# Patient Record
Sex: MALE | Race: WHITE | NOT HISPANIC OR LATINO | Employment: OTHER | URBAN - METROPOLITAN AREA
[De-identification: names, ages, dates, MRNs, and addresses within clinical notes are randomized per-mention and may not be internally consistent; named-entity substitution may affect disease eponyms.]

---

## 2017-01-05 ENCOUNTER — IMMUNIZATION (OUTPATIENT)
Dept: INTERNAL MEDICINE | Facility: CLINIC | Age: 57
End: 2017-01-05
Payer: MEDICARE

## 2017-01-05 ENCOUNTER — OFFICE VISIT (OUTPATIENT)
Dept: INTERNAL MEDICINE | Facility: CLINIC | Age: 57
End: 2017-01-05
Payer: MEDICARE

## 2017-01-05 VITALS
HEIGHT: 74 IN | BODY MASS INDEX: 32.48 KG/M2 | HEART RATE: 62 BPM | SYSTOLIC BLOOD PRESSURE: 122 MMHG | WEIGHT: 253.06 LBS | DIASTOLIC BLOOD PRESSURE: 72 MMHG

## 2017-01-05 DIAGNOSIS — F31.70 BIPOLAR DISORDER IN FULL REMISSION, MOST RECENT EPISODE UNSPECIFIED TYPE: Chronic | ICD-10-CM

## 2017-01-05 DIAGNOSIS — I10 ESSENTIAL HYPERTENSION: Primary | ICD-10-CM

## 2017-01-05 DIAGNOSIS — E78.5 HYPERLIPIDEMIA, UNSPECIFIED HYPERLIPIDEMIA TYPE: ICD-10-CM

## 2017-01-05 DIAGNOSIS — E55.9 VITAMIN D INSUFFICIENCY: ICD-10-CM

## 2017-01-05 DIAGNOSIS — Z12.5 PROSTATE CANCER SCREENING: ICD-10-CM

## 2017-01-05 DIAGNOSIS — R00.8 GALLOP RHYTHM: ICD-10-CM

## 2017-01-05 DIAGNOSIS — E66.9 OBESITY (BMI 30.0-34.9): ICD-10-CM

## 2017-01-05 PROCEDURE — 99213 OFFICE O/P EST LOW 20 MIN: CPT | Mod: PBBFAC,25 | Performed by: INTERNAL MEDICINE

## 2017-01-05 PROCEDURE — 99999 PR PBB SHADOW E&M-EST. PATIENT-LVL III: CPT | Mod: PBBFAC,,, | Performed by: INTERNAL MEDICINE

## 2017-01-05 PROCEDURE — 99214 OFFICE O/P EST MOD 30 MIN: CPT | Mod: S$PBB,,, | Performed by: INTERNAL MEDICINE

## 2017-01-05 PROCEDURE — G0008 ADMIN INFLUENZA VIRUS VAC: HCPCS | Mod: PBBFAC | Performed by: INTERNAL MEDICINE

## 2017-01-05 RX ORDER — QUETIAPINE FUMARATE 100 MG/1
TABLET, FILM COATED ORAL
Refills: 5 | COMMUNITY
Start: 2016-12-08 | End: 2018-10-07

## 2017-01-05 RX ORDER — LITHIUM CARBONATE 300 MG/1
CAPSULE ORAL
Refills: 3 | COMMUNITY
Start: 2016-11-08 | End: 2018-12-14

## 2017-01-05 RX ORDER — AMOXICILLIN 500 MG/1
CAPSULE ORAL
Refills: 0 | COMMUNITY
Start: 2016-11-29 | End: 2017-01-05

## 2017-01-05 RX ORDER — TRIAMCINOLONE ACETONIDE 1 MG/G
PASTE DENTAL
Refills: 0 | COMMUNITY
Start: 2016-11-29 | End: 2017-05-25

## 2017-01-05 NOTE — PROGRESS NOTES
Subjective:       Patient ID: Abel Dc III is a 56 y.o. male.    Chief Complaint: Follow-up    HPI    Last visit with me 03/2016. Since then seen by Internal Medicine and Orthopedics.     Reports health overall going well. Has some sinus congestion but not severe. Planning to go to dentist for tooth removal. otherwise feeling pretty good.     Now using Lithium 1 tab qAM and 2 tabs QHS. Was in [place] back in Dec of 2015, then from June 8 to 14 for worsening symptoms. Also developed dehydration. Reports appetite is mildly decreased due to changes in taste.    Not doing regular exercise. Only walks dogs in his yard.     Review of Systems   All other systems reviewed and are negative.      Objective:      Physical Exam   Constitutional: He is oriented to person, place, and time. No distress.    man whose Body mass index is 32.49 kg/(m^2).    HENT:   Head: Atraumatic.   Mouth/Throat: Oropharynx is clear and moist. No oropharyngeal exudate.   tympanic membrane obscured by cerumen bilaterally    Eyes: Pupils are equal, round, and reactive to light. Right eye exhibits no discharge. Left eye exhibits no discharge.   Neck: No thyromegaly present.   Cardiovascular: Normal rate and regular rhythm.  Exam reveals gallop and S4.    No murmur heard.  Pulmonary/Chest: Effort normal and breath sounds normal. He has no wheezes. He has no rales.   Abdominal: Soft. He exhibits no distension and no mass. There is no hepatosplenomegaly. There is no tenderness. There is no rigidity, no guarding and negative Christine's sign.   Musculoskeletal: He exhibits no edema or tenderness.   Lymphadenopathy:     He has no cervical adenopathy.   Neurological: He is alert and oriented to person, place, and time.   Skin: Skin is warm and dry. No rash noted.   Psychiatric: He has a normal mood and affect. His behavior is normal.   Nursing note and vitals reviewed.      Vitals:    01/05/17 0834   BP: 122/72   BP Location: Right arm  "  Patient Position: Sitting   BP Method: Manual   Pulse: 62   Weight: 114.8 kg (253 lb 1.4 oz)   Height: 6' 2" (1.88 m)     Body mass index is 32.49 kg/(m^2).    RESULTS: Reviewed labs from last 12 months. Reviewed stress test 2013.    Assessment:       1. Essential hypertension    2. Hyperlipidemia, unspecified hyperlipidemia type    3. Bipolar disorder in full remission, most recent episode unspecified type    4. Vitamin D insufficiency    5. Gallop rhythm    6. Prostate cancer screening    7. Obesity (BMI 30.0-34.9)        Plan:   Abel was seen today for follow-up.    Diagnoses and all orders for this visit:    Essential hypertension:  Prior diagnosis, well controlled on current management.   -     Comprehensive metabolic panel; Future  -     CBC Without Differential; Future    Hyperlipidemia, unspecified hyperlipidemia type:  Prior diagnosis, well controlled on current management.   -     Lipid panel; Future    Bipolar disorder in full remission, most recent episode unspecified type:  Stable, continue follow up with Psychiatry.    Vitamin D insufficiency:  Low levels, recheck, if decreased further start supplement.  -     Vitamin D; Future    Gallop rhythm:  Asymptomatic, known LVH, continue blood pressure control.    Prostate cancer screening  -     PSA, Screening; Future    Obesity (BMI 30.0-34.9):  Pt instructed to increase walking to help decrease weight:  "Please start walking more frequently, at least 30 minutes on most days out of the week."    Return in about 3 months (around 4/5/2017).     Jonah Harrison MD  Internal Medicine    Portions of this note were completed using [employer] dictation software. Please excuse typographical or syntax errors.   "

## 2017-01-07 RX ORDER — SUCRALFATE 1 G/1
TABLET ORAL
Qty: 360 TABLET | Refills: 0 | Status: SHIPPED | OUTPATIENT
Start: 2017-01-07 | End: 2017-03-31 | Stop reason: SDUPTHER

## 2017-01-09 ENCOUNTER — DOCUMENTATION ONLY (OUTPATIENT)
Dept: REHABILITATION | Facility: HOSPITAL | Age: 57
End: 2017-01-09

## 2017-01-09 NOTE — PROGRESS NOTES
PHYSICAL THERAPY DISCHARGE SUMMARY     Name: Abel Dc III  Clinic Number: [id number]    Diagnosis:bilateral knee pain, difficulty walking  Physician: [facility], John MD  Treatment Orders: Discharge Summary  Past Medical History   Diagnosis Date    Bipolar disorder      manic depressive x 1992    BPH (benign prostatic hypertrophy)     Chronic low back pain     Depression     GERD (gastroesophageal reflux disease)     Gout, arthritis     Hyperlipidemia     Hypertension     Lumbar disc disease     Obesity     Panic disorder        Initial visit: 12/4/15  Date of Last visit: 12/7/15  Date of Discharge Note:  1/9/17  Total Visits Received: 2  Missed Visits: 0  ASSESSMENT   Status Towards Goals Met:      PHYSICAL THERAPY DISCHARGE SUMMARY     Name: Aebl Dc III  Clinic Number: [id number]    Diagnosis: bilateral knee pain, difficulty walking  Physician: [facility], John MD  Treatment Orders: Discharge Summary  Past Medical History   Diagnosis Date    Bipolar disorder      manic depressive x 1992    BPH (benign prostatic hypertrophy)     Chronic low back pain     Depression     GERD (gastroesophageal reflux disease)     Gout, arthritis     Hyperlipidemia     Hypertension     Lumbar disc disease     Obesity     Panic disorder        Initial visit: 12/4/15  Date of Last visit: 12/7/15  Date of Discharge Note:  1/9/17  Total Visits Received: 2  Missed Visits: 0  ASSESSMENT   Status Towards Goals Met:  Patient did not return to physical therapy.  Pt is discharged prior to achieving the goals established in the initial evaluation due to discontinuing physical therapy.     Goals Not achieved and why: see above    Discharge reason : Pt has not re-scheduled further follow-up sessions    G-CODE: Discharge g-code not filed due to discharge note being documentation only. Upon eval Pt was at CL at least 60% < 80% impaired, limited or restricted on the LEFS with g-code goal set at CK at least 40% < 60% impaired,  limited or restricted.    PLAN   This patient is discharged from Physical Therapy Services.     GOALS:   Short Term Goals: 4 weeks  1. Pt. to report decreased knee pain </= 6/10 at worst to increase tolerance for prolonged standing/ambulation  2. Pt. to demonstrate proper gait mechanics requiring min. to no verbal cues from PT  3. Pt. to demonstrate increased MMT for bilateral gluteus medius to 4-/5 to increase tolerance for community ambulation  5. Pt. to demonstrate increased MMT for quadriceps to 4/5 to increase tolerance to sit to standing transfers  6. Pt. to demonstrate increased MMT for hip flexor to 3/5 to increase ease with toe clearance and stair negotiation  7. Pt to report decreased pain with sit to stand transfers  8. Pt to tolerate HEP to improve ROM and independence with ADL's     Long Term Goals: 8 weeks  1. Pt. to report decreased knee pain </= 4/10 at worst to increase tolerance for community ambulation  2. Pt. to demonstrate proper gait mechanics requiring no verbal cues from PT  3. Pt. to demonstrate increased MMT for bilateral gluteus medius to 4-/5 to increase tolerance for community ambulation  4. Pt. to demonstrate increased MMT for quadriceps to 4/5 to increase tolerance to sit to standing transfers  5. Pt. to demonstrate increased MMT for hip flexor to 3/5 to increase ease with toe clearance and stair negotiation  6. Pt. to demonstrate increased MMT for hamstring strength to 4+/5 to increase ease with toe clearance and stair negotiation  7. Pt will report at CK at least 40% < 60% impaired, limited or restricted on LEFS to demonstrate increase in LE function with every day tasks.   8. Pt to be Independent with HEP to improve ROM and independence with ADL's

## 2017-01-27 RX ORDER — IBUPROFEN 800 MG/1
TABLET ORAL
Qty: 30 TABLET | Refills: 5 | Status: SHIPPED | OUTPATIENT
Start: 2017-01-27 | End: 2017-03-29 | Stop reason: SDUPTHER

## 2017-02-02 DIAGNOSIS — G25.81 RESTLESS LEG SYNDROME: ICD-10-CM

## 2017-02-03 RX ORDER — GABAPENTIN 300 MG/1
CAPSULE ORAL
Qty: 90 CAPSULE | Refills: 3 | Status: SHIPPED | OUTPATIENT
Start: 2017-02-03 | End: 2017-12-19 | Stop reason: SDUPTHER

## 2017-03-29 RX ORDER — IBUPROFEN 800 MG/1
TABLET ORAL
Qty: 30 TABLET | Refills: 0 | Status: SHIPPED | OUTPATIENT
Start: 2017-03-29 | End: 2017-04-08 | Stop reason: SDUPTHER

## 2017-03-31 RX ORDER — SUCRALFATE 1 G/1
TABLET ORAL
Qty: 360 TABLET | Refills: 0 | Status: SHIPPED | OUTPATIENT
Start: 2017-03-31 | End: 2019-05-13

## 2017-04-03 ENCOUNTER — HOSPITAL ENCOUNTER (EMERGENCY)
Facility: HOSPITAL | Age: 57
Discharge: HOME | End: 2017-04-03
Attending: FAMILY MEDICINE | Admitting: FAMILY MEDICINE
Payer: MEDICARE

## 2017-04-03 VITALS
HEIGHT: 72 IN | HEART RATE: 67 BPM | SYSTOLIC BLOOD PRESSURE: 139 MMHG | BODY MASS INDEX: 32.51 KG/M2 | OXYGEN SATURATION: 97 % | RESPIRATION RATE: 18 BRPM | WEIGHT: 240 LBS | TEMPERATURE: 97 F | DIASTOLIC BLOOD PRESSURE: 73 MMHG

## 2017-04-03 DIAGNOSIS — M10.062 ACUTE IDIOPATHIC GOUT OF LEFT KNEE: Primary | ICD-10-CM

## 2017-04-03 PROCEDURE — 99283 EMERGENCY DEPT VISIT LOW MDM: CPT | Mod: 25

## 2017-04-03 PROCEDURE — 96372 THER/PROPH/DIAG INJ SC/IM: CPT

## 2017-04-03 PROCEDURE — 63600175 PHARM REV CODE 636 W HCPCS: Performed by: FAMILY MEDICINE

## 2017-04-03 PROCEDURE — 99283 EMERGENCY DEPT VISIT LOW MDM: CPT | Mod: ,,, | Performed by: FAMILY MEDICINE

## 2017-04-03 RX ORDER — TRAMADOL HYDROCHLORIDE 50 MG/1
50 TABLET ORAL EVERY 6 HOURS PRN
Qty: 12 TABLET | Refills: 0 | Status: SHIPPED | OUTPATIENT
Start: 2017-04-03 | End: 2017-05-25

## 2017-04-03 RX ORDER — METHYLPREDNISOLONE 4 MG/1
TABLET ORAL
Qty: 1 PACKAGE | Refills: 0 | Status: SHIPPED | OUTPATIENT
Start: 2017-04-03 | End: 2017-04-24

## 2017-04-03 RX ORDER — TRIAMCINOLONE ACETONIDE 40 MG/ML
80 INJECTION, SUSPENSION INTRA-ARTICULAR; INTRAMUSCULAR
Status: COMPLETED | OUTPATIENT
Start: 2017-04-03 | End: 2017-04-03

## 2017-04-03 RX ADMIN — TRIAMCINOLONE ACETONIDE 80 MG: 40 INJECTION, SUSPENSION INTRA-ARTICULAR; INTRAMUSCULAR at 03:04

## 2017-04-03 NOTE — ED PROVIDER NOTES
"Encounter Date: 4/3/2017    SCRIBE #1 NOTE: I, Sally Ace , am scribing for, and in the presence of, Dr. Blunt .       History     Chief Complaint   Patient presents with    Knee Pain     L knee pain denies injury     Review of patient's allergies indicates:   Allergen Reactions    Hydrocodone      Other reaction(s): Hallucinations    Hydrocodone-acetaminophen      Other reaction(s): hyperactivity    Oxycodone Other (See Comments)     hallucinations    Pseudoephedrine      Other reaction(s): Hallucinations    Pseudoephedrine hcl      Other reaction(s): hyperactivity    Risperidone      Other reaction(s): Hallucinations  Other reaction(s): hyperactivity    Naproxen      HPI Comments: Time seen by provider: 2:47 PM    This is a 56 y.o. male with PMHx of HTN, HLD, GERD, gout, and lumbar disc disease who presents with complaint of left knee pain for a few days. Patient c/o a lot of pain in his knee cap associated with knee warmth and swelling. Patient states that last week the pain was in his foot after he wore boots and the next day pt.states  "my foot was killing me." Patient denies injury when he was in the boots but they were just hard on his joints. Patient reports that he had problems with cartilage in his knees years ago but did not have to have any surgeries. He also states that he has a history of gout but he has never had it in his knee. He has endorsed 800 mg of ibuprofen a day but it has not relieved the knee pain.     The history is provided by the patient and medical records.     Past Medical History:   Diagnosis Date    Bipolar disorder     manic depressive x 1992    BPH (benign prostatic hypertrophy)     Chronic low back pain     Depression     GERD (gastroesophageal reflux disease)     Gout, arthritis     Hyperlipidemia     Hypertension     Lumbar disc disease     Obesity     Panic disorder      Past Surgical History:   Procedure Laterality Date    HERNIA REPAIR      HUMERUS " "FRACTURE SURGERY  1971    Bone grafts required    MANDIBLE FRACTURE SURGERY      MUSCLE TRANSFER UPPER ARM      SHOULDER ARTHROSCOPY      SHOULDER SURGERY      TONSILLECTOMY       Family History   Problem Relation Age of Onset    Diabetes Mother     Breast cancer Mother      Social History   Substance Use Topics    Smoking status: Never Smoker    Smokeless tobacco: Current User     Types: Snuff      Comment: Patient uses "dip" tobacco    Alcohol use No      Comment: Heavy alcohol use in the past.      Review of Systems   Musculoskeletal: Positive for joint swelling.        Positive for knee pain       Physical Exam   Initial Vitals   BP Pulse Resp Temp SpO2   04/03/17 1324 04/03/17 1324 04/03/17 1324 04/03/17 1324 04/03/17 1324   127/60 63 18 97.7 °F (36.5 °C) 98 %     Physical Exam    Nursing note and vitals reviewed.  HENT:   Head: Atraumatic.   Cardiovascular: Normal rate, regular rhythm and normal heart sounds.   No murmur heard.  Pulmonary/Chest: Breath sounds normal. No respiratory distress. He has no wheezes. He has no rhonchi. He has no rales.   Abdominal: Soft. There is no tenderness.   Musculoskeletal:   Left knee warm to touch, mild effusion. Moderate diffuse joint tenderness. No gross instability.    Neurological: He is alert and oriented to person, place, and time.         ED Course   Procedures  Labs Reviewed - No data to display          Medical Decision Making:   History:   Old Medical Records: I decided to obtain old medical records.  ED Management:  Patient with clinical history suggestive of gout stable for discharge.  No imaging studies required  Prescriptions for Medrol Dosepak and tramadol for pain provided for patient            Scribe Attestation:   Scribe #1: I performed the above scribed service and the documentation accurately describes the services I performed. I attest to the accuracy of the note.    Attending Attestation:           Physician Attestation for Scribe:  Physician " Attestation Statement for Scribe #1: I, Dr. Blunt , reviewed documentation, as scribed by Sally Bello  in my presence, and it is both accurate and complete.                 ED Course     Clinical Impression:   The encounter diagnosis was Acute idiopathic gout of left knee.    Disposition:   Disposition: Discharged  Condition: Stable       Ariel Blunt MD  04/04/17 1605       Ariel Blutn MD  04/07/17 2988

## 2017-04-03 NOTE — DISCHARGE INSTRUCTIONS
Gout Diet  Gout is a painful condition caused by an excess of uric acid, a waste product made by the body. Uric acid forms crystals that collect in the joints. The immune response to these crystals brings on symptoms of joint pain and swelling. This is called a gout attack. Often, medications and diet changes are combined to manage gout. Below are some guidelines for changing your diet to help you manage gout and prevent attacks. Your health care provider will help you determine the best eating plan for you.     Eating to manage gout  Weight loss for those who are overweight may help reduce gout attacks.  Eat less of these foods  Eating too many foods containing purines may raise the levels of uric acid in your body. This raises your risk for a gout attack. Try to limit these foods and drinks:  · Alcohol, such as beer and red wine. You may be told to avoid alcohol completely.  · Soft drinks that contain sugar or high fructose corn syrup  · Certain fish, including anchovies, sardines, fish eggs, and herring  · Shellfish  · Certain meats, such as red meat, hot dogs, luncheon meats, and turkey  · Organ meats, such as liver, kidneys, and sweetbreads  · Legumes, such as dried beans and peas  · Other high fat foods such as gravy, whole milk, and high fat cheeses  · Vegetables such as asparagus, cauliflower, spinach, and mushrooms used to be thought to contribute to an increased risk for a gout attack, but recent studies show that high purine vegetables don't increase the risk for a gout attack.  Eat more of these foods  Other foods may be helpful for people with gout. Add some of these foods to your diet:  · Cherries contain chemicals that may lower uric acid.  · Omega fatty acids. These are found in some fatty fish such as salmon, certain oils (flax, olive, or nut), and nuts themselves. Omega fatty acids may help prevent inflammation due to gout.  · Dairy products that are low-fat or fat-free, such as cheese and  yogurt  · Complex carbohydrate foods, including whole grains, brown rice, oats, and beans  · Coffee, in moderation  · Water, approximately 64 ounces per day  Follow-up care  Follow up with your healthcare provider as advised.  When to seek medical advice  Call your healthcare provider right away if any of these occur:  · Return of gout symptoms, usually at night:  · Severe pain, swelling, and heat in a joint, especially the base of the big toe  · Affected joint is hard to move  · Skin of the affected joint is purple or red  · Fever of 100.4°F (38°C) or higher  · Pain that doesn't get better even with prescribed medicine   Date Last Reviewed: 1/12/2016  © 0949-0258 [employer]. [address] [place] [address], [place] [address]. All rights reserved. This information is not intended as a substitute for professional medical care. Always follow your healthcare professional's instructions.          Treating Gout Attacks     Raising the joint above the level of your heart can help reduce gout symptoms.     Gout is a disease that affects the joints. It is caused by excess uric acid in your blood stream that may lead to crystals forming in your joints. Left untreated, it can lead to painful foot and joint deformities and even kidney problems. But, by treating gout early, you can relieve pain and help prevent future problems. Gout can usually be treated with medication and proper diet. In severe cases, surgery may be needed.  Gout attacks are painful and often happen more than once. Taking medications may reduce pain and prevent attacks in the future. There are also some things you can do at home to relieve symptoms.  Medications for gout  Your healthcare provider may prescribe a daily medication to reduce levels of uric acid. Reducing your uric acid levels may help prevent gout attacks. Allopurinol is one commonly used medication taken daily to reduce uric acid levels. Other medications can help relieve pain and  swelling during an acute attack. Medicines such as NSAIDs (nonsteroidal anti-inflammatory medicines), steroids, and colchicine may be prescribed for intermittent use to relieve an acute gout attack. Be sure to take your medication as directed.  What you can do  Below are some things you can do at home to relieve gout symptoms. Your healthcare provider may have other tips.  · Rest the painful joint as much as you can.  · Raise the painful joint so it is at a level higher than your heart.  · Use ice for 10 minutes every 1-2 hours as possible.  How can I prevent gout?  With a little effort, you may be able to prevent gout attacks in the future. Here are some things you can do:  · Avoid foods high in purines  ¨ Certain meats (red meat, processed meat, turkey)  ¨ Organ meats (kidney, liver, sweetbread)  ¨ Shellfish (lobster, crab, shrimp, scallop, mussel)  ¨ Certain fish (anchovy, sardine, herring, mackerel)  · Take any medications prescribed by your healthcare provider.  · Lose weight if you need to.  · Reduce high fructose corn syrup in meals and drinks.  · Reduce or eliminate consumption of alcohol, particularly beer, but also red wine and spirits.  · Control blood pressure, diabetes, and cholesterol.  · Drink plenty of water to help flush uric acid from your body.  Date Last Reviewed: 2/1/2016  © 5378-4576 [employer]. [address] [place] [address], [place] [address]. All rights reserved. This information is not intended as a substitute for professional medical care. Always follow your healthcare professional's instructions.

## 2017-04-03 NOTE — ED NOTES
Patient identifiers verified and correct for Abel Dc III.    LOC: The patient is awake, alert and aware of environment with an appropriate affect, the patient is oriented x 3 and speaking appropriately.  APPEARANCE: Patient resting comfortably and in no acute distress, patient is clean and well groomed, patient's clothing is properly fastened.  SKIN: The skin is warm and dry, color consistent with ethnicity, patient has normal skin turgor and moist mucus membranes, skin intact, no breakdown or bruising noted.  MUSCULOSKELETAL: Patient moving all extremities spontaneously, no obvious swelling or deformities noted.

## 2017-04-03 NOTE — ED NOTES
Discharge home with family, states understanding to follow up as directed. Ambulates out of ED without difficulty. RX given,

## 2017-04-03 NOTE — ED AVS SNAPSHOT
[facility]  [address] [facility] [address]               Abel Dc III   4/3/2017  2:39 PM   ED    Description:  Male : 1960   Department:  [facility]           Your Care was Coordinated By:     Provider Role From To    Ariel Blunt MD Attending Provider 17 1443 --      Reason for Visit     Knee Pain           Diagnoses this Visit        Comments    Acute idiopathic gout of left knee    -  Primary       ED Disposition     ED Disposition Condition Comment    Discharge  D/C after injection time           To Do List           Follow-up Information     Follow up with Jonah Harrison MD In 1 week.    Specialty:  Internal Medicine    Why:  As needed, If symptoms worsen or fail to resolve    Contact information:    [address]  [facility] [address]  [phone]         These Medications        Disp Refills Start End    methylPREDNISolone (MEDROL DOSEPACK) 4 mg tablet 1 Package 0 4/3/2017 2017    Take as directed x 6 days    Pharmacy: [facility] [address] AT [facility] Ph #: [phone]       tramadol (ULTRAM) 50 mg tablet 12 tablet 0 4/3/2017     Take 1 tablet (50 mg total) by mouth every 6 (six) hours as needed for Pain. - Oral    Pharmacy: [facility] [address] AT [facility] Ph #: [phone]         [facility]     [facility] -  Assistance  Unless otherwise directed by your provider, please contact [facility], our nurse care line that is available for  assistance.     Registered nurses in the [facility] provide: appointment scheduling, clinical advisement, health education, and other advisory services.  Call: [phone] (toll free)               Medications           Message regarding Medications     Verify the changes and/or additions  to your medication regime listed below are the same as discussed with your clinician today.  If any of these changes or additions are incorrect, please notify your healthcare provider.        START taking these NEW medications        Refills    methylPREDNISolone (MEDROL DOSEPACK) 4 mg tablet 0    Sig: Take as directed x 6 days    Class: Print    tramadol (ULTRAM) 50 mg tablet 0    Sig: Take 1 tablet (50 mg total) by mouth every 6 (six) hours as needed for Pain.    Class: Print    Route: Oral      These medications were administered today        Dose Freq    triamcinolone acetonide injection 80 mg 80 mg ED 1 Time    Sig: Inject 2 mLs (80 mg total) into the muscle ED 1 Time.    Class: Normal    Route: Intramuscular           Verify that the below list of medications is an accurate representation of the medications you are currently taking.  If none reported, the list may be blank. If incorrect, please contact your healthcare provider. Carry this list with you in case of emergency.           Current Medications     amlodipine (NORVASC) 10 MG tablet Take 1 tablet (10 mg total) by mouth once daily.    aspirin 81 mg Tab Take 1 tablet by mouth.    busPIRone (BUSPAR) 15 MG tablet Take 15 mg by mouth 3 (three) times daily.     fluoxetine (PROZAC) 20 MG capsule TK 1 C PO QAM.    fluticasone (FLONASE) 50 mcg/actuation nasal spray SPRAY ONCE IN EACH NOSTRIL ONCE DAILY    gabapentin (NEURONTIN) 300 MG capsule TAKE ONE CAPSULE BY MOUTH EVERY EVENING    ibuprofen (ADVIL,MOTRIN) 800 MG tablet TAKE 1 TABLET BY MOUTH EVERY 8 HOURS AS NEEDED FOR GOUT PAIN    lithium (ESKALITH) 300 MG capsule TK ONE C PO TID    methylPREDNISolone (MEDROL DOSEPACK) 4 mg tablet Take as directed x 6 days    omeprazole (PRILOSEC) 20 MG capsule TAKE ONE CAPSULE BY MOUTH EVERY DAY    oxybutynin (DITROPAN-XL) 10 MG 24 hr tablet Take 1 tablet (10 mg total) by mouth once daily.    pravastatin (PRAVACHOL) 40 MG tablet Take 1 tablet (40 mg total) by mouth  nightly.    propranolol (INDERAL) 20 MG tablet TAKE ONE TABLET BY MOUTH TWICE DAILY    quetiapine (SEROQUEL) 100 MG Tab TK 1 T PO QHS    quetiapine (SEROQUEL) 50 MG tablet TK 1 T PO TID    sucralfate (CARAFATE) 1 gram tablet TAKE 1 TABLET BY MOUTH FOUR TIMES DAILY    tamsulosin (FLOMAX) 0.4 mg Cp24 TAKE 1 CAPSULE BY MOUTH EVERY MORNING    tramadol (ULTRAM) 50 mg tablet Take 1 tablet (50 mg total) by mouth every 6 (six) hours as needed for Pain.    triamcinolone acetonide 0.1% (KENALOG) 0.1 % paste FIONA THIN AREA AA TID. PRESS A SMALL DAB TO LESION UNTIL A THIN FILM DEVELOPS.           Clinical Reference Information           Your Vitals Were     BP Pulse Temp Resp Height Weight    127/60 63 97.7 °F (36.5 °C) (Oral) 18 6' (1.829 m) 108.9 kg (240 lb)    SpO2 BMI             98% 32.55 kg/m2         Allergies as of 4/3/2017        Reactions    Hydrocodone     Other reaction(s): Hallucinations    Hydrocodone-acetaminophen     Other reaction(s): hyperactivity    Oxycodone Other (See Comments)    hallucinations    Pseudoephedrine     Other reaction(s): Hallucinations    Pseudoephedrine Hcl     Other reaction(s): hyperactivity    Risperidone     Other reaction(s): Hallucinations  Other reaction(s): hyperactivity    Naproxen       Immunizations Administered on Date of Encounter - 4/3/2017     None      ED Micro, Lab, POCT     None      ED Imaging Orders     None        Discharge Instructions         Gout Diet  Gout is a painful condition caused by an excess of uric acid, a waste product made by the body. Uric acid forms crystals that collect in the joints. The immune response to these crystals brings on symptoms of joint pain and swelling. This is called a gout attack. Often, medications and diet changes are combined to manage gout. Below are some guidelines for changing your diet to help you manage gout and prevent attacks. Your health care provider will help you determine the best eating plan for you.     Eating to manage  gout  Weight loss for those who are overweight may help reduce gout attacks.  Eat less of these foods  Eating too many foods containing purines may raise the levels of uric acid in your body. This raises your risk for a gout attack. Try to limit these foods and drinks:  · Alcohol, such as beer and red wine. You may be told to avoid alcohol completely.  · Soft drinks that contain sugar or high fructose corn syrup  · Certain fish, including anchovies, sardines, fish eggs, and herring  · Shellfish  · Certain meats, such as red meat, hot dogs, luncheon meats, and turkey  · Organ meats, such as liver, kidneys, and sweetbreads  · Legumes, such as dried beans and peas  · Other high fat foods such as gravy, whole milk, and high fat cheeses  · Vegetables such as asparagus, cauliflower, spinach, and mushrooms used to be thought to contribute to an increased risk for a gout attack, but recent studies show that high purine vegetables don't increase the risk for a gout attack.  Eat more of these foods  Other foods may be helpful for people with gout. Add some of these foods to your diet:  · Cherries contain chemicals that may lower uric acid.  · Omega fatty acids. These are found in some fatty fish such as salmon, certain oils (flax, olive, or nut), and nuts themselves. Omega fatty acids may help prevent inflammation due to gout.  · Dairy products that are low-fat or fat-free, such as cheese and yogurt  · Complex carbohydrate foods, including whole grains, brown rice, oats, and beans  · Coffee, in moderation  · Water, approximately 64 ounces per day  Follow-up care  Follow up with your healthcare provider as advised.  When to seek medical advice  Call your healthcare provider right away if any of these occur:  · Return of gout symptoms, usually at night:  · Severe pain, swelling, and heat in a joint, especially the base of the big toe  · Affected joint is hard to move  · Skin of the affected joint is purple or red  · Fever of  100.4°F (38°C) or higher  · Pain that doesn't get better even with prescribed medicine   Date Last Reviewed: 1/12/2016 © 2000-2016 [employer]. [address] [place] [address], [place] [address]. All rights reserved. This information is not intended as a substitute for professional medical care. Always follow your healthcare professional's instructions.          Treating Gout Attacks     Raising the joint above the level of your heart can help reduce gout symptoms.     Gout is a disease that affects the joints. It is caused by excess uric acid in your blood stream that may lead to crystals forming in your joints. Left untreated, it can lead to painful foot and joint deformities and even kidney problems. But, by treating gout early, you can relieve pain and help prevent future problems. Gout can usually be treated with medication and proper diet. In severe cases, surgery may be needed.  Gout attacks are painful and often happen more than once. Taking medications may reduce pain and prevent attacks in the future. There are also some things you can do at home to relieve symptoms.  Medications for gout  Your healthcare provider may prescribe a daily medication to reduce levels of uric acid. Reducing your uric acid levels may help prevent gout attacks. Allopurinol is one commonly used medication taken daily to reduce uric acid levels. Other medications can help relieve pain and swelling during an acute attack. Medicines such as NSAIDs (nonsteroidal anti-inflammatory medicines), steroids, and colchicine may be prescribed for intermittent use to relieve an acute gout attack. Be sure to take your medication as directed.  What you can do  Below are some things you can do at home to relieve gout symptoms. Your healthcare provider may have other tips.  · Rest the painful joint as much as you can.  · Raise the painful joint so it is at a level higher than your heart.  · Use ice for 10 minutes every 1-2 hours as  possible.  How can I prevent gout?  With a little effort, you may be able to prevent gout attacks in the future. Here are some things you can do:  · Avoid foods high in purines  ¨ Certain meats (red meat, processed meat, turkey)  ¨ Organ meats (kidney, liver, sweetbread)  ¨ Shellfish (lobster, crab, shrimp, scallop, mussel)  ¨ Certain fish (anchovy, sardine, herring, mackerel)  · Take any medications prescribed by your healthcare provider.  · Lose weight if you need to.  · Reduce high fructose corn syrup in meals and drinks.  · Reduce or eliminate consumption of alcohol, particularly beer, but also red wine and spirits.  · Control blood pressure, diabetes, and cholesterol.  · Drink plenty of water to help flush uric acid from your body.  Date Last Reviewed: 2/1/2016  © 1494-7387 [employer]. [address] [place] [address]. All rights reserved. This information is not intended as a substitute for professional medical care. Always follow your healthcare professional's instructions.          Your Scheduled Appointments     Apr 05, 2017  8:40 AM CDT   Established Patient Visit with MD Mario Farmer [facility] - Internal Medicine ([facility])    [address]  [place] [id number] [phone]              [facility] Sign-Up     Activating your [facility] account is as easy as 1-2-3!     1) Visit [email or web address], select Sign Up Now, enter this activation code and your date of birth, then select Next.  Activation code not generated  Current Patient Portal Status: Account disabled      2) Create a username and password to use when you visit [facility] in the future and select a security question in case you lose your password and select Next.    3) Enter your e-mail address and click Sign Up!    Additional Information  If you have questions, please e-mail [email or web address] or call [phone] to talk to our [facility] staff. Remember, [facility] is NOT  to be used for urgent needs. For medical emergencies, dial 911.          [facility] complies with applicable Federal civil rights laws and does not discriminate on the basis of race, color, national origin, age, disability, or sex.        Language Assistance Services     ATTENTION: Language assistance services are available, free of charge. Please call [phone].      ATENCIÓN: Si habla español, tiene a king disposición servicios gratuitos de asistencia lingüística. Llame al [phone].     CHÚ Ý: N?u b?n nói Ti?ng Vi?t, có các d?ch v? h? tr? ngôn ng? mi?n phí dành cho b?n. G?i s? [phone].

## 2017-04-03 NOTE — ED TRIAGE NOTES
Patient came in with a c/o left knee pain x's 3-4 days. Patient states that he's been taking Ibuprofen 800 mg three to four times a day without any relief.

## 2017-04-10 RX ORDER — IBUPROFEN 800 MG/1
TABLET ORAL
Qty: 30 TABLET | Refills: 3 | Status: SHIPPED | OUTPATIENT
Start: 2017-04-10 | End: 2017-04-22 | Stop reason: SDUPTHER

## 2017-04-24 RX ORDER — IBUPROFEN 800 MG/1
TABLET ORAL
Qty: 270 TABLET | Refills: 3 | Status: SHIPPED | OUTPATIENT
Start: 2017-04-24 | End: 2017-05-25

## 2017-05-19 ENCOUNTER — LAB VISIT (OUTPATIENT)
Dept: LAB | Facility: HOSPITAL | Age: 57
End: 2017-05-19
Attending: INTERNAL MEDICINE
Payer: MEDICARE

## 2017-05-19 DIAGNOSIS — E78.5 HYPERLIPIDEMIA, UNSPECIFIED HYPERLIPIDEMIA TYPE: ICD-10-CM

## 2017-05-19 DIAGNOSIS — Z12.5 PROSTATE CANCER SCREENING: ICD-10-CM

## 2017-05-19 DIAGNOSIS — I10 ESSENTIAL HYPERTENSION: ICD-10-CM

## 2017-05-19 DIAGNOSIS — E55.9 VITAMIN D INSUFFICIENCY: ICD-10-CM

## 2017-05-19 LAB
25(OH)D3+25(OH)D2 SERPL-MCNC: 51 NG/ML
ALBUMIN SERPL BCP-MCNC: 3.8 G/DL
ALP SERPL-CCNC: 91 U/L
ALT SERPL W/O P-5'-P-CCNC: 17 U/L
ANION GAP SERPL CALC-SCNC: 8 MMOL/L
AST SERPL-CCNC: 20 U/L
BILIRUB SERPL-MCNC: 0.9 MG/DL
BUN SERPL-MCNC: 8 MG/DL
CALCIUM SERPL-MCNC: 10 MG/DL
CHLORIDE SERPL-SCNC: 106 MMOL/L
CHOLEST/HDLC SERPL: 4.8 {RATIO}
CO2 SERPL-SCNC: 24 MMOL/L
COMPLEXED PSA SERPL-MCNC: 0.71 NG/ML
CREAT SERPL-MCNC: 1.5 MG/DL
ERYTHROCYTE [DISTWIDTH] IN BLOOD BY AUTOMATED COUNT: 13.5 %
EST. GFR  (AFRICAN AMERICAN): 59.3 ML/MIN/1.73 M^2
EST. GFR  (NON AFRICAN AMERICAN): 51.3 ML/MIN/1.73 M^2
GLUCOSE SERPL-MCNC: 96 MG/DL
HCT VFR BLD AUTO: 38.6 %
HDL/CHOLESTEROL RATIO: 20.9 %
HDLC SERPL-MCNC: 148 MG/DL
HDLC SERPL-MCNC: 31 MG/DL
HGB BLD-MCNC: 12.8 G/DL
LDLC SERPL CALC-MCNC: 93.4 MG/DL
MCH RBC QN AUTO: 29.8 PG
MCHC RBC AUTO-ENTMCNC: 33.2 %
MCV RBC AUTO: 90 FL
NONHDLC SERPL-MCNC: 117 MG/DL
PLATELET # BLD AUTO: 324 K/UL
PMV BLD AUTO: 10.4 FL
POTASSIUM SERPL-SCNC: 4 MMOL/L
PROT SERPL-MCNC: 7.6 G/DL
RBC # BLD AUTO: 4.29 M/UL
SODIUM SERPL-SCNC: 138 MMOL/L
TRIGL SERPL-MCNC: 118 MG/DL
WBC # BLD AUTO: 9.75 K/UL

## 2017-05-19 PROCEDURE — 85027 COMPLETE CBC AUTOMATED: CPT

## 2017-05-19 PROCEDURE — 80061 LIPID PANEL: CPT

## 2017-05-19 PROCEDURE — 80053 COMPREHEN METABOLIC PANEL: CPT

## 2017-05-19 PROCEDURE — 82306 VITAMIN D 25 HYDROXY: CPT

## 2017-05-19 PROCEDURE — 36415 COLL VENOUS BLD VENIPUNCTURE: CPT

## 2017-05-19 PROCEDURE — 84153 ASSAY OF PSA TOTAL: CPT

## 2017-05-25 ENCOUNTER — HOSPITAL ENCOUNTER (OUTPATIENT)
Dept: RADIOLOGY | Facility: HOSPITAL | Age: 57
Discharge: HOME | End: 2017-05-25
Attending: INTERNAL MEDICINE
Payer: MEDICARE

## 2017-05-25 ENCOUNTER — OFFICE VISIT (OUTPATIENT)
Dept: INTERNAL MEDICINE | Facility: CLINIC | Age: 57
End: 2017-05-25
Payer: MEDICARE

## 2017-05-25 VITALS
DIASTOLIC BLOOD PRESSURE: 76 MMHG | BODY MASS INDEX: 32.85 KG/M2 | HEART RATE: 62 BPM | SYSTOLIC BLOOD PRESSURE: 126 MMHG | HEIGHT: 74 IN | WEIGHT: 255.94 LBS

## 2017-05-25 DIAGNOSIS — E78.5 HYPERLIPIDEMIA: ICD-10-CM

## 2017-05-25 DIAGNOSIS — B36.0 TINEA VERSICOLOR: ICD-10-CM

## 2017-05-25 DIAGNOSIS — M25.562 ARTHRALGIA OF BOTH KNEES: ICD-10-CM

## 2017-05-25 DIAGNOSIS — D64.9 NORMOCYTIC ANEMIA: ICD-10-CM

## 2017-05-25 DIAGNOSIS — R79.89 ELEVATED SERUM CREATININE: Primary | ICD-10-CM

## 2017-05-25 DIAGNOSIS — Z79.899 LITHIUM USE: ICD-10-CM

## 2017-05-25 DIAGNOSIS — M25.561 ARTHRALGIA OF BOTH KNEES: ICD-10-CM

## 2017-05-25 DIAGNOSIS — F41.0 PANIC ATTACKS: ICD-10-CM

## 2017-05-25 DIAGNOSIS — G25.81 RESTLESS LEG: ICD-10-CM

## 2017-05-25 DIAGNOSIS — E79.0 ELEVATED URIC ACID IN BLOOD: ICD-10-CM

## 2017-05-25 LAB
BILIRUB UR QL STRIP: NEGATIVE
CLARITY UR REFRACT.AUTO: CLEAR
COLOR UR AUTO: YELLOW
GLUCOSE UR QL STRIP: NEGATIVE
HGB UR QL STRIP: NEGATIVE
KETONES UR QL STRIP: NEGATIVE
LEUKOCYTE ESTERASE UR QL STRIP: NEGATIVE
NITRITE UR QL STRIP: NEGATIVE
PH UR STRIP: 7 [PH] (ref 5–8)
PROT UR QL STRIP: NEGATIVE
SP GR UR STRIP: 1 (ref 1–1.03)
URN SPEC COLLECT METH UR: NORMAL
UROBILINOGEN UR STRIP-ACNC: NEGATIVE EU/DL

## 2017-05-25 PROCEDURE — 73562 X-RAY EXAM OF KNEE 3: CPT | Mod: 26,50,, | Performed by: RADIOLOGY

## 2017-05-25 PROCEDURE — 73562 X-RAY EXAM OF KNEE 3: CPT | Mod: TC,50

## 2017-05-25 PROCEDURE — 99999 PR PBB SHADOW E&M-EST. PATIENT-LVL III: CPT | Mod: PBBFAC,,, | Performed by: INTERNAL MEDICINE

## 2017-05-25 PROCEDURE — 99213 OFFICE O/P EST LOW 20 MIN: CPT | Mod: PBBFAC,25 | Performed by: INTERNAL MEDICINE

## 2017-05-25 PROCEDURE — 99215 OFFICE O/P EST HI 40 MIN: CPT | Mod: S$PBB,,, | Performed by: INTERNAL MEDICINE

## 2017-05-25 RX ORDER — ACETAMINOPHEN 500 MG
1 TABLET ORAL DAILY
COMMUNITY

## 2017-05-25 RX ORDER — NAPROXEN 500 MG/1
TABLET ORAL
Refills: 11 | COMMUNITY
Start: 2017-03-02 | End: 2017-05-25

## 2017-05-25 RX ORDER — KETOCONAZOLE 20 MG/ML
SHAMPOO, SUSPENSION TOPICAL DAILY
Qty: 120 ML | Refills: 2 | Status: SHIPPED | OUTPATIENT
Start: 2017-05-25 | End: 2017-10-15 | Stop reason: SDUPTHER

## 2017-05-25 RX ORDER — MELOXICAM 15 MG/1
15 TABLET ORAL DAILY PRN
Qty: 30 TABLET | Refills: 11 | Status: SHIPPED | OUTPATIENT
Start: 2017-05-25 | End: 2018-05-14 | Stop reason: SDUPTHER

## 2017-05-25 NOTE — PATIENT INSTRUCTIONS
Start meloxicam (Mobic) daily for knee pain. Stop ibuprofen (Motrin) and naproxen (Aleve). Please also use over-the-counter extra-strength Tylenol 500 mg, 1 tab every 4 hours as needed for pain.

## 2017-05-25 NOTE — PROGRESS NOTES
Subjective:       Patient ID: Abel Dc III is a 56 y.o. male.    Chief Complaint: Follow-up    HPI    Patient is accompanied by wife, Jillian.    Last visit with me 01/2017. Was using ibuprofen QID. Pain is in knees bilaterally. Had pain and swelling in ankle at one point. Restarted [place] by outside Psychiatry. Still drinking water well.    Also with dark spots on back that burn and itch when on the sun.    Will get bad nightmares that leads to bed wetting.    Review of Systems    As per HPI    Objective:      Physical Exam   Constitutional: He is oriented to person, place, and time. No distress.   HENT:   Head: Atraumatic.   Right Ear: Tympanic membrane normal.   Left Ear: Tympanic membrane normal.   Mouth/Throat: Oropharynx is clear and moist. No oropharyngeal exudate.   Eyes: Pupils are equal, round, and reactive to light. Right eye exhibits no discharge. Left eye exhibits no discharge.   Neck: Normal range of motion. No thyromegaly present.   Cardiovascular: Normal rate and regular rhythm.  Exam reveals gallop and S4.    Pulmonary/Chest: Effort normal and breath sounds normal. He has no wheezes. He has no rales.   Abdominal: Soft. He exhibits no distension and no mass. There is no hepatosplenomegaly. There is no tenderness. There is no rigidity, no guarding, no CVA tenderness and negative Christine's sign.   Musculoskeletal: He exhibits no edema or tenderness.   bilateral knees without effusion or tenderness to palpation    Lymphadenopathy:     He has no cervical adenopathy.   Neurological: He is alert and oriented to person, place, and time.   Skin: Skin is warm and dry.   Light brown hyperpigmented patches located throughout upper back bilaterally and along armpits.   Psychiatric: He has a normal mood and affect. His behavior is normal.   Nursing note and vitals reviewed.      Vitals:    05/25/17 1535   BP: 126/76   BP Location: Right arm   Patient Position: Sitting   BP Method: Manual   Pulse: 62   Weight:  "116.1 kg (255 lb 15.3 oz)   Height: 6' 2" (1.88 m)     Body mass index is 32.86 kg/m².    RESULTS: Reviewed labs from last 6 months    Assessment:       1. Elevated serum creatinine    2. Normocytic anemia    3. Arthralgia of both knees    4. Elevated uric acid in blood    5. Lithium use    6. Tinea versicolor    7. Restless leg        Plan:   Abel was seen today for follow-up.    Diagnoses and all orders for this visit:    Elevated serum creatinine:  Noted on recent labs, possibly due to overuse of NSAIDS, counseled re risks of excess NSAID use, encouraged regular hydration, send to lab today.  -     Basic metabolic panel; Future  -     Magnesium; Future  -     Urinalysis    Normocytic anemia:  Seen on prior labs, check again along with iron studies.  -     CBC Without Differential; Future  -     Ferritin; Future  -     Iron and TIBC; Future    Arthralgia of both knees:  osteoarthritis vs gout, check UA levels, restart Mobic, do not use other NSAIDS while on this medication.  -     Uric acid; Future  -     X-ray Knee Ortho Bilateral; Future  -     meloxicam (MOBIC) 15 MG tablet; Take 1 tablet (15 mg total) by mouth daily as needed for Pain (Knee pain).    Elevated uric acid in blood:  Recheck, may be having gout arthropathy leading to pain, if still elevated start allopurinol.  -     Uric acid; Future    Lithium use:  continue follow up with outside Psychiatry   -     Lithium level; Future    Tinea versicolor:  Back and armpits, start antifungal.  -     ketoconazole (NIZORAL) 2 % shampoo; Apply topically once daily.    Restless leg:  Check iron levels, if ferritin <75 start iron supplement.    Return in about 3 months (around 8/25/2017).  Jonah Harrison MD  Internal Medicine    Portions of this note were completed using [employer] dictation software. Please excuse typographical or syntax errors.   "

## 2017-05-26 RX ORDER — PRAVASTATIN SODIUM 40 MG/1
TABLET ORAL
Qty: 90 TABLET | Refills: 3 | Status: SHIPPED | OUTPATIENT
Start: 2017-05-26 | End: 2018-05-14 | Stop reason: SDUPTHER

## 2017-05-26 RX ORDER — PROPRANOLOL HYDROCHLORIDE 20 MG/1
TABLET ORAL
Qty: 180 TABLET | Refills: 3 | Status: SHIPPED | OUTPATIENT
Start: 2017-05-26 | End: 2018-05-14 | Stop reason: SDUPTHER

## 2017-05-29 ENCOUNTER — TELEPHONE (OUTPATIENT)
Dept: INTERNAL MEDICINE | Facility: CLINIC | Age: 57
End: 2017-05-29

## 2017-05-29 DIAGNOSIS — M1A.9XX0 CHRONIC GOUT WITHOUT TOPHUS, UNSPECIFIED CAUSE, UNSPECIFIED SITE: Primary | ICD-10-CM

## 2017-05-29 RX ORDER — ALLOPURINOL 100 MG/1
100 TABLET ORAL DAILY
Qty: 30 TABLET | Refills: 11 | Status: SHIPPED | OUTPATIENT
Start: 2017-05-29 | End: 2017-10-16 | Stop reason: SDUPTHER

## 2017-05-29 NOTE — TELEPHONE ENCOUNTER
Please call the patient to notify that his uric acid levels are high, which is likely causing his gout flares. I am starting him on allopurinol once daily to lower his uric acid levels. We need to recheck his labs in two weeks to see if things are at goal; if not we will increase the dose. Please schedule nonfasting labs in 2 weeks.    His kidney function is starting to look better.  I recommend he continue to use only meloxicam instead of ibuprofen or Aleve, as we discussed in our visit.    All other labs are stable or within normal limits. I have sent the results in a letter with more information.

## 2017-05-30 NOTE — TELEPHONE ENCOUNTER
Spoke to patient and informed---Please advise on lab order for 2 weeks so we can schedule and mailed to patient----schedule lab appt on any day around 10 am and mail to patient---no need to call patient back

## 2017-06-05 RX ORDER — IBUPROFEN 800 MG/1
TABLET ORAL
Qty: 30 TABLET | Refills: 0 | OUTPATIENT
Start: 2017-06-05

## 2017-06-24 DIAGNOSIS — J30.9 ALLERGIC RHINITIS: ICD-10-CM

## 2017-06-26 RX ORDER — FLUTICASONE PROPIONATE 50 MCG
SPRAY, SUSPENSION (ML) NASAL
Qty: 16 G | Refills: 11 | Status: SHIPPED | OUTPATIENT
Start: 2017-06-26 | End: 2019-04-11 | Stop reason: SDUPTHER

## 2017-08-23 RX ORDER — OMEPRAZOLE 20 MG/1
20 CAPSULE, DELAYED RELEASE ORAL DAILY
Qty: 90 CAPSULE | Refills: 3 | Status: SHIPPED | OUTPATIENT
Start: 2017-08-23 | End: 2018-08-13 | Stop reason: SDUPTHER

## 2017-10-13 ENCOUNTER — LAB VISIT (OUTPATIENT)
Dept: LAB | Facility: HOSPITAL | Age: 57
End: 2017-10-13
Attending: INTERNAL MEDICINE
Payer: MEDICARE

## 2017-10-13 ENCOUNTER — OFFICE VISIT (OUTPATIENT)
Dept: INTERNAL MEDICINE | Facility: CLINIC | Age: 57
End: 2017-10-13
Payer: MEDICARE

## 2017-10-13 ENCOUNTER — IMMUNIZATION (OUTPATIENT)
Dept: INTERNAL MEDICINE | Facility: CLINIC | Age: 57
End: 2017-10-13
Payer: MEDICARE

## 2017-10-13 VITALS
HEART RATE: 62 BPM | HEIGHT: 74 IN | SYSTOLIC BLOOD PRESSURE: 100 MMHG | BODY MASS INDEX: 32.88 KG/M2 | WEIGHT: 256.19 LBS | DIASTOLIC BLOOD PRESSURE: 68 MMHG

## 2017-10-13 DIAGNOSIS — I95.1 ORTHOSTATIC HYPOTENSION: Primary | ICD-10-CM

## 2017-10-13 DIAGNOSIS — M1A.9XX0 CHRONIC GOUT WITHOUT TOPHUS, UNSPECIFIED CAUSE, UNSPECIFIED SITE: ICD-10-CM

## 2017-10-13 DIAGNOSIS — Z23 NEED FOR INFLUENZA VACCINATION: ICD-10-CM

## 2017-10-13 DIAGNOSIS — Z79.899 ENCOUNTER FOR LITHIUM MONITORING: ICD-10-CM

## 2017-10-13 DIAGNOSIS — Z51.81 ENCOUNTER FOR LITHIUM MONITORING: ICD-10-CM

## 2017-10-13 DIAGNOSIS — R43.8 METALLIC TASTE: ICD-10-CM

## 2017-10-13 DIAGNOSIS — K08.9 TOOTH DISEASE: ICD-10-CM

## 2017-10-13 LAB
LITHIUM SERPL-SCNC: 1.2 MMOL/L
TSH SERPL DL<=0.005 MIU/L-ACNC: 2.81 UIU/ML
URATE SERPL-MCNC: 7.2 MG/DL

## 2017-10-13 PROCEDURE — 80178 ASSAY OF LITHIUM: CPT

## 2017-10-13 PROCEDURE — 84443 ASSAY THYROID STIM HORMONE: CPT

## 2017-10-13 PROCEDURE — 36415 COLL VENOUS BLD VENIPUNCTURE: CPT

## 2017-10-13 PROCEDURE — 99214 OFFICE O/P EST MOD 30 MIN: CPT | Mod: S$PBB,,, | Performed by: INTERNAL MEDICINE

## 2017-10-13 PROCEDURE — 99999 PR PBB SHADOW E&M-EST. PATIENT-LVL III: CPT | Mod: PBBFAC,,, | Performed by: INTERNAL MEDICINE

## 2017-10-13 PROCEDURE — 99213 OFFICE O/P EST LOW 20 MIN: CPT | Mod: PBBFAC,25 | Performed by: INTERNAL MEDICINE

## 2017-10-13 PROCEDURE — 84550 ASSAY OF BLOOD/URIC ACID: CPT

## 2017-10-13 PROCEDURE — G0008 ADMIN INFLUENZA VIRUS VAC: HCPCS | Mod: PBBFAC

## 2017-10-13 RX ORDER — AMOXICILLIN 500 MG/1
CAPSULE ORAL
Refills: 0 | COMMUNITY
Start: 2017-10-03 | End: 2017-10-13 | Stop reason: ALTCHOICE

## 2017-10-13 RX ORDER — IBUPROFEN 800 MG/1
TABLET ORAL
Refills: 3 | COMMUNITY
Start: 2017-08-12 | End: 2018-05-03

## 2017-10-13 NOTE — PROGRESS NOTES
"Anesthesia Post Evaluation    Patient: Elmo Bray    Procedure(s) Performed: Procedure(s) (LRB):  REPAIR-HERNIA-INGUINAL-BILATERAL-LAPAROSCOPIC w/ mesh (Bilateral)    Final Anesthesia Type: general  Patient location during evaluation: PACU  Patient participation: Yes- Able to Participate  Level of consciousness: awake and alert  Post-procedure vital signs: reviewed and stable  Pain management: adequate  Airway patency: patent  PONV status at discharge: No PONV  Anesthetic complications: no      Cardiovascular status: blood pressure returned to baseline  Respiratory status: unassisted  Hydration status: euvolemic  Follow-up not needed.        Visit Vitals  /67   Pulse 65   Temp 36.8 °C (98.3 °F) (Skin)   Resp 18   Ht 5' 11" (1.803 m)   Wt 88 kg (194 lb)   SpO2 98%   BMI 27.06 kg/m²       Pain/Nahum Score: Pain Assessment Performed: Yes (8/21/2017  4:15 PM)  Presence of Pain: denies (8/21/2017  4:15 PM)  Pain Rating Prior to Med Admin: 5 (8/21/2017  3:00 PM)  Nahum Score: 9 (8/21/2017  3:15 PM)      " "Subjective:       Patient ID: Abel Dc III is a 56 y.o. male.    Chief Complaint: Follow-up    HPI    Last visit with me 05/2017. Still with wife currently. Reports things with health has been okay. Been feeling okay as far as "regular stuff", but when taking meds together he will feel dizzy. especially when getting up suddenly.    Working to get set up with dentist for tooth extraction. Likely oral surgeon needed. No gout flares recently.    Metallic taste going on. Has been on Li for a long time.     Review of Systems    As per HPI    Objective:      Physical Exam   Constitutional: He is oriented to person, place, and time. No distress.    man whose Body mass index is 32.89 kg/m².    Neurological: He is alert and oriented to person, place, and time.   Psychiatric: He has a normal mood and affect. His behavior is normal.   Nursing note and vitals reviewed.      Vitals:    10/13/17 0922   BP: 100/68   BP Location: Right arm   Patient Position: Sitting   BP Method: Large (Manual)   Pulse: 62   Weight: 116.2 kg (256 lb 2.8 oz)   Height: 6' 2" (1.88 m)     Body mass index is 32.89 kg/m².    RESULTS: Reviewed labs from last 6 months    Assessment:       1. Orthostatic hypotension    2. Chronic gout without tophus, unspecified cause, unspecified site    3. Tooth disease    4. Metallic taste    5. Encounter for lithium monitoring    6. Need for influenza vaccination        Plan:   Abel was seen today for follow-up.    Diagnoses and all orders for this visit:    Orthostatic hypotension:  Low blood pressure today, try stopping Norvasc, continue to monitor home blood pressure and notify office if 140/90 or greater.    Chronic gout without tophus, unspecified cause, unspecified site:  Last uric acid elevated, on allopurinol 100, recheck levels today.  -     Uric acid; Future    Tooth disease:  Ongoing problem, pt is planning to work with outside dentistry for extraction.    Metallic taste:  Likely due to Li, " NSAIDs can raise Li levels, check on labs and follow up with outside Psychiatry.    Encounter for lithium monitoring  -     Lithium level; Future  -     TSH; Future    Need for influenza vaccination    Return in about 4 months (around 2/13/2018). Assess status of chronic conditions, make sure blood pressure is good off of amlodipine.  Jonah Harrison MD  Internal Medicine    Portions of this note were completed using [employer] dictation software. Please excuse typographical or syntax errors.

## 2017-10-13 NOTE — PATIENT INSTRUCTIONS
Please stop amlodipine 10 mg to limit lightheadedness. Try to monitor the blood pressure at least once per week. Goal is top number 139 or less, and bottom 89 or less. Optimal blood pressure is top around 120s and bottom low 80s. If the blood pressure is top 140 or bottom 90 two times in a row, please notify the office.

## 2017-10-15 DIAGNOSIS — B36.0 TINEA VERSICOLOR: ICD-10-CM

## 2017-10-15 DIAGNOSIS — M1A.9XX0 CHRONIC GOUT WITHOUT TOPHUS, UNSPECIFIED CAUSE, UNSPECIFIED SITE: ICD-10-CM

## 2017-10-16 DIAGNOSIS — M1A.9XX0 CHRONIC GOUT WITHOUT TOPHUS, UNSPECIFIED CAUSE, UNSPECIFIED SITE: ICD-10-CM

## 2017-10-16 RX ORDER — KETOCONAZOLE 20 MG/ML
SHAMPOO, SUSPENSION TOPICAL
Qty: 120 ML | Refills: 0 | Status: SHIPPED | OUTPATIENT
Start: 2017-10-16 | End: 2021-08-02

## 2017-10-16 RX ORDER — ALLOPURINOL 100 MG/1
TABLET ORAL
Qty: 180 TABLET | Refills: 3 | Status: SHIPPED | OUTPATIENT
Start: 2017-10-16 | End: 2018-08-14 | Stop reason: SDUPTHER

## 2017-10-16 RX ORDER — ALLOPURINOL 100 MG/1
200 TABLET ORAL DAILY
Qty: 60 TABLET | Refills: 11 | Status: SHIPPED | OUTPATIENT
Start: 2017-10-16 | End: 2017-10-16 | Stop reason: SDUPTHER

## 2017-10-16 NOTE — TELEPHONE ENCOUNTER
Please call the patient to notify that his uric acid is still elevated, which increases his risk of gout flares. I would like him to start taking 2 pills of allopurinol 100 mg once daily. I will send in more tablets to his pharmacy. All his other labs are normal including Lithium levels. I have sent the results in a letter.

## 2017-11-10 ENCOUNTER — TELEPHONE (OUTPATIENT)
Dept: INTERNAL MEDICINE | Facility: CLINIC | Age: 57
End: 2017-11-10

## 2017-11-10 NOTE — TELEPHONE ENCOUNTER
----- Message from Cherry Bloom sent at 11/10/2017 10:11 AM CST -----  Contact: Self/ [phone]   Pt wanted to let the doctor know his blood pressure has been running 149/90. Pt want to know if he need to go back on his blood pressure Rx. Please call and advise     Thank you

## 2017-11-10 NOTE — TELEPHONE ENCOUNTER
The number listed in the message is not correct  So I called the number listed as the home number which is the cell and I can not leave a msg bc the mailbox is full

## 2017-11-18 ENCOUNTER — TELEPHONE (OUTPATIENT)
Dept: INTERNAL MEDICINE | Facility: CLINIC | Age: 57
End: 2017-11-18

## 2017-11-18 DIAGNOSIS — I10 ESSENTIAL HYPERTENSION: ICD-10-CM

## 2017-11-19 RX ORDER — AMLODIPINE BESYLATE 10 MG/1
TABLET ORAL
Qty: 90 TABLET | Refills: 0 | OUTPATIENT
Start: 2017-11-19

## 2017-11-19 NOTE — TELEPHONE ENCOUNTER
Refused refill request for amlodipine, this was stopped in October given concerns for low blood pressure.  Please call to see if the patient has noted high blood pressure at home, if so we may need to start at a low dose.

## 2017-12-19 ENCOUNTER — TELEPHONE (OUTPATIENT)
Dept: INTERNAL MEDICINE | Facility: CLINIC | Age: 57
End: 2017-12-19

## 2017-12-19 DIAGNOSIS — I10 ESSENTIAL HYPERTENSION: ICD-10-CM

## 2017-12-19 DIAGNOSIS — G25.81 RESTLESS LEG SYNDROME: ICD-10-CM

## 2017-12-20 RX ORDER — GABAPENTIN 300 MG/1
CAPSULE ORAL
Qty: 90 CAPSULE | Refills: 3 | Status: SHIPPED | OUTPATIENT
Start: 2017-12-20 | End: 2018-07-12

## 2017-12-20 RX ORDER — AMLODIPINE BESYLATE 10 MG/1
TABLET ORAL
Qty: 90 TABLET | Refills: 0 | OUTPATIENT
Start: 2017-12-20

## 2017-12-20 NOTE — TELEPHONE ENCOUNTER
I am not refilling amlodipine. This med was discontinued on 10/2017 because the patient was having low blood pressure. I do see a phone note on 11/10/17 where the patient reported he was having high blood pressure but didn't return the call back. Please call to clarify blood pressure situation with the patient.

## 2017-12-21 NOTE — TELEPHONE ENCOUNTER
Could not leave a [facility] mailbox is full will try again later

## 2017-12-27 DIAGNOSIS — I10 ESSENTIAL HYPERTENSION: ICD-10-CM

## 2017-12-27 NOTE — TELEPHONE ENCOUNTER
----- Message from Avelino Corbett sent at 12/27/2017 11:03 AM CST -----  Contact: Patient [phone]    Is this a refill or new RX:  Refill (on his list discontinued) He said he wants to get back on it because his blood pressure has been running 150/90    RX name and strength: amlodipine (NORVASC) 10 MG tablet     Pharmacy name and phone #:[facility] [address] [place] [address] [address] AT [facility] [address] [phone] (phone) [phone] (Fax)    Comments:  The pharmacy faxed a request for authorization this morning.

## 2017-12-28 RX ORDER — AMLODIPINE BESYLATE 5 MG/1
5 TABLET ORAL DAILY
Qty: 90 TABLET | Refills: 0 | Status: SHIPPED | OUTPATIENT
Start: 2017-12-28 | End: 2018-03-21 | Stop reason: SDUPTHER

## 2017-12-28 NOTE — TELEPHONE ENCOUNTER
Please call patient to check and see how long his blood pressure has been running high (>140/90) and how consistently.   If it is consistently high, would recommend restarting the norvasc at just 5mg daily, as he was having low blood pressures with the full 10mg dose before; this med was stopped 10/13/17 because of low blood pressure.

## 2017-12-28 NOTE — TELEPHONE ENCOUNTER
Spoke to pt he states that the bp has been high now for about a week/ he states that it has been in the 150/90 range give or take a few numbers

## 2017-12-29 NOTE — TELEPHONE ENCOUNTER
Given persistent high BP, will restart norvasc at 5mg. New rx sent in. Recommend he continue to check his BP and contact clinic in 1 week with his BP readings. Please let patient know

## 2018-03-21 DIAGNOSIS — I10 ESSENTIAL HYPERTENSION: ICD-10-CM

## 2018-03-23 RX ORDER — AMLODIPINE BESYLATE 5 MG/1
TABLET ORAL
Qty: 90 TABLET | Refills: 0 | Status: SHIPPED | OUTPATIENT
Start: 2018-03-23 | End: 2018-05-16 | Stop reason: SDUPTHER

## 2018-04-17 ENCOUNTER — TELEPHONE (OUTPATIENT)
Dept: INTERNAL MEDICINE | Facility: CLINIC | Age: 58
End: 2018-04-17

## 2018-04-17 NOTE — TELEPHONE ENCOUNTER
----- Message from Pamela Del Rio MA sent at 4/17/2018 11:02 AM CDT -----  Contact: self/[phone]      ----- Message -----  From: Rosalino Gibson  Sent: 4/17/2018  10:57 AM  To: Hunter Mckenzie Staff    Pt is calling to speak with someone in the office in regards to getting an apt with the doctor some time this week. He states that he has been having an cold for about 2 months or more. He needs to speak with some see someone as soon as possible. Please advise.        Thanks

## 2018-04-18 ENCOUNTER — HOSPITAL ENCOUNTER (OUTPATIENT)
Dept: RADIOLOGY | Facility: HOSPITAL | Age: 58
Discharge: HOME | End: 2018-04-18
Attending: INTERNAL MEDICINE
Payer: COMMERCIAL

## 2018-04-18 ENCOUNTER — OFFICE VISIT (OUTPATIENT)
Dept: INTERNAL MEDICINE | Facility: CLINIC | Age: 58
End: 2018-04-18
Payer: MEDICARE

## 2018-04-18 VITALS
SYSTOLIC BLOOD PRESSURE: 128 MMHG | TEMPERATURE: 98 F | BODY MASS INDEX: 32.83 KG/M2 | DIASTOLIC BLOOD PRESSURE: 66 MMHG | OXYGEN SATURATION: 97 % | HEART RATE: 72 BPM | WEIGHT: 255.75 LBS

## 2018-04-18 DIAGNOSIS — R05.9 COUGH: Primary | ICD-10-CM

## 2018-04-18 DIAGNOSIS — R05.9 COUGH: ICD-10-CM

## 2018-04-18 DIAGNOSIS — J40 BRONCHITIS: ICD-10-CM

## 2018-04-18 PROCEDURE — 99214 OFFICE O/P EST MOD 30 MIN: CPT | Mod: S$GLB,,, | Performed by: INTERNAL MEDICINE

## 2018-04-18 PROCEDURE — 71046 X-RAY EXAM CHEST 2 VIEWS: CPT | Mod: 26,,, | Performed by: RADIOLOGY

## 2018-04-18 PROCEDURE — 99999 PR PBB SHADOW E&M-EST. PATIENT-LVL III: CPT | Mod: PBBFAC,,, | Performed by: INTERNAL MEDICINE

## 2018-04-18 PROCEDURE — 71046 X-RAY EXAM CHEST 2 VIEWS: CPT | Mod: TC

## 2018-04-18 PROCEDURE — 99213 OFFICE O/P EST LOW 20 MIN: CPT | Mod: PBBFAC,25 | Performed by: INTERNAL MEDICINE

## 2018-04-18 RX ORDER — PROMETHAZINE HYDROCHLORIDE AND CODEINE PHOSPHATE 6.25; 1 MG/5ML; MG/5ML
5 SOLUTION ORAL EVERY 6 HOURS PRN
Qty: 180 ML | Refills: 0 | Status: SHIPPED | OUTPATIENT
Start: 2018-04-18 | End: 2018-04-28

## 2018-04-18 RX ORDER — LEVOFLOXACIN 500 MG/1
500 TABLET, FILM COATED ORAL DAILY
Qty: 10 TABLET | Refills: 0 | Status: SHIPPED | OUTPATIENT
Start: 2018-04-18 | End: 2018-04-28

## 2018-04-18 NOTE — PROGRESS NOTES
Subjective:       Patient ID: Abel Dc III is a 57 y.o. male.    Chief Complaint: URI    URI    This is a new problem. The current episode started more than 1 month ago. The problem has been unchanged. There has been no fever. Associated symptoms include chest pain, congestion and coughing. Pertinent negatives include no abdominal pain, diarrhea, headaches, nausea, sore throat, vomiting or wheezing. Associated symptoms comments: Cough productive of thick green mucus for 2-3 months. He has tried nothing for the symptoms. The treatment provided no relief.     Review of Systems   Constitutional: Negative for activity change, appetite change and fever.   HENT: Positive for congestion. Negative for postnasal drip and sore throat.    Respiratory: Positive for cough. Negative for shortness of breath and wheezing.    Cardiovascular: Positive for chest pain. Negative for palpitations.   Gastrointestinal: Negative for abdominal pain, blood in stool, constipation, diarrhea, nausea and vomiting.   Genitourinary: Negative for decreased urine volume, difficulty urinating, flank pain and frequency.   Musculoskeletal: Negative for arthralgias.   Neurological: Negative for dizziness, weakness and headaches.       Objective:      Physical Exam   Constitutional: He is oriented to person, place, and time. He appears well-developed and well-nourished. No distress.   HENT:   Head: Normocephalic and atraumatic.   Right Ear: External ear normal.   Left Ear: External ear normal.   Eyes: Conjunctivae and EOM are normal. Pupils are equal, round, and reactive to light.   Neck: Normal range of motion. Neck supple. No thyromegaly present.   Cardiovascular: Normal rate and regular rhythm.    Pulmonary/Chest: Effort normal and breath sounds normal.   Abdominal: Soft. Bowel sounds are normal. He exhibits no mass. There is no tenderness. There is no rebound and no guarding.   Musculoskeletal: Normal range of motion.   Lymphadenopathy:     He  has no cervical adenopathy.   Neurological: He is alert and oriented to person, place, and time. He has normal reflexes. He displays normal reflexes. No cranial nerve deficit. He exhibits normal muscle tone. Coordination normal.   Skin: Skin is warm and dry.       Assessment:       1. Cough    2. Bronchitis        Plan:   Abel was seen today for uri.    Diagnoses and all orders for this visit:    Cough  -     X-Ray Chest PA And Lateral; Future    Bronchitis    Other orders  -     levoFLOXacin (LEVAQUIN) 500 MG tablet; Take 1 tablet (500 mg total) by mouth once daily.  -     promethazine-codeine 6.25-10 mg/5 ml (PHENERGAN WITH CODEINE) 6.25-10 mg/5 mL syrup; Take 5 mLs by mouth every 6 (six) hours as needed.

## 2018-05-01 ENCOUNTER — OFFICE VISIT (OUTPATIENT)
Dept: INTERNAL MEDICINE | Facility: CLINIC | Age: 58
End: 2018-05-01
Payer: MEDICARE

## 2018-05-01 VITALS
HEART RATE: 64 BPM | DIASTOLIC BLOOD PRESSURE: 64 MMHG | SYSTOLIC BLOOD PRESSURE: 120 MMHG | WEIGHT: 251.75 LBS | BODY MASS INDEX: 32.31 KG/M2 | HEIGHT: 74 IN

## 2018-05-01 DIAGNOSIS — F31.70 BIPOLAR DISORDER IN FULL REMISSION, MOST RECENT EPISODE UNSPECIFIED TYPE: Chronic | ICD-10-CM

## 2018-05-01 DIAGNOSIS — I10 ESSENTIAL HYPERTENSION: Primary | Chronic | ICD-10-CM

## 2018-05-01 DIAGNOSIS — E78.5 HYPERLIPIDEMIA, UNSPECIFIED HYPERLIPIDEMIA TYPE: ICD-10-CM

## 2018-05-01 DIAGNOSIS — Z12.5 PROSTATE CANCER SCREENING: ICD-10-CM

## 2018-05-01 DIAGNOSIS — G47.00 INSOMNIA, UNSPECIFIED TYPE: ICD-10-CM

## 2018-05-01 DIAGNOSIS — D64.9 NORMOCYTIC ANEMIA: ICD-10-CM

## 2018-05-01 DIAGNOSIS — G47.30 OBSERVED SLEEP APNEA: ICD-10-CM

## 2018-05-01 DIAGNOSIS — G25.81 RESTLESS LEG SYNDROME: ICD-10-CM

## 2018-05-01 DIAGNOSIS — J40 BRONCHITIS: ICD-10-CM

## 2018-05-01 PROCEDURE — 99214 OFFICE O/P EST MOD 30 MIN: CPT | Mod: S$GLB,,, | Performed by: INTERNAL MEDICINE

## 2018-05-01 PROCEDURE — 99999 PR PBB SHADOW E&M-EST. PATIENT-LVL III: CPT | Mod: PBBFAC,,, | Performed by: INTERNAL MEDICINE

## 2018-05-01 PROCEDURE — 99213 OFFICE O/P EST LOW 20 MIN: CPT | Mod: PBBFAC | Performed by: INTERNAL MEDICINE

## 2018-05-01 NOTE — PROGRESS NOTES
"Subjective:       Patient ID: Abel Dc III is a 57 y.o. male.    Chief Complaint: Follow-up    \[facility]\""    Last visit with me October 2017.  Seen 2 weeks ago by internal medicine for bronchitis.  At last visit patient was having low blood pressure, stopped amlodipine.    Reports coughing has improved since his visit 2 weeks ago. No other breathing problems. Nonsmoker.     Reports bad mood swings. Was in [place] 2 yrs ago, was started on Li at that time. Reports mood has been worse since. Feels short-tempered. No specific triggers that he can identify. Talked to Dr Alvarado recently and Li and Seroquel were increased. Takes Seroquel 100 mg in AM and 100 mg at night. Seroquel was causing restless leg syndrome per the patient. Reports can't get comfortable when sleeping at night at times; feels restless, sometimes needs to get up and walk.    Pt has had more caregiver responsibility for wife since her foot surgery. "It gets to be overwhelming".     Wife notes he snores badly and sometime stops breathing.     Review of Systems    As per HPI    Objective:      Physical Exam   Constitutional: No distress.    man whose Body mass index is 32.32 kg/m².    HENT:   Mouth/Throat: Oropharynx is clear and moist. No oropharyngeal exudate.   Neck: No thyromegaly present.   Cardiovascular: Normal rate and regular rhythm.  Exam reveals no gallop and no friction rub.    Murmur heard.   Systolic (RUSB, holosystolic) murmur is present with a grade of 1/6   Pulmonary/Chest: Effort normal and breath sounds normal. No stridor. He has no wheezes. He has no rales.   Lymphadenopathy:     He has no cervical adenopathy.   Nursing note and vitals reviewed.      Vitals:    05/01/18 1309   BP: 120/64   BP Location: Right arm   Patient Position: Sitting   BP Method: Large (Manual)   Pulse: 64   Weight: 114.2 kg (251 lb 12.3 oz)   Height: 6' 2" (1.88 m)     Body mass index is 32.32 kg/m².    RESULTS: Reviewed labs from last 12 " months    Assessment:       1. Essential hypertension    2. Bronchitis    3. Bipolar disorder in full remission, most recent episode unspecified type    4. Observed sleep apnea    5. Hyperlipidemia, unspecified hyperlipidemia type    6. Normocytic anemia    7. Insomnia, unspecified type    8. Restless leg syndrome    9. Prostate cancer screening        Plan:   Abel was seen today for follow-up.    Diagnoses and all orders for this visit:    Essential hypertension:  Prior diagnosis, well controlled on current management. No changes at this time, will continue to monitor.   -     Comprehensive metabolic panel; Future  -     CBC Without Differential; Future  -     Polysomnogram (CPAP will be added if patient meets diagnostic criteria.); Future    Bronchitis:  Prior dx, recent URI, has resolved. No longer symptomatic, if breathing problems develop in future please notify the office.    Bipolar disorder in full remission, most recent episode unspecified type:  Prior dx. Pt has been more irritable lately, Psychiatry increased his dose of Li and Seroquel, will continue to monitor with their team.    Observed sleep apnea:  Wife has observed snoring and sleep apnea, completed ESS today in clinic, refer for clinic PSG given akasthesia and restless leg syndrome.  -     Polysomnogram (CPAP will be added if patient meets diagnostic criteria.); Future    Hyperlipidemia, unspecified hyperlipidemia type:  Prior diagnosis, well controlled on current management. No changes at this time, will continue to monitor.   -     Lipid panel; Future    Normocytic anemia:  Prior dx, stable, continue to monitor iron levels.  -     Ferritin; Future  -     Iron and TIBC; Future    Insomnia, unspecified type:  Chronic problem, patient is sleeping well, also some parasomnias.  Order Clinic sleep study.  [place] Sleepiness Scale today scores 2.  -     Polysomnogram (CPAP will be added if patient meets diagnostic criteria.); Future    Restless leg  syndrome:  Prior diagnosis, ferritin levels are normal.  Possibly some parasomnias.  Send for sleep study.  -     Polysomnogram (CPAP will be added if patient meets diagnostic criteria.); Future    Prostate cancer screening  -     PSA, Screening; Future    Follow-up in about 4 months (around 9/1/2018) for fasting labs 1 week prior.  Jonah Harrison MD  Internal Medicine    Portions of this note were completed using [employer] dictation software. Please excuse typographical or syntax errors.

## 2018-05-03 ENCOUNTER — TELEPHONE (OUTPATIENT)
Dept: INTERNAL MEDICINE | Facility: CLINIC | Age: 58
End: 2018-05-03

## 2018-05-03 RX ORDER — IBUPROFEN 800 MG/1
TABLET ORAL
Qty: 90 TABLET | Refills: 0 | Status: SHIPPED | OUTPATIENT
Start: 2018-05-03 | End: 2018-07-12

## 2018-05-04 NOTE — TELEPHONE ENCOUNTER
Medication refilled, instruct the patient he should not take meloxicam or any other NSAID while using ibuprofen.

## 2018-05-14 DIAGNOSIS — F41.0 PANIC ATTACKS: ICD-10-CM

## 2018-05-14 DIAGNOSIS — E78.5 HYPERLIPIDEMIA: ICD-10-CM

## 2018-05-14 DIAGNOSIS — M25.561 ARTHRALGIA OF BOTH KNEES: ICD-10-CM

## 2018-05-14 DIAGNOSIS — M25.562 ARTHRALGIA OF BOTH KNEES: ICD-10-CM

## 2018-05-15 RX ORDER — PROPRANOLOL HYDROCHLORIDE 20 MG/1
TABLET ORAL
Qty: 180 TABLET | Refills: 3 | Status: SHIPPED | OUTPATIENT
Start: 2018-05-15 | End: 2019-04-26 | Stop reason: DRUGHIGH

## 2018-05-15 RX ORDER — PRAVASTATIN SODIUM 40 MG/1
TABLET ORAL
Qty: 90 TABLET | Refills: 3 | Status: SHIPPED | OUTPATIENT
Start: 2018-05-15 | End: 2019-04-26 | Stop reason: SDUPTHER

## 2018-05-15 RX ORDER — MELOXICAM 15 MG/1
15 TABLET ORAL DAILY PRN
Qty: 90 TABLET | Refills: 3 | Status: SHIPPED | OUTPATIENT
Start: 2018-05-15 | End: 2018-10-25 | Stop reason: ALTCHOICE

## 2018-05-16 DIAGNOSIS — I10 ESSENTIAL HYPERTENSION: ICD-10-CM

## 2018-05-17 ENCOUNTER — TELEPHONE (OUTPATIENT)
Dept: SLEEP MEDICINE | Facility: OTHER | Age: 58
End: 2018-05-17

## 2018-05-17 RX ORDER — AMLODIPINE BESYLATE 5 MG/1
TABLET ORAL
Qty: 90 TABLET | Refills: 3 | Status: SHIPPED | OUTPATIENT
Start: 2018-05-17 | End: 2018-10-19 | Stop reason: DRUGHIGH

## 2018-05-22 ENCOUNTER — TELEPHONE (OUTPATIENT)
Dept: SLEEP MEDICINE | Facility: OTHER | Age: 58
End: 2018-05-22

## 2018-05-29 ENCOUNTER — TELEPHONE (OUTPATIENT)
Dept: SLEEP MEDICINE | Facility: OTHER | Age: 58
End: 2018-05-29

## 2018-06-07 ENCOUNTER — TELEPHONE (OUTPATIENT)
Dept: SLEEP MEDICINE | Facility: OTHER | Age: 58
End: 2018-06-07

## 2018-06-15 ENCOUNTER — TELEPHONE (OUTPATIENT)
Dept: SLEEP MEDICINE | Facility: OTHER | Age: 58
End: 2018-06-15

## 2018-07-12 ENCOUNTER — OFFICE VISIT (OUTPATIENT)
Dept: PSYCHIATRY | Facility: CLINIC | Age: 58
End: 2018-07-12
Payer: COMMERCIAL

## 2018-07-12 ENCOUNTER — HOSPITAL ENCOUNTER (EMERGENCY)
Facility: HOSPITAL | Age: 58
Discharge: HOME | End: 2018-07-12
Attending: EMERGENCY MEDICINE
Payer: COMMERCIAL

## 2018-07-12 VITALS
HEART RATE: 67 BPM | WEIGHT: 231.5 LBS | BODY MASS INDEX: 29.71 KG/M2 | DIASTOLIC BLOOD PRESSURE: 79 MMHG | HEIGHT: 74 IN | SYSTOLIC BLOOD PRESSURE: 170 MMHG

## 2018-07-12 VITALS
HEIGHT: 74 IN | RESPIRATION RATE: 16 BRPM | HEART RATE: 69 BPM | BODY MASS INDEX: 32.47 KG/M2 | WEIGHT: 253 LBS | TEMPERATURE: 98 F | OXYGEN SATURATION: 98 % | SYSTOLIC BLOOD PRESSURE: 168 MMHG | DIASTOLIC BLOOD PRESSURE: 77 MMHG

## 2018-07-12 DIAGNOSIS — F31.9 BIPOLAR AFFECTIVE DISORDER, REMISSION STATUS UNSPECIFIED: Primary | Chronic | ICD-10-CM

## 2018-07-12 DIAGNOSIS — F32.A DEPRESSION, UNSPECIFIED DEPRESSION TYPE: Primary | ICD-10-CM

## 2018-07-12 DIAGNOSIS — F41.0 PANIC DISORDER: Chronic | ICD-10-CM

## 2018-07-12 LAB
ALBUMIN SERPL BCP-MCNC: 4.6 G/DL
ALP SERPL-CCNC: 132 U/L
ALT SERPL W/O P-5'-P-CCNC: 19 U/L
AMPHET+METHAMPHET UR QL: NEGATIVE
ANION GAP SERPL CALC-SCNC: 10 MMOL/L
APAP SERPL-MCNC: <3 UG/ML
AST SERPL-CCNC: 25 U/L
BACTERIA #/AREA URNS AUTO: NORMAL /HPF
BARBITURATES UR QL SCN>200 NG/ML: NEGATIVE
BASOPHILS # BLD AUTO: 0.06 K/UL
BASOPHILS NFR BLD: 0.4 %
BENZODIAZ UR QL SCN>200 NG/ML: NEGATIVE
BILIRUB SERPL-MCNC: 0.9 MG/DL
BILIRUB UR QL STRIP: NEGATIVE
BUN SERPL-MCNC: 9 MG/DL
BZE UR QL SCN: NEGATIVE
CALCIUM SERPL-MCNC: 11.5 MG/DL
CANNABINOIDS UR QL SCN: NEGATIVE
CHLORIDE SERPL-SCNC: 104 MMOL/L
CLARITY UR REFRACT.AUTO: CLEAR
CO2 SERPL-SCNC: 23 MMOL/L
COLOR UR AUTO: YELLOW
CREAT SERPL-MCNC: 1.5 MG/DL
CREAT UR-MCNC: 88 MG/DL
DIFFERENTIAL METHOD: ABNORMAL
EOSINOPHIL # BLD AUTO: 0.4 K/UL
EOSINOPHIL NFR BLD: 2.3 %
ERYTHROCYTE [DISTWIDTH] IN BLOOD BY AUTOMATED COUNT: 13 %
EST. GFR  (AFRICAN AMERICAN): 58.9 ML/MIN/1.73 M^2
EST. GFR  (NON AFRICAN AMERICAN): 50.9 ML/MIN/1.73 M^2
ETHANOL SERPL-MCNC: <10 MG/DL
GLUCOSE SERPL-MCNC: 113 MG/DL
GLUCOSE UR QL STRIP: NEGATIVE
HCT VFR BLD AUTO: 41.8 %
HGB BLD-MCNC: 13.7 G/DL
HGB UR QL STRIP: NEGATIVE
HYALINE CASTS UR QL AUTO: 0 /LPF
IMM GRANULOCYTES # BLD AUTO: 0.07 K/UL
IMM GRANULOCYTES NFR BLD AUTO: 0.5 %
KETONES UR QL STRIP: NEGATIVE
LEUKOCYTE ESTERASE UR QL STRIP: NEGATIVE
LITHIUM SERPL-SCNC: 1.4 MMOL/L
LYMPHOCYTES # BLD AUTO: 1.8 K/UL
LYMPHOCYTES NFR BLD: 11.8 %
MCH RBC QN AUTO: 30.4 PG
MCHC RBC AUTO-ENTMCNC: 32.8 G/DL
MCV RBC AUTO: 93 FL
METHADONE UR QL SCN>300 NG/ML: NEGATIVE
MICROSCOPIC COMMENT: NORMAL
MONOCYTES # BLD AUTO: 1 K/UL
MONOCYTES NFR BLD: 6.3 %
NEUTROPHILS # BLD AUTO: 12.1 K/UL
NEUTROPHILS NFR BLD: 78.7 %
NITRITE UR QL STRIP: NEGATIVE
NRBC BLD-RTO: 0 /100 WBC
OPIATES UR QL SCN: NEGATIVE
PCP UR QL SCN>25 NG/ML: NEGATIVE
PH UR STRIP: 7 [PH] (ref 5–8)
PLATELET # BLD AUTO: 411 K/UL
PMV BLD AUTO: 10.7 FL
POTASSIUM SERPL-SCNC: 3.8 MMOL/L
PROT SERPL-MCNC: 9.2 G/DL
PROT UR QL STRIP: ABNORMAL
RBC # BLD AUTO: 4.51 M/UL
RBC #/AREA URNS AUTO: 1 /HPF (ref 0–4)
SALICYLATES SERPL-MCNC: <5 MG/DL
SODIUM SERPL-SCNC: 137 MMOL/L
SP GR UR STRIP: 1 (ref 1–1.03)
TOXICOLOGY INFORMATION: NORMAL
TSH SERPL DL<=0.005 MIU/L-ACNC: 3.24 UIU/ML
URN SPEC COLLECT METH UR: ABNORMAL
UROBILINOGEN UR STRIP-ACNC: 2 EU/DL
WBC # BLD AUTO: 15.31 K/UL
WBC #/AREA URNS AUTO: 4 /HPF (ref 0–5)

## 2018-07-12 PROCEDURE — 85025 COMPLETE CBC W/AUTO DIFF WBC: CPT

## 2018-07-12 PROCEDURE — 99283 EMERGENCY DEPT VISIT LOW MDM: CPT

## 2018-07-12 PROCEDURE — 80178 ASSAY OF LITHIUM: CPT

## 2018-07-12 PROCEDURE — 80320 DRUG SCREEN QUANTALCOHOLS: CPT

## 2018-07-12 PROCEDURE — 80329 ANALGESICS NON-OPIOID 1 OR 2: CPT

## 2018-07-12 PROCEDURE — 80307 DRUG TEST PRSMV CHEM ANLYZR: CPT

## 2018-07-12 PROCEDURE — 99205 OFFICE O/P NEW HI 60 MIN: CPT | Mod: S$GLB,,, | Performed by: NURSE PRACTITIONER

## 2018-07-12 PROCEDURE — 80053 COMPREHEN METABOLIC PANEL: CPT

## 2018-07-12 PROCEDURE — 99284 EMERGENCY DEPT VISIT MOD MDM: CPT | Mod: ,,, | Performed by: EMERGENCY MEDICINE

## 2018-07-12 PROCEDURE — 81001 URINALYSIS AUTO W/SCOPE: CPT

## 2018-07-12 PROCEDURE — 84443 ASSAY THYROID STIM HORMONE: CPT

## 2018-07-12 PROCEDURE — 99999 PR PBB SHADOW E&M-EST. PATIENT-LVL III: CPT | Mod: PBBFAC,,, | Performed by: NURSE PRACTITIONER

## 2018-07-12 NOTE — ED NOTES
Patient identifiers verified and correct for Mr Dc  C/C: Medication checked, poor appetite, difficulty sleeping  APPEARANCE: awake and alert in NAD.  SKIN: warm, dry and intact. No breakdown or bruising.  MUSCULOSKELETAL: Patient moving all extremities spontaneously, no obvious swelling or deformities noted. Ambulates independently.  RESPIRATORY: Denies shortness of breath.Respirations unlabored.   CARDIAC: Denies CP, 2+ distal pulses; no peripheral edema  ABDOMEN: S/ND/NT, Denies nausea  : voids spontaneously, denies difficulty  Neurologic: AAO x 4; follows commands equal strength in all extremities; denies numbness/tingling. Positive dizziness Positive weakness

## 2018-07-12 NOTE — PROGRESS NOTES
Outpatient Psychiatry Initial Visit (MD/NP)    7/12/2018    Abel Dc III, a 57 y.o. male, presenting for initial evaluation visit. Met with patient and wife.     Reason for Encounter: self-referral. Patient complains of depressive disorder.    History of Present Illness:     Pt is a 58 y/o male with past hx of bipolar mood disorder who presents for psychiatric evaluation after presenting to the emergency department.  See ED progress notes from today 7/12/18.  Pt was accompanied by his wife.  She reports that he exhibited bizarre changes in behavior last Tuesday from 7/2 - 7/4 and he wouldn't come to ED until today.  Behaviors have since resolved but involved slurred speech, hallucinations, insomnia, and decreased appetite.  Pt currently denies SI/HI/AVH.  Thought processes appear clear and organized.  Pt has been under the care of Dr. Alvarado at [place] for the past 12 years.  Pt's reason for coming here today instead of contacting Dr. Alvarado is that he didn't want to follow his doctor's recommendation of going to the [facility] at [place] because he doesn't like group setting.  No symptoms of EPS; AIMS scale.        Psychiatric Medications: currently taking  · Buspar 15 mg po TID  · Prozac 20 mg po daily  · Lithium 300 mg po TID  · Seroquel 300 mg po qhs (pt currently noncompliant and stopped medication before 7/2)  · Trazadone PRN insomnia (pt took medication night before 7/2 and believes he may have had an adverse reaction)      Past trials include: multiple but pt did not bring list    Social History:  Pt lives with wife.  Pt does not work. Disabled.  Denies use to tobacco, alcohol, or elicit drugs.     Coping Skills: limited    Medical History:  Past Medical History:   Diagnosis Date    Bipolar disorder       manic depressive x 1992    BPH (benign prostatic hypertrophy)      Chronic low back pain      Depression      GERD (gastroesophageal reflux disease)      Gout      Gout, arthritis       "Hyperlipidemia      Hypertension      Lumbar disc disease      Obesity      Panic disorder            Procedure    HERNIA REPAIR    HUMERUS FRACTURE SURGERY        MANDIBLE FRACTURE SURGERY    MUSCLE TRANSFER UPPER ARM    SHOULDER ARTHROSCOPY    SHOULDER SURGERY    TONSILLECTOMY       Review Of Systems:     GENERAL:  No weight gain or loss  SKIN:  No rashes or lacerations  HEAD:  No headaches  EYES:  No exophthalmos, jaundice or blindness  EARS:  No dizziness, tinnitus or hearing loss  NOSE:  No changes in smell  MOUTH & THROAT:  No dyskinetic movements or obvious goiter  CHEST:  No shortness of breath, hyperventilation or cough  CARDIOVASCULAR:  No tachycardia or chest pain  ABDOMEN:  No nausea, vomiting, pain, constipation or diarrhea  URINARY:  No frequency, dysuria or sexual dysfunction  ENDOCRINE:  No polydipsia, polyuria  MUSCULOSKELETAL:  No pain or stiffness of the joints  NEUROLOGIC:  No weakness, sensory changes, seizures, confusion, memory loss, tremor or other abnormal movements    Current Evaluation:     Nutritional Screening: Considering the patient's height and weight, medications, medical history and preferences, should a referral be made to the dietitian? no    Constitutional  Vitals:  Most recent vital signs, dated less than 90 days prior to this appointment, were reviewed.    Vitals:    07/12/18 1420   BP: (!) 170/79   Pulse: 67   Weight: 105 kg (231 lb 7.7 oz)   Height: 6' 2" (1.88 m)   asymptomatic     General:  unremarkable, age appropriate     Musculoskeletal  Muscle Strength/Tone:  no tremor, no tic   Gait & Station:  non-ataxic     Psychiatric  Speech:  no latency; no press   Mood & Affect:  depressed  congruent and appropriate   Thought Process:  normal and logical   Associations:  intact   Thought Content:  normal, no suicidality, no homicidality, delusions, or paranoia   Insight:  has awareness of illness   Judgement: behavior is adequate to circumstances   Orientation:  " grossly intact   Memory: intact for content of interview   Language: grossly intact   Attention Span & Concentration:  able to focus   Fund of Knowledge:  intact and appropriate to age and level of education       Relevant Elements of Neurological Exam: normal gait    Functioning in Relationships:  Spouse/partner: see above HPI  Peers: see above HPI  Employers: disabled    Laboratory Data  Admission on 07/12/2018, Discharged on 07/12/2018   Component Date Value Ref Range Status    WBC 07/12/2018 15.31* 3.90 - 12.70 K/uL Final    RBC 07/12/2018 4.51* 4.60 - 6.20 M/uL Final    Hemoglobin 07/12/2018 13.7* 14.0 - 18.0 g/dL Final    Hematocrit 07/12/2018 41.8  40.0 - 54.0 % Final    MCV 07/12/2018 93  82 - 98 fL Final    MCH 07/12/2018 30.4  27.0 - 31.0 pg Final    MCHC 07/12/2018 32.8  32.0 - 36.0 g/dL Final    RDW 07/12/2018 13.0  11.5 - 14.5 % Final    Platelets 07/12/2018 411* 150 - 350 K/uL Final    MPV 07/12/2018 10.7  9.2 - 12.9 fL Final    Immature Granulocytes 07/12/2018 0.5  0.0 - 0.5 % Final    Gran # (ANC) 07/12/2018 12.1* 1.8 - 7.7 K/uL Final    Immature Grans (Abs) 07/12/2018 0.07* 0.00 - 0.04 K/uL Final    Lymph # 07/12/2018 1.8  1.0 - 4.8 K/uL Final    Mono # 07/12/2018 1.0  0.3 - 1.0 K/uL Final    Eos # 07/12/2018 0.4  0.0 - 0.5 K/uL Final    Baso # 07/12/2018 0.06  0.00 - 0.20 K/uL Final    nRBC 07/12/2018 0  0 /100 WBC Final    Gran% 07/12/2018 78.7* 38.0 - 73.0 % Final    Lymph% 07/12/2018 11.8* 18.0 - 48.0 % Final    Mono% 07/12/2018 6.3  4.0 - 15.0 % Final    Eosinophil% 07/12/2018 2.3  0.0 - 8.0 % Final    Basophil% 07/12/2018 0.4  0.0 - 1.9 % Final    Differential Method 07/12/2018 Automated   Final    Sodium 07/12/2018 137  136 - 145 mmol/L Final    Potassium 07/12/2018 3.8  3.5 - 5.1 mmol/L Final    Chloride 07/12/2018 104  95 - 110 mmol/L Final    CO2 07/12/2018 23  23 - 29 mmol/L Final    Glucose 07/12/2018 113* 70 - 110 mg/dL Final    BUN, Bld 07/12/2018 9  6 -  20 mg/dL Final    Creatinine 07/12/2018 1.5* 0.5 - 1.4 mg/dL Final    Calcium 07/12/2018 11.5* 8.7 - 10.5 mg/dL Final    Total Protein 07/12/2018 9.2* 6.0 - 8.4 g/dL Final    Albumin 07/12/2018 4.6  3.5 - 5.2 g/dL Final    Total Bilirubin 07/12/2018 0.9  0.1 - 1.0 mg/dL Final    Alkaline Phosphatase 07/12/2018 132  55 - 135 U/L Final    AST 07/12/2018 25  10 - 40 U/L Final    ALT 07/12/2018 19  10 - 44 U/L Final    Anion Gap 07/12/2018 10  8 - 16 mmol/L Final    eGFR if  07/12/2018 58.9* >60 mL/min/1.73 m^2 Final    eGFR if non African American 07/12/2018 50.9* >60 mL/min/1.73 m^2 Final    TSH 07/12/2018 3.243  0.400 - 4.000 uIU/mL Final    Specimen UA 07/12/2018 Urine, Clean Catch   Final    Color, UA 07/12/2018 Yellow  Yellow, Straw, [place] Final    Appearance, UA 07/12/2018 Clear  Clear Final    pH, UA 07/12/2018 7.0  5.0 - 8.0 Final    Specific Gravity, UA 07/12/2018 1.005  1.005 - 1.030 Final    Protein, UA 07/12/2018 1+* Negative Final    Glucose, UA 07/12/2018 Negative  Negative Final    Ketones, UA 07/12/2018 Negative  Negative Final    Bilirubin (UA) 07/12/2018 Negative  Negative Final    Occult Blood UA 07/12/2018 Negative  Negative Final    Nitrite, UA 07/12/2018 Negative  Negative Final    Urobilinogen, UA 07/12/2018 2.0  <2.0 EU/dL Final    Leukocytes, UA 07/12/2018 Negative  Negative Final    Benzodiazepines 07/12/2018 Negative   Final    Methadone metabolites 07/12/2018 Negative   Final    Cocaine (Metab.) 07/12/2018 Negative   Final    Opiate Scrn, Ur 07/12/2018 Negative   Final    Barbiturate Screen, Ur 07/12/2018 Negative   Final    Amphetamine Screen, Ur 07/12/2018 Negative   Final    THC 07/12/2018 Negative   Final    Phencyclidine 07/12/2018 Negative   Final    Creatinine, Random Ur 07/12/2018 88.0  23.0 - 375.0 mg/dL Final    Toxicology Information 07/12/2018 SEE COMMENT   Final    Alcohol, Medical, Serum 07/12/2018 <10  <10 mg/dL Final     Acetaminophen (Tylenol), Serum 07/12/2018 <3.0* 10.0 - 20.0 ug/mL Final    Salicylate Lvl 07/12/2018 <5.0* 15.0 - 30.0 mg/dL Final    RBC, UA 07/12/2018 1  0 - 4 /hpf Final    WBC, UA 07/12/2018 4  0 - 5 /hpf Final    Bacteria, UA 07/12/2018 Rare  None-Occ /hpf Final    Hyaline Casts, UA 07/12/2018 0  0-1/lpf /lpf Final    Microscopic Comment 07/12/2018 SEE COMMENT   Final         Medications  Outpatient Encounter Prescriptions as of 7/12/2018   Medication Sig Dispense Refill    allopurinol (ZYLOPRIM) 100 MG tablet TAKE 2 TABLETS BY MOUTH ONCE DAILY 180 tablet 3    amLODIPine (NORVASC) 5 MG tablet TAKE 1 TABLET BY MOUTH EVERY DAY 90 tablet 3    aspirin 81 mg Tab Take 1 tablet by mouth.      busPIRone (BUSPAR) 15 MG tablet Take 15 mg by mouth 3 (three) times daily.   4    cholecalciferol, vitamin D3, 2,000 unit Cap Take 1 capsule by mouth once daily.      fluoxetine (PROZAC) 20 MG capsule TK 1 C PO QAM.  4    fluticasone (FLONASE) 50 mcg/actuation nasal spray SPRAY 1 SPRAY IN EACH NOSTRIL EVERY DAY 16 g 11    ketoconazole (NIZORAL) 2 % shampoo APPLY EXTERNALLY TO THE AFFECTED AREA ONCE DAILY 120 mL 0    lithium (ESKALITH) 300 MG capsule TK ONE C PO TID  3    meloxicam (MOBIC) 15 MG tablet Take 1 tablet (15 mg total) by mouth daily as needed (knee pain). Do not use with ibuprofen or Aleve. 90 tablet 3    omeprazole (PRILOSEC) 20 MG capsule Take 1 capsule (20 mg total) by mouth once daily. 90 capsule 3    pravastatin (PRAVACHOL) 40 MG tablet TAKE 1 TABLET BY MOUTH EVERY EVENING 90 tablet 3    propranolol (INDERAL) 20 MG tablet TAKE 1 TABLET BY MOUTH TWICE DAILY 180 tablet 3    quetiapine (SEROQUEL) 100 MG Tab TK 1 T PO QHS  5    quetiapine (SEROQUEL) 50 MG tablet TK 1 T PO TID  0    sucralfate (CARAFATE) 1 gram tablet TAKE 1 TABLET BY MOUTH FOUR TIMES DAILY 360 tablet 0    tamsulosin (FLOMAX) 0.4 mg Cp24 TAKE 1 CAPSULE BY MOUTH EVERY MORNING 90 capsule 0    [DISCONTINUED] gabapentin (NEURONTIN)  300 MG capsule TAKE ONE CAPSULE BY MOUTH EVERY EVENING 90 capsule 3    [DISCONTINUED] ibuprofen (ADVIL,MOTRIN) 800 MG tablet TAKE 1 TABLET BY MOUTH EVERY 8 HOURS AS NEEDED FOR GOUT PAIN 90 tablet 0     No facility-administered encounter medications on file as of 7/12/2018.        Assessment - Diagnosis - Goals:     Impression: Pt is a 58 y/o male with hx of bipolar disorder currently stable in presentation but reports 3 day episode of bizarre behavioral changes from 7/2-7/4.  R/O Serotonin syndrome  R/O Lithium toxicity      ICD-10-CM ICD-9-CM   1. Bipolar affective disorder, remission status unspecified F31.9 296.80   2. Panic disorder F41.0 300.01       Strengths and Liabilities: Strength: Patient accepts guidance/feedback, Strength: Patient is expressive/articulate., Strength: Patient has positive support network., Liability: Patient is dependent., Liability: Patient lacks coping skills.    Treatment Goals:  Specify outcomes written in observable, behavioral terms:   Anxiety: acquiring relapse prevention skills, reducing negative automatic thoughts, reducing physical symptoms of anxiety and reducing time spent worrying (<30 minutes/day)  Depression: acquiring relapse prevention skills, increasing interest in usual activities, increasing motivation, increasing self-reward for positive behaviors (one/day), increasing self-reward for positive thoughts (one/day), increasing social contacts (three/week), reducing excessive guilt and reducing negative automatic thoughts  Panic: acquiring breathing skills, acquiring relapse prevention skills, eliminating all avoidance behavior, eliminating conditioned anxiety response to physical sensations, eliminating safety behaviors, engaging in all previously avoided activities, no panic attacks for 1 month, reducing physical symptoms of anxiety/panic and reporting that fear of future panic attacks has been reduced to less than 1 on a scale of 0-10    Treatment  Plan/Recommendations:   · Medication Management: The risks and benefits of medication were discussed with the patient.   · Labs:  Reviewed recent labs and ordered STAT Lithium level which was (1.4) but taken after pt took morning medication.  Discussed signs/symptoms of lithium toxicity and medication instructions.  · Pt will follow-up with Dr. Alvarado and IOP at [place]   · No new medications ordered.  Pt will continue meds per Dr. Alvarado with the following recommendations  · Pt will restart Seroquel at 50 mg po qhs and increase to 100 mg hs and f/u with psychiatric provider at [place] [facility].    · DC Trazodone (possible adverse reactions)  · Monitor for signs of Serotonin Syndrome.   · Completed AIMS scale  · Counseling this visit focused on building adaptive coping skills, medication instructions, and expanding support network.   · Patient and wife agreed to treatment plan.       Return to Clinic: as needed    Counseling time: 32 minutes  Total time: 60 minutes  Consulting clinician was informed of the encounter and consult note.

## 2018-07-12 NOTE — ED PROVIDER NOTES
"Encounter Date: 2018    SCRIBE #1 NOTE: I, Dianna Mane, am scribing for, and in the presence of,  Dr. Goyal. I have scribed the following portions of the note - Other sections scribed: HPI, ROS, Physical Exam.       History     Chief Complaint   Patient presents with    Abdominal Pain     no appetite, nausea, thinking it's my psych meds doing it, denies suicidal/homicidal ideation,      Time patient was seen by the provider: 11:56 AM      The patient is a 57 y.o. male with co-morbidities including: HTN, HLD, GERD, bipolar disorder, and obesity who presents to the ED with a complaint of decreased appetite and energy.  Pt states that he thinks his psych medications are causing his symptoms.  Denies any SI/HI.  Wife states pt has not been sleeping well.  Pt sees psychiatrist at [facility].  Notes doctor changes his medications and he is unsure what to take.  Pt is currently not taking Trazodone or Seroquel.  States he has not been feeling well for the past two weeks.  Reports "I have not been eating anything."  Wife states pt has no energy.  Pt reports he can concentration.  Denies decreased libido.  Pt states that he has been feeling somewhat depressed.  Reports he has not been able to see his  grandson.  Wife notes that psychiatrist told him to go to group therapy 3 hrs/day 5 days/week and they would tell him what medications to take.  Denies any current hallucination, but wife notes hallucinations 9 days ago.  Denies any trouble with his eyes, ears, nose, throat, skin, bowels, or bladder.  Wife also reports that pt was "clammy" yesterday and tremulous.  Pt endorses drug allergies to Sudafed and Risperdal.  Pt is only taking lithium, buspirone, and fluoxitine.  Wife notes that pt has not been taking care of himself, including not brushing his teeth or showering.  She also states pt has been complaining of abdominal pain and nausea.      The history is provided by the patient, the spouse and medical " "records.     Review of patient's allergies indicates:   Allergen Reactions    Hydrocodone      Other reaction(s): Hallucinations    Hydrocodone-acetaminophen      Other reaction(s): hyperactivity    Oxycodone Other (See Comments)     hallucinations    Pseudoephedrine      Other reaction(s): Hallucinations    Pseudoephedrine hcl      Other reaction(s): hyperactivity    Risperidone      Other reaction(s): Hallucinations  Other reaction(s): hyperactivity    Naproxen      Past Medical History:   Diagnosis Date    Bipolar disorder     manic depressive x 1992    BPH (benign prostatic hypertrophy)     Chronic low back pain     Depression     GERD (gastroesophageal reflux disease)     Gout     Gout, arthritis     Hyperlipidemia     Hypertension     Lumbar disc disease     Obesity     Panic disorder      Past Surgical History:   Procedure Laterality Date    HERNIA REPAIR      HUMERUS FRACTURE SURGERY  1971    Bone grafts required    MANDIBLE FRACTURE SURGERY      MUSCLE TRANSFER UPPER ARM      SHOULDER ARTHROSCOPY      SHOULDER SURGERY      TONSILLECTOMY       Family History   Problem Relation Age of Onset    Diabetes Mother     Breast cancer Mother      Social History   Substance Use Topics    Smoking status: Never Smoker    Smokeless tobacco: Current User     Types: Snuff      Comment: Patient uses "dip" tobacco    Alcohol use No      Comment: Heavy alcohol use in the past.      Review of Systems   Constitutional: Positive for appetite change (decreased) and fatigue. Negative for chills and fever.        + decreased energy  + decreased sleep   HENT: Negative for ear pain and nosebleeds.    Eyes: Negative for pain and discharge.   Respiratory: Negative for shortness of breath and wheezing.    Cardiovascular: Negative for chest pain.   Gastrointestinal: Positive for abdominal pain and nausea. Negative for blood in stool.   Genitourinary: Negative for dysuria and hematuria.   Musculoskeletal: " Negative for neck pain and neck stiffness.   Skin: Negative for rash.   Neurological: Positive for tremors. Negative for speech difficulty and headaches.   Psychiatric/Behavioral: Negative for confusion, hallucinations and suicidal ideas.       Physical Exam     Initial Vitals [07/12/18 1034]   BP Pulse Resp Temp SpO2   (!) 146/77 62 18 98.7 °F (37.1 °C) 97 %      MAP       --         Physical Exam    Nursing note and vitals reviewed.  Constitutional: He appears well-developed and well-nourished.   HENT:   Head: Normocephalic and atraumatic.   Cardiovascular: Normal rate, regular rhythm and normal heart sounds. Exam reveals no gallop and no friction rub.    No murmur heard.  Pulmonary/Chest: Breath sounds normal. No respiratory distress.   Abdominal: Soft. Bowel sounds are normal.   Musculoskeletal: Normal range of motion.   No clonus.   Neurological: He is alert and oriented to person, place, and time. No cranial nerve deficit or sensory deficit.   Tremulous.   Skin: Skin is warm and dry.   Psychiatric:   Not suicidal homicidal paranoid nor hearing voices. He does have depressive symptoms. Has good insight and wants to adjust his meds         ED Course   Procedures  Labs Reviewed   CBC W/ AUTO DIFFERENTIAL - Abnormal; Notable for the following:        Result Value    WBC 15.31 (*)     RBC 4.51 (*)     Hemoglobin 13.7 (*)     Platelets 411 (*)     Gran # (ANC) 12.1 (*)     Immature Grans (Abs) 0.07 (*)     Gran% 78.7 (*)     Lymph% 11.8 (*)     All other components within normal limits    Narrative:     [facility] shared  07/12/2018  13:03    URINALYSIS - Abnormal; Notable for the following:     Protein, UA 1+ (*)     All other components within normal limits   DRUG SCREEN PANEL, URINE EMERGENCY   URINALYSIS MICROSCOPIC   COMPREHENSIVE METABOLIC PANEL   TSH   ALCOHOL,MEDICAL (ETHANOL)   ACETAMINOPHEN LEVEL   SALICYLATE LEVEL          Imaging Results    None          Medical Decision Making:   History:   Old Medical  Records: I decided to obtain old medical records.  Clinical Tests:   Lab Tests: Ordered and Reviewed  ED Management:  1250h Dr. Markham (psych) will speak with psychiatry clinic for an expedited appt  [address],  Nursing Supervisor of psychiatric clinic send patient over by 1400h to be seen today, [facility] 4th floor check in in room 400.  Other:   I have discussed this case with another health care provider.            Scribe Attestation:   Scribe #1: I performed the above scribed service and the documentation accurately describes the services I performed. I attest to the accuracy of the note.               Clinical Impression:   The encounter diagnosis was Depression, unspecified depression type.      Disposition:   Disposition: Discharged  Condition: Stable                        Syed Goyal MD  07/20/18 1141

## 2018-07-12 NOTE — DISCHARGE INSTRUCTIONS
Go to the 4th floor of the [facility] Room 400. That is the checkin for the Psychiatry Clinic

## 2018-07-12 NOTE — ED TRIAGE NOTES
"Patient states his psych meds have been getting "messed up" with Trazadone and Seroquel, states difficulty sleeping and eating. Concerned about the meds he is taking with the psychiatrist. Was told to go to outpatient therapy. States no Seroquel x 2 weeks.   "

## 2018-07-16 ENCOUNTER — OFFICE VISIT (OUTPATIENT)
Dept: INTERNAL MEDICINE | Facility: CLINIC | Age: 58
End: 2018-07-16
Payer: COMMERCIAL

## 2018-07-16 ENCOUNTER — LAB VISIT (OUTPATIENT)
Dept: LAB | Facility: HOSPITAL | Age: 58
End: 2018-07-16
Attending: INTERNAL MEDICINE
Payer: COMMERCIAL

## 2018-07-16 VITALS
DIASTOLIC BLOOD PRESSURE: 88 MMHG | HEIGHT: 74 IN | WEIGHT: 229.25 LBS | SYSTOLIC BLOOD PRESSURE: 154 MMHG | HEART RATE: 64 BPM | BODY MASS INDEX: 29.42 KG/M2

## 2018-07-16 DIAGNOSIS — I10 ESSENTIAL HYPERTENSION: Chronic | ICD-10-CM

## 2018-07-16 DIAGNOSIS — E83.52 HYPERCALCEMIA: ICD-10-CM

## 2018-07-16 DIAGNOSIS — F31.9 BIPOLAR AFFECTIVE DISORDER, REMISSION STATUS UNSPECIFIED: Primary | Chronic | ICD-10-CM

## 2018-07-16 DIAGNOSIS — D72.829 LEUKOCYTOSIS, UNSPECIFIED TYPE: ICD-10-CM

## 2018-07-16 LAB
25(OH)D3+25(OH)D2 SERPL-MCNC: 47 NG/ML
ALBUMIN SERPL BCP-MCNC: 4.1 G/DL
ALP SERPL-CCNC: 117 U/L
ALT SERPL W/O P-5'-P-CCNC: 22 U/L
ANION GAP SERPL CALC-SCNC: 10 MMOL/L
AST SERPL-CCNC: 24 U/L
BILIRUB SERPL-MCNC: 1 MG/DL
BUN SERPL-MCNC: 9 MG/DL
CA-I BLDV-SCNC: 1.41 MMOL/L
CALCIUM SERPL-MCNC: 11 MG/DL
CHLORIDE SERPL-SCNC: 104 MMOL/L
CO2 SERPL-SCNC: 21 MMOL/L
CREAT SERPL-MCNC: 1.6 MG/DL
ERYTHROCYTE [DISTWIDTH] IN BLOOD BY AUTOMATED COUNT: 12.9 %
EST. GFR  (AFRICAN AMERICAN): 54.5 ML/MIN/1.73 M^2
EST. GFR  (NON AFRICAN AMERICAN): 47.1 ML/MIN/1.73 M^2
GLUCOSE SERPL-MCNC: 101 MG/DL
HCT VFR BLD AUTO: 39.9 %
HGB BLD-MCNC: 13.1 G/DL
MCH RBC QN AUTO: 30.3 PG
MCHC RBC AUTO-ENTMCNC: 32.8 G/DL
MCV RBC AUTO: 92 FL
PLATELET # BLD AUTO: 389 K/UL
PMV BLD AUTO: 11.2 FL
POTASSIUM SERPL-SCNC: 4.1 MMOL/L
PROT SERPL-MCNC: 8.1 G/DL
RBC # BLD AUTO: 4.32 M/UL
SODIUM SERPL-SCNC: 135 MMOL/L
WBC # BLD AUTO: 13.94 K/UL

## 2018-07-16 PROCEDURE — 80053 COMPREHEN METABOLIC PANEL: CPT

## 2018-07-16 PROCEDURE — 82306 VITAMIN D 25 HYDROXY: CPT

## 2018-07-16 PROCEDURE — 82330 ASSAY OF CALCIUM: CPT

## 2018-07-16 PROCEDURE — 85027 COMPLETE CBC AUTOMATED: CPT

## 2018-07-16 PROCEDURE — 99999 PR PBB SHADOW E&M-EST. PATIENT-LVL III: CPT | Mod: PBBFAC,,, | Performed by: INTERNAL MEDICINE

## 2018-07-16 PROCEDURE — 36415 COLL VENOUS BLD VENIPUNCTURE: CPT

## 2018-07-16 PROCEDURE — 99214 OFFICE O/P EST MOD 30 MIN: CPT | Mod: S$GLB,,, | Performed by: INTERNAL MEDICINE

## 2018-07-16 NOTE — PROGRESS NOTES
"Subjective:       Patient ID: Abel Dc III is a 57 y.o. male.    Chief Complaint: Hospital Follow Up    HPI    Patient is accompanied by his wife.    Still not eating or sleeping, nervous, "I'm nothing but a batch of nerves". Trazodone didn't work well. Started on Tues Jul 3rd, very irregular speech and slurred words. His Psychiatry team Dr Alvarado recommended program at [facility], pt doesn't want to go because he has done it in the past. Had 22# weight loss since May.    Review of Systems    As per HPI    Objective:      Physical Exam   Constitutional:    man whose Body mass index is 29.44 kg/m².    Psychiatric:   Sitting in chair with fidgeting behavior with legs, notable intention tremor in hands. Mood slightly withdrawn. Speech is normal rate and prosody. No evidence hallucinations.   Nursing note and vitals reviewed.      Vitals:    07/16/18 1626   BP: (!) 154/88   BP Location: Right arm   Patient Position: Sitting   BP Method: Large (Manual)   Pulse: 64   Weight: 104 kg (229 lb 4.5 oz)   Height: 6' 2" (1.88 m)     Body mass index is 29.44 kg/m².    RESULTS: Reviewed labs from last 6 months    Assessment:       1. Bipolar affective disorder, remission status unspecified    2. Essential hypertension    3. Hypercalcemia    4. Leukocytosis, unspecified type        Plan:   Abel was seen today for hospital follow up.    Diagnoses and all orders for this visit:    Bipolar affective disorder, remission status unspecified:  Prior diagnosis, recent psychosis with hallucinations.  He was seen in the emergency room and by Psychiatry four days ago.  Patient continues to have trouble sleeping and eating.  Recommend increasing Seroquel to 100 mg nightly has recommended by Psychiatry.  Encourage continued follow-up with his outpatient psychiatry team.  Patient would like to transition Psychiatry care to [facility], I have provided him the number to call the department to schedule.    Essential hypertension:  Not " well controlled today, possibly due to agitation and anxiety.  Follow-up at next visit in September to see if things have resolved.    Hypercalcemia:  New problem, seen on recent labs.  Unclear if this is contributing to any of his symptoms, unclear etiology as patient reports drinking plenty of fluid and should not be dehydrated.  Recheck on labs today, if still elevated stop Vitamin-D supplement.  -     Comprehensive metabolic panel; Future  -     Calcium, ionized; Future  -     Vitamin D; Future    Leukocytosis, unspecified type:  Mildly elevated white blood cells, new problem seen on labs from ER.  Possibly due to the stress from active manic episode, recheck levels.  -     CBC Without Differential; Future    Keep appointment with me in Sept 2018.  Jonah Harrison MD  Internal Medicine    Portions of this note were completed using medical dictation software. Please excuse typographical or syntax errors that were missed on review.

## 2018-07-16 NOTE — PATIENT INSTRUCTIONS
Please call the Mental Health Department at [phone] to schedule an appointment with Dr Tsai. In the meantime please continue follow up with Dr Alvarado.    Please increase quetiapine (Seroquel) to 100 mg at night. Take 2 pills of the 50 mg tablets for now. If this is helping please talk to the Psychiatry team to reorder the 100 mg tablets.

## 2018-07-17 ENCOUNTER — TELEPHONE (OUTPATIENT)
Dept: INTERNAL MEDICINE | Facility: CLINIC | Age: 58
End: 2018-07-17

## 2018-07-17 NOTE — TELEPHONE ENCOUNTER
Please call the patient to notify that his labs are improving. His white blood cell count and calcium are coming back down. No need for changes at this time. I have sent the results in a letter.

## 2018-08-11 DIAGNOSIS — M1A.9XX0 CHRONIC GOUT WITHOUT TOPHUS, UNSPECIFIED CAUSE, UNSPECIFIED SITE: ICD-10-CM

## 2018-08-11 RX ORDER — OMEPRAZOLE 20 MG/1
CAPSULE, DELAYED RELEASE ORAL
Qty: 90 CAPSULE | Refills: 0 | Status: CANCELLED | OUTPATIENT
Start: 2018-08-11

## 2018-08-13 ENCOUNTER — OFFICE VISIT (OUTPATIENT)
Dept: INTERNAL MEDICINE | Facility: CLINIC | Age: 58
End: 2018-08-13
Payer: COMMERCIAL

## 2018-08-13 VITALS
HEIGHT: 74 IN | OXYGEN SATURATION: 99 % | BODY MASS INDEX: 29.09 KG/M2 | WEIGHT: 226.63 LBS | HEART RATE: 60 BPM | DIASTOLIC BLOOD PRESSURE: 74 MMHG | SYSTOLIC BLOOD PRESSURE: 148 MMHG

## 2018-08-13 DIAGNOSIS — I10 ESSENTIAL HYPERTENSION: Chronic | ICD-10-CM

## 2018-08-13 DIAGNOSIS — R10.9 ABDOMINAL PAIN, UNSPECIFIED ABDOMINAL LOCATION: ICD-10-CM

## 2018-08-13 DIAGNOSIS — R42 LOSS OF EQUILIBRIUM: Primary | ICD-10-CM

## 2018-08-13 DIAGNOSIS — M1A.9XX0 CHRONIC GOUT WITHOUT TOPHUS, UNSPECIFIED CAUSE, UNSPECIFIED SITE: ICD-10-CM

## 2018-08-13 PROCEDURE — 99999 PR PBB SHADOW E&M-EST. PATIENT-LVL V: CPT | Mod: PBBFAC,,, | Performed by: INTERNAL MEDICINE

## 2018-08-13 PROCEDURE — 99215 OFFICE O/P EST HI 40 MIN: CPT | Mod: S$GLB,,, | Performed by: INTERNAL MEDICINE

## 2018-08-13 NOTE — PROGRESS NOTES
Subjective:       Patient ID: Abel Dc III is a 57 y.o. male.    Chief Complaint: Follow-up and Abdominal Pain    HPI    Patient is accompanied by his wife.     Last visit with me 07/2018. Stomach ache, can't eat because pain worsens. Pain is all the time. Also problems with ambulation, equilibrium is off. Can't sleep at night. Wife notes that there are periods where he eats well.    BP is 160s/90s at home, but after medicine things improve. Pressure changes don't affect equilibrium. Seroquel now only using 50 mg, when taking 100 mg was having joint pain and restless leg syndrome. Reports has to walk using rail to keep his balance but still feels he is going to fall. No chest pain or shortness of breath. Also some mild tremor. Thinks may due to meds from Psychiatry.    Review of Systems    As per HPI    Objective:      Physical Exam   Constitutional: No distress.   HENT:   Head: Atraumatic.   Right Ear: Tympanic membrane normal. No tenderness.   Left Ear: Tympanic membrane normal. No tenderness.   Mouth/Throat: Oropharynx is clear and moist. No oropharyngeal exudate.   Eyes: Pupils are equal, round, and reactive to light. Right eye exhibits no discharge. Left eye exhibits no discharge.   Neck: Normal range of motion. No thyromegaly present.   Cardiovascular: Normal rate, regular rhythm and normal heart sounds.   Pulmonary/Chest: Effort normal and breath sounds normal. No stridor. He has no wheezes. He has no rales.   Abdominal: Soft. Bowel sounds are normal. He exhibits no distension and no mass. There is no tenderness. There is no guarding.   Musculoskeletal: He exhibits no edema or tenderness.   Lymphadenopathy:     He has no cervical adenopathy.   Neurological: He is alert. He displays tremor (mild resting tremor). No cranial nerve deficit. He displays a negative Romberg sign.   Mild dysmetria on FNF, L>R. Heel to shin normal bilaterally.   Skin: Skin is warm and dry. No rash noted.   Nursing note and  "vitals reviewed.      Vitals:    08/13/18 1316   BP: (!) 148/74   BP Location: Right arm   Patient Position: Sitting   BP Method: Medium (Manual)   Pulse: 60   SpO2: 99%   Weight: 102.8 kg (226 lb 10.1 oz)   Height: 6' 2" (1.88 m)     Body mass index is 29.1 kg/m².    RESULTS: Reviewed labs from last 4 months    Assessment:       1. Loss of equilibrium    2. Abdominal pain, unspecified abdominal location    3. Essential hypertension        Plan:   Abel was seen today for follow-up and abdominal pain.    Diagnoses and all orders for this visit:    Loss of equilibrium:  New problem, getting worse. Mild tremor and dysmetria but no focal defecits. Order MRI for evaluation. If normal probably secondary to Psychiatry meds, continue follow up with Psychiatry.  -     MRI Brain Without Contrast; Future    Abdominal pain, unspecified abdominal location:  Chronic problem, persists, comes and goes. Still able to eat throughout day. Continue PPI and Carafate for now and continue to monitor.    Essential hypertension:  Prior dx, not well controlled today, continue follow up at next visit.    Keep follow up appointment on Sept 5th.  Jonah Harrison MD  Internal Medicine    Portions of this note were completed using medical dictation software. Please excuse typographical or syntax errors that were missed on review.    "

## 2018-08-14 ENCOUNTER — HOSPITAL ENCOUNTER (EMERGENCY)
Facility: HOSPITAL | Age: 58
Discharge: HOME | End: 2018-08-14
Attending: EMERGENCY MEDICINE
Payer: COMMERCIAL

## 2018-08-14 VITALS
RESPIRATION RATE: 18 BRPM | DIASTOLIC BLOOD PRESSURE: 81 MMHG | HEART RATE: 69 BPM | BODY MASS INDEX: 29.39 KG/M2 | SYSTOLIC BLOOD PRESSURE: 182 MMHG | OXYGEN SATURATION: 98 % | HEIGHT: 74 IN | TEMPERATURE: 98 F | WEIGHT: 229 LBS

## 2018-08-14 DIAGNOSIS — G47.00 INSOMNIA, UNSPECIFIED TYPE: Primary | ICD-10-CM

## 2018-08-14 PROCEDURE — 99282 EMERGENCY DEPT VISIT SF MDM: CPT

## 2018-08-14 PROCEDURE — 99283 EMERGENCY DEPT VISIT LOW MDM: CPT | Mod: ,,, | Performed by: EMERGENCY MEDICINE

## 2018-08-14 RX ORDER — ALLOPURINOL 100 MG/1
TABLET ORAL
Qty: 30 TABLET | Refills: 0 | Status: SHIPPED | OUTPATIENT
Start: 2018-08-14 | End: 2018-10-18

## 2018-08-14 RX ORDER — ALLOPURINOL 100 MG/1
200 TABLET ORAL DAILY
Qty: 180 TABLET | Refills: 3 | OUTPATIENT
Start: 2018-08-14

## 2018-08-14 RX ORDER — OMEPRAZOLE 20 MG/1
20 CAPSULE, DELAYED RELEASE ORAL DAILY
Qty: 90 CAPSULE | Refills: 3 | Status: SHIPPED | OUTPATIENT
Start: 2018-08-14 | End: 2019-07-01 | Stop reason: SDUPTHER

## 2018-08-14 NOTE — ED PROVIDER NOTES
Encounter Date: 8/14/2018  SCRIBE #1 NOTE: I, Umesh Dominguez, am scribing for, and in the presence of,  Syed Adames MD. I have scribed the entire note.        History     Chief Complaint   Patient presents with    Hypertension     pt reports taking his BP about 30 min ago and his pressure was in the 170s    Insomnia     pt reports not being able to sleep for days; pt states he hasn't slept yet tonight       Time patient was seen by the provider: 5:57 AM      The patient is a 57 y.o. male with co-morbidities including: HTN who presents to the ED with a complaint of HTN and insomnia. He states he could sleep more than a couple hours tonight. He checked his BP and saw 180's/91. The patient denies blurry vision focal numbness/tingling, chest pain or shortness of breath. No recent changes in antihypertensives or medications. He has no other complaints. Pt wondering if he needs more klonopin for insomnia.          Review of patient's allergies indicates:   Allergen Reactions    Hydrocodone      Other reaction(s): Hallucinations    Hydrocodone-acetaminophen      Other reaction(s): hyperactivity    Oxycodone Other (See Comments)     hallucinations    Pseudoephedrine      Other reaction(s): Hallucinations    Pseudoephedrine hcl      Other reaction(s): hyperactivity    Risperidone      Other reaction(s): Hallucinations  Other reaction(s): hyperactivity    Trazodone      Adverse reaction    Naproxen      Past Medical History:   Diagnosis Date    Bipolar disorder     manic depressive x 1992    BPH (benign prostatic hypertrophy)     Chronic low back pain     Depression     GERD (gastroesophageal reflux disease)     Gout     Gout, arthritis     Hyperlipidemia     Hypertension     Lumbar disc disease     Obesity     Panic disorder      Past Surgical History:   Procedure Laterality Date    HERNIA REPAIR      HUMERUS FRACTURE SURGERY  1971    Bone grafts required    MANDIBLE FRACTURE SURGERY      MUSCLE  "TRANSFER UPPER ARM      SHOULDER ARTHROSCOPY      SHOULDER SURGERY      TONSILLECTOMY       Family History   Problem Relation Age of Onset    Diabetes Mother     Breast cancer Mother      Social History     Tobacco Use    Smoking status: Never Smoker    Smokeless tobacco: Current User     Types: Snuff    Tobacco comment: Patient uses "dip" tobacco   Substance Use Topics    Alcohol use: No     Alcohol/week: 0.0 oz     Comment: Heavy alcohol use in the past.     Drug use: No     Review of Systems   Constitutional: Negative for fever.   HENT: Negative for sore throat.    Respiratory: Negative for shortness of breath.    Cardiovascular: Negative for chest pain.   Gastrointestinal: Negative for nausea.   Genitourinary: Negative for dysuria.   Musculoskeletal: Negative for back pain.   Skin: Negative for rash.   Neurological: Negative for weakness.   Hematological: Does not bruise/bleed easily.   Psychiatric/Behavioral: Positive for sleep disturbance.       Physical Exam     Initial Vitals [08/14/18 0517]   BP Pulse Resp Temp SpO2   (!) 182/81 69 18 98.4 °F (36.9 °C) 98 %      MAP       --         Physical Exam    Nursing note and vitals reviewed.    Appearance: No acute distress.  Skin: No rashes seen.  Good turgor.  No abrasions.  No ecchymoses.  Eyes: No conjunctival injection.  ENT: Oropharynx clear.    Chest: Clear to auscultation bilaterally.  Good air movement.  No wheezes.  No rhonchi.  Cardiovascular: Regular rate and rhythm.  No murmurs. No gallops. No rubs.  Abdomen: Soft.  Not distended.  Nontender.  No guarding.  No rebound. No Masses  Musculoskeletal: Good range of motion all joints.  No deformities.  Neck supple.  No meningismus.  Neurologic: Motor intact.  Sensation intact.  Cerebellar intact.  Cranial nerves intact.  Mental Status:  Alert and oriented x 3.  Appropriate, conversant.          ED Course   Procedures  Labs Reviewed - No data to display       Imaging Results    None          Medical " Decision Making:   ED Management:  Pt here with insomnia and symptomatic hypertension. No signs of end organ damage. Do not suspect acute CVA or MI based on physical exam and HX. Recommended he takes his BP once daily and if this persistent  to follow with his PCP for HTN and insomnia.    Advised pt to follow up with PCP or return if concerning symptoms arise. Pt understands and agrees with plan. Will d/c home.                          Clinical Impression:   The encounter diagnosis was Insomnia, unspecified type.      Disposition:   Disposition: Discharged  Condition: Stable                        Syed Adames MD  08/14/18 6234

## 2018-08-14 NOTE — ED TRIAGE NOTES
"Pt reports "high BP of 170's/180's" states pressure is usually in 150's but denies any symptoms. Also reports insomnia, states he hasn't slept since Sunday night and slept for "a couple hours". States "I think it might be from the medicine my doctor has been prescribing me", reports he took trazadone and had an "episode" where he states his wife told him he was throwing things and yelling but reports he doesn't remember any of this happening.    Patient Identifiers for Abel Dc III checked and correct  LOC: The patient is awake, alert and aware of environment with an appropriate affect, the patient is oriented x 3 and speaking appropriate.  APPEARANCE: Patient resting comfortably and in no acute distress, patient is clean and well groomed, patient's clothing is properly fastened.  SKIN: The skin is warm and dry, patient has normal skin turgor and moist mucus membranes,no rashes or lesions.Skin Intact , No Breakdown Noted  Musculoskeletal :  Normal range of motion noted. Moves all extremeties well, No swelling or tenderness noted  RESPIRATORY: Airway is open and patent, respirations are spontaneous, patient has a normal effort and rate.  CARDIAC: Patient has a normal rate and rhythm, no periphreal edema noted, capillary refill < 3 seconds.   ABDOMEN: Soft and non tender to palpation, no distention noted.   PULSES: 2+  And symmetrical in all extremeties  NEUROLOGIC: PERRL. facial expression is symmetrical, patient moving all extremities, normal sensation in all extremities when touched with a finger.The patient is awake, alert and cooperative with a calm affect, patient is aware of environment.    Will continue to monitor    "

## 2018-08-16 ENCOUNTER — TELEPHONE (OUTPATIENT)
Dept: INTERNAL MEDICINE | Facility: CLINIC | Age: 58
End: 2018-08-16

## 2018-08-22 ENCOUNTER — TELEPHONE (OUTPATIENT)
Dept: INTERNAL MEDICINE | Facility: CLINIC | Age: 58
End: 2018-08-22

## 2018-08-22 NOTE — TELEPHONE ENCOUNTER
----- Message from Sayda Lopez sent at 8/22/2018 11:21 AM CDT -----  Contact: [phone]  Patient is requesting a call from the nurse in regards to his Mri that was schedule for yesterday.    Patient stated he received a call from billing stating the he had to cancel appt.or do the MRI in [place] or [facility].    Patient has questions and would like for the nurse to call him.    Please advise, thanks

## 2018-08-25 ENCOUNTER — HOSPITAL ENCOUNTER (EMERGENCY)
Facility: HOSPITAL | Age: 58
Discharge: HOME | End: 2018-08-25
Attending: EMERGENCY MEDICINE
Payer: COMMERCIAL

## 2018-08-25 VITALS
WEIGHT: 229 LBS | OXYGEN SATURATION: 99 % | HEIGHT: 74 IN | TEMPERATURE: 99 F | SYSTOLIC BLOOD PRESSURE: 195 MMHG | BODY MASS INDEX: 29.39 KG/M2 | RESPIRATION RATE: 18 BRPM | HEART RATE: 85 BPM | DIASTOLIC BLOOD PRESSURE: 91 MMHG

## 2018-08-25 DIAGNOSIS — R22.0 GINGIVAL SWELLING: Primary | ICD-10-CM

## 2018-08-25 PROCEDURE — 25000003 PHARM REV CODE 250: Performed by: PHYSICIAN ASSISTANT

## 2018-08-25 PROCEDURE — 99283 EMERGENCY DEPT VISIT LOW MDM: CPT | Mod: 25

## 2018-08-25 PROCEDURE — 63600175 PHARM REV CODE 636 W HCPCS: Performed by: PHYSICIAN ASSISTANT

## 2018-08-25 PROCEDURE — 96372 THER/PROPH/DIAG INJ SC/IM: CPT

## 2018-08-25 PROCEDURE — 99283 EMERGENCY DEPT VISIT LOW MDM: CPT | Mod: ,,, | Performed by: PHYSICIAN ASSISTANT

## 2018-08-25 RX ORDER — ACETAMINOPHEN 500 MG
1000 TABLET ORAL
Status: COMPLETED | OUTPATIENT
Start: 2018-08-25 | End: 2018-08-25

## 2018-08-25 RX ORDER — PENICILLIN V POTASSIUM 500 MG/1
500 TABLET, FILM COATED ORAL 4 TIMES DAILY
Qty: 28 TABLET | Refills: 0 | Status: SHIPPED | OUTPATIENT
Start: 2018-08-25 | End: 2018-09-01

## 2018-08-25 RX ORDER — ACETAMINOPHEN 500 MG
500 TABLET ORAL EVERY 6 HOURS PRN
Qty: 30 TABLET | Refills: 0 | Status: SHIPPED | OUTPATIENT
Start: 2018-08-25 | End: 2020-08-15 | Stop reason: SDUPTHER

## 2018-08-25 RX ORDER — KETOROLAC TROMETHAMINE 30 MG/ML
15 INJECTION, SOLUTION INTRAMUSCULAR; INTRAVENOUS
Status: COMPLETED | OUTPATIENT
Start: 2018-08-25 | End: 2018-08-25

## 2018-08-25 RX ADMIN — KETOROLAC TROMETHAMINE 15 MG: 30 INJECTION, SOLUTION INTRAMUSCULAR at 08:08

## 2018-08-25 RX ADMIN — ACETAMINOPHEN 1000 MG: 500 TABLET ORAL at 08:08

## 2018-08-25 RX ADMIN — Medication 1 ML: at 08:08

## 2018-08-25 NOTE — ED PROVIDER NOTES
"Encounter Date: 8/25/2018    SCRIBE #1 NOTE: I, Carin Soliz, am scribing for, and in the presence of,  Dr. Waters. I have scribed the following portions of the note - the APC attestation.       History     Chief Complaint   Patient presents with    Dental Pain     "I got bad teeth and I was eating cereal this morning and it feels like my gums are broke" Right sided gum pain      A 57-year-old male with hypertension and bipolar disorder presents for pain to upper right gums x1 day.  Patient reports he was eating cereal yesterday and subsequently felt "like my gums broke".  Took ibuprofen for pain with some relief.  Reports associated swelling and difficulty chewing.  Denies bleeding or discharge, broken teeth or fever.  Patient has very poor dentition, has appointment with oral surgeon to have teeth removed next week.           Review of patient's allergies indicates:   Allergen Reactions    Hydrocodone      Other reaction(s): Hallucinations    Hydrocodone-acetaminophen      Other reaction(s): hyperactivity    Oxycodone Other (See Comments)     hallucinations    Pseudoephedrine      Other reaction(s): Hallucinations    Pseudoephedrine hcl      Other reaction(s): hyperactivity    Risperidone      Other reaction(s): Hallucinations  Other reaction(s): hyperactivity    Trazodone      Adverse reaction    Naproxen      Past Medical History:   Diagnosis Date    Bipolar disorder     manic depressive x 1992    BPH (benign prostatic hypertrophy)     Chronic low back pain     Depression     GERD (gastroesophageal reflux disease)     Gout     Gout, arthritis     Hyperlipidemia     Hypertension     Lumbar disc disease     Obesity     Panic disorder      Past Surgical History:   Procedure Laterality Date    HERNIA REPAIR      HUMERUS FRACTURE SURGERY  1971    Bone grafts required    MANDIBLE FRACTURE SURGERY      MUSCLE TRANSFER UPPER ARM      SHOULDER ARTHROSCOPY      SHOULDER SURGERY      " "TONSILLECTOMY       Family History   Problem Relation Age of Onset    Diabetes Mother     Breast cancer Mother      Social History     Tobacco Use    Smoking status: Never Smoker    Smokeless tobacco: Current User     Types: Snuff    Tobacco comment: Patient uses "dip" tobacco   Substance Use Topics    Alcohol use: No     Alcohol/week: 0.0 oz     Comment: Heavy alcohol use in the past.     Drug use: No     Review of Systems   Constitutional: Negative for fatigue and fever.   HENT: Positive for dental problem and facial swelling. Negative for mouth sores, sore throat and trouble swallowing.    Respiratory: Negative for cough and shortness of breath.    Cardiovascular: Negative for chest pain and palpitations.   Gastrointestinal: Negative for abdominal pain, constipation, diarrhea, nausea and vomiting.   Genitourinary: Negative for dysuria and hematuria.   Musculoskeletal: Negative for back pain and myalgias.   Skin: Negative for color change and rash.   Neurological: Negative for speech difficulty, light-headedness, numbness and headaches.   Hematological: Does not bruise/bleed easily.       Physical Exam     Initial Vitals [08/25/18 0723]   BP Pulse Resp Temp SpO2   (!) 195/91 85 18 98.5 °F (36.9 °C) 99 %      MAP       --         Physical Exam    Nursing note and vitals reviewed.  Constitutional: He appears well-developed and well-nourished. He is not diaphoretic. No distress.   HENT:   Head: Normocephalic and atraumatic.   Mouth/Throat: He does not have dentures. No oral lesions. No trismus in the jaw. Abnormal dentition. Dental caries present. No dental abscesses, uvula swelling or lacerations. No oropharyngeal exudate, posterior oropharyngeal edema or posterior oropharyngeal erythema.       Mild swelling to upper right mandible.  Gingiva erythematous, edematous and tender to palpation. No abscess noted. Dentition extremely poor, all teeth are rotted to the gum line.   Eyes: EOM are normal. Pupils are " equal, round, and reactive to light.   Neck: Normal range of motion. Neck supple.   Cardiovascular: Normal rate, regular rhythm and normal heart sounds.   Pulmonary/Chest: Breath sounds normal.   Abdominal: Soft. Bowel sounds are normal.   Musculoskeletal: Normal range of motion.   Neurological: He is alert and oriented to person, place, and time.   Skin: Skin is warm and dry.   Psychiatric: He has a normal mood and affect.         ED Course   Procedures  Labs Reviewed - No data to display        Medical Decision Making:   History:   Old Medical Records: I decided to obtain old medical records.       APC / Resident Notes:   57-year-old male presenting with pain in the upper right gums after eating cereal.  Hypertensive at 195/91, afebrile.  Very poor dentition, all teeth are rotted down to the bone.  Right upper gingiva erythematous, edematous and tender.  No bleeding or discharge noted. No abscess.  Suspect pain due to mechanical irritation of the gingiva. DDX includes dental abscess vs dental infection.  Will apply LET for numbing, will discharge patient with Rx for Magic mouthwash, acetaminophen and penicillin for infection prophylaxis.  Discussed the importance of follow-up, strict ED return precautions given.  The patient voiced understanding and is comfortable with discharge. I discussed this patient with my supervising physician.    Livier Retana PA-C         Scribe Attestation:   Scribe #1: I performed the above scribed service and the documentation accurately describes the services I performed. I attest to the accuracy of the note.    Attending Attestation:     Physician Attestation Statement for NP/PA:   I discussed this assessment and plan of this patient with the NP/PA, but I did not personally examine the patient. The face to face encounter was performed by the NP/PA.    Other NP/PA Attestation Additions:    History of Present Illness: 57 y.o. male with co-morbidities of chronic tobacco use  presents for evaluation of gingival pain.         Physician Attestation for Scribe:      Comments: I, Dr. Carrington Waters, personally performed the services described in this documentation. All medical record entries made by the scribe were at my direction and in my presence.  I have reviewed the chart and agree that the record reflects my personal performance and is accurate and complete. Carrington Waters MD.  2:59 PM 08/25/2018                 Clinical Impression:   The encounter diagnosis was Gingival swelling.      Disposition:   Disposition: Discharged  Condition: Stable                        Livier Retana PA-C  08/25/18 [id number]

## 2018-08-25 NOTE — DISCHARGE INSTRUCTIONS
Please follow up with your Oral surgeon to have the teeth removed. If you start to have fever or cannot swallow

## 2018-08-25 NOTE — ED TRIAGE NOTES
Presents with dental pain and facial swelling to his right upper gumline.  Patient's name and date of birth checked and is correct.  LOC: The patient is awake, alert and aware of environment with an appropriate affect, the patient is oriented x 3 and speaking appropriately.  APPEARANCE: Patient resting comfortably and in no acute distress, patient is clean and well groomed, patient's clothing is properly fastened.  CARDIOVASCULAR:  Heart rate regular and even with no peripheral edema noted.  SKIN: The skin is warm and dry, patient has normal skin turgor and moist mucus membranes, skin intact, no breakdown or brusing noted. MUSKULOSKELETAL: Patient moving all extremities well, no obvious swelling or deformities noted.  RESPIRATORY: Airway is open and patent, respirations are spontaneous, patient has a normal effort and rate.

## 2018-09-04 ENCOUNTER — TELEPHONE (OUTPATIENT)
Dept: INTERNAL MEDICINE | Facility: CLINIC | Age: 58
End: 2018-09-04

## 2018-09-04 NOTE — TELEPHONE ENCOUNTER
----- Message from Sayda Lopez sent at 9/4/2018  9:47 AM CDT -----  Contact: [phone]  Patient is trying to reschedule his 4mon f/u appt.  Patient stated he has been losing weight and would like to speak with the doctor about it.  Next available appt is Nov.    Please advise, thank you.

## 2018-09-27 ENCOUNTER — TELEPHONE (OUTPATIENT)
Dept: INTERNAL MEDICINE | Facility: CLINIC | Age: 58
End: 2018-09-27

## 2018-09-27 NOTE — TELEPHONE ENCOUNTER
"----- Message from Chandrika Carlson sent at 9/26/2018  6:35 PM CDT -----  Contact: Patient would like to speak to you in regards to oral sedation.  Dear Dr. Harrison and staff,    Good evening, I am writing in regards to our patient Mr. Abel Dc (MRN [id number]). He was unaware that we no longer carry the Open MRI machine. We have had all brand new Wide Bore machines ever since December of 2017. I explained to him that it will no longer be  2"-3" away from his face/body but now (depending on his body/build) it will be approximately 12" away form his face/body. He is okay with that but not too sure about the head coil that we use for all our Brain MRIs. Even though I informed him that the new machines, with the Brain MRI without contrast will only be 10 minutes (if he lays completely still) he wanted some sort of sedation (which we do not provide at the Imaging Center).    For Claustrophobia, we do have a protocol in place now. First, the ordering physician would have to prescribe an oral sedation medication for the patient to take prior to their MRI exam that is scheduled at the [facility]patient [facility]. If the patient has tried this and is still unable to complete their MRI, then we can schedule the patient with IV Sedation in the [facility] - Inpatient MRI called "Hospital MRI" on the 1st Floor. It would have to be scheduled by us here at [id number]. We only have sedation nurses available every Monday, Wednesday and Friday at 4 times slots each of those days.  If this also does not work, then the ordering doctor would have to have a consult with the radiologists and our upper MRI team.    Please let me know if you have any further questions.  Would you be able to assist by giving Mr. Dc and/or Mrs. Dc a call tomorrow to discuss their oral sedation questions?    Sincerely,  Chandrika Carlson  Radiology  [id number]  [id number]    "

## 2018-09-27 NOTE — TELEPHONE ENCOUNTER
"----- Message from Chandrika Carlson sent at 9/26/2018  6:35 PM CDT -----  Contact: Patient would like to speak to you in regards to oral sedation.  Dear Dr. Harrison and staff,    Good evening, I am writing in regards to our patient Mr. Abel Dc (MRN [id number]). He was unaware that we no longer carry the Open MRI machine. We have had all brand new Wide Bore machines ever since December of 2017. I explained to him that it will no longer be  2"-3" away from his face/body but now (depending on his body/build) it will be approximately 12" away form his face/body. He is okay with that but not too sure about the head coil that we use for all our Brain MRIs. Even though I informed him that the new machines, with the Brain MRI without contrast will only be 10 minutes (if he lays completely still) he wanted some sort of sedation (which we do not provide at the Imaging Center).    For Claustrophobia, we do have a protocol in place now. First, the ordering physician would have to prescribe an oral sedation medication for the patient to take prior to their MRI exam that is scheduled at the [facility]patient [facility]. If the patient has tried this and is still unable to complete their MRI, then we can schedule the patient with IV Sedation in the [facility] Inpatient MRI called "Hospital MRI" on the 1st Floor. It would have to be scheduled by us here at [id number]. We only have sedation nurses available every Monday, Wednesday and Friday at 4 times slots each of those days.  If this also does not work, then the ordering doctor would have to have a consult with the radiologists and our upper MRI team.    Please let me know if you have any further questions.  Would you be able to assist by giving Mr. Dc and/or Mrs. Dc a call tomorrow to discuss their oral sedation questions?    Sincerely,  Chandrika Carlson  Radiology  [id number]  [id number]    "

## 2018-10-04 ENCOUNTER — TELEPHONE (OUTPATIENT)
Dept: INTERNAL MEDICINE | Facility: CLINIC | Age: 58
End: 2018-10-04

## 2018-10-04 DIAGNOSIS — R42 LOSS OF EQUILIBRIUM: Primary | ICD-10-CM

## 2018-10-04 DIAGNOSIS — F40.240 CLAUSTROPHOBIA: ICD-10-CM

## 2018-10-04 RX ORDER — ALPRAZOLAM 1 MG/1
TABLET ORAL
Qty: 1 TABLET | Refills: 0 | Status: SHIPPED | OUTPATIENT
Start: 2018-10-04 | End: 2018-10-07

## 2018-10-04 NOTE — TELEPHONE ENCOUNTER
Please call to notify the patient that I spoke to the radiologist regarding the MRI.  We will prescribe a medication to help with going through the MRI.  Will use a dose of alprazolam (Xanax), to be taken 30-60 min before his planned MRI.  I have sent this to his pharmacy.  Please have the schedulers call the patient to reschedule the MRI; order placed a few wks ago.

## 2018-10-04 NOTE — TELEPHONE ENCOUNTER
Spoke with patient verbal understanding on with to take the Xanax and I told him he would received a call to schedule his MRI

## 2018-10-05 NOTE — TELEPHONE ENCOUNTER
No order in for MRI, with the MRI on the 9/27 and 9/28 being cancelled, order no longer in and unable to reschedule from either of these appt.  Please enter new order.  Thanks

## 2018-10-07 ENCOUNTER — HOSPITAL ENCOUNTER (EMERGENCY)
Facility: HOSPITAL | Age: 58
Discharge: HOME | End: 2018-10-07
Attending: EMERGENCY MEDICINE
Payer: COMMERCIAL

## 2018-10-07 VITALS
TEMPERATURE: 98 F | WEIGHT: 219 LBS | BODY MASS INDEX: 28.11 KG/M2 | HEART RATE: 69 BPM | OXYGEN SATURATION: 100 % | DIASTOLIC BLOOD PRESSURE: 85 MMHG | SYSTOLIC BLOOD PRESSURE: 179 MMHG | HEIGHT: 74 IN | RESPIRATION RATE: 18 BRPM

## 2018-10-07 DIAGNOSIS — K08.89 PAIN, DENTAL: Primary | ICD-10-CM

## 2018-10-07 DIAGNOSIS — R22.0 GINGIVAL SWELLING: ICD-10-CM

## 2018-10-07 PROCEDURE — 64402 HC NERVE BLOCK INJ - FACIAL: CPT

## 2018-10-07 PROCEDURE — 40800 DRAINAGE OF MOUTH LESION: CPT | Mod: ,,, | Performed by: PHYSICIAN ASSISTANT

## 2018-10-07 PROCEDURE — 99283 EMERGENCY DEPT VISIT LOW MDM: CPT | Mod: 25,,, | Performed by: EMERGENCY MEDICINE

## 2018-10-07 PROCEDURE — 40800 DRAINAGE OF MOUTH LESION: CPT

## 2018-10-07 PROCEDURE — 25000003 PHARM REV CODE 250: Performed by: PHYSICIAN ASSISTANT

## 2018-10-07 PROCEDURE — 99283 EMERGENCY DEPT VISIT LOW MDM: CPT | Mod: 25

## 2018-10-07 RX ORDER — PENICILLIN V POTASSIUM 500 MG/1
500 TABLET, FILM COATED ORAL 4 TIMES DAILY
Qty: 40 TABLET | Refills: 0 | Status: SHIPPED | OUTPATIENT
Start: 2018-10-07 | End: 2018-10-14

## 2018-10-07 RX ADMIN — Medication 1 ML: at 04:10

## 2018-10-07 NOTE — ED TRIAGE NOTES
Presents to ER with swelling to his right jaw area.  Complaining of pain to teeth 28-32 due to dental caries and possible gum disease.  Patient's name and date of birth checked and is correct.    LOC: The patient is awake, alert, and oriented to place, time, situation. Affect is appropriate.  Speech is appropriate and clear.      APPEARANCE: Patient resting comfortably, reporting palpation, light headedness,  in no acute distress.  Patient is clean and well groomed.     SKIN: The skin is warm and dry; color consistent with ethnicity.  Patient has normal skin turgor and moist mucus membranes.  Skin intact; no breakdown or bruising noted.      MUSCULOSKELETAL: Patient moving upper and lower extremities without difficulty.  Denies weakness.      RESPIRATORY: Airway is open and patent. Respirations spontaneous, even, easy, and non-labored.  Patient has a normal effort and rate.  No accessory muscle use noted. Denies cough.  BS clear.     CARDIAC:  No peripheral edema noted. No complaints of chest pain.       ABDOMEN: Soft and non tender to palpation.  No distention noted.      NEUROLOGIC: Eyes open spontaneously.  Behavior appropriate to situation.  Follows commands; facial expression symmetrical.  Purposeful motor response noted; normal sensation in all extremities.

## 2018-10-07 NOTE — DISCHARGE INSTRUCTIONS
Please use the contact information below to establish an appointment with [facility] for evaluation of your dental pain.

## 2018-10-07 NOTE — ED PROVIDER NOTES
Encounter Date: 10/7/2018    SCRIBE #1 NOTE: I, Luis Carlos Carlson, am scribing for, and in the presence of,  Dr. Ross. I have scribed the following portions of the note - the APC attestation.       History     Chief Complaint   Patient presents with    Oral Swelling     my gums are swollen and pain painful     57 year old male with medical history of HTN, HLD, Bipolar disorder, GERD presenting to the ED with the chief complaint of dental pain and swelling. Patient reports having chronic dentition problems for several years. He reports seeing an oral surgeon 3 months ago and was told he needed $6500 to have his teeth removed to which he cannot afford. He is in the process in obtaining an appointment with oral surgery at [facility]. He reports waking up this morning and having increased pain and swelling to his lower right gum lines. He denies associated fever, mastication difficulties, sore throat, trouble swallowing, tongue edema, neck pain, neck stiffness, chest pain, SOB, nausea, vomiting. He denies inciting trauma or other injuries. He is currently taking Meloxicam for pain. He is not on antibiotics currently. He uses chewing tobacco.           Review of patient's allergies indicates:   Allergen Reactions    Hydrocodone      Other reaction(s): Hallucinations    Hydrocodone-acetaminophen      Other reaction(s): hyperactivity    Oxycodone Other (See Comments)     hallucinations    Pseudoephedrine      Other reaction(s): Hallucinations    Pseudoephedrine hcl      Other reaction(s): hyperactivity    Risperidone      Other reaction(s): Hallucinations  Other reaction(s): hyperactivity    Trazodone      Adverse reaction    Naproxen      Past Medical History:   Diagnosis Date    Bipolar disorder     manic depressive x 1992    BPH (benign prostatic hypertrophy)     Chronic low back pain     Depression     GERD (gastroesophageal reflux disease)     Gout     Gout, arthritis     Hyperlipidemia     Hypertension      "Lumbar disc disease     Obesity     Panic disorder      Past Surgical History:   Procedure Laterality Date    ARTHROSCOPY, SHOULDER, WITH SUBACROMIAL SPACE DECOMPRESSION Left 6/27/2013    [facility] at [facility]    HERNIA REPAIR      HUMERUS FRACTURE SURGERY  1971    Bone grafts required    MANDIBLE FRACTURE SURGERY      MUSCLE TRANSFER UPPER ARM      REPAIR, ROTATOR CUFF Left 6/27/2013    Performed by [facility] at [facility] OR    SHOULDER ARTHROSCOPY      SHOULDER SURGERY      TENODESIS ARTHROSCOPIC (TENDON FIXATION) Left 6/27/2013    [facility] by [facility] at [facility] OR    TONSILLECTOMY       Family History   Problem Relation Age of Onset    Diabetes Mother     Breast cancer Mother      Social History     Tobacco Use    Smoking status: Never Smoker    Smokeless tobacco: Current User     Types: Snuff    Tobacco comment: Patient uses "dip" tobacco   Substance Use Topics    Alcohol use: No     Alcohol/week: 0.0 oz     Comment: Heavy alcohol use in the past.     Drug use: No     Review of Systems   Constitutional: Negative for chills, diaphoresis and fever.   HENT: Positive for dental problem. Negative for sore throat and trouble swallowing.    Eyes: Negative for pain and redness.   Respiratory: Negative for cough and shortness of breath.    Cardiovascular: Negative for chest pain.   Gastrointestinal: Negative for nausea and vomiting.   Genitourinary: Negative for dysuria and hematuria.   Musculoskeletal: Negative for arthralgias, back pain, neck pain and neck stiffness.   Skin: Negative for wound.   Neurological: Negative for weakness, light-headedness and headaches.       Physical Exam     Initial Vitals [10/07/18 1540]   BP Pulse Resp Temp SpO2   (!) 179/85 69 18 98.3 °F (36.8 °C) 100 %      MAP       --         Physical Exam    Constitutional: He appears well-developed and well-nourished.   HENT:   Head: Normocephalic and atraumatic.   Mouth/Throat: Oropharynx is " clear and moist. No oropharyngeal exudate.       Extremely poor dentition with several missing teeth. Teeth in place are necrotic and decayed to gumline. Mild swelling to R lower mandibular with slight induration to inner R mandibular gumline. No expressible discharge. No mastication difficulties. No sublingual or submandibular TTP or edema.    Eyes: EOM are normal. Pupils are equal, round, and reactive to light.   Neck: Normal range of motion. Neck supple.   Cardiovascular: Regular rhythm.   Pulmonary/Chest: Breath sounds normal. No respiratory distress. He has no wheezes.   Musculoskeletal: Normal range of motion.   Neurological: He is alert and oriented to person, place, and time.   Skin: Skin is warm and dry. No erythema.       ED Course   I & D - Incision and Drainage  Date/Time: 10/7/2018 5:23 PM  Performed by: Jonah Cramer PA-C  Authorized by: Haylee Ross MD   Body area: mouth  Location details: vestibule of mouth  Anesthesia: digital block (R Inferior alveolar)    Anesthesia:  Local Anesthetic: bupivacaine 0.5% with epinephrine (Inferior alveolar block)  Anesthetic total: 1 mL  Scalpel size: 11  Incision type: single straight  Complexity: simple  Drainage: bloody  Drainage amount: scant  Wound treatment: incision  Packing material: none  Patient tolerance: Patient tolerated the procedure well with no immediate complications        Labs Reviewed - No data to display       Imaging Results    None          Medical Decision Making:   History:   Old Medical Records: I decided to obtain old medical records.       APC / Resident Notes:   57 year old male with medical history of HTN, HLD, Bipolar disorder, GERD presenting to the ED c/o acute on chronic dental pain and swelling. DDx includes but not limited to gingivitis, dental caries, dental abscess, sialadenitis. I have considered but do not suspect angioedema, Adán's angina, pharyngitis.     Bedside ultrasound performed over R mandibular area that  displayed a small hypoechoic area. Inferior alveolar nerve block performed at bedside and patient subsequently reports complete relief of pain. Intraoral I&D performed with scant bloody drainage and no visible purulence. Patient stable for discharge with outpatient follow-up with [facility] oral surgery. Patient given resources to obtain an appointment. Will give RX for Pen VK and Magic Mouthwash. Patient expresses understanding and agreeable to the plan. Return to ED precautions given for new, worsening, or concerning symptoms. I have discussed the care of this patient with my supervising physician.          Scribe Attestation:   Scribe #1: I performed the above scribed service and the documentation accurately describes the services I performed. I attest to the accuracy of the note.    Attending Attestation:     Physician Attestation Statement for NP/PA:   I have conducted a face to face encounter with this patient in addition to the NP/PA, due to Medical Complexity    Other NP/PA Attestation Additions:    History of Present Illness: 57 y.o. male with chronic dental disease presenting with gum swelling and facial swelling over the right lower mandible. Bedside US shows small hypoechoic area concerning for possible abscess. Dental block performed I&D with scant drainage. Will refer to [facility] for oral surgeon.                       Clinical Impression:   The primary encounter diagnosis was Pain, dental. A diagnosis of Gingival swelling was also pertinent to this visit.      Disposition:   Disposition: Discharged  Condition: Stable                        Jonah Cramer PA-C  10/07/18 1931       Haylee Ross MD  10/07/18 2033

## 2018-10-16 ENCOUNTER — NURSE TRIAGE (OUTPATIENT)
Dept: ADMINISTRATIVE | Facility: CLINIC | Age: 58
End: 2018-10-16

## 2018-10-16 NOTE — TELEPHONE ENCOUNTER
Taken off of trazadone Faining and throwing things away    Reason for Disposition   Patient wants to be seen    Protocols used: ST [id number]    Transferred to central scheduling

## 2018-10-17 DIAGNOSIS — M1A.9XX0 CHRONIC GOUT WITHOUT TOPHUS, UNSPECIFIED CAUSE, UNSPECIFIED SITE: ICD-10-CM

## 2018-10-17 NOTE — TELEPHONE ENCOUNTER
----- Message from Arabella Ruano sent at 10/17/2018  1:14 PM CDT -----  Contact: self/[phone]      ----- Message -----  From: Marly Salazar  Sent: 10/17/2018   1:03 PM  To: Hunter Mckenzie Staff    Pt called in regards to his blood pressure has been running high 180/95.  He was told by the  dr in the er to talk and extra amLODIPine (NORVASC) 5 MG tablet 90 tablet   3  5/17/2018  No TAKE 1 TABLET BY MOUTH EVERY DAY and it helped.      Please advise

## 2018-10-17 NOTE — TELEPHONE ENCOUNTER
----- Message from Arabella Ruano sent at 10/17/2018  1:14 PM CDT -----  Contact: self/[phone]      ----- Message -----  From: Marly Salazar  Sent: 10/17/2018   1:03 PM  To: Hunter Mckenzie Staff    Pt called in regards to his blood pressure has been running high 180/95.  He was told by the  dr in the er to talk and extra amLODIPine (NORVASC) 5 MG tablet 90 tablet   3  5/17/2018  No TAKE 1 TABLET BY MOUTH EVERY DAY and it helped.      Please advise

## 2018-10-18 RX ORDER — GABAPENTIN 300 MG/1
CAPSULE ORAL
Refills: 3 | COMMUNITY
Start: 2018-08-10 | End: 2019-01-26

## 2018-10-18 RX ORDER — IBUPROFEN 800 MG/1
TABLET ORAL
Refills: 0 | COMMUNITY
Start: 2018-09-25 | End: 2018-10-25 | Stop reason: ALTCHOICE

## 2018-10-18 RX ORDER — CHLORHEXIDINE GLUCONATE ORAL RINSE 1.2 MG/ML
SOLUTION DENTAL
Refills: 1 | COMMUNITY
Start: 2018-08-24 | End: 2022-05-24

## 2018-10-18 RX ORDER — AMITRIPTYLINE HYDROCHLORIDE 100 MG/1
TABLET ORAL
Refills: 1 | COMMUNITY
Start: 2018-09-20 | End: 2019-05-13

## 2018-10-18 RX ORDER — ALLOPURINOL 100 MG/1
TABLET ORAL
Qty: 180 TABLET | Refills: 3 | Status: SHIPPED | OUTPATIENT
Start: 2018-10-18 | End: 2019-04-26 | Stop reason: SDUPTHER

## 2018-10-19 ENCOUNTER — TELEPHONE (OUTPATIENT)
Dept: INTERNAL MEDICINE | Facility: CLINIC | Age: 58
End: 2018-10-19

## 2018-10-19 RX ORDER — AMLODIPINE BESYLATE 10 MG/1
10 TABLET ORAL DAILY
Qty: 90 TABLET | Refills: 3 | Status: SHIPPED | OUTPATIENT
Start: 2018-10-19 | End: 2019-07-12 | Stop reason: SDUPTHER

## 2018-10-19 NOTE — TELEPHONE ENCOUNTER
----- Message from Amy Garcia sent at 10/19/2018 10:59 AM CDT -----  Contact: Patient [phone]  Pt is calling to speak with someone in regards to his BP. Pt states that he spoke with someone and he was told that  had sent him in some new bp medication but when he went to the pharmacy it was medication for gout. He also states that he would like for  or his nurse to give him a call back in regards to his BP. Please advise.      Thanks

## 2018-10-19 NOTE — TELEPHONE ENCOUNTER
10 mg is the maximum dose of amlodipine.  I will send in a new prescription for a 10 mg tablet, so that he can start taking 1 tab daily instead of 2 pills daily.  If his blood pressure is still elevated on this, please notify the office so he can be scheduled with 1 of the advanced practice providers.

## 2018-10-19 NOTE — TELEPHONE ENCOUNTER
Spoke to pt he startes he went to the ED for high bp   It was running 180/79 to 195/95  He sd he was told to take 2 Norvasc   Pt complied and did and states that his bp is much better  He would like to see if he can increase it      Please advise

## 2018-10-21 ENCOUNTER — HOSPITAL ENCOUNTER (EMERGENCY)
Facility: HOSPITAL | Age: 58
Discharge: HOME | End: 2018-10-21
Attending: EMERGENCY MEDICINE
Payer: COMMERCIAL

## 2018-10-21 VITALS
BODY MASS INDEX: 27.21 KG/M2 | SYSTOLIC BLOOD PRESSURE: 164 MMHG | OXYGEN SATURATION: 98 % | HEIGHT: 74 IN | RESPIRATION RATE: 17 BRPM | DIASTOLIC BLOOD PRESSURE: 72 MMHG | WEIGHT: 212 LBS | TEMPERATURE: 99 F | HEART RATE: 96 BPM

## 2018-10-21 DIAGNOSIS — K04.01 PULPITIS: ICD-10-CM

## 2018-10-21 DIAGNOSIS — K08.89 PAIN, DENTAL: Primary | ICD-10-CM

## 2018-10-21 DIAGNOSIS — K02.9 TOOTH DECAY: ICD-10-CM

## 2018-10-21 PROCEDURE — 99283 EMERGENCY DEPT VISIT LOW MDM: CPT | Mod: ,,, | Performed by: PHYSICIAN ASSISTANT

## 2018-10-21 PROCEDURE — 99283 EMERGENCY DEPT VISIT LOW MDM: CPT

## 2018-10-21 RX ORDER — PENICILLIN V POTASSIUM 500 MG/1
500 TABLET, FILM COATED ORAL EVERY 6 HOURS
Qty: 28 TABLET | Refills: 0 | Status: SHIPPED | OUTPATIENT
Start: 2018-10-21 | End: 2018-10-28

## 2018-10-21 NOTE — ED PROVIDER NOTES
"Encounter Date: 10/21/2018       History     Chief Complaint   Patient presents with    Dental Pain     "I got bad teeth"     57-year-old male presents to the ER with chief complaint of dental pain all over.  He reports pain beginning in the lower gums yesterday.  He admits that he has been to the ER twice in the last 2 months for dental pain.  His symptoms improved with the antibiotics prescribed.  He says he is having trouble getting in with [facility] to see a dentist.  Patient takes Mobic 15 mg daily, but has not taken this today.  His pain is moderate.  He denies recent fever, chills, drainage from the gumline, nausea or vomiting.  He has no other complaints at this time.          Review of patient's allergies indicates:   Allergen Reactions    Hydrocodone      Other reaction(s): Hallucinations    Hydrocodone-acetaminophen      Other reaction(s): hyperactivity    Oxycodone Other (See Comments)     hallucinations    Pseudoephedrine      Other reaction(s): Hallucinations    Pseudoephedrine hcl      Other reaction(s): hyperactivity    Risperidone      Other reaction(s): Hallucinations  Other reaction(s): hyperactivity    Trazodone      Adverse reaction    Naproxen      Past Medical History:   Diagnosis Date    Bipolar disorder     manic depressive x 1992    BPH (benign prostatic hypertrophy)     Chronic low back pain     Depression     GERD (gastroesophageal reflux disease)     Gout     Gout, arthritis     Hyperlipidemia     Hypertension     Lumbar disc disease     Obesity     Panic disorder      Past Surgical History:   Procedure Laterality Date    ARTHROSCOPY, SHOULDER, WITH SUBACROMIAL SPACE DECOMPRESSION Left 6/27/2013    [facility] at [facility] [place]    HERNIA REPAIR      HUMERUS FRACTURE SURGERY  1971    Bone grafts required    MANDIBLE FRACTURE SURGERY      MUSCLE TRANSFER UPPER ARM      REPAIR, ROTATOR CUFF Left 6/27/2013    Performed by [facility]abe [facility] " "at [facility] OR    SHOULDER ARTHROSCOPY      SHOULDER SURGERY      TENODESIS ARTHROSCOPIC (TENDON FIXATION) Left 6/27/2013    [facility] by [facility] at [facility]    TONSILLECTOMY       Family History   Problem Relation Age of Onset    Diabetes Mother     Breast cancer Mother      Social History     Tobacco Use    Smoking status: Never Smoker    Smokeless tobacco: Current User     Types: Snuff    Tobacco comment: Patient uses "dip" tobacco   Substance Use Topics    Alcohol use: No     Alcohol/week: 0.0 oz     Comment: Heavy alcohol use in the past.     Drug use: No     Review of Systems   Constitutional: Negative for chills and fever.   HENT: Positive for dental problem. Negative for sore throat.    Respiratory: Negative for shortness of breath.    Cardiovascular: Negative for chest pain.   Gastrointestinal: Negative for nausea and vomiting.   Musculoskeletal: Negative for neck pain and neck stiffness.   Skin: Negative for rash.       Physical Exam     Initial Vitals [10/21/18 0717]   BP Pulse Resp Temp SpO2   (!) 164/72 96 17 98.5 °F (36.9 °C) 98 %      MAP       --         Physical Exam    Nursing note and vitals reviewed.  Constitutional: He appears well-developed and well-nourished.   HENT:   Head: Atraumatic.   Mouth/Throat: Oropharynx is clear and moist and mucous membranes are normal. No trismus in the jaw. Abnormal dentition ( poor dentition with decay of all upper and lower teeth.    erythema and mild swelling of lower gumline.  no fluctuance or drainage from gumline ). Dental caries present. No dental abscesses. No posterior oropharyngeal edema or posterior oropharyngeal erythema.   Eyes: Conjunctivae and EOM are normal. Pupils are equal, round, and reactive to light.   Neck: Normal range of motion. Neck supple.   Cardiovascular: Normal rate, regular rhythm and intact distal pulses.   Pulmonary/Chest: Breath sounds normal.   Abdominal: Soft. Bowel sounds are normal. There is no tenderness. "   Neurological: He is alert and oriented to person, place, and time.   Skin: No rash noted.   Psychiatric: He has a normal mood and affect.         ED Course   Procedures  Labs Reviewed - No data to display       Imaging Results    None                APC / Resident Notes:   The patient appears to have pulpitis.  Based upon the history and physical I see no signs of Adán's angina, sublingual swelling, facial swelling, airway compromise, facial cellulitis, sepsis, dehydration, or a fluctuant abscess to drain.  I believe the patient can be discharged home with antibiotics.  I have advised that he follow up closely with a dentist and he is instructed to use probiotics while taking Antibiotics.  He is also advised to take tylenol for pain and is prescribed magic mouthwash.   The patient has been given specific return precautions.  I discussed the care of this patient with my supervising MD.                  Clinical Impression:   The primary encounter diagnosis was Pain, dental. Diagnoses of Tooth decay and Pulpitis were also pertinent to this visit.                             TWYLA Diego  10/21/18 0892

## 2018-10-25 RX ORDER — IBUPROFEN 800 MG/1
TABLET ORAL
Qty: 90 TABLET | Refills: 0 | Status: SHIPPED | OUTPATIENT
Start: 2018-10-25 | End: 2018-11-29 | Stop reason: SDUPTHER

## 2018-11-13 ENCOUNTER — OFFICE VISIT (OUTPATIENT)
Dept: INTERNAL MEDICINE | Facility: CLINIC | Age: 58
End: 2018-11-13
Payer: COMMERCIAL

## 2018-11-13 ENCOUNTER — IMMUNIZATION (OUTPATIENT)
Dept: INTERNAL MEDICINE | Facility: CLINIC | Age: 58
End: 2018-11-13
Payer: COMMERCIAL

## 2018-11-13 VITALS
HEART RATE: 68 BPM | BODY MASS INDEX: 28.49 KG/M2 | DIASTOLIC BLOOD PRESSURE: 80 MMHG | SYSTOLIC BLOOD PRESSURE: 138 MMHG | WEIGHT: 222 LBS | HEIGHT: 74 IN

## 2018-11-13 DIAGNOSIS — E83.52 HYPERCALCEMIA: ICD-10-CM

## 2018-11-13 DIAGNOSIS — Z12.5 PROSTATE CANCER SCREENING: ICD-10-CM

## 2018-11-13 DIAGNOSIS — Z23 NEED FOR INFLUENZA VACCINATION: ICD-10-CM

## 2018-11-13 DIAGNOSIS — R25.1 TREMOR: Primary | ICD-10-CM

## 2018-11-13 DIAGNOSIS — Z79.899 LITHIUM USE: ICD-10-CM

## 2018-11-13 DIAGNOSIS — N18.30 CHRONIC KIDNEY DISEASE, STAGE 3: ICD-10-CM

## 2018-11-13 DIAGNOSIS — E78.5 HYPERLIPIDEMIA, UNSPECIFIED HYPERLIPIDEMIA TYPE: Chronic | ICD-10-CM

## 2018-11-13 DIAGNOSIS — R42 LOSS OF EQUILIBRIUM: ICD-10-CM

## 2018-11-13 PROCEDURE — 99214 OFFICE O/P EST MOD 30 MIN: CPT | Mod: S$GLB,,, | Performed by: INTERNAL MEDICINE

## 2018-11-13 PROCEDURE — 99999 PR PBB SHADOW E&M-EST. PATIENT-LVL III: CPT | Mod: PBBFAC,,, | Performed by: INTERNAL MEDICINE

## 2018-11-13 PROCEDURE — 90686 IIV4 VACC NO PRSV 0.5 ML IM: CPT | Mod: S$GLB,,, | Performed by: INTERNAL MEDICINE

## 2018-11-13 PROCEDURE — G0008 ADMIN INFLUENZA VIRUS VAC: HCPCS | Mod: S$GLB,,, | Performed by: INTERNAL MEDICINE

## 2018-11-13 NOTE — PROGRESS NOTES
"Subjective:       Patient ID: Abel Dc III is a 58 y.o. male.    Chief Complaint: Follow-up    HPI    Patient is accompanied by his wife.    Last visit with me August 2018 for loss of equilibrium.  Has upcoming appointment with Sports Medicine.  Since his last visit with me was seen in the emergency room, 3 times for tooth pain and once for insomnia.    Reports the insurance denied the MRI. Still with shaking, particularly tremor with activity. Continues to have right shoulder pain. Reports frequently agitated, often in afternoon and evening getting up frequently. Has urinary frequency that wakes him up at night.    Review of Systems    As per HPI    Objective:      Physical Exam   Constitutional: No distress.    man whose Body mass index is 28.5 kg/m².    HENT:   Right Ear: No middle ear effusion.   Left Ear:  No middle ear effusion.   Mouth/Throat: Oropharynx is clear and moist. No oropharyngeal exudate.   Tooth decay upper and lower jaw. tympanic membrane obscured by cerumen bilaterally    Neurological: He is alert. He displays tremor (low amplitude high frequency intention tremor, milder at rest). No cranial nerve deficit.   Reflex Scores:       Bicep reflexes are 2+ on the right side and 1+ on the left side.       Patellar reflexes are 3+ on the right side and 2+ on the left side.  Restless with leg shaking while in room   Nursing note and vitals reviewed.      Vitals:    11/13/18 1544   BP: 138/80   BP Location: Right arm   Patient Position: Sitting   BP Method: Large (Manual)   Pulse: 68   Weight: 100.7 kg (222 lb)   Height: 6' 2" (1.88 m)     Body mass index is 28.5 kg/m².    RESULTS: Reviewed labs from last 6 months    Assessment:       1. Tremor    2. Chronic kidney disease, stage 3    3. Lithium use    4. Loss of equilibrium    5. Hypercalcemia    6. Prostate cancer screening    7. Hyperlipidemia, unspecified hyperlipidemia type    8. Need for influenza vaccination        Plan:   Abel was " seen today for follow-up.    Diagnoses and all orders for this visit:    Tremor:  Prior dx, chronic problem. Appears to be essential tremor, less likely dystonic reaction to antipsychotic. MRI denied. Refer to Neurology. continue follow up with Psychiatry.  -     Ambulatory Referral to Neurology    Chronic kidney disease, stage 3:  Prior dx, has been stable, pt stays hydrated. recheck levels on labs.  -     CBC Without Differential; Future  -     Magnesium; Future  -     CBC Without Differential  -     Magnesium    Lithium use:  continue follow up with Psychiatry, check labs.  -     Lithium level; Future  -     Lithium level    Loss of equilibrium:  Unclear etiology, ordered MRI for evaluation but denied, refer to Neurology.  -     TSH; Future  -     Vitamin B12; Future  -     TSH  -     Vitamin B12    Hypercalcemia:  Prior dx, stable on last check, recheck along with PTH given Li use.  -     Comprehensive metabolic panel; Future  -     Vitamin D; Future  -     Calcium, ionized; Future  -     Comprehensive metabolic panel  -     Vitamin D  -     Calcium, ionized    Prostate cancer screening  -     Cancel: PSA, Screening; Future  -     PSA, Screening; Future  -     PSA, Screening    Hyperlipidemia, unspecified hyperlipidemia type  -     Cancel: Lipid panel; Future  -     Lipid panel; Future  -     Lipid panel    Need for influenza vaccination    Follow-up in about 4 months (around 3/13/2019) for follow up visit.  Jonah Harrison MD  Internal Medicine    Portions of this note were completed using medical dictation software. Please excuse typographical or syntax errors that were missed on review.

## 2018-11-15 ENCOUNTER — TELEPHONE (OUTPATIENT)
Dept: INTERNAL MEDICINE | Facility: CLINIC | Age: 58
End: 2018-11-15

## 2018-11-15 LAB — PTH-INTACT SERPL-MCNC: 81 PG/ML (ref 15–65)

## 2018-11-15 NOTE — TELEPHONE ENCOUNTER
----- Message from Rosalino Gibson sent at 11/15/2018 11:24 AM CST -----  Contact: self/[phone]  Type: Returning a call    Who left a message? N/A    When did the practice call? Today     Comments: Pt's wife received a call from the office in regards to him. Please advise.        Thanks

## 2018-11-16 ENCOUNTER — TELEPHONE (OUTPATIENT)
Dept: INTERNAL MEDICINE | Facility: CLINIC | Age: 58
End: 2018-11-16

## 2018-11-16 DIAGNOSIS — N18.30 CHRONIC KIDNEY DISEASE, STAGE 3: Primary | ICD-10-CM

## 2018-11-16 PROBLEM — N25.81 SECONDARY HYPERPARATHYROIDISM OF RENAL ORIGIN: Status: ACTIVE | Noted: 2018-11-16

## 2018-11-16 LAB
25(OH)D3+25(OH)D2 SERPL-MCNC: 41.6 NG/ML (ref 30–100)
ALBUMIN SERPL-MCNC: 4.3 G/DL (ref 3.5–5.5)
ALBUMIN/GLOB SERPL: 1.3 {RATIO} (ref 1.2–2.2)
ALP SERPL-CCNC: 120 IU/L (ref 39–117)
ALT SERPL-CCNC: 16 IU/L (ref 0–44)
AST SERPL-CCNC: 20 IU/L (ref 0–40)
BILIRUB SERPL-MCNC: 0.4 MG/DL (ref 0–1.2)
BUN SERPL-MCNC: 11 MG/DL (ref 6–24)
BUN/CREAT SERPL: 6 (ref 9–20)
CA-I SERPL ISE-MCNC: 5.5 MG/DL (ref 4.5–5.6)
CALCIUM SERPL-MCNC: 10.3 MG/DL (ref 8.7–10.2)
CHLORIDE SERPL-SCNC: 97 MMOL/L (ref 96–106)
CO2 SERPL-SCNC: 25 MMOL/L (ref 20–29)
CREAT SERPL-MCNC: 1.8 MG/DL (ref 0.76–1.27)
EGFR IF AFRICAN AMERICAN: 47 ML/MIN/1.73
ERYTHROCYTE [DISTWIDTH] IN BLOOD BY AUTOMATED COUNT: 14 % (ref 12.3–15.4)
EST. GFR  (NON AFRICAN AMERICAN): 41 ML/MIN/1.73
GLOBULIN SER CALC-MCNC: 3.2 G/DL (ref 1.5–4.5)
GLUCOSE SERPL-MCNC: 105 MG/DL (ref 65–99)
HCT VFR BLD AUTO: 34 % (ref 37.5–51)
HGB BLD-MCNC: 11.1 G/DL (ref 13–17.7)
LITHIUM SERPL-SCNC: 1.3 MMOL/L (ref 0.6–1.2)
MAGNESIUM SERPL-MCNC: 2.6 MG/DL (ref 1.6–2.3)
MCH RBC QN AUTO: 29.8 PG (ref 26.6–33)
MCHC RBC AUTO-ENTMCNC: 32.6 G/DL (ref 31.5–35.7)
MCV RBC AUTO: 91 FL (ref 79–97)
POTASSIUM SERPL-SCNC: 5.1 MMOL/L (ref 3.5–5.2)
PROT SERPL-MCNC: 7.5 G/DL (ref 6–8.5)
RBC # BLD AUTO: 3.73 X10E6/UL (ref 4.14–5.8)
SODIUM SERPL-SCNC: 137 MMOL/L (ref 134–144)
TSH SERPL DL<=0.005 MIU/L-ACNC: 2.9 UIU/ML (ref 0.45–4.5)
VIT B12 SERPL-MCNC: 407 PG/ML (ref 232–1245)
WBC # BLD AUTO: 12 X10E3/UL (ref 3.4–10.8)

## 2018-11-16 NOTE — TELEPHONE ENCOUNTER
Please call the patient to notify that his kidney function has gone down slightly, which has steadily progressed over the past year. I am referring to Nephrology  for further evaluation. Please schedule this appointment with the patient or provide the number for the scheduling desk.     All other labs are stable or within normal limits. No other changes to be made at this time. His lithium levels are 1.3, which is close to the normal range. He should notify his Psychiatry team. I have sent the results in a letter.

## 2018-11-18 ENCOUNTER — HOSPITAL ENCOUNTER (EMERGENCY)
Facility: HOSPITAL | Age: 58
Discharge: HOME | End: 2018-11-18
Attending: EMERGENCY MEDICINE
Payer: COMMERCIAL

## 2018-11-18 VITALS
OXYGEN SATURATION: 98 % | HEART RATE: 73 BPM | HEIGHT: 74 IN | BODY MASS INDEX: 28.49 KG/M2 | TEMPERATURE: 98 F | RESPIRATION RATE: 18 BRPM | DIASTOLIC BLOOD PRESSURE: 77 MMHG | WEIGHT: 222 LBS | SYSTOLIC BLOOD PRESSURE: 148 MMHG

## 2018-11-18 DIAGNOSIS — K08.89 TOOTHACHE: Primary | ICD-10-CM

## 2018-11-18 PROCEDURE — 99283 EMERGENCY DEPT VISIT LOW MDM: CPT | Mod: ,,, | Performed by: EMERGENCY MEDICINE

## 2018-11-18 PROCEDURE — 99283 EMERGENCY DEPT VISIT LOW MDM: CPT

## 2018-11-18 RX ORDER — PENICILLIN V POTASSIUM 500 MG/1
500 TABLET, FILM COATED ORAL EVERY 6 HOURS
Qty: 28 TABLET | Refills: 0 | Status: SHIPPED | OUTPATIENT
Start: 2018-11-18 | End: 2019-05-13

## 2018-11-18 NOTE — DISCHARGE INSTRUCTIONS
Please report to already scheduled dentist appointment to discuss more permanent treatment options.

## 2018-11-18 NOTE — ED PROVIDER NOTES
Encounter Date: 11/18/2018       History     Chief Complaint   Patient presents with    Dental Pain     Swollen gums and dental pain     This is a 58 year old male presenting with chief complaint of dental pain. He has a PMH of Bipolar disorder, GERD, HTN, and HLD. He presents with intermittent right lower dental pain with isolated gum swelling described as sharp, throbbing sensation; graded 10/10 at it's worst. Pain has been on-going for approximately one year, but acutely worsened yesterday at approximately 2200 after eating dinner. Pain since improved after using OTC topical benzocaine and Meloxicam PRN, like he has been for last year. Patient reports multiple presentations here for same issue, requiring topical antibiotics intermittently. He's had issues finding dentist secondary to financial issues, but reports having dentist appointment scheduled in two days to discuss removing teeth. He denies fevers, chills, neck swelling, difficulty moving tongue, neck stiffness, difficulty swallowing, facial swelling.           Review of patient's allergies indicates:   Allergen Reactions    Hydrocodone      Other reaction(s): Hallucinations    Hydrocodone-acetaminophen      Other reaction(s): hyperactivity    Oxycodone Other (See Comments)     hallucinations    Pseudoephedrine      Other reaction(s): Hallucinations    Pseudoephedrine hcl      Other reaction(s): hyperactivity    Risperidone      Other reaction(s): Hallucinations  Other reaction(s): hyperactivity    Trazodone      Adverse reaction    Naproxen      Past Medical History:   Diagnosis Date    Bipolar disorder     manic depressive x 1992    BPH (benign prostatic hypertrophy)     Chronic low back pain     Depression     GERD (gastroesophageal reflux disease)     Gout     Gout, arthritis     Hyperlipidemia     Hypertension     Lumbar disc disease     Obesity     Panic disorder      Past Surgical History:   Procedure Laterality Date     "ARTHROSCOPY, SHOULDER, WITH SUBACROMIAL SPACE DECOMPRESSION Left 6/27/2013    Performed [facility] at [facility] [place] [place]    HERNIA REPAIR      HUMERUS FRACTURE SURGERY  1971    Bone grafts required    MANDIBLE FRACTURE SURGERY      MUSCLE TRANSFER UPPER ARM      REPAIR, ROTATOR CUFF Left 6/27/2013    Performed by [facility] at [facility] [place]    SHOULDER ARTHROSCOPY      SHOULDER SURGERY      TENODESIS ARTHROSCOPIC (TENDON FIXATION) Left 6/27/2013    Performed by [facility] at [facility] [facility] [place]    TONSILLECTOMY       Family History   Problem Relation Age of Onset    Diabetes Mother     Breast cancer Mother      Social History     Tobacco Use    Smoking status: Never Smoker    Smokeless tobacco: Current User     Types: Snuff    Tobacco comment: Patient uses "dip" tobacco   Substance Use Topics    Alcohol use: No     Alcohol/week: 0.0 oz     Comment: Heavy alcohol use in the past.     Drug use: No     Review of Systems   Constitutional: Negative for appetite change, chills, diaphoresis, fatigue and fever.   HENT: Positive for dental problem. Negative for congestion, ear pain, facial swelling, mouth sores, nosebleeds, rhinorrhea, sinus pain, sore throat, trouble swallowing and voice change.    Eyes: Negative for photophobia, pain and discharge.   Respiratory: Negative for cough, chest tightness and shortness of breath.    Cardiovascular: Negative for chest pain, palpitations and leg swelling.   Gastrointestinal: Negative for abdominal pain, diarrhea, nausea and vomiting.   Genitourinary: Negative for difficulty urinating.   Musculoskeletal: Negative for back pain, myalgias, neck pain and neck stiffness.   Skin: Negative for pallor and rash.   Neurological: Negative for light-headedness, numbness and headaches.       Physical Exam     Initial Vitals [11/18/18 0521]   BP Pulse Resp Temp SpO2   133/68 87 16 98 °F (36.7 °C) 98 %      MAP       --         Physical Exam    Constitutional: He appears " well-developed and well-nourished. No distress.   HENT:   Head: Normocephalic and atraumatic.   Mouth/Throat: Uvula is midline, oropharynx is clear and moist and mucous membranes are normal. Abnormal dentition. Dental caries present. No dental abscesses, uvula swelling or lacerations. No oropharyngeal exudate or tonsillar abscesses.   There are diffuse dental caries with minimal gum swelling (R>L). There is no swelling of the floor of the mouth.    Eyes: Conjunctivae are normal. Pupils are equal, round, and reactive to light.   Neck: Neck supple. No spinous process tenderness and no muscular tenderness present. Normal range of motion present. No neck rigidity.   Cardiovascular: Normal rate, regular rhythm and normal heart sounds.   No murmur heard.  Pulmonary/Chest: Effort normal and breath sounds normal. He has no decreased breath sounds. He has no wheezes. He has no rales.   Abdominal: Soft. Normal appearance and bowel sounds are normal. He exhibits no distension. There is no tenderness.   Neurological: He is alert and oriented to person, place, and time. No cranial nerve deficit. GCS eye subscore is 4. GCS verbal subscore is 5. GCS motor subscore is 6.   Skin: Skin is warm and dry. No pallor.         ED Course   Procedures  Labs Reviewed - No data to display       Imaging Results    None          Medical Decision Making:   Initial Assessment:   This is a 58 year old male with PMH of Bipolar disorder, GERD, HTN, and HLD presenting with acute exacerbation of on-going right lower dental and gum pain after eating dinner yesterday evening that has since improved with usual OTC pain control, with already arranged dental follow-up in two days, with exam only notable for diffuse dental caries and minimal gum swelling.   Differential Diagnosis:   Differential diagnoses include, but are not limited to, pulpitis, periodontitis, gingivitis, peridontal abscess.   ED Management:  Based on exam, patient appears to have  pulpitis. No concerning signs for Adán's angina or peritonsillar abscess. Will discharge with Penicillin V K for 7 days for possible infection.   Will discharge with instructions to continue OTC pain control PRN until he can be seen by dentist in two days.   Will provide written instructions regarding warning signs for worsening infection.                       Clinical Impression:   The encounter diagnosis was Toothache.      Disposition:   Disposition: Discharged  Condition: Stable                        Daljit Peck MD  Resident  11/18/18 1998

## 2018-11-19 ENCOUNTER — TELEPHONE (OUTPATIENT)
Dept: INTERNAL MEDICINE | Facility: CLINIC | Age: 58
End: 2018-11-19

## 2018-11-19 NOTE — TELEPHONE ENCOUNTER
----- Message from Sayda Lopez sent at 11/19/2018  9:40 AM CST -----  Contact: [phone]  Type: Returning a call    Who left a message?  Nelly    When did the practice call?  today    Comments:  Please advise, thank you

## 2018-11-30 DIAGNOSIS — N18.9 CHRONIC KIDNEY DISEASE, UNSPECIFIED CKD STAGE: ICD-10-CM

## 2018-11-30 RX ORDER — IBUPROFEN 800 MG/1
TABLET ORAL
Qty: 90 TABLET | Refills: 0 | Status: SHIPPED | OUTPATIENT
Start: 2018-11-30 | End: 2018-11-30 | Stop reason: SDUPTHER

## 2018-11-30 RX ORDER — IBUPROFEN 800 MG/1
TABLET ORAL
Qty: 270 TABLET | Refills: 0 | Status: SHIPPED | OUTPATIENT
Start: 2018-11-30 | End: 2019-04-10 | Stop reason: SDUPTHER

## 2018-12-03 ENCOUNTER — TELEPHONE (OUTPATIENT)
Dept: INTERNAL MEDICINE | Facility: CLINIC | Age: 58
End: 2018-12-03

## 2018-12-05 NOTE — TELEPHONE ENCOUNTER
No appointments available to me.  Message sent to department requesting they contact the patient for an appointment.

## 2018-12-14 ENCOUNTER — OFFICE VISIT (OUTPATIENT)
Dept: NEUROLOGY | Facility: CLINIC | Age: 58
End: 2018-12-14
Payer: COMMERCIAL

## 2018-12-14 VITALS
SYSTOLIC BLOOD PRESSURE: 147 MMHG | WEIGHT: 220.25 LBS | HEIGHT: 74 IN | DIASTOLIC BLOOD PRESSURE: 77 MMHG | HEART RATE: 67 BPM | BODY MASS INDEX: 28.27 KG/M2

## 2018-12-14 DIAGNOSIS — R25.1 TREMOR: ICD-10-CM

## 2018-12-14 DIAGNOSIS — F41.0 PANIC ATTACKS: ICD-10-CM

## 2018-12-14 PROCEDURE — 99999 PR PBB SHADOW E&M-EST. PATIENT-LVL III: CPT | Mod: PBBFAC,,, | Performed by: PSYCHIATRY & NEUROLOGY

## 2018-12-14 PROCEDURE — 99204 OFFICE O/P NEW MOD 45 MIN: CPT | Mod: S$GLB,,, | Performed by: PSYCHIATRY & NEUROLOGY

## 2018-12-14 RX ORDER — OXCARBAZEPINE 300 MG/1
600 TABLET, FILM COATED ORAL DAILY
Refills: 3 | COMMUNITY
Start: 2018-11-21

## 2018-12-14 RX ORDER — PROPRANOLOL HYDROCHLORIDE 40 MG/1
40 TABLET ORAL 2 TIMES DAILY
Qty: 180 TABLET | Refills: 3 | Status: SHIPPED | OUTPATIENT
Start: 2018-12-14 | End: 2019-11-04 | Stop reason: SDUPTHER

## 2018-12-14 NOTE — LETTER
December 14, 2018      Jonah Harrison MD  [address]  [facility] [address]           [facility]  [address] [place] [address]  [place] [address]  Phone: [phone]  Fax: [phone]          Patient: Abel Dc III   MR Number: [id number]   YOB: 1960   Date of Visit: 12/14/2018       Dear Dr. Jonah Harrison:    Thank you for referring Abel Dc to me for evaluation. Attached you will find relevant portions of my assessment and plan of care.    If you have questions, please do not hesitate to call me. I look forward to following Abel Dc along with you.    Sincerely,    Stef Murguia MD    Enclosure  CC:  No Recipients    If you would like to receive this communication electronically, please contact [email or web address] or [phone] to request more information on [employer] access.    For providers and/or their staff who would like to refer a patient to [facility], please contact us through our one-stop-shop provider referral line, [facility], at [phone].    If you feel you have received this communication in error or would no longer like to receive these types of communications, please e-mail [email or web address]

## 2018-12-14 NOTE — PATIENT INSTRUCTIONS
Essential Tremor (ET)  What is essential tremor?  Essential tremor (ET) is a neurological disorder that causes your hands, head, trunk, voice, or legs to shake rhythmically. It is often confused with Parkinson disease.  ET is the most common trembling disorder that people have. Everyone has some ET, but the movements usually cannot be seen or felt. When tremors are noticeable, the condition is classified as ET.  ET is most common among people older than age 65, but it can affect people at any age.  What causes ET?  ET can occur in different people for different reasons:  · Familial essential tremor. In most people, the condition seems to be passed down from a parent to a child. If your parent has ET, there is a 50% chance that you or your children will inherit the gene responsible for the condition.  · Essential tremor related to another disorder. Sometimes, a tremor is a symptom of another neurological disorder, such as Parkinson disease or dystonia. Sometimes, ET is mistaken for these other diseases when they are not present. A healthcare providers careful diagnosis is extremely important.  The cause of ET isnt known. However, one theory suggests that your cerebellum and other parts of your brain are not communicating correctly. The cerebellum is a part of the brain that controls muscle coordination.  What are the symptoms of ET?  If you have ET, you will have shaking and trembling at different times and in different situations, but some characteristics are common to all. Here is what you might typically experience:  · Tremors occur when you move and are less noticeable when you rest.  · Certain medicines, caffeine, or stress can make your tremors worse.  · Tremor may improve with ingestion of a small amount of alcohol (such as wine)  · Tremors get worse as you age.  · Tremors dont affect both sides of your body in the same way.  Here are different signs of essential tremor:  · Tremors that are most obvious in  your hands  · Difficulty doing tasks with your hands, such as writing or using tools  · Shaking or quivering sound in your voice  · Uncontrollable head-nodding  · In rare instances, tremors in your legs or feet  How is ET diagnosed?  Your rapid, uncontrollable trembling, as well as questions about your medical and family history, can help your healthcare provider determine if you have familial ET. He or she will probably need to rule out other conditions that could cause shaking or trembling. For example, tremors could be symptoms of diseases, such as hyperthyroidism. Your healthcare provider might test you for those, as well.  In some cases, the tremors might be related to other factors. To find out for certain, your healthcare provider may have you try to:  · Abstain from heavy alcohol use; if youre an alcoholic, trembling is a common symptom.  · Cut out cigarette smoking.  · Avoid caffeine.  · Avoid certain medicines  How is ET treated?   Propanolol and primidone are 2 medicines often prescribed to treat ET. Propanolol blocks the stimulating action of neurotransmitters to calm your trembling. Primidone is a common antiseizure medicine that also controls the actions of neurotransmitters.  Gabapentin and topiramate are 2 other antiseizure medicines that are sometimes prescribed. In some cases, tranquilizers like alprazolam or clonazepam might be suggested.  For ET in your hands, botulinum toxin (Botox) injections have shown some promise in easing the trembling. They work by weakening the surrounding muscles around your hands. For severe tremors, a stimulating device (deep brain stimulator) surgically implanted in your brain may help.  Can ET be prevented?   The specific cause of ET is not known, so scientists are not sure how the condition can be prevented.  Living with ET  ET is usually not dangerous, but it can certainly be frustrating if you have to deal with it. Certain factors can make tremors worse, so the  following steps may help to decrease tremors:  · Avoid alcohol, cigarettes, and caffeine  · Avoid stressful situations as much as possible  · Use relaxation techniques, such as yoga, deep-breathing exercises, or biofeedback  · Check with your healthcare provider to see if any medicines youre taking could be making your tremors worse.  Talk with your healthcare provider about other options, such as surgery, if ET starts to affect your quality of life.  When should I call my healthcare provider?  If you have been diagnosed with ET, talk with your healthcare provider about when you might need to call. He or she will likely advise you to call if your tremors become worse, or if you develop new neurologic symptoms, such as numbness or weakness.  Key points about essential tremors  · ET is a neurological disorder that causes your hands, head, trunk, voice, or legs to shake rhythmically. The cause is not known, but it is often passed down from a parent to a child.  · ET is sometimes confused with other types of tremor, so getting the right diagnosis is important.  · Tremors tend to be worse during movement than when at rest. The tremors are usually not dangerous, but they can get worse over time.  · Avoiding things that might make tremors worse, such as stress, caffeine, and certain medicines, may be helpful.  · Medicines can also help control or limit tremors in some people. Severe tremors can sometimes be treated with surgery.  Next steps  Tips to help you get the most from a visit to your healthcare provider:  · Know the reason for your visit and what you want to happen.  · Before your visit, write down questions you want answered.  · Bring someone with you to help you ask questions and remember what your provider tells you.  · At the visit, write down the name of a new diagnosis, and any new medicines, treatments, or tests. Also write down any new instructions your provider gives you.  · Know why a new medicine or  treatment is prescribed, and how it will help you. Also know what the side effects are.  · Ask if your condition can be treated in other ways.  · Know why a test or procedure is recommended and what the results could mean.  · Know what to expect if you do not take the medicine or have the test or procedure.  · If you have a follow-up appointment, write down the date, time, and purpose for that visit.  · Know how you can contact your provider if you have questions.  © 1807-5783 [employer]. [address] [place] [address], [place] [address]. All rights reserved. This information is not intended as a substitute for professional medical care. Always follow your healthcare professional's instructions.

## 2018-12-14 NOTE — PROGRESS NOTES
[facility] NEUROLOGY  [facility] [place]    Date: 12/14/18  Patient Name: Abel Dc III   MRN: [id number]   PCP: Jonah Harrison  Referring Provider: Jonah Harrison MD    Assessment:   Abel Dc III is a 58 y.o. male presenting for evaluation of tremor.  Presentation is most consistent with a benign essential tremor.  Patient appears to have partial symptomatic control propranolol.  Will try increased dose of 40 mg b.i.d. as patient finds tremor disruptive.  Plan:     Problem List Items Addressed This Visit        Neuro    Tremor    Current Assessment & Plan     -- increase propranalol to 40 mg BID           Other Visit Diagnoses     Panic attacks              Stef Murguia MD  [facility]   Department of Neurology    Patient note was created using MModal Dictation.  Any errors in syntax or even information may not have been identified and edited on initial review prior to signing this note.  Subjective:   Patient seen in consultation at the request of Jonah Harrison MD for the evaluation of tremor. A copy of this note will be sent to the referring physician.      HPI:   Mr. Abel Dc III is a 58 y.o. male presenting for evaluation of tremor.  Patient reports that he has noted a left greater than right hand tremor for years.  He states he is most affected by the tremor when holding objects such as a cup, plate, or spoon.  He states that it is moderately disruptive.  He is unaware of any family history of tremor.  He does not drink alcohol and is uncertain if it affects his tremor.  He states that anxiety does seem to worsen the tremor.  He does have a history of being maintained on multiple antipsychotics as well as lithium but is uncertain if this seem to provoke the tremor.     PAST MEDICAL HISTORY:  Past Medical History:   Diagnosis Date    Bipolar disorder     manic depressive x 1992    BPH (benign prostatic hypertrophy)     Chronic low back pain     Depression      GERD (gastroesophageal reflux disease)     Gout     Gout, arthritis     Hyperlipidemia     Hypertension     Lumbar disc disease     Obesity     Panic disorder        PAST SURGICAL HISTORY:  Past Surgical History:   Procedure Laterality Date    ARTHROSCOPY, SHOULDER, WITH SUBACROMIAL SPACE DECOMPRESSION Left 6/27/2013    [facility]    HERNIA REPAIR      HUMERUS FRACTURE SURGERY  1971    Bone grafts required    MANDIBLE FRACTURE SURGERY      MUSCLE TRANSFER UPPER ARM      REPAIR, ROTATOR CUFF Left 6/27/2013    [facility]    SHOULDER ARTHROSCOPY      SHOULDER SURGERY      TENODESIS ARTHROSCOPIC (TENDON FIXATION) Left 6/27/2013    [facility]    TONSILLECTOMY         CURRENT MEDS:  Current Outpatient Medications   Medication Sig Dispense Refill    allopurinol (ZYLOPRIM) 100 MG tablet TAKE 2 TABLETS BY MOUTH ONCE DAILY 180 tablet 3    amLODIPine (NORVASC) 10 MG tablet Take 1 tablet (10 mg total) by mouth once daily. 90 tablet 3    aspirin 81 mg Tab Take 1 tablet by mouth.      busPIRone (BUSPAR) 15 MG tablet Take 15 mg by mouth 3 (three) times daily.   4    cholecalciferol, vitamin D3, 2,000 unit Cap Take 1 capsule by mouth once daily.      diphenhydrAMINE-aluminum-magnesium hydroxide-simethicone-lidocaine HCl 2% Swish and spit 15 mLs every 4 (four) hours as needed (dental pain). 30 mL 0    fluoxetine (PROZAC) 20 MG capsule TK 1 C PO QAM.  4    fluticasone (FLONASE) 50 mcg/actuation nasal spray SPRAY 1 SPRAY IN EACH NOSTRIL EVERY DAY 16 g 11    ketoconazole (NIZORAL) 2 % shampoo APPLY EXTERNALLY TO THE AFFECTED AREA ONCE DAILY 120 mL 0    omeprazole (PRILOSEC) 20 MG capsule Take 1 capsule (20 mg total) by mouth once daily. 90 capsule 3    pravastatin (PRAVACHOL) 40 MG tablet TAKE 1 TABLET BY MOUTH EVERY EVENING 90 tablet 3    propranolol (INDERAL) 20 MG tablet TAKE 1 TABLET BY MOUTH TWICE DAILY  "180 tablet 3    quetiapine (SEROQUEL) 50 MG tablet TK 1 T PO TID  0    sucralfate (CARAFATE) 1 gram tablet TAKE 1 TABLET BY MOUTH FOUR TIMES DAILY 360 tablet 0    acetaminophen (TYLENOL) 500 MG tablet Take 1 tablet (500 mg total) by mouth every 6 (six) hours as needed for Pain. 30 tablet 0    amitriptyline (ELAVIL) 100 MG tablet TK 1 T PO QHS  1    chlorhexidine (PERIDEX) 0.12 % solution RINSE WITH 2 PRUDENCIO TID  1    gabapentin (NEURONTIN) 300 MG capsule TK ONE C PO  Q EVENING  3     mg tablet TAKE 1 TABLET BY MOUTH EVERY 8 HOURS AS NEEDED FOR GOUT PAIN 270 tablet 0    OXcarbazepine (TRILEPTAL) 300 MG Tab TK 2 TS PO QD  3    penicillin v potassium (VEETID) 500 MG tablet Take 1 tablet (500 mg total) by mouth every 6 (six) hours. 28 tablet 0    propranolol (INDERAL) 40 MG tablet Take 1 tablet (40 mg total) by mouth 2 (two) times daily. 180 tablet 3    tamsulosin (FLOMAX) 0.4 mg Cp24 TAKE 1 CAPSULE BY MOUTH EVERY MORNING 90 capsule 0     No current facility-administered medications for this visit.        ALLERGIES:  Review of patient's allergies indicates:   Allergen Reactions    Hydrocodone      Other reaction(s): Hallucinations    Hydrocodone-acetaminophen      Other reaction(s): hyperactivity    Oxycodone Other (See Comments)     hallucinations    Pseudoephedrine      Other reaction(s): Hallucinations    Pseudoephedrine hcl      Other reaction(s): hyperactivity    Risperidone      Other reaction(s): Hallucinations  Other reaction(s): hyperactivity    Trazodone      Adverse reaction    Naproxen        FAMILY HISTORY:  Family History   Problem Relation Age of Onset    Diabetes Mother     Breast cancer Mother        SOCIAL HISTORY:  Social History     Tobacco Use    Smoking status: Never Smoker    Smokeless tobacco: Current User     Types: Snuff    Tobacco comment: Patient uses "dip" tobacco   Substance Use Topics    Alcohol use: No     Alcohol/week: 0.0 oz     Comment: Heavy alcohol use in " "the past.     Drug use: No       Review of Systems:  12 system review of systems is negative except for the symptoms mentioned in HPI.      Objective:     Vitals:    12/14/18 1452   BP: (!) 147/77   Pulse: 67   Weight: 99.9 kg (220 lb 3.8 oz)   Height: 6' 2" (1.88 m)     General: NAD, well nourished   Eyes: no tearing, discharge, no erythema   ENT: moist mucous membranes of the oral cavity, nares patent    Neck: Supple, full range of motion  Cardiovascular: Warm and well perfused, pulses equal and symmetrical  Lungs: Normal work of breathing, normal chest wall excursions  Skin: No rash, lesions, or breakdown on exposed skin  Psychiatry: Mood and affect are appropriate   Abdomen: soft, non tender, non distended  Extremeties: No cyanosis, clubbing or edema.    Neurological   MENTAL STATUS: Alert and oriented to person, place, and time. Attention and concentration within normal limits. Speech without dysarthria, able to name and repeat without difficulty. Recent and remote memory within normal limits   CRANIAL NERVES: Visual fields intact. PERRL. EOMI. Facial sensation intact. Face symmetrical. Hearing grossly intact. Full shoulder shrug bilaterally. Tongue protrudes midline   SENSORY: Sensation is intact to light touch throughout.    MOTOR: Normal bulk and tone. Mild postural tremor of L>R UE. 5/5 deltoid, biceps, triceps, interosseous, hand  bilaterally. 5/5 iliopsoas, knee extension/flexion, foot dorsi/plantarflexion bilaterally.    REFLEXES: Symmetric and 2+ throughout.  CEREBELLAR/COORDINATION/GAIT: Gait steady with normal arm swing and stride length.   Finger to nose intact. Normal rapid alternating movements.       "

## 2019-01-21 ENCOUNTER — HOSPITAL ENCOUNTER (EMERGENCY)
Facility: HOSPITAL | Age: 59
Discharge: HOME | End: 2019-01-21
Attending: EMERGENCY MEDICINE
Payer: COMMERCIAL

## 2019-01-21 VITALS
TEMPERATURE: 98 F | BODY MASS INDEX: 29.52 KG/M2 | RESPIRATION RATE: 18 BRPM | HEART RATE: 68 BPM | SYSTOLIC BLOOD PRESSURE: 172 MMHG | HEIGHT: 74 IN | OXYGEN SATURATION: 99 % | DIASTOLIC BLOOD PRESSURE: 81 MMHG | WEIGHT: 230 LBS

## 2019-01-21 DIAGNOSIS — H92.02 EAR PAIN, LEFT: Primary | ICD-10-CM

## 2019-01-21 DIAGNOSIS — S00.412A ABRASION OF LEFT EAR CANAL, INITIAL ENCOUNTER: ICD-10-CM

## 2019-01-21 PROCEDURE — 99283 EMERGENCY DEPT VISIT LOW MDM: CPT

## 2019-01-21 PROCEDURE — 99284 PR EMERGENCY DEPT VISIT,LEVEL IV: ICD-10-PCS | Mod: ,,, | Performed by: EMERGENCY MEDICINE

## 2019-01-21 PROCEDURE — 99284 EMERGENCY DEPT VISIT MOD MDM: CPT | Mod: ,,, | Performed by: EMERGENCY MEDICINE

## 2019-01-21 RX ORDER — NEOMYCIN SULFATE, POLYMYXIN B SULFATE AND HYDROCORTISONE 10; 3.5; 1 MG/ML; MG/ML; [USP'U]/ML
4 SUSPENSION/ DROPS AURICULAR (OTIC) 3 TIMES DAILY
Qty: 5 ML | Refills: 0 | Status: SHIPPED | OUTPATIENT
Start: 2019-01-21 | End: 2019-05-13

## 2019-01-21 NOTE — ED PROVIDER NOTES
Encounter Date: 1/21/2019    SCRIBE #1 NOTE: IAzeem am scribing for, and in the presence of,  Dr. Topete. I have scribed the following portions of the note - Other sections scribed: HPI, ROS, PE.   SCRIBE #2 NOTE: IRayna am scribing for, and in the presence of, Dr. Topete.     History     Chief Complaint   Patient presents with    Otalgia     had blood in L ear     Time patient was seen by the provider: 8:59 AM      The patient is a 58 y.o. male with a history of HTN, HLD, and GERD who presents to the ED with a complaint of otalgia s/p cleaning ear with q-tip to remove wax two days ago. He reports pain in his left ear and decreased hearing accompanied by ringing. He denies dizziness.        The history is provided by the patient and medical records.     Review of patient's allergies indicates:   Allergen Reactions    Hydrocodone      Other reaction(s): Hallucinations    Hydrocodone-acetaminophen      Other reaction(s): hyperactivity    Oxycodone Other (See Comments)     hallucinations    Pseudoephedrine      Other reaction(s): Hallucinations    Pseudoephedrine hcl      Other reaction(s): hyperactivity    Risperidone      Other reaction(s): Hallucinations  Other reaction(s): hyperactivity    Trazodone      Adverse reaction    Naproxen      Past Medical History:   Diagnosis Date    Bipolar disorder     manic depressive x 1992    BPH (benign prostatic hypertrophy)     Chronic low back pain     Depression     GERD (gastroesophageal reflux disease)     Gout     Gout, arthritis     Hyperlipidemia     Hypertension     Lumbar disc disease     Obesity     Panic disorder      Past Surgical History:   Procedure Laterality Date    ARTHROSCOPY, SHOULDER, WITH SUBACROMIAL SPACE DECOMPRESSION Left 6/27/2013    Performed by [facility] at [facility] [place] OR    HERNIA REPAIR      HUMERUS FRACTURE SURGERY  1971    Bone grafts required    MANDIBLE FRACTURE SURGERY      MUSCLE TRANSFER  "UPPER ARM      REPAIR, ROTATOR CUFF Left 6/27/2013    [facility]    SHOULDER ARTHROSCOPY      SHOULDER SURGERY      TENODESIS ARTHROSCOPIC (TENDON FIXATION) Left 6/27/2013    [facility]    TONSILLECTOMY       Family History   Problem Relation Age of Onset    Diabetes Mother     Breast cancer Mother      Social History     Tobacco Use    Smoking status: Never Smoker    Smokeless tobacco: Current User     Types: Snuff    Tobacco comment: Patient uses "dip" tobacco   Substance Use Topics    Alcohol use: No     Alcohol/week: 0.0 oz     Comment: Heavy alcohol use in the past.     Drug use: No     Review of Systems   Constitutional: Negative for fever.   HENT: Positive for ear pain. Negative for sore throat.         Pain in left ear  Diminished hearing   Eyes: Negative for visual disturbance.   Respiratory: Negative for shortness of breath.    Cardiovascular: Negative for chest pain.   Gastrointestinal: Negative for nausea.   Genitourinary: Negative for dysuria.   Musculoskeletal: Negative for back pain.   Skin: Negative for rash.   Neurological: Negative for weakness.       Physical Exam     Initial Vitals [01/21/19 0848]   BP Pulse Resp Temp SpO2   (!) 172/81 68 18 97.8 °F (36.6 °C) 99 %      MAP       --         Physical Exam    Nursing note and vitals reviewed.  Constitutional: He appears well-developed and well-nourished.   HENT:   Head: Normocephalic and atraumatic.   Lacerated left external auricular canal, superficially aberrated.    Eyes: EOM are normal. Pupils are equal, round, and reactive to light.   Neck: Normal range of motion. Neck supple.   Cardiovascular: Normal rate and regular rhythm. Exam reveals no gallop and no friction rub.    No murmur heard.  Pulmonary/Chest: Breath sounds normal.   Abdominal: Soft. Bowel sounds are normal.   Musculoskeletal: Normal range of motion.   Neurological: He is alert and oriented to person, " place, and time.   Skin: Skin is warm and dry.         ED Course   Procedures  Labs Reviewed - No data to display       Imaging Results    None          Medical Decision Making:   History:   Old Medical Records: I decided to obtain old medical records.  Initial Assessment:   Patient presents with plain into left ear.  He said he has a Q-tip yesterday.  He has had some bloody drainage since then.  He has also had some ringing in the ear.  Differential Diagnosis:   Differential diagnosis includes but is not limited to barotrauma, dislocation of the ossicles  ED Management:  He has a small laceration/abrasion to the floor of the left external irregular canal.  Tympanic membrane is patent.  Patient will be discharged home with Cortisporin otic drops.  Those will be electronically prescribed.  His hearing does not get any better he should see an audiologist            Scribe Attestation:   Scribe #1: I performed the above scribed service and the documentation accurately describes the services I performed. I attest to the accuracy of the note.  Scribe #2: I performed the above scribed service and the documentation accurately describes the services I performed. I attest to the accuracy of the note.    Attending Attestation:           Physician Attestation for Scribe:      Comments: I, Dr. Topete, personally performed the services described in this documentation. All medical record entries made by the scribe were at my direction and in my presence.  I have reviewed the chart and agree that the record reflects my personal performance and is accurate and complete. Saul Topete DO  9:07 AM 01/21/2019                 Clinical Impression:   The primary encounter diagnosis was Ear pain, left. A diagnosis of Abrasion of left ear canal, initial encounter was also pertinent to this visit.      Disposition:   Disposition: Discharged  Condition: Stable                        Saul Topete,   01/21/19 0908

## 2019-01-21 NOTE — ED NOTES
.  Patient identifiers for Abel Dc III 58 y.o. male checked and correct.  Chief Complaint   Patient presents with    Otalgia     had blood in L ear     Past Medical History:   Diagnosis Date    Bipolar disorder     manic depressive x 1992    BPH (benign prostatic hypertrophy)     Chronic low back pain     Depression     GERD (gastroesophageal reflux disease)     Gout     Gout, arthritis     Hyperlipidemia     Hypertension     Lumbar disc disease     Obesity     Panic disorder      Allergies reported:   Review of patient's allergies indicates:   Allergen Reactions    Hydrocodone      Other reaction(s): Hallucinations    Hydrocodone-acetaminophen      Other reaction(s): hyperactivity    Oxycodone Other (See Comments)     hallucinations    Pseudoephedrine      Other reaction(s): Hallucinations    Pseudoephedrine hcl      Other reaction(s): hyperactivity    Risperidone      Other reaction(s): Hallucinations  Other reaction(s): hyperactivity    Trazodone      Adverse reaction    Naproxen          LOC: Patient is awake, alert, and aware of environment with an appropriate affect. Patient is oriented x 3 and speaking appropriately.  APPEARANCE: Patient resting comfortably and in no acute distress. Patient is clean and well groomed, patient's clothing is properly fastened.  HEENT: Pt reports blood in left ear since last night. Pt reports he had wax build up and used a q tip and thinks he scratched it. Pt reports some ringing in left ear. Pt denies any dizziness.   SKIN: The skin is warm and dry. Patient has normal skin turgor and moist mucus membranes. Skin is intact; no bruising or breakdown noted.  MUSKULOSKELETAL: Patient is moving all extremities well, no obvious deformities noted. Pulses intact.   RESPIRATORY: Airway is open and patent. Respirations are spontaneous and non-labored with normal effort and rate, BBS=clear  CARDIAC: Patient has a normal rate and rhythm. No peripheral edema noted.    ABDOMEN: No distention noted. Bowel sounds active in all 4 quadrants. Soft and non-tender upon palpation.  NEUROLOGICAL: PERRL. Facial expression is symmetrical. Hand grasps are equal bilaterally. Normal sensation in all extremities when touched with finger.

## 2019-01-21 NOTE — ED TRIAGE NOTES
"Pt presents with c/o blood in left ear since last night. Saturday pt reports he was "digging" in left ear to remove some wax.   "

## 2019-01-26 DIAGNOSIS — G25.81 RESTLESS LEG SYNDROME: ICD-10-CM

## 2019-01-26 RX ORDER — GABAPENTIN 300 MG/1
CAPSULE ORAL
Qty: 90 CAPSULE | Refills: 3 | Status: SHIPPED | OUTPATIENT
Start: 2019-01-26 | End: 2019-05-13

## 2019-01-30 ENCOUNTER — HOSPITAL ENCOUNTER (EMERGENCY)
Facility: HOSPITAL | Age: 59
Discharge: HOME | End: 2019-01-30
Attending: EMERGENCY MEDICINE
Payer: COMMERCIAL

## 2019-01-30 VITALS
HEIGHT: 74 IN | OXYGEN SATURATION: 99 % | RESPIRATION RATE: 18 BRPM | HEART RATE: 77 BPM | SYSTOLIC BLOOD PRESSURE: 145 MMHG | TEMPERATURE: 98 F | DIASTOLIC BLOOD PRESSURE: 76 MMHG | WEIGHT: 230 LBS | BODY MASS INDEX: 29.52 KG/M2

## 2019-01-30 DIAGNOSIS — S01.81XA FACIAL LACERATION, INITIAL ENCOUNTER: Primary | ICD-10-CM

## 2019-01-30 PROCEDURE — 12011 RPR F/E/E/N/L/M 2.5 CM/<: CPT | Mod: RT,,, | Performed by: EMERGENCY MEDICINE

## 2019-01-30 PROCEDURE — 12011 PR RESUPERF WND FACE <2.5 CM: ICD-10-PCS | Mod: RT,,, | Performed by: EMERGENCY MEDICINE

## 2019-01-30 PROCEDURE — 99283 EMERGENCY DEPT VISIT LOW MDM: CPT | Mod: 25

## 2019-01-30 PROCEDURE — 99284 EMERGENCY DEPT VISIT MOD MDM: CPT | Mod: 25,,, | Performed by: EMERGENCY MEDICINE

## 2019-01-30 PROCEDURE — 25000003 PHARM REV CODE 250: Performed by: EMERGENCY MEDICINE

## 2019-01-30 PROCEDURE — 12011 RPR F/E/E/N/L/M 2.5 CM/<: CPT

## 2019-01-30 PROCEDURE — 99284 PR EMERGENCY DEPT VISIT,LEVEL IV: ICD-10-PCS | Mod: 25,,, | Performed by: EMERGENCY MEDICINE

## 2019-01-30 RX ORDER — BACITRACIN ZINC 500 UNIT/G
1 OINTMENT (GRAM) TOPICAL
Status: DISCONTINUED | OUTPATIENT
Start: 2019-01-30 | End: 2019-01-30

## 2019-01-30 RX ORDER — BACITRACIN ZINC 500 UNIT/G
OINTMENT IN PACKET (EA) TOPICAL ONCE
Status: COMPLETED | OUTPATIENT
Start: 2019-01-30 | End: 2019-01-30

## 2019-01-30 RX ADMIN — BACITRACIN 1 EACH: 500 OINTMENT TOPICAL at 09:01

## 2019-01-30 RX ADMIN — LIDOCAINE HYDROCHLORIDE 10 ML: 10; .005 INJECTION, SOLUTION EPIDURAL; INFILTRATION; INTRACAUDAL; PERINEURAL at 09:01

## 2019-01-30 NOTE — ED NOTES
Discharge home with family, states understanding to follow up as directed. Ambulates out of ED without difficulty.

## 2019-01-30 NOTE — ED TRIAGE NOTES
Patient states he rolled out of bed and hit his head on the end table at 0645 with 1.5 cm laceration above right eyebrow.

## 2019-01-30 NOTE — ED NOTES
Patient identifiers verified and correct for Mr Dc  C/C: Laceration above right eyebrow, medial aspect  APPEARANCE: awake and alert in NAD.  SKIN: warm, dry 1.5 cm laceration  MUSCULOSKELETAL: Patient moving all extremities spontaneously, no obvious swelling or deformities noted. Ambulates independently.  RESPIRATORY: Denies shortness of breath.Respirations unlabored.   CARDIAC: Denies CP, 2+ distal pulses; no peripheral edema  ABDOMEN: S/ND/NT, Denies nausea  : voids spontaneously, denies difficulty  Neurologic: AAO x 4; follows commands equal strength in all extremities; denies numbness/tingling. Denies dizziness Denies weakness, Denies LOC.

## 2019-01-30 NOTE — ED PROVIDER NOTES
Encounter Date: 1/30/2019    SCRIBE #1 NOTE: I, Melvin Jon, am scribing for, and in the presence of,  Tashi Caruso MD. I have scribed the entire note.       History     Chief Complaint   Patient presents with    Head Laceration     rolled out of bed, hit bed table, denies loc     58 year old male with PMHx of HTN, HLD, and bipolar disorder presents to the ED with complaints of forehead laceration. Incidentt occurred at approximately 0645 today. Patient states that he was sleeping when he accidentally rolled out of bed causing him to cut his head against his end table. Patient takes ASA 81 mg daily, otherwise no other anticoagulants. He did not take his ASA today. He denies recent ETOH or illicit drug use. Tetanus is UTD. He denies any other injuries or pain elsewhere. Patient denies LOC, neck pain, N/V, AMS. No other complaints at this time. No change in vision or visual complaints.      The history is provided by the patient.     Review of patient's allergies indicates:   Allergen Reactions    Hydrocodone      Other reaction(s): Hallucinations    Hydrocodone-acetaminophen      Other reaction(s): hyperactivity    Oxycodone Other (See Comments)     hallucinations    Pseudoephedrine      Other reaction(s): Hallucinations    Pseudoephedrine hcl      Other reaction(s): hyperactivity    Risperidone      Other reaction(s): Hallucinations  Other reaction(s): hyperactivity    Trazodone      Adverse reaction    Naproxen      Past Medical History:   Diagnosis Date    Bipolar disorder     manic depressive x 1992    BPH (benign prostatic hypertrophy)     Chronic low back pain     Depression     GERD (gastroesophageal reflux disease)     Gout     Gout, arthritis     Hyperlipidemia     Hypertension     Lumbar disc disease     Obesity     Panic disorder      Past Surgical History:   Procedure Laterality Date    ARTHROSCOPY, SHOULDER, WITH SUBACROMIAL SPACE DECOMPRESSION Left 6/27/2013    Performed by  "[facility] at [facility] OR    HERNIA REPAIR      HUMERUS FRACTURE SURGERY  1971    Bone grafts required    MANDIBLE FRACTURE SURGERY      MUSCLE TRANSFER UPPER ARM      REPAIR, ROTATOR CUFF Left 6/27/2013    Performed by [facility] at [facility] OR    SHOULDER ARTHROSCOPY      SHOULDER SURGERY      TENODESIS ARTHROSCOPIC (TENDON FIXATION) Left 6/27/2013    Per[facility] by [facility] at [facility] OR    TONSILLECTOMY       Family History   Problem Relation Age of Onset    Diabetes Mother     Breast cancer Mother      Social History     Tobacco Use    Smoking status: Never Smoker    Smokeless tobacco: Current User     Types: Snuff    Tobacco comment: Patient uses "dip" tobacco   Substance Use Topics    Alcohol use: No     Alcohol/week: 0.0 oz     Comment: Heavy alcohol use in the past.     Drug use: No     Review of Systems   Constitutional: Negative for fever.   HENT: Negative for sore throat.    Respiratory: Negative for shortness of breath.    Cardiovascular: Negative for chest pain.   Gastrointestinal: Negative for nausea.   Genitourinary: Negative for dysuria.   Musculoskeletal: Negative for back pain and neck pain.   Skin: Positive for wound. Negative for rash.   Neurological: Negative for weakness.   Hematological: Does not bruise/bleed easily.   Psychiatric/Behavioral: Negative for confusion.       Physical Exam     Initial Vitals [01/30/19 0706]   BP Pulse Resp Temp SpO2   (!) 145/76 77 18 98 °F (36.7 °C) 99 %      MAP       --         Physical Exam    Nursing note and vitals reviewed.  Constitutional: He appears well-developed and well-nourished. He is not diaphoretic. No distress.   HENT:   Head: Normocephalic.       Eyes: EOM are normal.   There is a 1.5 cm laceration along medial aspect of right eyebrow.   Neck: No spinous process tenderness present.   Cardiovascular: Normal rate.   Pulmonary/Chest: No respiratory distress. He exhibits no tenderness.   Abdominal: There is no " "tenderness.   Musculoskeletal: He exhibits no tenderness.        Cervical back: He exhibits no bony tenderness.        Thoracic back: He exhibits no bony tenderness.        Lumbar back: He exhibits no bony tenderness.   There is no tenderness throughout chest, abdomen, back, or extremities.   Neurological: He is alert. No cranial nerve deficit.   Skin: Skin is warm and dry.         ED Course   Lac Repair  Date/Time: 1/30/2019 9:15 AM  Performed by: Tashi Caruso MD  Authorized by: Tashi Caruso MD   Consent Done: Yes  Consent: Verbal consent obtained.  Consent given by: patient  Time out: Immediately prior to procedure a "time out" was called to verify the correct patient, procedure, equipment, support staff and site/side marked as required.  Body area: head/neck  Location details: right eyebrow  Laceration length: 1.5 cm  Foreign bodies: no foreign bodies  Tendon involvement: none  Nerve involvement: none  Vascular damage: no  Anesthesia: local infiltration    Anesthesia:  Local Anesthetic: lidocaine 1% with epinephrine  Anesthetic total: 1 mL  Preparation: Patient was prepped and draped in the usual sterile fashion.  Irrigation solution: saline  Irrigation method: jet lavage  Amount of cleaning: standard  Debridement: none  Degree of undermining: none  Wound skin closure material used: 6-0 Vicryl.  Number of sutures: 3  Technique: simple  Approximation: close  Approximation difficulty: simple  Dressing: antibiotic ointment and dressing applied  Patient tolerance: Patient tolerated the procedure well with no immediate complications        Labs Reviewed - No data to display       Imaging Results    None          Medical Decision Making:   History:   Old Medical Records: I decided to obtain old medical records.  Initial Assessment:   58 year old male with PMHx of HTN, HLD, and bipolar disorder presents to the ED with complaints of forehead laceration.  Differential Diagnosis:   My differential diagnosis " includes but is not limited to: facial laceration, closed head injury, concussion.     There are no signs of intracranial bleed given normal neuro exam, no significant headache, or N/V.  ED Management:  Laceration repaired as above. Patient tolerated procedure well without any complications. Patient advised wound care instructions and return precautions.              Attending Attestation:           Physician Attestation for Scribe:      Comments: I, Dr. Tashi Caruso, personally performed the services described in this documentation. All medical record entries made by the scribe were at my direction and in my presence.  I have reviewed the chart and agree that the record reflects my personal performance and is accurate and complete. Tashi Caruso MD.  10:33 AM 01/30/2019                 Clinical Impression:   The encounter diagnosis was Facial laceration, initial encounter.      Disposition:   Disposition: Discharged  Condition: Stable                        Tashi Caruso MD  01/30/19 1033

## 2019-02-04 ENCOUNTER — OFFICE VISIT (OUTPATIENT)
Dept: INTERNAL MEDICINE | Facility: CLINIC | Age: 59
End: 2019-02-04
Payer: COMMERCIAL

## 2019-02-04 VITALS
SYSTOLIC BLOOD PRESSURE: 140 MMHG | WEIGHT: 237.19 LBS | BODY MASS INDEX: 30.44 KG/M2 | HEIGHT: 74 IN | OXYGEN SATURATION: 97 % | HEART RATE: 61 BPM | DIASTOLIC BLOOD PRESSURE: 85 MMHG

## 2019-02-04 DIAGNOSIS — H93.12 TINNITUS OF LEFT EAR: ICD-10-CM

## 2019-02-04 DIAGNOSIS — H61.23 BILATERAL IMPACTED CERUMEN: Primary | ICD-10-CM

## 2019-02-04 PROCEDURE — 99213 PR OFFICE/OUTPT VISIT, EST, LEVL III, 20-29 MIN: ICD-10-PCS | Mod: GC,S$GLB,, | Performed by: HOSPITALIST

## 2019-02-04 PROCEDURE — 99999 PR PBB SHADOW E&M-EST. PATIENT-LVL III: ICD-10-PCS | Mod: PBBFAC,GC,, | Performed by: HOSPITALIST

## 2019-02-04 PROCEDURE — 99213 OFFICE O/P EST LOW 20 MIN: CPT | Mod: GC,S$GLB,, | Performed by: HOSPITALIST

## 2019-02-04 PROCEDURE — 99999 PR PBB SHADOW E&M-EST. PATIENT-LVL III: CPT | Mod: PBBFAC,GC,, | Performed by: HOSPITALIST

## 2019-02-04 NOTE — PATIENT INSTRUCTIONS
Please see an ear nose throat physician from now on to get your ears professionally cleaned.  Do not use Qtips or cotton swabs to clean your ears anymore as this is worsening the earwax and likely worsening your hearing loss.  Take a 1 week break from the meloxicam to see if ear ringing improves.  You can try over the counter lipo flavonoid to see if this helps with ear ringing relief.

## 2019-02-04 NOTE — PROGRESS NOTES
Subjective:       Patient ID: Abel Dc III is a 58 y.o. male.      Chief Complaint: tinnitus      HPI  Abel Dc III is a 58 y.o. male with PMH of HTN, manic depression, BPD, anxiety, GERD, history of seizures who presents for an urgent care visit.    He has ringing in his L ear that has been going on for 2 weeks.  In terms of recent medication changes, he was changed from lithium carbonate to oxycarbazepine (but this was back in July 2018).  He has no dizziness, lightheadedness, or vertigo, no trouble with balance, no difficulty ambulating.  He has decreased hearing in the L ear as well.  His hearing in his R hear is fine.  He has trouble hearing his wife and hearing music.  No ear pain.  No otorrhea.    Went to the ER two weeks ago after he dug into his ear with a q-tip and caused some bleeding, ER doc said ear canal was damaged but that the TM was fine. Review of ER note states, he was prescribed Cortisporin drops.   Patient states, instructions he was given verbally did not match what was written on the prescription so he followed the instructions on the prescription.  He has been using 4 drops twice per day.  Last dose was last night.  He has been putting cotton in his ear after each dose.  He also complains of hearing water in his L ear.  He states he has had tinnitus as a kid but this went away on its own.    States he used to see Dr. Carrasco in ENT clinic years ago and would get his ears cleaned then.  Has another ENT appointment in March but was unable to get an earlier one.      He takes meloxicam for knee pain and ASA for preventative.     Review of Systems   Constitutional: Negative for chills, fever and malaise/fatigue.   HENT: Positive for hearing loss and tinnitus. Negative for ear discharge, ear pain and sore throat.    Eyes: Negative for blurred vision and pain.   Respiratory: Negative for cough and shortness of breath.    Cardiovascular: Negative for chest pain and leg swelling.  "  Gastrointestinal: Negative for abdominal pain, nausea and vomiting.   Genitourinary: Negative for dysuria and frequency.   Musculoskeletal: Negative for back pain and myalgias.   Skin: Negative for itching and rash.   Neurological: Negative for dizziness, loss of consciousness and headaches.           Objective:     Vitals:    02/04/19 1403   BP: (!) 140/85   Pulse: 61   SpO2: 97%   Weight: 107.6 kg (237 lb 3.4 oz)   Height: 6' 2" (1.88 m)       Estimated body mass index is 30.46 kg/m² as calculated from the following:    Height as of this encounter: 6' 2" (1.88 m).    Weight as of this encounter: 107.6 kg (237 lb 3.4 oz).    Physical Exam   Constitutional: He is oriented to person, place, and time.   HENT:   Head: Normocephalic and atraumatic.   Unable to view tympanic membranes or assess for effusions bilaterally secondary to cerumen impaction which is greater in the L ear.  L ear canal has a visible scab anteriorly 2/2 previous trauma.   Eyes: Conjunctivae and EOM are normal. Pupils are equal, round, and reactive to light.   Lac repair noted to R eyebrow   Neck: Neck supple. No tracheal deviation present. No thyromegaly present.   Cardiovascular: Normal rate, regular rhythm, normal heart sounds and intact distal pulses.   No murmur heard.  Pulmonary/Chest: Effort normal and breath sounds normal. No respiratory distress. He has no wheezes. He has no rales.   Abdominal: Soft. Bowel sounds are normal. He exhibits no distension. There is no tenderness.   Musculoskeletal: He exhibits no edema.   Neurological: He is alert and oriented to person, place, and time. GCS score is 15.   Skin: Skin is warm and dry.   Psychiatric: Affect and judgment normal.         Assessment/Plan:     Cerumen impaction   --with hearing loss  --patient was advised to refrain from cleaning his ears at home with Qtips and/or cotton swabs which were worsening cerumen impaction and likely causing hearing loss  --needs ENT appointment moved up " to this week so he can get his ears cleaned, spoke to MA to help arrange    Tinnitus  --without vertigo  --recommended 1 week break from meloxicam to see if tinnitus improves  --he was advised to continue ASA as part of his preventative regimen after discussion of risks/benefits of continuing therapy  --Dr. Belcher recommended he try lipo-flavonoid to help with tinnitus      RTC PRN    This case was discussed with Dr. Simi Moctezuma, PGY-3

## 2019-02-07 ENCOUNTER — OFFICE VISIT (OUTPATIENT)
Dept: INTERNAL MEDICINE | Facility: CLINIC | Age: 59
End: 2019-02-07
Payer: COMMERCIAL

## 2019-02-07 ENCOUNTER — HOSPITAL ENCOUNTER (OUTPATIENT)
Dept: RADIOLOGY | Facility: HOSPITAL | Age: 59
Discharge: HOME | End: 2019-02-07
Attending: INTERNAL MEDICINE
Payer: COMMERCIAL

## 2019-02-07 VITALS
WEIGHT: 233.25 LBS | HEIGHT: 74 IN | SYSTOLIC BLOOD PRESSURE: 132 MMHG | OXYGEN SATURATION: 96 % | TEMPERATURE: 99 F | BODY MASS INDEX: 29.93 KG/M2 | DIASTOLIC BLOOD PRESSURE: 78 MMHG | HEART RATE: 73 BPM

## 2019-02-07 DIAGNOSIS — M62.838 MUSCLE SPASM: ICD-10-CM

## 2019-02-07 DIAGNOSIS — M62.838 MUSCLE SPASM: Primary | ICD-10-CM

## 2019-02-07 PROCEDURE — 72050 X-RAY EXAM NECK SPINE 4/5VWS: CPT | Mod: 26,,, | Performed by: RADIOLOGY

## 2019-02-07 PROCEDURE — 99999 PR PBB SHADOW E&M-EST. PATIENT-LVL III: ICD-10-PCS | Mod: PBBFAC,,, | Performed by: INTERNAL MEDICINE

## 2019-02-07 PROCEDURE — 99213 OFFICE O/P EST LOW 20 MIN: CPT | Mod: S$GLB,,, | Performed by: INTERNAL MEDICINE

## 2019-02-07 PROCEDURE — 99999 PR PBB SHADOW E&M-EST. PATIENT-LVL III: CPT | Mod: PBBFAC,,, | Performed by: INTERNAL MEDICINE

## 2019-02-07 PROCEDURE — 72050 X-RAY EXAM NECK SPINE 4/5VWS: CPT | Mod: TC

## 2019-02-07 PROCEDURE — 72050 XR CERVICAL SPINE COMPLETE 5 VIEW: ICD-10-PCS | Mod: 26,,, | Performed by: RADIOLOGY

## 2019-02-07 PROCEDURE — 99213 PR OFFICE/OUTPT VISIT, EST, LEVL III, 20-29 MIN: ICD-10-PCS | Mod: S$GLB,,, | Performed by: INTERNAL MEDICINE

## 2019-02-07 RX ORDER — CYCLOBENZAPRINE HCL 5 MG
5 TABLET ORAL NIGHTLY
Qty: 20 TABLET | Refills: 0 | Status: SHIPPED | OUTPATIENT
Start: 2019-02-07 | End: 2019-02-17

## 2019-02-07 NOTE — PROGRESS NOTES
"I have personally taken the history and examined this patient and agree with the resident's note as stated above with the following thoughts:  BP (!) 140/85 (BP Location: Left arm, Patient Position: Sitting, BP Method: Large (Manual))   Pulse 61   Ht 6' 2" (1.88 m)   Wt 107.6 kg (237 lb 3.4 oz)   SpO2 97%   BMI 30.46 kg/m²     .  Discussed avoidance of q-tip use.  He has had tinnitus before.  Will treat with lipflavinoid-- avoid other NSAIDs- continue Asprin for cardo protection   "

## 2019-02-08 ENCOUNTER — TELEPHONE (OUTPATIENT)
Dept: INTERNAL MEDICINE | Facility: CLINIC | Age: 59
End: 2019-02-08

## 2019-02-08 NOTE — PROGRESS NOTES
Subjective:       Patient ID: Abel Dc III is a 58 y.o. male.    Chief Complaint: Headache (fever, back and neck pain)    Complains of occipital headache and neck stiffness for over a week.  Temp at home 99.9.  Has known lumbar disc disease      Review of Systems   Constitutional: Negative for activity change, appetite change and fever.   HENT: Negative for congestion, postnasal drip and sore throat.    Respiratory: Negative for cough, shortness of breath and wheezing.    Cardiovascular: Negative for chest pain and palpitations.   Gastrointestinal: Negative for abdominal pain, blood in stool, constipation, diarrhea, nausea and vomiting.   Genitourinary: Negative for decreased urine volume, difficulty urinating, flank pain and frequency.   Musculoskeletal: Negative for arthralgias.   Neurological: Negative for dizziness, weakness and headaches.       Objective:      Physical Exam   Constitutional: He is oriented to person, place, and time.   Neck: Normal range of motion. Muscular tenderness present. No spinous process tenderness present. No neck rigidity. No edema, no erythema and normal range of motion present.       Neurological: He is alert and oriented to person, place, and time. No cranial nerve deficit or sensory deficit. GCS eye subscore is 4. GCS verbal subscore is 5. GCS motor subscore is 6.       Assessment:       1. Muscle spasm        Plan:   Abel was seen today for headache.    Diagnoses and all orders for this visit:    Muscle spasm  -     X-Ray Cervical Spine Complete 5 view; Future    Other orders  -     cyclobenzaprine (FLEXERIL) 5 MG tablet; Take 1 tablet (5 mg total) by mouth nightly. for 10 days

## 2019-02-09 NOTE — TELEPHONE ENCOUNTER
Left Vm stated that his xray show lots of arthritis, if have any question to give the office a call back.    Shanika TELLEZ

## 2019-04-10 RX ORDER — IBUPROFEN 800 MG/1
TABLET ORAL
Qty: 270 TABLET | Refills: 0 | Status: SHIPPED | OUTPATIENT
Start: 2019-04-10 | End: 2019-06-27 | Stop reason: ALTCHOICE

## 2019-04-11 DIAGNOSIS — J30.9 ALLERGIC RHINITIS: ICD-10-CM

## 2019-04-11 RX ORDER — FLUTICASONE PROPIONATE 50 MCG
SPRAY, SUSPENSION (ML) NASAL
Qty: 16 G | Refills: 11 | Status: SHIPPED | OUTPATIENT
Start: 2019-04-11 | End: 2020-04-30

## 2019-04-26 DIAGNOSIS — M1A.9XX0 CHRONIC GOUT WITHOUT TOPHUS, UNSPECIFIED CAUSE, UNSPECIFIED SITE: ICD-10-CM

## 2019-04-26 DIAGNOSIS — F41.0 PANIC ATTACKS: ICD-10-CM

## 2019-04-26 DIAGNOSIS — E78.5 HYPERLIPIDEMIA: ICD-10-CM

## 2019-04-26 RX ORDER — FLUOXETINE HYDROCHLORIDE 40 MG/1
40 CAPSULE ORAL DAILY
Refills: 5 | COMMUNITY
Start: 2019-03-20

## 2019-04-26 RX ORDER — ALLOPURINOL 100 MG/1
TABLET ORAL
Qty: 180 TABLET | Refills: 3 | Status: SHIPPED | OUTPATIENT
Start: 2019-04-26 | End: 2020-04-17

## 2019-04-26 RX ORDER — PROPRANOLOL HYDROCHLORIDE 20 MG/1
TABLET ORAL
Qty: 180 TABLET | Refills: 3 | OUTPATIENT
Start: 2019-04-26

## 2019-04-26 RX ORDER — MELOXICAM 15 MG/1
TABLET ORAL
Refills: 3 | COMMUNITY
Start: 2019-02-02 | End: 2019-06-27

## 2019-04-26 RX ORDER — PRAVASTATIN SODIUM 40 MG/1
TABLET ORAL
Qty: 90 TABLET | Refills: 3 | Status: SHIPPED | OUTPATIENT
Start: 2019-04-26 | End: 2020-02-18

## 2019-04-29 ENCOUNTER — PES CALL (OUTPATIENT)
Dept: ADMINISTRATIVE | Facility: CLINIC | Age: 59
End: 2019-04-29

## 2019-05-13 ENCOUNTER — OFFICE VISIT (OUTPATIENT)
Dept: INTERNAL MEDICINE | Facility: CLINIC | Age: 59
End: 2019-05-13
Payer: COMMERCIAL

## 2019-05-13 VITALS
HEIGHT: 74 IN | BODY MASS INDEX: 30.8 KG/M2 | SYSTOLIC BLOOD PRESSURE: 132 MMHG | WEIGHT: 240 LBS | DIASTOLIC BLOOD PRESSURE: 74 MMHG | HEART RATE: 65 BPM

## 2019-05-13 DIAGNOSIS — K21.9 GASTROESOPHAGEAL REFLUX DISEASE, ESOPHAGITIS PRESENCE NOT SPECIFIED: ICD-10-CM

## 2019-05-13 DIAGNOSIS — N40.0 BENIGN PROSTATIC HYPERPLASIA, UNSPECIFIED WHETHER LOWER URINARY TRACT SYMPTOMS PRESENT: Primary | ICD-10-CM

## 2019-05-13 DIAGNOSIS — F31.9 BIPOLAR AFFECTIVE DISORDER, REMISSION STATUS UNSPECIFIED: Chronic | ICD-10-CM

## 2019-05-13 DIAGNOSIS — J30.9 ALLERGIC SINUSITIS: ICD-10-CM

## 2019-05-13 DIAGNOSIS — R73.09 ELEVATED RANDOM BLOOD GLUCOSE LEVEL: ICD-10-CM

## 2019-05-13 DIAGNOSIS — M10.9 GOUT, ARTHRITIS: ICD-10-CM

## 2019-05-13 DIAGNOSIS — E78.5 HYPERLIPIDEMIA, UNSPECIFIED HYPERLIPIDEMIA TYPE: Chronic | ICD-10-CM

## 2019-05-13 DIAGNOSIS — H61.23 EXCESSIVE CERUMEN IN BOTH EAR CANALS: ICD-10-CM

## 2019-05-13 DIAGNOSIS — K04.7 TOOTH ABSCESS: ICD-10-CM

## 2019-05-13 DIAGNOSIS — N18.30 CHRONIC KIDNEY DISEASE, STAGE 3: ICD-10-CM

## 2019-05-13 PROCEDURE — 99999 PR PBB SHADOW E&M-EST. PATIENT-LVL IV: ICD-10-PCS | Mod: PBBFAC,,, | Performed by: INTERNAL MEDICINE

## 2019-05-13 PROCEDURE — 99999 PR PBB SHADOW E&M-EST. PATIENT-LVL IV: CPT | Mod: PBBFAC,,, | Performed by: INTERNAL MEDICINE

## 2019-05-13 PROCEDURE — 99215 OFFICE O/P EST HI 40 MIN: CPT | Mod: S$GLB,,, | Performed by: INTERNAL MEDICINE

## 2019-05-13 PROCEDURE — 99215 PR OFFICE/OUTPT VISIT, EST, LEVL V, 40-54 MIN: ICD-10-PCS | Mod: S$GLB,,, | Performed by: INTERNAL MEDICINE

## 2019-05-13 NOTE — PATIENT INSTRUCTIONS
For earwax, avoid Qtips or cotton swabs so that wax doesn't get pushed further in the ear. Instead use a few drops of mineral oil or olive oil to the ears; you can also purchase over-the-counter Debrox oil. After about 10 minutes, the wax should be loose and can be rinsed out with water from the shower or an over-the-counter syringe with warm water. If this doesn't work, please notify the office.

## 2019-05-13 NOTE — PROGRESS NOTES
Subjective:       Patient ID: Abel Dc III is a 58 y.o. male.    Chief Complaint: Follow-up    HPI    Last visit with me November 2018.  Since then seen by internal medicine and Neurology.  Also had visits with emergency room.    Pt has had teeth taken out and soon to have molds. Had to see ER for abscess previously.     Continues to have BPH symptoms, due for follow up with Urology.    Gabapentin not being used anymore. High-dose Seroquel seemed to affect his legs and causing restless leg syndrome, but since decreasing the dose his sleep is better. Using Trileptal as well with generally good effects.    GERD is generally well controlled on Prilosec. occasionally with some breakthrough discomfort but not frequently.    Reports foul odor from nose, hasn't noted recently though. Using nasal spray and the smell has improved.     Review of Systems   All other systems reviewed and are negative.      Objective:      Physical Exam   Constitutional: He is oriented to person, place, and time. No distress.   HENT:   Head: Atraumatic.   Right Ear: No tenderness.   Left Ear: No tenderness.   Mouth/Throat: Oropharynx is clear and moist. No oropharyngeal exudate.   tympanic membrane obscured by cerumen bilateral    Eyes: Pupils are equal, round, and reactive to light. Right eye exhibits no discharge. Left eye exhibits no discharge.   Neck: Normal range of motion. No thyromegaly present.   Cardiovascular: Normal rate, regular rhythm and normal heart sounds.   Pulmonary/Chest: Effort normal and breath sounds normal. No stridor. He has no wheezes. He has no rales.   Abdominal: Soft. There is no tenderness. There is no guarding.   Musculoskeletal: He exhibits no edema or tenderness.   Lymphadenopathy:     He has no cervical adenopathy.   Neurological: He is alert and oriented to person, place, and time.   Skin: Skin is warm and dry. No rash noted.   Psychiatric: He has a normal mood and affect. His behavior is normal.   Nursing  "note and vitals reviewed.      Vitals:    05/13/19 1524   BP: 132/74   BP Location: Right arm   Patient Position: Sitting   BP Method: Large (Manual)   Pulse: 65   Weight: 108.9 kg (240 lb)   Height: 6' 2" (1.88 m)     Body mass index is 30.81 kg/m².    RESULTS: Reviewed labs from last 6 months    Assessment:       1. Benign prostatic hyperplasia, unspecified whether lower urinary tract symptoms present    2. Chronic kidney disease, stage 3    3. Elevated random blood glucose level    4. Hyperlipidemia, unspecified hyperlipidemia type    5. Gout, arthritis    6. Gastroesophageal reflux disease, esophagitis presence not specified    7. Allergic sinusitis    8. Excessive cerumen in both ear canals    9. Bipolar affective disorder, remission status unspecified    10. Tooth abscess        Plan:   Abel was seen today for follow-up.    Diagnoses and all orders for this visit:    Benign prostatic hyperplasia, unspecified whether lower urinary tract symptoms present:  Prior diagnosis, was being seen by Urology but lost to follow up, wants to return to clinic for reevaluation.    -     Ambulatory referral to Urology    Chronic kidney disease, stage 3:  Prior dx, at last check creatinine had increased, recheck and if still increasing refer to Nephrology for evaluation. Avoid dehydration.  -     CBC Without Differential; Future  -     Comprehensive metabolic panel; Future  -     Magnesium; Future    Elevated random blood glucose level:  New dx seen on recent labs. Recheck along with A1c.  -     Hemoglobin A1c; Future    Hyperlipidemia, unspecified hyperlipidemia type:  Prior diagnosis, well controlled in past but no check for >1 yr, recheck with next visit.   -     Lipid panel; Future    Gout, arthritis:  Prior dx, well controlled on allopurinol, recheck UA.  -     Uric acid; Future    Gastroesophageal reflux disease, esophagitis presence not specified:  Prior diagnosis, stable, well controlled on current management of " "prilosec. No changes at this time, will continue to monitor.     Allergic sinusitis:  Prior dx, recently worse but has improved with daily Flonase use, continue.    Excessive cerumen in both ear canals:  Prior dx, persists. Counseled to use oil and water irrigation:  "For earwax, avoid Qtips or cotton swabs so that wax doesn't get pushed further in the ear. Instead use a few drops of mineral oil or olive oil to the ears; you can also purchase over-the-counter Debrox oil. After about 10 minutes, the wax should be loose and can be rinsed out with water from the shower or an over-the-counter syringe with warm water. If this doesn't work, please notify the office."    Bipolar d/o:  Prior diagnosis, stable, well controlled on current management. No changes at this time, will continue to monitor. continue follow up with outside Psychiatry.    Tooth abscess:  Prior dx, improved since removing teeth with dentist, continue follow up with outside dentistry for dentures.    Follow up in about 4 months (around 9/13/2019) for follow up visit, fasting labs 1 week prior.  Jonah Harrison MD  Internal Medicine    Portions of this note were completed using medical dictation software. Please excuse typographical or syntax errors that were missed on review.    "

## 2019-06-25 ENCOUNTER — TELEPHONE (OUTPATIENT)
Dept: INTERNAL MEDICINE | Facility: CLINIC | Age: 59
End: 2019-06-25

## 2019-06-25 DIAGNOSIS — M25.562 ARTHRALGIA OF BOTH KNEES: ICD-10-CM

## 2019-06-25 DIAGNOSIS — M25.561 ARTHRALGIA OF BOTH KNEES: ICD-10-CM

## 2019-06-25 NOTE — TELEPHONE ENCOUNTER
----- Message from Buffy Mcdaniels MA sent at 6/25/2019  2:19 PM CDT -----  Contact: Patient [phone]      ----- Message -----  From: Bebe Pino  Sent: 6/25/2019  11:46 AM  To: [facility] Staff    Patient is experiencing coughing, sneezing and runny nose. Patient is also coughing up green phlegm.  Patient is taking dosage of  [facility] night time cold and flu.   Patient would like something prescribed or if  needed to come in.     Please call and advise  Thank you

## 2019-06-25 NOTE — TELEPHONE ENCOUNTER
Spoke to pt, pt informed me, he has productive cough of green phlegm, clear runny nose and sneezing also he has no fever, not sob,pt chews tobacco and symptoms been going on 3-4 days. Pt is taking night time [facility] cold and flu

## 2019-06-27 RX ORDER — MELOXICAM 15 MG/1
TABLET ORAL
Qty: 90 TABLET | Refills: 0 | OUTPATIENT
Start: 2019-06-27

## 2019-06-27 RX ORDER — MELOXICAM 15 MG/1
15 TABLET ORAL DAILY
Qty: 90 TABLET | Refills: 3 | Status: SHIPPED | OUTPATIENT
Start: 2019-06-27 | End: 2020-06-01

## 2019-06-27 NOTE — TELEPHONE ENCOUNTER
I will send in Mobic to be used instead of the ibuprofen. For URI he will need to use over-the-counter medications for symptoms relief. I recommend Dayquil and Nyquil for symptoms control. If he develops fever and chills or gets other concerning symptoms he will need to be seen in clinic for evaluation.

## 2019-06-27 NOTE — TELEPHONE ENCOUNTER
Spoke with pt he states that he wasn't taking ibuprofen he has been taking the mobic because the ibuprofen was making his blood work elevated(liver enzymes). Pt states that he is also suffering with a uri, congestion and blowing green mucus with no fever. Pt would like pcp to address this issue

## 2019-07-02 RX ORDER — OMEPRAZOLE 20 MG/1
CAPSULE, DELAYED RELEASE ORAL
Qty: 90 CAPSULE | Refills: 3 | Status: SHIPPED | OUTPATIENT
Start: 2019-07-02 | End: 2020-06-11

## 2019-07-09 RX ORDER — IBUPROFEN 800 MG/1
TABLET ORAL
Qty: 270 TABLET | Refills: 0 | OUTPATIENT
Start: 2019-07-09

## 2019-07-12 RX ORDER — AMLODIPINE BESYLATE 10 MG/1
TABLET ORAL
Qty: 90 TABLET | Refills: 3 | Status: SHIPPED | OUTPATIENT
Start: 2019-07-12 | End: 2020-05-05

## 2019-09-03 ENCOUNTER — LAB VISIT (OUTPATIENT)
Dept: LAB | Facility: HOSPITAL | Age: 59
End: 2019-09-03
Attending: INTERNAL MEDICINE
Payer: COMMERCIAL

## 2019-09-03 DIAGNOSIS — N18.30 CHRONIC KIDNEY DISEASE, STAGE 3: ICD-10-CM

## 2019-09-03 DIAGNOSIS — R73.09 ELEVATED RANDOM BLOOD GLUCOSE LEVEL: ICD-10-CM

## 2019-09-03 DIAGNOSIS — E78.5 HYPERLIPIDEMIA, UNSPECIFIED HYPERLIPIDEMIA TYPE: Chronic | ICD-10-CM

## 2019-09-03 DIAGNOSIS — M10.9 GOUT, ARTHRITIS: ICD-10-CM

## 2019-09-03 LAB
ALBUMIN SERPL BCP-MCNC: 4.1 G/DL (ref 3.5–5.2)
ALP SERPL-CCNC: 97 U/L (ref 55–135)
ALT SERPL W/O P-5'-P-CCNC: 15 U/L (ref 10–44)
ANION GAP SERPL CALC-SCNC: 9 MMOL/L (ref 8–16)
AST SERPL-CCNC: 21 U/L (ref 10–40)
BILIRUB SERPL-MCNC: 0.4 MG/DL (ref 0.1–1)
BUN SERPL-MCNC: 12 MG/DL (ref 6–20)
CALCIUM SERPL-MCNC: 9.8 MG/DL (ref 8.7–10.5)
CHLORIDE SERPL-SCNC: 102 MMOL/L (ref 95–110)
CHOLEST SERPL-MCNC: 155 MG/DL (ref 120–199)
CHOLEST/HDLC SERPL: 4 {RATIO} (ref 2–5)
CO2 SERPL-SCNC: 26 MMOL/L (ref 23–29)
CREAT SERPL-MCNC: 1.9 MG/DL (ref 0.5–1.4)
ERYTHROCYTE [DISTWIDTH] IN BLOOD BY AUTOMATED COUNT: 13.1 % (ref 11.5–14.5)
EST. GFR  (AFRICAN AMERICAN): 44 ML/MIN/1.73 M^2
EST. GFR  (NON AFRICAN AMERICAN): 38 ML/MIN/1.73 M^2
ESTIMATED AVG GLUCOSE: 100 MG/DL (ref 68–131)
GLUCOSE SERPL-MCNC: 100 MG/DL (ref 70–110)
HBA1C MFR BLD HPLC: 5.1 % (ref 4–5.6)
HCT VFR BLD AUTO: 35.4 % (ref 40–54)
HDLC SERPL-MCNC: 39 MG/DL (ref 40–75)
HDLC SERPL: 25.2 % (ref 20–50)
HGB BLD-MCNC: 12 G/DL (ref 14–18)
LDLC SERPL CALC-MCNC: 86.8 MG/DL (ref 63–159)
MAGNESIUM SERPL-MCNC: 2.2 MG/DL (ref 1.6–2.6)
MCH RBC QN AUTO: 30.7 PG (ref 27–31)
MCHC RBC AUTO-ENTMCNC: 33.9 G/DL (ref 32–36)
MCV RBC AUTO: 91 FL (ref 82–98)
NONHDLC SERPL-MCNC: 116 MG/DL
PLATELET # BLD AUTO: 278 K/UL (ref 150–350)
PMV BLD AUTO: 10.5 FL (ref 9.2–12.9)
POTASSIUM SERPL-SCNC: 4.4 MMOL/L (ref 3.5–5.1)
PROT SERPL-MCNC: 8.3 G/DL (ref 6–8.4)
RBC # BLD AUTO: 3.91 M/UL (ref 4.6–6.2)
SODIUM SERPL-SCNC: 137 MMOL/L (ref 136–145)
TRIGL SERPL-MCNC: 146 MG/DL (ref 30–150)
URATE SERPL-MCNC: 6 MG/DL (ref 3.4–7)
WBC # BLD AUTO: 8.67 K/UL (ref 3.9–12.7)

## 2019-09-03 PROCEDURE — 84550 ASSAY OF BLOOD/URIC ACID: CPT

## 2019-09-03 PROCEDURE — 83036 HEMOGLOBIN GLYCOSYLATED A1C: CPT

## 2019-09-03 PROCEDURE — 85027 COMPLETE CBC AUTOMATED: CPT

## 2019-09-03 PROCEDURE — 80053 COMPREHEN METABOLIC PANEL: CPT

## 2019-09-03 PROCEDURE — 83735 ASSAY OF MAGNESIUM: CPT

## 2019-09-03 PROCEDURE — 36415 COLL VENOUS BLD VENIPUNCTURE: CPT

## 2019-09-03 PROCEDURE — 80061 LIPID PANEL: CPT

## 2019-10-17 ENCOUNTER — IMMUNIZATION (OUTPATIENT)
Dept: PHARMACY | Facility: CLINIC | Age: 59
End: 2019-10-17
Payer: MEDICAID

## 2019-10-17 ENCOUNTER — OFFICE VISIT (OUTPATIENT)
Dept: INTERNAL MEDICINE | Facility: CLINIC | Age: 59
End: 2019-10-17
Payer: COMMERCIAL

## 2019-10-17 VITALS
DIASTOLIC BLOOD PRESSURE: 76 MMHG | SYSTOLIC BLOOD PRESSURE: 128 MMHG | HEART RATE: 63 BPM | HEIGHT: 74 IN | WEIGHT: 246.5 LBS | BODY MASS INDEX: 31.63 KG/M2 | OXYGEN SATURATION: 97 %

## 2019-10-17 DIAGNOSIS — N18.30 CHRONIC KIDNEY DISEASE, STAGE 3: Primary | ICD-10-CM

## 2019-10-17 DIAGNOSIS — K21.9 GASTROESOPHAGEAL REFLUX DISEASE, ESOPHAGITIS PRESENCE NOT SPECIFIED: ICD-10-CM

## 2019-10-17 DIAGNOSIS — R06.09 DYSPNEA ON EXERTION: ICD-10-CM

## 2019-10-17 DIAGNOSIS — N40.0 BENIGN PROSTATIC HYPERPLASIA, UNSPECIFIED WHETHER LOWER URINARY TRACT SYMPTOMS PRESENT: ICD-10-CM

## 2019-10-17 LAB
BILIRUB UR QL STRIP: NEGATIVE
CLARITY UR REFRACT.AUTO: CLEAR
COLOR UR AUTO: ABNORMAL
GLUCOSE UR QL STRIP: NEGATIVE
HGB UR QL STRIP: NEGATIVE
KETONES UR QL STRIP: NEGATIVE
LEUKOCYTE ESTERASE UR QL STRIP: ABNORMAL
MICROSCOPIC COMMENT: NORMAL
NITRITE UR QL STRIP: NEGATIVE
PH UR STRIP: 6 [PH] (ref 5–8)
PROT UR QL STRIP: NEGATIVE
RBC #/AREA URNS AUTO: 1 /HPF (ref 0–4)
SP GR UR STRIP: 1.01 (ref 1–1.03)
URN SPEC COLLECT METH UR: ABNORMAL
WBC #/AREA URNS AUTO: 3 /HPF (ref 0–5)

## 2019-10-17 PROCEDURE — 99999 PR PBB SHADOW E&M-EST. PATIENT-LVL IV: CPT | Mod: PBBFAC,,, | Performed by: INTERNAL MEDICINE

## 2019-10-17 PROCEDURE — 99214 PR OFFICE/OUTPT VISIT, EST, LEVL IV, 30-39 MIN: ICD-10-PCS | Mod: S$GLB,,, | Performed by: INTERNAL MEDICINE

## 2019-10-17 PROCEDURE — 99214 OFFICE O/P EST MOD 30 MIN: CPT | Mod: S$GLB,,, | Performed by: INTERNAL MEDICINE

## 2019-10-17 PROCEDURE — 81001 URINALYSIS AUTO W/SCOPE: CPT

## 2019-10-17 PROCEDURE — 99999 PR PBB SHADOW E&M-EST. PATIENT-LVL IV: ICD-10-PCS | Mod: PBBFAC,,, | Performed by: INTERNAL MEDICINE

## 2019-10-17 RX ORDER — LIDOCAINE HYDROCHLORIDE 20 MG/ML
15 SOLUTION OROPHARYNGEAL
COMMUNITY
Start: 2018-12-07 | End: 2022-05-24

## 2019-10-17 RX ORDER — GABAPENTIN 300 MG/1
CAPSULE ORAL
Refills: 3 | COMMUNITY
Start: 2019-07-23 | End: 2019-11-14 | Stop reason: SDUPTHER

## 2019-10-17 NOTE — PROGRESS NOTES
"Subjective:       Patient ID: Abel Dc III is a 58 y.o. male.    Chief Complaint: Follow-up (4 month)    HPI    Last visit with me May 2019.  The visit patient was referred to Urology but no appointments with their team since.    Sometimes gets dyspnea on exertion. Bending over. Couldn't see Dr Loomis because they wanted the referral. Hasn't seen Urology for over 6 years. Eating fast foods and junk foods.    More GERD recently. Also more junk food.    Review of Systems   All other systems reviewed and are negative.      Objective:      Physical Exam   Constitutional: He is oriented to person, place, and time. No distress.   HENT:   Head: Atraumatic.   Right Ear: Tympanic membrane normal. No tenderness.   Left Ear: Tympanic membrane normal. No tenderness.   Mouth/Throat: Oropharynx is clear and moist. No oropharyngeal exudate.   Eyes: Pupils are equal, round, and reactive to light. Right eye exhibits no discharge. Left eye exhibits no discharge.   Neck: Normal range of motion. No thyromegaly present.   Cardiovascular: Normal rate, regular rhythm and normal heart sounds.   Pulmonary/Chest: Effort normal and breath sounds normal. No stridor. He has no wheezes. He has no rales.   Abdominal: Soft. There is no tenderness. There is no guarding and no CVA tenderness.   Lymphadenopathy:     He has no cervical adenopathy.   Neurological: He is alert and oriented to person, place, and time.   Psychiatric: He has a normal mood and affect. His behavior is normal.   Nursing note and vitals reviewed.      Vitals:    10/17/19 1450   BP: 128/76   BP Location: Right arm   Patient Position: Sitting   BP Method: Large (Manual)   Pulse: 63   SpO2: 97%   Weight: 111.8 kg (246 lb 7.6 oz)   Height: 6' 2" (1.88 m)     Body mass index is 31.65 kg/m².    RESULTS: Reviewed labs from last 12 months    Assessment:       1. Chronic kidney disease, stage 3    2. Dyspnea on exertion    3. Gastroesophageal reflux disease, esophagitis presence " not specified    4. Benign prostatic hyperplasia, unspecified whether lower urinary tract symptoms present        Plan:   Abel was seen today for follow-up.    Diagnoses and all orders for this visit:    Chronic kidney disease, stage 3:  Prior diagnosis, creatinine continues to rise.  Unclear etiology, refer to Nephrology, get urinalysis and ultrasound.  -     Ambulatory consult to Nephrology  -     Urinalysis  -     US Retroperitoneal Complete (Kidney and; Future    Dyspnea on exertion:  New problem, last stress test was in 2013, patient has risk factors for cardiovascular disease, sent for another stress test.  -     Stress Echo Which stress agent will be used? Pharmacological (Dobutamine); Color Flow Doppler? No; Future  -     EKG 12-LEAD - Muse; Future    Gastroesophageal reflux disease, esophagitis presence not specified:  Prior diagnosis, getting worse, likely due to increase in junk food intake.  Encouraged to eat healthier diet.    Benign prostatic hyperplasia, unspecified whether lower urinary tract symptoms present:  Prior diagnosis, patient was referred to Urology but the Urology team here not accept his insurance.  If workup for kidney disease shows problems with bladder or ureters referral to Urology.    Follow up in about 6 months (around 4/17/2020) for follow up visit.  Jonah Harrison MD  Internal Medicine    Portions of this note were completed using medical dictation software. Please excuse typographical or syntax errors that were missed on review.

## 2019-10-22 ENCOUNTER — HOSPITAL ENCOUNTER (OUTPATIENT)
Dept: RADIOLOGY | Facility: HOSPITAL | Age: 59
Discharge: HOME | End: 2019-10-22
Attending: INTERNAL MEDICINE
Payer: COMMERCIAL

## 2019-10-22 DIAGNOSIS — N18.30 CHRONIC KIDNEY DISEASE, STAGE 3: ICD-10-CM

## 2019-10-22 PROCEDURE — 76770 US EXAM ABDO BACK WALL COMP: CPT | Mod: 26,,, | Performed by: RADIOLOGY

## 2019-10-22 PROCEDURE — 76770 US EXAM ABDO BACK WALL COMP: CPT | Mod: TC

## 2019-10-22 PROCEDURE — 76770 US RETROPERITONEAL COMPLETE: ICD-10-PCS | Mod: 26,,, | Performed by: RADIOLOGY

## 2019-10-23 ENCOUNTER — TELEPHONE (OUTPATIENT)
Dept: INTERNAL MEDICINE | Facility: CLINIC | Age: 59
End: 2019-10-23

## 2019-10-23 NOTE — TELEPHONE ENCOUNTER
----- Message from Arabella Ruano sent at 10/23/2019 11:49 AM CDT -----  Contact: Patient [phone]  Test Results Request:    Test Performed: US    When & Where[facility]    Please call and advise.    Thank You

## 2019-10-23 NOTE — TELEPHONE ENCOUNTER
Please call the patient to notify that his ultrasound shows that his kidneys show some mild damage, likely a result of high blood pressure in the past. He was referred to Nephrology last week; I don't seen an appointment currently scheduled. Please route to scheduling to help make the appointment. I have sent the results in a letter.

## 2019-10-23 NOTE — TELEPHONE ENCOUNTER
I printed letter from Dr. Harrison and placed letter in the mailbox to be sent out.    Nando Mcbride

## 2019-10-24 ENCOUNTER — TELEPHONE (OUTPATIENT)
Dept: INTERNAL MEDICINE | Facility: CLINIC | Age: 59
End: 2019-10-24

## 2019-10-25 DIAGNOSIS — Z12.39 BREAST CANCER SCREENING: ICD-10-CM

## 2019-10-25 DIAGNOSIS — N18.9 CHRONIC KIDNEY DISEASE, UNSPECIFIED CKD STAGE: ICD-10-CM

## 2019-10-31 ENCOUNTER — TELEPHONE (OUTPATIENT)
Dept: INTERNAL MEDICINE | Facility: CLINIC | Age: 59
End: 2019-10-31

## 2019-11-01 ENCOUNTER — TELEPHONE (OUTPATIENT)
Dept: NEPHROLOGY | Facility: CLINIC | Age: 59
End: 2019-11-01

## 2019-11-01 DIAGNOSIS — N18.30 STAGE 3 CHRONIC KIDNEY DISEASE: Primary | ICD-10-CM

## 2019-11-01 NOTE — TELEPHONE ENCOUNTER
----- Message from Patty Perez sent at 11/1/2019  9:34 AM CDT -----  Contact: consult pt  Referral Jonah Harrison -   GFR dropping   ckd 3   &  Simple cyst     Pt insurance under [employer]   But  Internal med is using it     Needs a work in -   Next available is February 2020  Thanks

## 2019-11-04 RX ORDER — PROPRANOLOL HYDROCHLORIDE 40 MG/1
40 TABLET ORAL 2 TIMES DAILY
Qty: 180 TABLET | Refills: 0 | Status: SHIPPED | OUTPATIENT
Start: 2019-11-04 | End: 2019-11-14 | Stop reason: SDUPTHER

## 2019-11-04 NOTE — TELEPHONE ENCOUNTER
----- Message from Sayda Lopez sent at 11/4/2019  3:19 PM CST -----  Contact: [phone]  Type: Rx    Name of medication(s): propranolol (INDERAL) 40 MG tablet    Is this a refill? New rx? refill    Who prescribed medication? Stef Murguia    Pharmacy Name, Phone, & Location:  [id number] [place] [phone] AT [facility] [address]    Comments:  Please advise, thank you

## 2019-11-12 ENCOUNTER — HOSPITAL ENCOUNTER (EMERGENCY)
Facility: HOSPITAL | Age: 59
Discharge: HOME | End: 2019-11-12
Attending: EMERGENCY MEDICINE
Payer: COMMERCIAL

## 2019-11-12 VITALS
WEIGHT: 240 LBS | DIASTOLIC BLOOD PRESSURE: 65 MMHG | SYSTOLIC BLOOD PRESSURE: 134 MMHG | RESPIRATION RATE: 18 BRPM | HEART RATE: 65 BPM | HEIGHT: 74 IN | TEMPERATURE: 98 F | BODY MASS INDEX: 30.8 KG/M2 | OXYGEN SATURATION: 98 %

## 2019-11-12 DIAGNOSIS — S01.511A LIP LACERATION, INITIAL ENCOUNTER: Primary | ICD-10-CM

## 2019-11-12 PROCEDURE — 99284 PR EMERGENCY DEPT VISIT,LEVEL IV: ICD-10-PCS | Mod: 25,,, | Performed by: EMERGENCY MEDICINE

## 2019-11-12 PROCEDURE — 12011 RPR F/E/E/N/L/M 2.5 CM/<: CPT | Mod: ,,, | Performed by: EMERGENCY MEDICINE

## 2019-11-12 PROCEDURE — 25000003 PHARM REV CODE 250: Performed by: EMERGENCY MEDICINE

## 2019-11-12 PROCEDURE — 12011 PR RESUPERF WND FACE <2.5 CM: ICD-10-PCS | Mod: ,,, | Performed by: EMERGENCY MEDICINE

## 2019-11-12 PROCEDURE — 99284 EMERGENCY DEPT VISIT MOD MDM: CPT | Mod: 25,,, | Performed by: EMERGENCY MEDICINE

## 2019-11-12 PROCEDURE — 12011 RPR F/E/E/N/L/M 2.5 CM/<: CPT

## 2019-11-12 PROCEDURE — 99283 EMERGENCY DEPT VISIT LOW MDM: CPT | Mod: 25

## 2019-11-12 RX ORDER — LIDOCAINE HYDROCHLORIDE AND EPINEPHRINE 10; 10 MG/ML; UG/ML
10 INJECTION, SOLUTION INFILTRATION; PERINEURAL ONCE
Status: COMPLETED | OUTPATIENT
Start: 2019-11-12 | End: 2019-11-12

## 2019-11-12 RX ORDER — LIDOCAINE HYDROCHLORIDE AND EPINEPHRINE 10; 10 MG/ML; UG/ML
10 INJECTION, SOLUTION INFILTRATION; PERINEURAL ONCE
Status: DISCONTINUED | OUTPATIENT
Start: 2019-11-12 | End: 2019-11-12

## 2019-11-12 RX ADMIN — LIDOCAINE HYDROCHLORIDE,EPINEPHRINE BITARTRATE 10 ML: 10; .01 INJECTION, SOLUTION INFILTRATION; PERINEURAL at 05:11

## 2019-11-12 NOTE — ED PROVIDER NOTES
Source of History:  patient    Chief complaint:  Lip Laceration (Pt states he fell out the bed just PTA and hit his upper lip on end table. Laceration noted; no bleeding. )      HPI:  Abel Dc III is a 59 y.o. male presenting with lip laceration.  Patient rolled over in bed and fell to the side off of his bed, hitting his lip on a dresser.  He notes immediate pain in his lip but no loss of consciousness or any other injury. There was bleeding initially but this resolved spontaneously prior to arrival.  Denies any other injury besides his lip laceration.    ROS: As per HPI and below:    General: No fever.  No chills.  Eyes: No visual changes.  Head: No headache.    Integument: No rashes or lesions.  Chest: No shortness of breath.  Cardiovascular: No chest pain.  Abdomen: No abdominal pain.  No nausea or vomiting.  Urinary: No abnormal urination.  Neurologic: No focal weakness.  No numbness.  Hematologic: No easy bruising.  Endocrine: No excessive thirst or urination.      Review of patient's allergies indicates:   Allergen Reactions    Amitriptyline Other (See Comments) and Hallucinations     confusion    Hydrocodone      Other reaction(s): Hallucinations    Hydrocodone-acetaminophen      Other reaction(s): hyperactivity    Oxycodone Other (See Comments)     hallucinations    Pseudoephedrine      Other reaction(s): Hallucinations    Pseudoephedrine hcl      Other reaction(s): hyperactivity    Risperidone      Other reaction(s): Hallucinations  Other reaction(s): hyperactivity    Trazodone      Adverse reaction    Naproxen        No current facility-administered medications on file prior to encounter.      Current Outpatient Medications on File Prior to Encounter   Medication Sig Dispense Refill    acetaminophen (TYLENOL) 500 MG tablet Take 1 tablet (500 mg total) by mouth every 6 (six) hours as needed for Pain. 30 tablet 0    allopurinol (ZYLOPRIM) 100 MG tablet TAKE 2 TABLETS BY MOUTH EVERY   tablet 3    amLODIPine (NORVASC) 10 MG tablet TAKE 1 TABLET BY MOUTH EVERY DAY 90 tablet 3    aspirin 81 mg Tab Take 1 tablet by mouth.      busPIRone (BUSPAR) 15 MG tablet Take 15 mg by mouth 3 (three) times daily.   4    chlorhexidine (PERIDEX) 0.12 % solution RINSE WITH 2 PRUDENCIO TID  1    cholecalciferol, vitamin D3, 2,000 unit Cap Take 1 capsule by mouth once daily.      diphenhydrAMINE-aluminum-magnesium hydroxide-simethicone-lidocaine HCl 2% Swish and spit 15 mLs every 4 (four) hours as needed (dental pain). (Patient not taking: Reported on 10/17/2019) 30 mL 0    FLUoxetine 40 MG capsule Take 40 mg by mouth once daily.  5    fluticasone (FLONASE) 50 mcg/actuation nasal spray SPRAY 1 SPRAY IN EACH NOSTRIL EVERY DAY 16 g 11    gabapentin (NEURONTIN) 300 MG capsule TK ONE C PO QPM  3    ketoconazole (NIZORAL) 2 % shampoo APPLY EXTERNALLY TO THE AFFECTED AREA ONCE DAILY 120 mL 0    lidocaine HCl 2% (XYLOCAINE) 2 % Soln 15 mLs by Transmucosal route.      meloxicam (MOBIC) 15 MG tablet Take 1 tablet (15 mg total) by mouth once daily. 90 tablet 3    omeprazole (PRILOSEC) 20 MG capsule TAKE ONE CAPSULE BY MOUTH ONCE DAILY 90 capsule 3    OXcarbazepine (TRILEPTAL) 300 MG Tab TK 2 TS PO QD  3    pravastatin (PRAVACHOL) 40 MG tablet TAKE 1 TABLET BY MOUTH EVERY EVENING 90 tablet 3    propranolol (INDERAL) 40 MG tablet Take 1 tablet (40 mg total) by mouth 2 (two) times daily. 180 tablet 0    quetiapine (SEROQUEL) 50 MG tablet TK 1 T PO TID  0       PMH:  As per HPI and below:  Past Medical History:   Diagnosis Date    Bipolar disorder     manic depressive x 1992    BPH (benign prostatic hypertrophy)     Chronic low back pain     Depression     GERD (gastroesophageal reflux disease)     Gout     Gout, arthritis     Hyperlipidemia     Hypertension     Lumbar disc disease     Obesity     Panic disorder      Past Surgical History:   Procedure Laterality Date    HERNIA REPAIR      HUMERUS FRACTURE  "SURGERY  1971    Bone grafts required    MANDIBLE FRACTURE SURGERY      MUSCLE TRANSFER UPPER ARM      SHOULDER ARTHROSCOPY      SHOULDER SURGERY      TONSILLECTOMY         Social History     Socioeconomic History    Marital status:      Spouse name: Not on file    Number of children: Not on file    Years of education: Not on file    Highest education level: Not on file   Occupational History    Occupation: disability for manic-depressive state   Social Needs    Financial resource strain: Not on file    Food insecurity:     Worry: Not on file     Inability: Not on file    Transportation needs:     Medical: Not on file     Non-medical: Not on file   Tobacco Use    Smoking status: Never Smoker    Smokeless tobacco: Current User     Types: Snuff    Tobacco comment: Patient uses "dip" tobacco   Substance and Sexual Activity    Alcohol use: No     Alcohol/week: 0.0 standard drinks     Comment: Heavy alcohol use in the past.     Drug use: No    Sexual activity: Yes     Partners: Female   Lifestyle    Physical activity:     Days per week: Not on file     Minutes per session: Not on file    Stress: Not on file   Relationships    Social connections:     Talks on phone: Not on file     Gets together: Not on file     Attends [employer] service: Not on file     Active member of club or organization: Not on file     Attends meetings of clubs or organizations: Not on file     Relationship status: Not on file   Other Topics Concern    Not on file   Social History Narrative    No longer working, is on disability       Family History   Problem Relation Age of Onset    Diabetes Mother     Breast cancer Mother        Physical Exam:    Vitals:    11/12/19 0511   BP: 134/65   Pulse: 65   Resp: 18   Temp: 98.2 °F (36.8 °C)     Appearance: No acute distress.  Skin: No rashes seen.  Good turgor.  No abrasions.  No ecchymoses.  Eyes: No conjunctival injection. EOMI, PERRL.  ENT:  2 cm laceration to upper lip " to the left of the philtrum involving the vermilion border.  It is linear, not involving muscle.  Small subcentimeter laceration on interior of upper lip that is not gaping.  Oropharynx clear.    Chest: Clear to auscultation bilaterally.  Good air movement.  No wheezes.  No rhonchi.  Cardiovascular: Regular rate and rhythm.  No murmurs. No gallops. No rubs.  Abdomen: Soft.  Not distended.  Nontender.  No guarding.  No rebound. No Masses  Musculoskeletal: Good range of motion all joints.  No deformities.  Neck supple.  No meningismus.  Neurologic: Moves all extremities.  Normal sensation.  No facial droop.  Normal speech.    Mental Status:  Alert and oriented x 3.  Appropriate, conversant.          Laboratory Studies:  Labs Reviewed - No data to display        Imaging Results    None         Medications Given:  Medications   lidocaine-EPINEPHrine 1%-1:100,000 injection 10 mL (has no administration in time range)       Discussed with:  Patient    MDM:    59 y.o. male with upper lip laceration, no surrounding trauma. Patient is edentulous and has no teeth injury. Small interior laceration will not require suturing.  Exterior laceration sutured as below.  Patient otherwise stable, neuro intact, does not require further imaging.  Will follow up as outpatient with primary doctor.    Laceration repair note:  Patient with laceration as detailed in the physical exam.  Anesthetized with 1% lidocaine with epinephrine.  Four simple interrupted sutures were done using 6 0 Vicryl, patient tolerated the procedure well with no complications.    Diagnostic Impression:    1. Lip laceration, initial encounter               Syed Adames MD  11/12/19 6341

## 2019-11-12 NOTE — ED TRIAGE NOTES
Patient fell out of bed in sleep and hit L upper lip on nightstand. Denies hitting head, loc, headache, neck pain. Reports he frequently falls out of bed while sleeping. Patient has 1inch laceration to L upper lip and 1cm laceration to internal upper lip; bleeding controlled. Patient has no teeth baseline. No swelling noted. Patient denies pain. Patient does not take blood thinners.

## 2019-11-14 DIAGNOSIS — G25.81 RESTLESS LEG SYNDROME: ICD-10-CM

## 2019-11-14 RX ORDER — PROPRANOLOL HYDROCHLORIDE 40 MG/1
TABLET ORAL
Qty: 180 TABLET | Refills: 3 | Status: SHIPPED | OUTPATIENT
Start: 2019-11-14 | End: 2021-02-03 | Stop reason: SDUPTHER

## 2019-11-14 RX ORDER — GABAPENTIN 300 MG/1
CAPSULE ORAL
Qty: 90 CAPSULE | Refills: 3 | Status: SHIPPED | OUTPATIENT
Start: 2019-11-14 | End: 2020-08-01 | Stop reason: DRUGHIGH

## 2020-01-06 ENCOUNTER — LAB VISIT (OUTPATIENT)
Dept: LAB | Facility: HOSPITAL | Age: 60
End: 2020-01-06
Payer: COMMERCIAL

## 2020-01-06 DIAGNOSIS — N18.30 STAGE 3 CHRONIC KIDNEY DISEASE: ICD-10-CM

## 2020-01-06 PROCEDURE — 36415 COLL VENOUS BLD VENIPUNCTURE: CPT

## 2020-01-06 PROCEDURE — 80069 RENAL FUNCTION PANEL: CPT

## 2020-01-07 LAB
ALBUMIN SERPL BCP-MCNC: 3.8 G/DL (ref 3.5–5.2)
ANION GAP SERPL CALC-SCNC: 8 MMOL/L (ref 8–16)
BUN SERPL-MCNC: 11 MG/DL (ref 6–20)
CALCIUM SERPL-MCNC: 9.7 MG/DL (ref 8.7–10.5)
CHLORIDE SERPL-SCNC: 103 MMOL/L (ref 95–110)
CO2 SERPL-SCNC: 26 MMOL/L (ref 23–29)
CREAT SERPL-MCNC: 1.8 MG/DL (ref 0.5–1.4)
EST. GFR  (AFRICAN AMERICAN): 46.6 ML/MIN/1.73 M^2
EST. GFR  (NON AFRICAN AMERICAN): 40.3 ML/MIN/1.73 M^2
GLUCOSE SERPL-MCNC: 89 MG/DL (ref 70–110)
PHOSPHATE SERPL-MCNC: 3.4 MG/DL (ref 2.7–4.5)
POTASSIUM SERPL-SCNC: 4.3 MMOL/L (ref 3.5–5.1)
SODIUM SERPL-SCNC: 137 MMOL/L (ref 136–145)

## 2020-01-10 ENCOUNTER — TELEPHONE (OUTPATIENT)
Dept: NEPHROLOGY | Facility: CLINIC | Age: 60
End: 2020-01-10

## 2020-01-21 ENCOUNTER — TELEPHONE (OUTPATIENT)
Dept: NEPHROLOGY | Facility: CLINIC | Age: 60
End: 2020-01-21

## 2020-01-21 NOTE — TELEPHONE ENCOUNTER
-we do not accept [employer]           ----- Message from Treva Mckinney sent at 1/21/2020 12:22 PM CST -----  Contact: Pt [phone]  Pt wants to know if someone in Neph accepts [employer] insurance his levels are low and nneds to see a doctor per Dr.Bryan Harrison.

## 2020-02-07 ENCOUNTER — TELEPHONE (OUTPATIENT)
Dept: INTERNAL MEDICINE | Facility: CLINIC | Age: 60
End: 2020-02-07

## 2020-02-07 DIAGNOSIS — N18.30 CHRONIC KIDNEY DISEASE, STAGE 3: Primary | ICD-10-CM

## 2020-02-07 NOTE — TELEPHONE ENCOUNTER
----- Message from Mirella Morales sent at 2/7/2020 12:35 PM CST -----  Contact: [phone]  Patient would like to get a referral.  Referral to what specialty:  Nephrology   Does the patient want the referral with a specific physician:    Is the specialist [facility]:  Non [facility]   Reason (be specific):    Does the patient already have the specialty clinic appointment scheduled:  no  If yes, what date is the appointment scheduled:     Is the insurance listed in Epic correct? (this is important for a referral):yes     Comments:  Please advise, thanks     ##Advise the patient that once the physician approves this either a nurse or the  will return their call##

## 2020-02-12 ENCOUNTER — TELEPHONE (OUTPATIENT)
Dept: INTERNAL MEDICINE | Facility: CLINIC | Age: 60
End: 2020-02-12

## 2020-02-12 DIAGNOSIS — N18.30 STAGE 3 CHRONIC KIDNEY DISEASE: Primary | ICD-10-CM

## 2020-02-12 NOTE — TELEPHONE ENCOUNTER
I will place a referral to outside Nephrology. Please notify the patient that the order is ready for pickup and he can take to the outside Nephrology clinic.

## 2020-02-14 DIAGNOSIS — E78.5 HYPERLIPIDEMIA: ICD-10-CM

## 2020-02-18 RX ORDER — PRAVASTATIN SODIUM 40 MG/1
TABLET ORAL
Qty: 90 TABLET | Refills: 1 | Status: SHIPPED | OUTPATIENT
Start: 2020-02-18 | End: 2020-05-15

## 2020-02-18 NOTE — PROGRESS NOTES
Refill Authorization Note     is requesting a refill authorization.    Brief assessment and rationale for refill: APPROVE: prr               Medication reconciliation completed: No                         Comments:   Requested Prescriptions   Pending Prescriptions Disp Refills    pravastatin (PRAVACHOL) 40 MG tablet [Pharmacy Med Name: PRAVASTATIN 40MG TABLETS] 90 tablet 1     Sig: TAKE 1 TABLET BY MOUTH EVERY EVENING       Cardiovascular:  Antilipid - Statins Passed - 2/14/2020  5:15 AM        Passed - Patient is at least 18 years old        Passed - Office visit in past 12 months or future 90 days.     Recent Outpatient Visits            4 months ago Chronic kidney disease, stage 3    [facility] - Internal Medicine Jonah Harrison MD    9 months ago Benign prostatic hyperplasia, unspecified whether lower urinary tract symptoms present    [facility] - Internal Medicine Jonah Harrison MD    1 year ago Muscle spasm    [facility] - Internal Medicine Ayse Lazaro MD    1 year ago Bilateral impacted cerumen    [facility] - Internal Medicine Veronica Moctezuma MD    1 year ago Panic attacks    [place] - Neurology Stef Murguia MD                    Passed - Lipid Panel completed in last 360 days     Lab Results   Component Value Date    CHOL 155 09/03/2019    HDL 39 (L) 09/03/2019    LDLCALC 86.8 09/03/2019    TRIG 146 09/03/2019             Passed - ALT is 94 or below and within 360 days     ALT   Date Value Ref Range Status   09/03/2019 15 10 - 44 U/L Final   11/14/2018 16 0 - 44 IU/L Final   07/16/2018 22 10 - 44 U/L Final              Passed - AST is 54 or below and within 360 days     AST ([place])   Date Value Ref Range Status   12/27/2015 43 17 - 59 U/L Final     AST   Date Value Ref Range Status   09/03/2019 21 10 - 40 U/L Final   11/14/2018 20 0 - 40 IU/L Final   07/16/2018 24 10 - 40 U/L Final               Appointments  past 12m or future 3m with PCP    Date Provider   Last Visit   10/17/2019  Jonah Harrison MD   Next Visit   Visit date not found Jonah Harrison MD   .  ED visits in past 90 days: 0       Note composed:10:03 AM 02/18/2020

## 2020-04-14 DIAGNOSIS — M1A.9XX0 CHRONIC GOUT WITHOUT TOPHUS, UNSPECIFIED CAUSE, UNSPECIFIED SITE: ICD-10-CM

## 2020-04-18 NOTE — PROGRESS NOTES
Refill Routing Note     Medication(s) are not appropriate for processing by [facility]:       Required laboratory values are abnormal          Medication Therapy Plan: CBC Low; SCr>1.3 but wnl for patient; URIC ACID>5.9 but has decreased from 7.2 to 6.0 from 2 years ago; Defer to you; Will pend 3 months    Medication reconciliation completed: No        Appointments [id number] or future 3m with PCP    Date Provider   Last Visit   10/17/2019 Jonah Harrison MD   Next Visit   Visit date not found Jonah Harrison MD     Automatic Epic Protocol Generated Data:    Requested Prescriptions   Pending Prescriptions Disp Refills    allopurinoL (ZYLOPRIM) 100 MG tablet [Pharmacy Med Name: ALLOPURINOL 100MG TABLETS] 180 tablet 0     Sig: TAKE 2 TABLETS BY MOUTH EVERY DAY       Endocrinology:  Gout Agents - allopurinol Failed - 4/14/2020  5:14 AM        Failed - Uric Acid is 5.9 or below and within 360 days     Uric Acid   Date Value Ref Range Status   09/03/2019 6.0 3.4 - 7.0 mg/dL Final   10/13/2017 7.2 (H) 3.4 - 7.0 mg/dL Final   05/25/2017 11.7 (H) 3.4 - 7.0 mg/dL Final              Failed - Cr is 1.3 or below and within 360 days     Creatinine   Date Value Ref Range Status   01/06/2020 1.8 (H) 0.5 - 1.4 mg/dL Final   09/03/2019 1.9 (H) 0.5 - 1.4 mg/dL Final   11/14/2018 1.80 (H) 0.76 - 1.27 mg/dL Final              Failed - RBC in normal range and within 360 days     RBC   Date Value Ref Range Status   09/03/2019 3.91 (L) 4.60 - 6.20 M/uL Final   11/14/2018 3.73 (L) 4.14 - 5.80 x10E6/uL Final   07/16/2018 4.32 (L) 4.60 - 6.20 M/uL Final              Failed - HGB in normal range and within 360 days     Hemoglobin   Date Value Ref Range Status   09/03/2019 12.0 (L) 14.0 - 18.0 g/dL Final   11/14/2018 11.1 (L) 13.0 - 17.7 g/dL Final   07/16/2018 13.1 (L) 14.0 - 18.0 g/dL Final              Failed - HCT in normal range and within 360 days     Hematocrit   Date Value Ref Range Status   09/03/2019 35.4 (L) 40.0 -  54.0 % Final   11/14/2018 34.0 (L) 37.5 - 51.0 % Final   07/16/2018 39.9 (L) 40.0 - 54.0 % Final              Failed - eGFR is 60 or above and within 360 days     eGFR if non    Date Value Ref Range Status   01/06/2020 40.3 (A) >60 mL/min/1.73 m^2 Final     Comment:     Calculation used to obtain the estimated glomerular filtration  rate (eGFR) is the CKD-EPI equation.      09/03/2019 38 (A) >60 mL/min/1.73 m^2 Final     Comment:     Calculation used to obtain the estimated glomerular filtration  rate (eGFR) is the CKD-EPI equation.      11/14/2018 41 (L) >59 mL/min/1.73 Final     eGFR if    Date Value Ref Range Status   01/06/2020 46.6 (A) >60 mL/min/1.73 m^2 Final   09/03/2019 44 (A) >60 mL/min/1.73 m^2 Final   07/16/2018 54.5 (A) >60 mL/min/1.73 m^2 Final              Passed - Patient is at least 18 years old        Passed - Office visit in past 12 months or future 90 days.     Recent Outpatient Visits            6 months ago Chronic kidney disease, stage 3    [facility] Internal Medicine Jonah Harrison MD    11 months ago Benign prostatic hyperplasia, unspecified whether lower urinary tract symptoms present    [facility] Medicine Jonah Harrison MD    1 year ago Muscle spasm    [facility] Medicine Ayse Lazaro MD    1 year ago Bilateral impacted cerumen    [facility] Medicine Veronica Moctezuma MD    1 year ago Panic attacks    [place] - Neurology Stef Murguia MD                    Passed - WBC in normal range and within 360 days     WBC   Date Value Ref Range Status   09/03/2019 8.67 3.90 - 12.70 K/uL Final   11/14/2018 12.0 (H) 3.4 - 10.8 x10E3/uL Final   07/16/2018 13.94 (H) 3.90 - 12.70 K/uL Final              Passed - PLT in normal range and within 360 days     Platelets   Date Value Ref Range Status   09/03/2019 278 150 - 350 K/uL Final   07/16/2018 389 (H) 150 - 350 K/uL Final   07/12/2018 411 (H) 150 - 350 K/uL Final              Passed -  ALT is 94 or below and within 360 days     ALT   Date Value Ref Range Status   09/03/2019 15 10 - 44 U/L Final   11/14/2018 16 0 - 44 IU/L Final   07/16/2018 22 10 - 44 U/L Final              Passed - AST is 54 or below and within 360 days     AST ([place])   Date Value Ref Range Status   12/27/2015 43 17 - 59 U/L Final     AST   Date Value Ref Range Status   09/03/2019 21 10 - 40 U/L Final   11/14/2018 20 0 - 40 IU/L Final   07/16/2018 24 10 - 40 U/L Final                 Note composed:8:31 PM 04/17/2020

## 2020-04-19 RX ORDER — ALLOPURINOL 100 MG/1
TABLET ORAL
Qty: 180 TABLET | Refills: 0 | Status: SHIPPED | OUTPATIENT
Start: 2020-04-19 | End: 2020-07-17

## 2020-04-27 DIAGNOSIS — J30.9 ALLERGIC RHINITIS: ICD-10-CM

## 2020-04-30 RX ORDER — FLUTICASONE PROPIONATE 50 MCG
SPRAY, SUSPENSION (ML) NASAL
Qty: 48 G | Refills: 1 | Status: SHIPPED | OUTPATIENT
Start: 2020-04-30 | End: 2020-10-20

## 2020-04-30 NOTE — TELEPHONE ENCOUNTER
Refill Authorization Note    is requesting a refill authorization.    Brief assessment and rationale for refill: APPROVE: prr               Medication reconciliation completed: No                         Comments:      Requested Prescriptions   Signed Prescriptions Disp Refills    fluticasone propionate (FLONASE) 50 mcg/actuation nasal spray 48 g 1     Sig: INSTILL 1 SPRAY IN EACH NOSTRIL EVERY DAY       Ear, Nose, and Throat: Nasal Preparations - Corticosteroids Failed - 4/27/2020  7:03 AM        Failed - An appropriate indication is on the problem list     Allergic Rhinitis  Sinusitis  Seasonal Allergies              Passed - Patient is at least 18 years old        Passed - Office visit in past 12 months or future 90 days.     Recent Outpatient Visits            6 months ago Chronic kidney disease, stage 3    [facility] - Internal Medicine Jonah Harrison MD    11 months ago Benign prostatic hyperplasia, unspecified whether lower urinary tract symptoms present    [facility] Jonah Harrison MD    1 year ago Muscle spasm    [facility] Ayse Lazaro MD    1 year ago Bilateral impacted cerumen    [facility] Medicine Veronica Moctezuma MD    1 year ago Panic attacks    [place] - Neurology Stef Murguia MD                     Appointments  past 12m or future 3m with PCP    Date Provider   Last Visit   10/17/2019 Jonah Harrison MD   Next Visit   Visit date not found Jonah Harrison MD   ED visits in past 90 days: [unfilled]     Note composed:3:17 PM 04/30/2020

## 2020-05-05 RX ORDER — AMLODIPINE BESYLATE 10 MG/1
TABLET ORAL
Qty: 90 TABLET | Refills: 1 | Status: SHIPPED | OUTPATIENT
Start: 2020-05-05 | End: 2020-12-31

## 2020-05-06 NOTE — PROGRESS NOTES
Refill Authorization Note    is requesting a refill authorization.    Brief assessment and rationale for refill: APPROVE: prr               Medication reconciliation completed: No                         Comments:      Requested Prescriptions   Pending Prescriptions Disp Refills    amLODIPine (NORVASC) 10 MG tablet [Pharmacy Med Name: AMLODIPINE BESYLATE 10MG TABLETS] 90 tablet 1     Sig: TAKE 1 TABLET BY MOUTH EVERY DAY       Cardiovascular:  Calcium Channel Blockers Passed - 5/5/2020  9:07 PM        Passed - Patient is at least 18 years old        Passed - Last BP in normal range within 360 days.     BP Readings from Last 3 Encounters:   11/12/19 134/65   10/17/19 128/76   05/13/19 132/74              Passed - Office visit in past 12 months or future 90 days.     Recent Outpatient Visits            6 months ago Chronic kidney disease, stage 3    [facility] - Internal Medicine Jonah Harrison MD    11 months ago Benign prostatic hyperplasia, unspecified whether lower urinary tract symptoms present    [facility] - Internal Medicine Jonah Harrison MD    1 year ago Muscle spasm    [facility] - Internal Medicine Ayse Lazaro MD    1 year ago Bilateral impacted cerumen    [facility] - Internal Medicine Veronica Moctezuma MD    1 year ago Panic attacks    [place] - Neurology Stef Murguia MD                     Appointments  past 12m or future 3m with PCP    Date Provider   Last Visit   10/17/2019 Jonah Harrison MD   Next Visit   Visit date not found Jonah Harrison MD   ED visits in past 90 days: 0     Note composed:9:08 PM 05/05/2020

## 2020-05-14 DIAGNOSIS — E78.5 HYPERLIPIDEMIA: ICD-10-CM

## 2020-05-15 NOTE — PROGRESS NOTES
Refill Routing Note    Medication(s) are not appropriate for processing by [facility]:       Patient has been seen in the Emergency Room/Department since the last PCP visit        Medication Therapy Plan: ED VISIT(11/12/19-LIP LACERATION), DEFER TO YOU  Medication reconciliation completed: No      Automatic Epic Protocol Generated Data:    Requested Prescriptions   Pending Prescriptions Disp Refills    pravastatin (PRAVACHOL) 40 MG tablet [Pharmacy Med Name: PRAVASTATIN 40MG TABLETS] 90 tablet 0     Sig: TAKE 1 TABLET BY MOUTH EVERY EVENING       Cardiovascular:  Antilipid - Statins Passed - 5/14/2020  5:19 AM        Passed - Patient is at least 18 years old        Passed - Office visit in past 12 months or future 90 days.     Recent Outpatient Visits            7 months ago Chronic kidney disease, stage 3    [facility] - Internal Medicine Jonah Harrison MD    1 year ago Benign prostatic hyperplasia, unspecified whether lower urinary tract symptoms present    [facility] Internal Medicine Jonah Harrison MD    1 year ago Muscle spasm    [facility] Internal Medicine Ayse Lazaro MD    1 year ago Bilateral impacted cerumen    [facility] Internal Medicine Veronica Moctezuma MD    1 year ago Panic attacks    [place] - Neurology Stfe Murguia MD                    Passed - Lipid Panel completed in last 360 days     Lab Results   Component Value Date    CHOL 155 09/03/2019    HDL 39 (L) 09/03/2019    LDLCALC 86.8 09/03/2019    TRIG 146 09/03/2019             Passed - ALT is 94 or below and within 360 days     ALT   Date Value Ref Range Status   09/03/2019 15 10 - 44 U/L Final   11/14/2018 16 0 - 44 IU/L Final   07/16/2018 22 10 - 44 U/L Final              Passed - AST is 54 or below and within 360 days     AST ([place])   Date Value Ref Range Status   12/27/2015 43 17 - 59 U/L Final     AST   Date Value Ref Range Status   09/03/2019 21 10 - 40 U/L Final   11/14/2018 20 0 - 40 IU/L Final   07/16/2018  24 10 - 40 U/L Final                 Appointments  past 12m or future 3m with PCP    Date Provider   Last Visit   10/17/2019 Jonah Harrison MD   Next Visit   Visit date not found Jonah Harrison MD   ED visits in past 90 days: 0     Note composed:12:26 PM 05/15/2020

## 2020-05-18 RX ORDER — PRAVASTATIN SODIUM 40 MG/1
TABLET ORAL
Qty: 90 TABLET | Refills: 3 | Status: SHIPPED | OUTPATIENT
Start: 2020-05-18 | End: 2021-07-29

## 2020-05-29 DIAGNOSIS — M25.562 ARTHRALGIA OF BOTH KNEES: ICD-10-CM

## 2020-05-29 DIAGNOSIS — M25.561 ARTHRALGIA OF BOTH KNEES: ICD-10-CM

## 2020-05-29 NOTE — PROGRESS NOTES
Refill Routing Note     Medication(s) are not appropriate for processing by [facility]:    Medication Outside of Protocol    Appointments  past 12m or future 3m with PCP    Date Provider   Last Visit   10/17/2019 Jonah Harrison MD   Next Visit   Visit date not found Jonah Harrison MD           Automatic Epic Protocol Generated Data:    Requested Prescriptions   Pending Prescriptions Disp Refills    meloxicam (MOBIC) 15 MG tablet [Pharmacy Med Name: MELOXICAM 15MG TABLETS] 90 tablet 3     Sig: TAKE 1 TABLET(15 MG) BY MOUTH EVERY DAY       NSAIDs Protocol Failed - 5/29/2020  8:18 AM        Failed - Serum Creatinine less than 1.4 on file in the past 12 months     Lab Results   Component Value Date    CREATININE 1.8 (H) 01/06/2020    CREATININE 1.9 (H) 09/03/2019    CREATININE 1.80 (H) 11/14/2018     Lab Results   Component Value Date    EGFRNONAA 40.3 (A) 01/06/2020    EGFRNONAA 38 (A) 09/03/2019    EGFRNONAA 41 (L) 11/14/2018               Passed - Visit with authorizing provider in past 12 months or upcoming 90 days        Passed - HGB greater than 10 or HCT greater than 30 in past 12 months        Passed - AST in past 12 months      Lab Results   Component Value Date    AST 21 09/03/2019    AST 20 11/14/2018    AST 24 07/16/2018              Passed - Serum Potassium less than 5.2 on file in the past 12 months     Lab Results   Component Value Date    K 4.3 01/06/2020    K 4.4 09/03/2019    K 5.1 11/14/2018                  Passed - Blood Pressure below 139/89 on file in past 12 months      BP Readings from Last 3 Encounters:   11/12/19 134/65   10/17/19 128/76   05/13/19 132/74             Passed - ALT less than 95 in past 12 months     Lab Results   Component Value Date    ALT 15 09/03/2019    ALT 16 11/14/2018    ALT 22 07/16/2018                 Note composed:8:22 AM 05/29/2020

## 2020-06-01 RX ORDER — MELOXICAM 15 MG/1
TABLET ORAL
Qty: 90 TABLET | Refills: 3 | Status: SHIPPED | OUTPATIENT
Start: 2020-06-01 | End: 2021-08-02

## 2020-06-11 NOTE — PROGRESS NOTES
Refill Routing Note    Medication(s) are not appropriate for processing by [facility]:       Patient has been seen in the Emergency Room/Department since the last PCP visit        Medication Therapy Plan: Recent ED visit for Lip laceration; Defer to you  Medication reconciliation completed: No      Automatic Epic Protocol Generated Data:    Requested Prescriptions   Pending Prescriptions Disp Refills    omeprazole (PRILOSEC) 20 MG capsule [Pharmacy Med Name: OMEPRAZOLE 20MG CAPSULES] 90 capsule 0     Sig: TAKE ONE CAPSULE BY MOUTH ONCE DAILY       Gastroenterology: Proton Pump Inhibitors Failed - 6/11/2020  4:07 AM        Failed - Office visit in past 6 months or future 90 days.     Recent Outpatient Visits            7 months ago Chronic kidney disease, stage 3    [facility] - Internal Medicine Jonah Harrison MD    1 year ago Benign prostatic hyperplasia, unspecified whether lower urinary tract symptoms present    [facility] Internal Medicine Jonah Harrison MD    1 year ago Muscle spasm    [facility] - Internal Medicine Ayse Lazaro MD    1 year ago Bilateral impacted cerumen    [facility] - Internal Medicine Veronica Moctezuma MD    1 year ago Panic attacks    [place] - Neurology Stef Murguia MD                    Passed - Patient is at least 18 years old        Passed - Osteoporosis is not on problem list        Passed - Plavix is not on active medication list        Passed - GERD is on problem list           Appointments  past 12m or future 3m with PCP    Date Provider   Last Visit   10/17/2019 Jonah Harrison MD   Next Visit   Visit date not found Jonah Harrison MD   ED visits in past 90 days: 0     Note composed:12:11 PM 06/11/2020

## 2020-06-15 RX ORDER — OMEPRAZOLE 20 MG/1
CAPSULE, DELAYED RELEASE ORAL
Qty: 90 CAPSULE | Refills: 0 | Status: SHIPPED | OUTPATIENT
Start: 2020-06-15 | End: 2020-09-14

## 2020-07-17 DIAGNOSIS — M1A.9XX0 CHRONIC GOUT WITHOUT TOPHUS, UNSPECIFIED CAUSE, UNSPECIFIED SITE: ICD-10-CM

## 2020-07-17 RX ORDER — ALLOPURINOL 100 MG/1
TABLET ORAL
Qty: 180 TABLET | Refills: 0 | Status: SHIPPED | OUTPATIENT
Start: 2020-07-17 | End: 2020-10-12

## 2020-07-17 NOTE — PROGRESS NOTES
Refill Routing Note   Medication(s) are not appropriate for processing by [facility]:       - Required laboratory values are abnormal  - Patient has been seen in the Emergency Room/Department since the last PCP visit  - Drug-Disease Interaction (allopurinoL and Secondary hyperparathyroidism of renal origin)     Medication-related problems identified: Drug-disease interaction  Medication Therapy Plan: Drug-Disease: allopurinoL and Secondary hyperparathyroidism of renal origin; Chronic kidney disease, stage 3; Severe Warning for renal disease with moderate to severe impairment; SCr>1.3 but wnl for patinet; Recent ED visit for Lip laceration(11/19); Defer to you  Medication reconciliation completed: No      Automatic Epic Generated Protocol Data:    Requested Prescriptions   Pending Prescriptions Disp Refills    allopurinoL (ZYLOPRIM) 100 MG tablet [Pharmacy Med Name: ALLOPURINOL 100MG TABLETS] 180 tablet 0     Sig: TAKE 2 TABLETS BY MOUTH DAILY       Endocrinology:  Gout Agents - allopurinol Failed - 7/17/2020  4:06 AM        Failed - Uric Acid is 5.9 or below and within 360 days     Uric Acid   Date Value Ref Range Status   09/03/2019 6.0 3.4 - 7.0 mg/dL Final   10/13/2017 7.2 (H) 3.4 - 7.0 mg/dL Final   05/25/2017 11.7 (H) 3.4 - 7.0 mg/dL Final              Failed - Cr is 1.3 or below and within 360 days     Creatinine   Date Value Ref Range Status   01/06/2020 1.8 (H) 0.5 - 1.4 mg/dL Final   09/03/2019 1.9 (H) 0.5 - 1.4 mg/dL Final   11/14/2018 1.80 (H) 0.76 - 1.27 mg/dL Final              Failed - RBC in normal range and within 360 days     RBC   Date Value Ref Range Status   09/03/2019 3.91 (L) 4.60 - 6.20 M/uL Final   11/14/2018 3.73 (L) 4.14 - 5.80 x10E6/uL Final   07/16/2018 4.32 (L) 4.60 - 6.20 M/uL Final              Failed - HGB in normal range and within 360 days     Hemoglobin   Date Value Ref Range Status   09/03/2019 12.0 (L) 14.0 - 18.0 g/dL Final   11/14/2018 11.1 (L) 13.0 - 17.7 g/dL  Final   07/16/2018 13.1 (L) 14.0 - 18.0 g/dL Final              Failed - HCT in normal range and within 360 days     Hematocrit   Date Value Ref Range Status   09/03/2019 35.4 (L) 40.0 - 54.0 % Final   11/14/2018 34.0 (L) 37.5 - 51.0 % Final   07/16/2018 39.9 (L) 40.0 - 54.0 % Final              Failed - eGFR is 60 or above and within 360 days     eGFR if non    Date Value Ref Range Status   01/06/2020 40.3 (A) >60 mL/min/1.73 m^2 Final     Comment:     Calculation used to obtain the estimated glomerular filtration  rate (eGFR) is the CKD-EPI equation.      09/03/2019 38 (A) >60 mL/min/1.73 m^2 Final     Comment:     Calculation used to obtain the estimated glomerular filtration  rate (eGFR) is the CKD-EPI equation.      11/14/2018 41 (L) >59 mL/min/1.73 Final     eGFR if    Date Value Ref Range Status   01/06/2020 46.6 (A) >60 mL/min/1.73 m^2 Final   09/03/2019 44 (A) >60 mL/min/1.73 m^2 Final   07/16/2018 54.5 (A) >60 mL/min/1.73 m^2 Final              Passed - Patient is at least 18 years old        Passed - Office visit in past 12 months or future 90 days.     Recent Outpatient Visits            9 months ago Chronic kidney disease, stage 3    [facility] - Internal Medicine Jonah Harrison MD    1 year ago Benign prostatic hyperplasia, unspecified whether lower urinary tract symptoms present    [facility] - Internal Medicine Jonah Harrison MD    1 year ago Muscle spasm    [facility] - Internal Medicine Ayse Lazaro MD    1 year ago Bilateral impacted cerumen    [facility] - Internal Medicine Veronica Moctezuma MD    1 year ago Panic attacks    [place] - Neurology Stef Murguia MD          Future Appointments              In 2 weeks MD [facility] - Internal Medicine, [facility]                Passed - WBC in normal range and within 360 days     WBC   Date Value Ref Range Status   09/03/2019 8.67 3.90 - 12.70 K/uL Final   11/14/2018 12.0 (H) 3.4 - 10.8 x10E3/uL Final    07/16/2018 13.94 (H) 3.90 - 12.70 K/uL Final              Passed - PLT in normal range and within 360 days     Platelets   Date Value Ref Range Status   09/03/2019 278 150 - 350 K/uL Final   07/16/2018 389 (H) 150 - 350 K/uL Final   07/12/2018 411 (H) 150 - 350 K/uL Final              Passed - ALT is 94 or below and within 360 days     ALT   Date Value Ref Range Status   09/03/2019 15 10 - 44 U/L Final   11/14/2018 16 0 - 44 IU/L Final   07/16/2018 22 10 - 44 U/L Final              Passed - AST is 54 or below and within 360 days     AST ([place])   Date Value Ref Range Status   12/27/2015 43 17 - 59 U/L Final     AST   Date Value Ref Range Status   09/03/2019 21 10 - 40 U/L Final   11/14/2018 20 0 - 40 IU/L Final   07/16/2018 24 10 - 40 U/L Final                    Appointments  past 12m or future 3m with PCP    Date Provider   Last Visit   10/17/2019 Jonah Harrison MD   Next Visit   8/1/2020 Jonah Harrison MD   ED visits in past 90 days: 0     Note composed:8:16 AM 07/17/2020

## 2020-08-01 ENCOUNTER — OFFICE VISIT (OUTPATIENT)
Dept: INTERNAL MEDICINE | Facility: CLINIC | Age: 60
End: 2020-08-01
Payer: COMMERCIAL

## 2020-08-01 ENCOUNTER — LAB VISIT (OUTPATIENT)
Dept: LAB | Facility: HOSPITAL | Age: 60
End: 2020-08-01
Attending: INTERNAL MEDICINE
Payer: COMMERCIAL

## 2020-08-01 VITALS
WEIGHT: 275.38 LBS | HEIGHT: 74 IN | OXYGEN SATURATION: 98 % | HEART RATE: 71 BPM | BODY MASS INDEX: 35.34 KG/M2 | DIASTOLIC BLOOD PRESSURE: 80 MMHG | SYSTOLIC BLOOD PRESSURE: 130 MMHG

## 2020-08-01 DIAGNOSIS — Z12.5 PROSTATE CANCER SCREENING: ICD-10-CM

## 2020-08-01 DIAGNOSIS — R20.2 PARESTHESIA OF SKIN: ICD-10-CM

## 2020-08-01 DIAGNOSIS — D64.9 NORMOCYTIC ANEMIA: ICD-10-CM

## 2020-08-01 DIAGNOSIS — E78.5 HYPERLIPIDEMIA, UNSPECIFIED HYPERLIPIDEMIA TYPE: Chronic | ICD-10-CM

## 2020-08-01 DIAGNOSIS — R23.3 PETECHIAE: ICD-10-CM

## 2020-08-01 DIAGNOSIS — N18.30 CHRONIC KIDNEY DISEASE, STAGE 3: ICD-10-CM

## 2020-08-01 DIAGNOSIS — G62.9 PERIPHERAL POLYNEUROPATHY: ICD-10-CM

## 2020-08-01 DIAGNOSIS — N25.81 SECONDARY HYPERPARATHYROIDISM OF RENAL ORIGIN: ICD-10-CM

## 2020-08-01 DIAGNOSIS — I10 ESSENTIAL HYPERTENSION: Chronic | ICD-10-CM

## 2020-08-01 DIAGNOSIS — N18.32 STAGE 3B CHRONIC KIDNEY DISEASE: ICD-10-CM

## 2020-08-01 DIAGNOSIS — Z00.00 ANNUAL PHYSICAL EXAM: Primary | ICD-10-CM

## 2020-08-01 DIAGNOSIS — Z00.00 ANNUAL PHYSICAL EXAM: ICD-10-CM

## 2020-08-01 LAB
25(OH)D3+25(OH)D2 SERPL-MCNC: 46 NG/ML (ref 30–96)
ALBUMIN SERPL BCP-MCNC: 3.9 G/DL (ref 3.5–5.2)
ALP SERPL-CCNC: 112 U/L (ref 55–135)
ALT SERPL W/O P-5'-P-CCNC: 23 U/L (ref 10–44)
ANION GAP SERPL CALC-SCNC: 8 MMOL/L (ref 8–16)
AST SERPL-CCNC: 29 U/L (ref 10–40)
BILIRUB SERPL-MCNC: 0.4 MG/DL (ref 0.1–1)
BUN SERPL-MCNC: 15 MG/DL (ref 6–20)
CALCIUM SERPL-MCNC: 10 MG/DL (ref 8.7–10.5)
CHLORIDE SERPL-SCNC: 102 MMOL/L (ref 95–110)
CHOLEST SERPL-MCNC: 158 MG/DL (ref 120–199)
CHOLEST/HDLC SERPL: 4.1 {RATIO} (ref 2–5)
CO2 SERPL-SCNC: 28 MMOL/L (ref 23–29)
COMPLEXED PSA SERPL-MCNC: 0.59 NG/ML (ref 0–4)
CREAT SERPL-MCNC: 2 MG/DL (ref 0.5–1.4)
ERYTHROCYTE [DISTWIDTH] IN BLOOD BY AUTOMATED COUNT: 12.8 % (ref 11.5–14.5)
EST. GFR  (AFRICAN AMERICAN): 41 ML/MIN/1.73 M^2
EST. GFR  (NON AFRICAN AMERICAN): 35.5 ML/MIN/1.73 M^2
ESTIMATED AVG GLUCOSE: 103 MG/DL (ref 68–131)
FERRITIN SERPL-MCNC: 32 NG/ML (ref 20–300)
GLUCOSE SERPL-MCNC: 81 MG/DL (ref 70–110)
HBA1C MFR BLD HPLC: 5.2 % (ref 4–5.6)
HCT VFR BLD AUTO: 39.6 % (ref 40–54)
HDLC SERPL-MCNC: 39 MG/DL (ref 40–75)
HDLC SERPL: 24.7 % (ref 20–50)
HGB BLD-MCNC: 12.5 G/DL (ref 14–18)
INR PPP: 0.9 (ref 0.8–1.2)
IRON SERPL-MCNC: 84 UG/DL (ref 45–160)
LDLC SERPL CALC-MCNC: 91 MG/DL (ref 63–159)
MAGNESIUM SERPL-MCNC: 2.2 MG/DL (ref 1.6–2.6)
MCH RBC QN AUTO: 30.8 PG (ref 27–31)
MCHC RBC AUTO-ENTMCNC: 31.6 G/DL (ref 32–36)
MCV RBC AUTO: 98 FL (ref 82–98)
NONHDLC SERPL-MCNC: 119 MG/DL
PLATELET # BLD AUTO: 253 K/UL (ref 150–350)
PMV BLD AUTO: 10.6 FL (ref 9.2–12.9)
POTASSIUM SERPL-SCNC: 4.2 MMOL/L (ref 3.5–5.1)
PROT SERPL-MCNC: 8 G/DL (ref 6–8.4)
PROTHROMBIN TIME: 10.3 SEC (ref 9–12.5)
PTH-INTACT SERPL-MCNC: 70 PG/ML (ref 9–77)
RBC # BLD AUTO: 4.06 M/UL (ref 4.6–6.2)
SATURATED IRON: 21 % (ref 20–50)
SODIUM SERPL-SCNC: 138 MMOL/L (ref 136–145)
TOTAL IRON BINDING CAPACITY: 398 UG/DL (ref 250–450)
TRANSFERRIN SERPL-MCNC: 269 MG/DL (ref 200–375)
TRIGL SERPL-MCNC: 140 MG/DL (ref 30–150)
TSH SERPL DL<=0.005 MIU/L-ACNC: 0.67 UIU/ML (ref 0.4–4)
URATE SERPL-MCNC: 6.1 MG/DL (ref 3.4–7)
VIT B12 SERPL-MCNC: 640 PG/ML (ref 210–950)
WBC # BLD AUTO: 8.53 K/UL (ref 3.9–12.7)

## 2020-08-01 PROCEDURE — 84153 ASSAY OF PSA TOTAL: CPT

## 2020-08-01 PROCEDURE — 83970 ASSAY OF PARATHORMONE: CPT

## 2020-08-01 PROCEDURE — 85610 PROTHROMBIN TIME: CPT

## 2020-08-01 PROCEDURE — 82728 ASSAY OF FERRITIN: CPT

## 2020-08-01 PROCEDURE — 99999 PR PBB SHADOW E&M-EST. PATIENT-LVL V: ICD-10-PCS | Mod: PBBFAC,,, | Performed by: INTERNAL MEDICINE

## 2020-08-01 PROCEDURE — 83540 ASSAY OF IRON: CPT

## 2020-08-01 PROCEDURE — 99999 PR PBB SHADOW E&M-EST. PATIENT-LVL V: CPT | Mod: PBBFAC,,, | Performed by: INTERNAL MEDICINE

## 2020-08-01 PROCEDURE — 83036 HEMOGLOBIN GLYCOSYLATED A1C: CPT

## 2020-08-01 PROCEDURE — 83735 ASSAY OF MAGNESIUM: CPT

## 2020-08-01 PROCEDURE — 84550 ASSAY OF BLOOD/URIC ACID: CPT

## 2020-08-01 PROCEDURE — 82607 VITAMIN B-12: CPT

## 2020-08-01 PROCEDURE — 36415 COLL VENOUS BLD VENIPUNCTURE: CPT

## 2020-08-01 PROCEDURE — 80053 COMPREHEN METABOLIC PANEL: CPT

## 2020-08-01 PROCEDURE — 99396 PREV VISIT EST AGE 40-64: CPT | Mod: S$GLB,,, | Performed by: INTERNAL MEDICINE

## 2020-08-01 PROCEDURE — 82306 VITAMIN D 25 HYDROXY: CPT

## 2020-08-01 PROCEDURE — 80061 LIPID PANEL: CPT

## 2020-08-01 PROCEDURE — 99396 PR PREVENTIVE VISIT,EST,40-64: ICD-10-PCS | Mod: S$GLB,,, | Performed by: INTERNAL MEDICINE

## 2020-08-01 PROCEDURE — 84443 ASSAY THYROID STIM HORMONE: CPT

## 2020-08-01 PROCEDURE — 85027 COMPLETE CBC AUTOMATED: CPT

## 2020-08-01 RX ORDER — GABAPENTIN 300 MG/1
CAPSULE ORAL
Qty: 270 CAPSULE | Refills: 3 | Status: SHIPPED | OUTPATIENT
Start: 2020-08-01 | End: 2020-11-14

## 2020-08-01 NOTE — PATIENT INSTRUCTIONS
Please start taking gabapentin 300 mg two times a day. Then after 1 week start taking 300 mg (1 tab) in the morning, and 600 mg (2 tabs) in the evening. If the neuropathy continues please notify the office.    I recommend getting the shingles vaccine, Shingrix.  This is a 2-dose vaccine that is recommended even for patients who have had the older Zostavax shingles vaccine in the past.  You can get this at the [facility], or at your local pharmacy such as [email or web address].

## 2020-08-01 NOTE — PROGRESS NOTES
Subjective:       Patient ID: Abel Dc III is a 59 y.o. male.    Chief Complaint: Annual Exam      Last visit with me 10/17/2019. Since then seen by ER.     HPI    Numbness and neuropathy in ball of foot. Also some dryness and pain between 1st and 2nd digit of foot. Both feet. All the time. Feet get hot as well.    Reviewed PMH, PSH, SH, FH, allergies, and medications.     Review of Systems   All other systems reviewed and are negative.      Objective:      Physical Exam  Vitals signs and nursing note reviewed.   Constitutional:       General: He is not in acute distress.     Appearance: He is not diaphoretic.   HENT:      Head: Normocephalic and atraumatic.      Right Ear: External ear normal.      Left Ear: External ear normal.   Eyes:      General:         Right eye: No discharge.         Left eye: No discharge.      Pupils: Pupils are equal, round, and reactive to light.   Neck:      Musculoskeletal: Normal range of motion.      Thyroid: No thyromegaly.   Cardiovascular:      Rate and Rhythm: Normal rate and regular rhythm.      Pulses:           Dorsalis pedis pulses are 2+ on the right side and 2+ on the left side.        Posterior tibial pulses are 2+ on the right side and 2+ on the left side.      Heart sounds: Normal heart sounds.   Pulmonary:      Effort: Pulmonary effort is normal.      Breath sounds: Normal breath sounds. No stridor. No wheezing or rales.   Musculoskeletal:         General: No tenderness.      Right lower leg: No edema.      Left lower leg: No edema.   Feet:      Right foot:      Skin integrity: No ulcer, skin breakdown, erythema or dry skin.      Left foot:      Skin integrity: No ulcer, skin breakdown, erythema or dry skin.   Lymphadenopathy:      Cervical: No cervical adenopathy.   Skin:     General: Skin is warm and dry.      Comments: Brown hyperpigmented macules in bilateral lower extremities around ankles, also in bilateral upper extremities distal to shoulder. Petechiae in  "dorsum of feet, R>>L.   Neurological:      General: No focal deficit present.      Mental Status: He is alert and oriented to person, place, and time.   Psychiatric:         Mood and Affect: Mood normal.         Behavior: Behavior normal.         Vitals:    08/01/20 1300   BP: 130/80   BP Location: Right arm   Patient Position: Sitting   BP Method: Large (Manual)   Pulse: 71   SpO2: 98%   Weight: 124.9 kg (275 lb 5.7 oz)   Height: 6' 2" (1.88 m)     Body mass index is 35.35 kg/m².    RESULTS: Reviewed labs from last 12 months    Assessment:       1. Annual physical exam    2. Peripheral polyneuropathy    3. Essential hypertension    4. Hyperlipidemia, unspecified hyperlipidemia type    5. Secondary hyperparathyroidism of renal origin    6. Prostate cancer screening    7. Paresthesia of skin    8. Normocytic anemia    9. Petechiae    10. Stage 3b chronic kidney disease        Plan:   Abel was seen today for annual exam.    Diagnoses and all orders for this visit:    Annual physical exam:  Age-appropriate health screening reviewed, indicated tests ordered.   -     gabapentin (NEURONTIN) 300 MG capsule; Take 1 capsule (300 mg total) by mouth once daily AND 2 capsules (600 mg total) every evening.  -     Comprehensive metabolic panel; Future  -     CBC Without Differential; Future  -     Vitamin D; Future  -     Hemoglobin A1C; Future  -     Uric acid; Future  -     Vitamin B12; Future  -     Iron and TIBC; Future  -     Ferritin; Future  -     PSA, Screening; Future  -     Magnesium; Future  -     Lipid Panel; Future  -     Protime-INR; Future  -     PTH, intact; Future  -     TSH; Future    Peripheral polyneuropathy:  Prior diagnosis, persists, start gabapentin and increase as needed. Check labs to see if any etiology, if not consider EMG.  -     gabapentin (NEURONTIN) 300 MG capsule; Take 1 capsule (300 mg total) by mouth once daily AND 2 capsules (600 mg total) every evening.  -     Hemoglobin A1C; " Future    Essential hypertension: Prior diagnosis, stable, well controlled on current management. No changes at this time, will continue to monitor.   -     Comprehensive metabolic panel; Future  -     Magnesium; Future  -     TSH; Future    Hyperlipidemia, unspecified hyperlipidemia type  -     Lipid Panel; Future  -     TSH; Future    Secondary hyperparathyroidism of renal origin  -     PTH, intact; Future    Prostate cancer screening  -     PSA, Screening; Future    Paresthesia of skin  -     Vitamin B12; Future    Normocytic anemia  -     CBC Without Differential; Future  -     Iron and TIBC; Future  -     Ferritin; Future    Petechiae  -     Protime-INR; Future    Stage 3b chronic kidney disease      Follow up in about 6 months (around 2/1/2021) for follow up visit.  Jonah Harrison MD  Internal Medicine    Portions of this note were completed using medical dictation software. Please excuse typographical or syntax errors that were missed on review.

## 2020-08-15 ENCOUNTER — HOSPITAL ENCOUNTER (EMERGENCY)
Facility: HOSPITAL | Age: 60
Discharge: HOME | End: 2020-08-16
Attending: EMERGENCY MEDICINE
Payer: COMMERCIAL

## 2020-08-15 DIAGNOSIS — M54.6 LOWER THORACIC BACK PAIN: Primary | ICD-10-CM

## 2020-08-15 LAB
ANION GAP SERPL CALC-SCNC: 8 MMOL/L (ref 8–16)
BASOPHILS # BLD AUTO: 0.07 K/UL (ref 0–0.2)
BASOPHILS NFR BLD: 0.7 % (ref 0–1.9)
BUN SERPL-MCNC: 14 MG/DL (ref 6–20)
CALCIUM SERPL-MCNC: 9.3 MG/DL (ref 8.7–10.5)
CHLORIDE SERPL-SCNC: 106 MMOL/L (ref 95–110)
CO2 SERPL-SCNC: 21 MMOL/L (ref 23–29)
CREAT SERPL-MCNC: 1.9 MG/DL (ref 0.5–1.4)
DIFFERENTIAL METHOD: ABNORMAL
EOSINOPHIL # BLD AUTO: 0.4 K/UL (ref 0–0.5)
EOSINOPHIL NFR BLD: 4.3 % (ref 0–8)
ERYTHROCYTE [DISTWIDTH] IN BLOOD BY AUTOMATED COUNT: 13.2 % (ref 11.5–14.5)
EST. GFR  (AFRICAN AMERICAN): 43.6 ML/MIN/1.73 M^2
EST. GFR  (NON AFRICAN AMERICAN): 37.7 ML/MIN/1.73 M^2
GLUCOSE SERPL-MCNC: 101 MG/DL (ref 70–110)
HCT VFR BLD AUTO: 36.2 % (ref 40–54)
HGB BLD-MCNC: 11.9 G/DL (ref 14–18)
IMM GRANULOCYTES # BLD AUTO: 0.05 K/UL (ref 0–0.04)
IMM GRANULOCYTES NFR BLD AUTO: 0.5 % (ref 0–0.5)
LYMPHOCYTES # BLD AUTO: 2.3 K/UL (ref 1–4.8)
LYMPHOCYTES NFR BLD: 21.9 % (ref 18–48)
MCH RBC QN AUTO: 31.2 PG (ref 27–31)
MCHC RBC AUTO-ENTMCNC: 32.9 G/DL (ref 32–36)
MCV RBC AUTO: 95 FL (ref 82–98)
MONOCYTES # BLD AUTO: 1.1 K/UL (ref 0.3–1)
MONOCYTES NFR BLD: 10.4 % (ref 4–15)
NEUTROPHILS # BLD AUTO: 6.4 K/UL (ref 1.8–7.7)
NEUTROPHILS NFR BLD: 62.2 % (ref 38–73)
NRBC BLD-RTO: 0 /100 WBC
PLATELET # BLD AUTO: 257 K/UL (ref 150–350)
PMV BLD AUTO: 10.2 FL (ref 9.2–12.9)
POTASSIUM SERPL-SCNC: 3.7 MMOL/L (ref 3.5–5.1)
RBC # BLD AUTO: 3.82 M/UL (ref 4.6–6.2)
SODIUM SERPL-SCNC: 135 MMOL/L (ref 136–145)
TROPONIN I SERPL DL<=0.01 NG/ML-MCNC: <0.006 NG/ML (ref 0–0.03)
TROPONIN I SERPL DL<=0.01 NG/ML-MCNC: <0.006 NG/ML (ref 0–0.03)
WBC # BLD AUTO: 10.27 K/UL (ref 3.9–12.7)

## 2020-08-15 PROCEDURE — 99285 EMERGENCY DEPT VISIT HI MDM: CPT | Mod: 25

## 2020-08-15 PROCEDURE — 93010 EKG 12-LEAD: ICD-10-PCS | Mod: ,,, | Performed by: INTERNAL MEDICINE

## 2020-08-15 PROCEDURE — 25000003 PHARM REV CODE 250: Performed by: EMERGENCY MEDICINE

## 2020-08-15 PROCEDURE — 99284 EMERGENCY DEPT VISIT MOD MDM: CPT | Mod: ,,, | Performed by: EMERGENCY MEDICINE

## 2020-08-15 PROCEDURE — 84484 ASSAY OF TROPONIN QUANT: CPT | Mod: 91

## 2020-08-15 PROCEDURE — 93005 ELECTROCARDIOGRAM TRACING: CPT

## 2020-08-15 PROCEDURE — 99284 PR EMERGENCY DEPT VISIT,LEVEL IV: ICD-10-PCS | Mod: ,,, | Performed by: EMERGENCY MEDICINE

## 2020-08-15 PROCEDURE — 96360 HYDRATION IV INFUSION INIT: CPT

## 2020-08-15 PROCEDURE — 85025 COMPLETE CBC W/AUTO DIFF WBC: CPT

## 2020-08-15 PROCEDURE — 80048 BASIC METABOLIC PNL TOTAL CA: CPT

## 2020-08-15 PROCEDURE — 93010 ELECTROCARDIOGRAM REPORT: CPT | Mod: ,,, | Performed by: INTERNAL MEDICINE

## 2020-08-15 RX ORDER — ACETAMINOPHEN 500 MG
500 TABLET ORAL
Qty: 42 TABLET | Refills: 0 | Status: SHIPPED | OUTPATIENT
Start: 2020-08-15 | End: 2020-08-22

## 2020-08-15 RX ORDER — ACETAMINOPHEN 500 MG
1000 TABLET ORAL
Status: COMPLETED | OUTPATIENT
Start: 2020-08-15 | End: 2020-08-15

## 2020-08-15 RX ADMIN — SODIUM CHLORIDE 1000 ML: 0.9 INJECTION, SOLUTION INTRAVENOUS at 10:08

## 2020-08-15 RX ADMIN — IOHEXOL 100 ML: 350 INJECTION, SOLUTION INTRAVENOUS at 11:08

## 2020-08-15 RX ADMIN — ACETAMINOPHEN 1000 MG: 500 TABLET ORAL at 07:08

## 2020-08-16 VITALS
TEMPERATURE: 99 F | DIASTOLIC BLOOD PRESSURE: 65 MMHG | SYSTOLIC BLOOD PRESSURE: 140 MMHG | HEART RATE: 90 BPM | OXYGEN SATURATION: 98 % | RESPIRATION RATE: 18 BRPM

## 2020-08-16 PROCEDURE — 25500020 PHARM REV CODE 255: Performed by: EMERGENCY MEDICINE

## 2020-08-16 NOTE — DISCHARGE INSTRUCTIONS
"CT a/p " No acute pulmonary thromboemboli.     Few small micro nodules right lower lobe.  For multiple solid nodules all <6 mm, Fleischner Society 2017 guidelines recommend no routine follow up for a low risk patient, or follow up with non-contrast chest CT at 12 months after discovery in a high risk patient.   Linear scarring versus platelike atelectatic change right lower lobe and right upper lobe as above.   Cholelithiasis.   Right renal cyst. "  "

## 2020-08-16 NOTE — ED PROVIDER NOTES
"SCRIBE #1 NOTE: I, Fouzia Sanders, am scribing for, and in the presence of,  Myah Ozuna MD. I have scribed the entire note.         CC: rib pain (c/o rib pain on both sides that started today. Pain described as sharp. Pt states "I feel like I broke my ribs". Pain started after having a BM and having to strain. )      History provided by:   Patient    HPI: Abel Dc III is a 59 y.o. year old male PMH of bipolar disorder, chronic low back pain, depression, gout, hyperlipidemia, hypertension, lumbar disc disease who presents with bilateral back pain near his ribs that started today when he was straining to have a bowel movement. He describes the pain as 7/10 severity. The patient also reports falling 2 days ago. He states he fell about 3 feet while working on a construction project in his house. He scraped and bruised his right leg on part of the floor. He landed on his feet and did not fall after landing. The patient states he did not have any pain other than in his leg after the accident. He has no weakness or numbness in his legs. He reports some SOB that started with the back pain while he was having the bowel movement. He has not taken any medication for the back pain today, although he took 3 Tylenol on for leg pain immediately after his fall 2 days ago. The back pain is still present when he is sitting still. Twisting and bending makes the back pain worse. He did not feel any tearing sensation when the pain began. The patient states he has HTN and Bipolar Disorder, and he currently takes an antipsychotic medication. He also reports he has chronic neuropathy in his feet and takes Gabapentin for the neuropathy. He denies any history of back problems or cardiovascular problems. The patient denies any constipation. He was not twisting or bending his back while having a bowel movement.        Past Medical History:   Diagnosis Date    Bipolar disorder     manic depressive x 1992    BPH (benign prostatic " hypertrophy)     Chronic low back pain     Depression     GERD (gastroesophageal reflux disease)     Gout     Gout, arthritis     Hyperlipidemia     Hypertension     Lumbar disc disease     Obesity     Panic disorder      Past Surgical History:   Procedure Laterality Date    HERNIA REPAIR      HUMERUS FRACTURE SURGERY  1971    Bone grafts required    MANDIBLE FRACTURE SURGERY      MUSCLE TRANSFER UPPER ARM      SHOULDER ARTHROSCOPY      SHOULDER SURGERY      TONSILLECTOMY       Family History   Problem Relation Age of Onset    Diabetes Mother     Breast cancer Mother      No current facility-administered medications on file prior to encounter.      Current Outpatient Medications on File Prior to Encounter   Medication Sig Dispense Refill    acetaminophen (TYLENOL) 500 MG tablet Take 1 tablet (500 mg total) by mouth every 6 (six) hours as needed for Pain. 30 tablet 0    allopurinoL (ZYLOPRIM) 100 MG tablet TAKE 2 TABLETS BY MOUTH DAILY 180 tablet 0    amLODIPine (NORVASC) 10 MG tablet TAKE 1 TABLET BY MOUTH EVERY DAY 90 tablet 1    aspirin 81 mg Tab Take 1 tablet by mouth.      busPIRone (BUSPAR) 15 MG tablet Take 15 mg by mouth 3 (three) times daily.   4    chlorhexidine (PERIDEX) 0.12 % solution RINSE WITH 2 PRUDENCIO TID  1    cholecalciferol, vitamin D3, 2,000 unit Cap Take 1 capsule by mouth once daily.      FLUoxetine 40 MG capsule Take 40 mg by mouth once daily.  5    fluticasone propionate (FLONASE) 50 mcg/actuation nasal spray INSTILL 1 SPRAY IN EACH NOSTRIL EVERY DAY 48 g 1    gabapentin (NEURONTIN) 300 MG capsule Take 1 capsule (300 mg total) by mouth once daily AND 2 capsules (600 mg total) every evening. 270 capsule 3    ketoconazole (NIZORAL) 2 % shampoo APPLY EXTERNALLY TO THE AFFECTED AREA ONCE DAILY 120 mL 0    lidocaine HCl 2% (XYLOCAINE) 2 % Soln 15 mLs by Transmucosal route.      meloxicam (MOBIC) 15 MG tablet TAKE 1 TABLET(15 MG) BY MOUTH EVERY DAY 90 tablet 3     "omeprazole (PRILOSEC) 20 MG capsule TAKE ONE CAPSULE BY MOUTH ONCE DAILY 90 capsule 0    OXcarbazepine (TRILEPTAL) 300 MG Tab TK 2 TS PO QD  3    pravastatin (PRAVACHOL) 40 MG tablet TAKE 1 TABLET BY MOUTH EVERY EVENING 90 tablet 3    propranolol (INDERAL) 40 MG tablet TAKE 1 TABLET(40 MG) BY MOUTH TWICE DAILY 180 tablet 3    quetiapine (SEROQUEL) 50 MG tablet TK 1 T PO TID  0     Amitriptyline, Hydrocodone, Hydrocodone-acetaminophen, Oxycodone, Pseudoephedrine, Pseudoephedrine hcl, Risperidone, Trazodone, and Naproxen  Social History     Socioeconomic History    Marital status:      Spouse name: Not on file    Number of children: Not on file    Years of education: Not on file    Highest education level: Not on file   Occupational History    Occupation: disability for manic-depressive state   Social Needs    Financial resource strain: Not on file    Food insecurity     Worry: Not on file     Inability: Not on file    Transportation needs     Medical: Not on file     Non-medical: Not on file   Tobacco Use    Smoking status: Never Smoker    Smokeless tobacco: Current User     Types: Snuff    Tobacco comment: Patient uses "dip" tobacco   Substance and Sexual Activity    Alcohol use: No     Alcohol/week: 0.0 standard drinks     Comment: Heavy alcohol use in the past.     Drug use: No    Sexual activity: Yes     Partners: Female   Lifestyle    Physical activity     Days per week: Not on file     Minutes per session: Not on file    Stress: Not on file   Relationships    Social connections     Talks on phone: Not on file     Gets together: Not on file     Attends Mandaeism service: Not on file     Active member of club or organization: Not on file     Attends meetings of clubs or organizations: Not on file     Relationship status: Not on file   Other Topics Concern    Not on file   Social History Narrative    No longer working, is on disability       ROS:     Constitutional : neg for fever, neg " for weakness  HENT neg for head injury, neg for sore throat  Eyes: neg for visual changes, neg for eye pain  Resp pos SOB, neg for cough  Cardiac  neg for chest pain, neg for palpitations  GI neg for abd pain, neg for nausea, neg for vomiting, neg for constipation   neg for urinary changes  Neuro neg for focal weakness or numbness  Skin neg for skin rash  MSK: pos back pain, neg for arthralgia  ALL: Amitriptyline, Hydrocodone, Hydrocodone-acetaminophen, Oxycodone, Pseudoephedrine, Pseudoephedrine hcl, Risperidone, Trazodone, and Naproxen    PHYSICAL EXAM:  Vitals:    08/15/20 1931   BP: 137/63   Pulse: 74   Resp: 18   Temp: 98.7 °F (37.1 °C)         PHYSICAL EXAM:     general: comfortable, in no acute distress, pleasant, well nourished  VS: triage VS reviewed  HENT: NC/AT  Eyes: PERRL, EOMI  CV: RRR, no  murmurs, no rubs, no gallops, no LE edema, Normal radial and DP PT pulses  Resp: comfortable breathing, speaks in full sentences, CTAB, no wheezing, no crackles, no ronchi  ABD:  soft, ND, + normal BS, NT  Renal: No CVAT  Neuro: AAO x 3, 5/5 strength x 4 extremities, sensation intact, face symmetric, speech normal  MSK: no deformity, no edema, no midline C,T,L tenderness to palpation, no step-offs or bony tenderness. Bilateral lower thoracic pain that is reproducible with twisting but not palpation.          DATA & INTERVENTIONS:    LABS reviewed:  Labs Reviewed - No data to display    RADIOLOGY reviewed:  Imaging Results    None         MEDICATIONS/FLUIDS:  Medications - No data to display      MDM:  Abel Dc III is a 59 y.o. year old male who presents to the ED complaining of    DDX includes but not limited to: Muscle strain vs. Chest wall pain. Lower suspicion for AAA in the picture of the patient looking comfortable with symmetric pulses with no weakness or numbness. Tylenol for pain control. Will obtain basic labs and CXR.    Labs ordered and reviewed:  CBC normal white count, hemoglobin 11.9 mild drop  "from 12.5 on August 1, 2020, platelets within normal limits  BMP creatinine 1.9 at baseline for the patient  Troponin 1 neg      Medication given in the ED: Acetaminophen 1,000 mg    CXR (ordered and reviewed):  No acute    EKG (independantly reviewed): Ventricular rate of 73 BPM, sinus left bundle branch block. Last EKG in system was from 2016 and had normal sinus rhythm with no left bundle.     Patient with left bundle branch block on the EKG new from 2016 initial trop negative will repeat a 2nd troponin, unsure if the left bundle branch block is new and associated with the pain today or has been developing over the last few years.     CTA chest, IVF  Old records obtained and reviewed: yes    Update at 10:45 PM: Patient is comfortable in the recliner. His pain improved after taking pain medication.  Rpt trop. CTA chest pending    11:53 PM  Rpt trop wnl    12:37 AM  CTA chest: "No acute pulmonary thromboemboli.     Few small micro nodules right lower lobe.  For multiple solid nodules all <6 mm, Fleischner Society 2017 guidelines recommend no routine follow up for a low risk patient, or follow up with non-contrast chest CT at 12 months after discovery in a high risk patient.     Linear scarring versus platelike atelectatic change right lower lobe and right upper lobe as above.     Cholelithiasis.     Right renal cyst. "    Unlikely any of the findings of the CTA to be the source of the pain for the patient.  Patient aware of all incidental findings on the CTA, will follow-up with PCP.  Tylenol p.r.n. for pain control  The patient has been carefully educated about symptoms and conditions that should prompt immediate return to the ED for recheck or further evaluation. Told to return immediately for any new or worsening or progressive symptoms.      IMPRESSION:  1.) bilateral lower thorax pain  2. LBBB  3. CT incidental finding of lung micronodules   3. Cholelithiasis  5. Right renal cyst      Dispo: " Discharge    Critical Care Time: N/A       Myah Ozuna MD  08/16/20 0039

## 2020-08-18 ENCOUNTER — OFFICE VISIT (OUTPATIENT)
Dept: INTERNAL MEDICINE | Facility: CLINIC | Age: 60
End: 2020-08-18
Payer: COMMERCIAL

## 2020-08-18 VITALS
SYSTOLIC BLOOD PRESSURE: 130 MMHG | HEIGHT: 74 IN | HEART RATE: 66 BPM | WEIGHT: 269 LBS | BODY MASS INDEX: 34.52 KG/M2 | DIASTOLIC BLOOD PRESSURE: 84 MMHG

## 2020-08-18 DIAGNOSIS — K80.20 CALCULUS OF GALLBLADDER WITHOUT CHOLECYSTITIS WITHOUT OBSTRUCTION: ICD-10-CM

## 2020-08-18 DIAGNOSIS — M54.6 ACUTE BILATERAL THORACIC BACK PAIN: Primary | ICD-10-CM

## 2020-08-18 PROCEDURE — 99213 OFFICE O/P EST LOW 20 MIN: CPT | Mod: S$GLB,,, | Performed by: INTERNAL MEDICINE

## 2020-08-18 PROCEDURE — 99999 PR PBB SHADOW E&M-EST. PATIENT-LVL IV: ICD-10-PCS | Mod: PBBFAC,,, | Performed by: INTERNAL MEDICINE

## 2020-08-18 PROCEDURE — 99999 PR PBB SHADOW E&M-EST. PATIENT-LVL IV: CPT | Mod: PBBFAC,,, | Performed by: INTERNAL MEDICINE

## 2020-08-18 PROCEDURE — 99213 PR OFFICE/OUTPT VISIT, EST, LEVL III, 20-29 MIN: ICD-10-PCS | Mod: S$GLB,,, | Performed by: INTERNAL MEDICINE

## 2020-08-18 NOTE — PROGRESS NOTES
"Subjective:       Patient ID: Abel Dc III is a 59 y.o. male.    Chief Complaint: Follow-up      Last visit with me 8/1/2020.     HPI    Fell 4 days ago and hurt hip. Fell in a hole that was in the floor. Then had pain with bowel movement and went to ER. Since ER visit feeling well, usual state of health. Notes that he has bowel movement q2-3 days, generally with straining.     Review of Systems   All other systems reviewed and are negative.      Objective:      Physical Exam  Vitals signs and nursing note reviewed.   Constitutional:       Appearance: He is not ill-appearing or diaphoretic.   Abdominal:      Palpations: Abdomen is soft.      Tenderness: There is no abdominal tenderness. There is no guarding.   Musculoskeletal:      Thoracic back: Normal.   Neurological:      Mental Status: He is alert.         Vitals:    08/18/20 1453   BP: 130/84   BP Location: Left arm   Patient Position: Sitting   BP Method: Large (Manual)   Pulse: 66   Weight: 122 kg (269 lb)   Height: 6' 2" (1.88 m)     Body mass index is 34.54 kg/m².    RESULTS: Reviewed labs from last 12 months    Assessment:       1. Acute bilateral thoracic back pain    2. Calculus of gallbladder without cholecystitis without obstruction        Plan:   Abel was seen today for follow-up.    Diagnoses and all orders for this visit:    Acute bilateral thoracic back pain:  New problem, evaluation in ER not concerning, likely MSK. Sx have resolved. Encourage more fiber to limit straining with bowel movement.     Calculus of gallbladder without cholecystitis without obstruction:  New diagnosis, asymptomatic. No further evaluation or treatment planned at this time.      Keep follow up with me in Febr 2021.  Jonah Harrison MD  Internal Medicine    Portions of this note were completed using medical dictation software. Please excuse typographical or syntax errors that were missed on review.      "

## 2020-08-18 NOTE — PATIENT INSTRUCTIONS
Please make sure to drink plenty of water with all your meals. Please also start over-the-counter fiber supplement Metamucil powder supplement once a day to help limit straining with bowel movement.

## 2020-09-11 DIAGNOSIS — K21.9 GASTROESOPHAGEAL REFLUX DISEASE, ESOPHAGITIS PRESENCE NOT SPECIFIED: Primary | Chronic | ICD-10-CM

## 2020-09-11 NOTE — TELEPHONE ENCOUNTER
No new care gaps identified.  Powered by mobilePeople. Reference number: [id number]. 9/11/2020 5:22:53 AM TANNAT

## 2020-09-14 RX ORDER — OMEPRAZOLE 20 MG/1
CAPSULE, DELAYED RELEASE ORAL
Qty: 90 CAPSULE | Refills: 3 | Status: SHIPPED | OUTPATIENT
Start: 2020-09-14 | End: 2021-08-26

## 2020-09-14 NOTE — PROGRESS NOTES
Refill Authorization Note    is requesting a refill authorization.    Brief assessment and rationale for refill: APPROVE: prr  Name and strength of medication: omeprazole (PRILOSEC) 20 MG capsule       Medication Therapy Plan: CDMR. approve     Medication reconciliation completed: No                    Comments:      Orders Placed This Encounter    omeprazole (PRILOSEC) 20 MG capsule      Requested Prescriptions   Signed Prescriptions Disp Refills    omeprazole (PRILOSEC) 20 MG capsule 90 capsule 3     Sig: TAKE ONE CAPSULE BY MOUTH ONCE DAILY       Gastroenterology: Proton Pump Inhibitors Passed - 9/11/2020  5:22 AM        Passed - Patient is at least 18 years old        Passed - Osteoporosis is not on problem list        Passed - Plavix is not on active medication list        Passed - Office visit in past 6 months or future 90 days.     Recent Outpatient Visits            3 weeks ago Acute bilateral thoracic back pain    [facility] Jonah Harrison MD    1 month ago Annual physical exam    [facility] Jonah Harrison MD    11 months ago Chronic kidney disease, stage 3    [facility] [place] [place] Jonah Harrison MD    1 year ago Benign prostatic hyperplasia, unspecified whether lower urinary tract symptoms present    [facility] Jonah Harrison MD    1 year ago Muscle spasm    [facility] Ayse Lazaro MD          Future Appointments              In 4 months Jonah Harrison MD [facility]                Passed - GERD is on problem list            Appointments  past 12m or future 3m with PCP    Date Provider   Last Visit   8/18/2020 Jonah Harrison MD   Next Visit   2/1/2021 Jonah Harrison MD   ED visits in past 90 days: 1     Note composed:2:12 PM 09/14/2020

## 2020-09-22 ENCOUNTER — OFFICE VISIT (OUTPATIENT)
Dept: URGENT CARE | Facility: CLINIC | Age: 60
End: 2020-09-22
Payer: COMMERCIAL

## 2020-09-22 VITALS
RESPIRATION RATE: 18 BRPM | TEMPERATURE: 98 F | BODY MASS INDEX: 34.52 KG/M2 | WEIGHT: 269 LBS | DIASTOLIC BLOOD PRESSURE: 75 MMHG | OXYGEN SATURATION: 95 % | HEIGHT: 74 IN | HEART RATE: 74 BPM | SYSTOLIC BLOOD PRESSURE: 121 MMHG

## 2020-09-22 DIAGNOSIS — U07.1 COVID-19 VIRUS DETECTED: ICD-10-CM

## 2020-09-22 DIAGNOSIS — U07.1 COVID-19: ICD-10-CM

## 2020-09-22 DIAGNOSIS — U07.1 COVID-19 VIRUS INFECTION: Primary | ICD-10-CM

## 2020-09-22 LAB
CTP QC/QA: YES
SARS-COV-2 RDRP RESP QL NAA+PROBE: POSITIVE

## 2020-09-22 PROCEDURE — U0002 COVID-19 LAB TEST NON-CDC: HCPCS | Mod: QW,S$GLB,, | Performed by: NURSE PRACTITIONER

## 2020-09-22 PROCEDURE — 99213 PR OFFICE/OUTPT VISIT, EST, LEVL III, 20-29 MIN: ICD-10-PCS | Mod: 25,S$GLB,, | Performed by: NURSE PRACTITIONER

## 2020-09-22 PROCEDURE — 99213 OFFICE O/P EST LOW 20 MIN: CPT | Mod: 25,S$GLB,, | Performed by: NURSE PRACTITIONER

## 2020-09-22 PROCEDURE — U0002: ICD-10-PCS | Mod: QW,S$GLB,, | Performed by: NURSE PRACTITIONER

## 2020-09-22 NOTE — LETTER
[address] ? [place], [address] ? Phone [phone] ? Fax [phone]           Return to Work/School    Patient: Abel Dc III  YOB: 1960   Date: 09/22/2020      To Whom It May Concern:     Abel Dc III was in contact with/seen in my office on 09/22/2020.     Abel Dc III has met the criteria for COVID-19 testing and has a POSITIVE result. He can return to work once he is asymptomatic for 24 hours without the use of fever reducing medications AND at least ten days from the start of symptoms (or from the first positive result if they have no symptoms).      If you have any questions or concerns, or if I can be of further assistance, please do not hesitate to contact me.     Sincerely,          Rosibel Levy NP

## 2020-09-22 NOTE — PROGRESS NOTES
"Subjective:       Patient ID: Abel Dc III is a 59 y.o. male.    Vitals:  height is 6' 2" (1.88 m) and weight is 122 kg (269 lb). His temperature is 98.2 °F (36.8 °C). His blood pressure is 121/75 and his pulse is 74. His respiration is 18 and oxygen saturation is 95%.     Chief Complaint: COVID-19 Concerns    This is a 59 y.o. male   who presents today with a chief complaint of COVID 19 concerns that began three days ago. He's complaining sneezing. He states that he has come in contact with someone who tested positive for COVID 19. He hasn't taken any medication to help relieve his symptoms.     URI   This is a new problem. The current episode started in the past 7 days. The problem has been unchanged. There has been no fever. Associated symptoms include congestion. Pertinent negatives include no coughing, ear pain, nausea, rash, sinus pain, sore throat, vomiting or wheezing. He has tried nothing for the symptoms.       Constitution: Negative for chills, sweating, fatigue and fever.   HENT: Positive for congestion. Negative for ear pain, sinus pain, sinus pressure, sore throat and voice change.    Neck: Negative for painful lymph nodes.   Eyes: Negative for eye redness.   Respiratory: Negative for chest tightness, cough, sputum production, bloody sputum, COPD, shortness of breath, stridor, wheezing and asthma.    Gastrointestinal: Negative for nausea and vomiting.   Musculoskeletal: Negative for muscle ache.   Skin: Negative for rash.   Allergic/Immunologic: Negative for seasonal allergies and asthma.   Hematologic/Lymphatic: Negative for swollen lymph nodes.       Objective:      Physical Exam   Constitutional: He is oriented to person, place, and time.  Non-toxic appearance. He does not appear ill. No distress.   HENT:   Head: Normocephalic and atraumatic.   Pulmonary/Chest: Effort normal. No accessory muscle usage or stridor. No respiratory distress (able to speak in complete sentences without pause). "   Abdominal: Normal appearance.   Neurological: He is alert and oriented to person, place, and time.   Skin: Skin is not diaphoretic. Psychiatric: His behavior is normal.   Nursing note and vitals reviewed.        Assessment:       1. COVID-19 virus infection    2. COVID-19        Plan:         COVID-19 virus infection    COVID-19  -     POCT COVID-19 Rapid Screening

## 2020-09-22 NOTE — PATIENT INSTRUCTIONS
"Return to Urgent Care or go to ER if symptoms worsen or fail to improve.  Follow up with PCP as recommended for further management.     THE FOLLOWING ARE RECOMMENDED TO HELP YOU MANAGE YOUR SYMPTOMS:    There has been some recent evidence that an antacid (such as pepcid) and an antihistamine (such as zyrtec) may help decrease likelihood of worsening covid symptoms associated with cytokine storm.  You may   these medications over the counter at the pharmacy.      Take dextromethorphan according to label instructions for cough.     Take Ibuprofen or Acetaminophen according to label instructions for fever, throat pain, headache, and body aches    Use warm salt water gargles to ease your throat pain. Warm salt water gargles as needed for sore throat-  1/2 tsp salt to 1 cup warm water, gargle as desired.    Viral Syndrome (Adult)  A viral illness may cause a number of symptoms. The symptoms depend on the part of the body that the virus affects. If it settles in your nose, throat, and lungs, it may cause cough, sore throat, congestion, and sometimes headache. If it settles in your stomach and intestinal tract, it may cause vomiting and diarrhea. Sometimes it causes vague symptoms like "aching all over," feeling tired, loss of appetite, or fever.  A viral illness usually lasts 1 to 2 weeks, but sometimes it lasts longer. In some cases, a more serious infection can look like a viral syndrome in the first few days of the illness. You may need another exam and additional tests to know the difference. Watch for the warning signs listed below.  Home care  Follow these guidelines for taking care of yourself at home:  · If symptoms are severe, rest at home for the first 2 to 3 days.  · Stay away from cigarette smoke - both your smoke and the smoke from others.  · You may use over-the-counter acetaminophen or ibuprofen for fever, muscle aching, and headache, unless another medicine was prescribed for this. If you have " chronic liver or kidney disease or ever had a stomach ulcer or GI bleeding, talk with your doctor before using these medicines. No one who is younger than 18 and ill with a fever should take aspirin. It may cause severe disease or death.  · Your appetite may be poor, so a light diet is fine. Avoid dehydration by drinking 8 to 12 8-ounce glasses of fluids each day. This may include water; orange juice; lemonade; apple, grape, and cranberry juice; clear fruit drinks; electrolyte replacement and sports drinks; and decaffeinated teas and coffee. If you have been diagnosed with a kidney disease, ask your doctor how much and what types of fluids you should drink to prevent dehydration. If you have kidney disease, drinking too much fluid can cause it build up in the your body and be dangerous to your health.  · Over-the-counter remedies won't shorten the length of the illness but may be helpful for cough, sore throat; and nasal and sinus congestion. Don't use decongestants if you have high blood pressure.  Follow-up care  Follow up with your healthcare provider if you do not improve over the next week.  Call 911  Get emergency medical care if any of the following occur:  · Convulsion  · Feeling weak, dizzy, or like you are going to faint  · Chest pain, shortness of breath, wheezing, or difficulty breathing  When to seek medical advice  Call your healthcare provider right away if any of these occur:  · Cough with lots of colored sputum (mucus) or blood in your sputum  · Chest pain, shortness of breath, wheezing, or difficulty breathing  · Severe headache; face, neck, or ear pain  · Severe, constant pain in the lower right side of your belly (abdominal)  · Continued vomiting (cant keep liquids down)  · Frequent diarrhea (more than 5 times a day); blood (red or black color) or mucus in diarrhea  · Feeling weak, dizzy, or like you are going to faint  · Extreme thirst  · Fever of 100.4°F (38°C) or higher, or as directed by  your healthcare provider  Date Last Reviewed: 9/25/2015  © 2283-7545 [employer]. [address] [place] [address], [place] [address]. All rights reserved. This information is not intended as a substitute for professional medical care. Always follow your healthcare professional's instructions.

## 2020-09-23 ENCOUNTER — NURSE TRIAGE (OUTPATIENT)
Dept: ADMINISTRATIVE | Facility: CLINIC | Age: 60
End: 2020-09-23

## 2020-09-23 NOTE — TELEPHONE ENCOUNTER
Covid-19 symptom tracker call back attempted, no contact made. Left VM message. Will retry in 15 mins.    Second call back attempted. No contact made. Will retry in 1 hour per protocol.     Third call back attempted with no contact.    Reason for Disposition   Message left on unidentified voice mail. Phone number verified.    Additional Information   Negative: Caller has already spoken with the PCP (or office), and has no further questions   Negative: Caller has already spoken with another triager and has no further questions   Negative: Caller has already spoken with another triager or PCP (or office), and has further questions and triager able to answer questions.   Negative: Busy signal.  First attempt to contact caller.  Follow-up call scheduled within 15 minutes.   Negative: No answer.  First attempt to contact caller.  Follow-up call scheduled within 15 minutes.   Negative: Message left on identified voicemail    Protocols used: NO CONTACT OR DUPLICATE CONTACT CALL-A-OH

## 2020-10-09 ENCOUNTER — TELEPHONE (OUTPATIENT)
Dept: INTERNAL MEDICINE | Facility: CLINIC | Age: 60
End: 2020-10-09

## 2020-10-10 NOTE — TELEPHONE ENCOUNTER
Please try taking 1 capsule in the morning and 1 capsule in the afternoon, along with 2 capsules at night. If after 3 days the pain persists, increase to 2 caps in the morning, 1 in the afternoon, and 2 at night.

## 2020-10-11 DIAGNOSIS — M1A.9XX0 CHRONIC GOUT WITHOUT TOPHUS, UNSPECIFIED CAUSE, UNSPECIFIED SITE: ICD-10-CM

## 2020-10-11 NOTE — TELEPHONE ENCOUNTER
No new care gaps identified.  Powered by [employer]. Reference number: [id number]. 10/11/2020 5:22:43 AM   JER

## 2020-10-12 ENCOUNTER — TELEPHONE (OUTPATIENT)
Dept: INTERNAL MEDICINE | Facility: CLINIC | Age: 60
End: 2020-10-12

## 2020-10-12 RX ORDER — ALLOPURINOL 100 MG/1
TABLET ORAL
Qty: 180 TABLET | Refills: 3 | Status: SHIPPED | OUTPATIENT
Start: 2020-10-12 | End: 2021-09-29

## 2020-10-12 NOTE — PROGRESS NOTES
Refill Routing Note   Medication(s) are not appropriate for processing by [facility] for the following reason(s):     - Drug-Disease Interaction (allopurinoL and Secondary hyperparathyroidism of renal origin; )    [facility] actions taken in this encounter: Defer    Medication-related problems identified: Drug-disease interaction  Medication Therapy Plan: CDMR. SCr>1.4; Drug-Disease: allopurinoL and Secondary hyperparathyroidism of renal origin; Chronic kidney disease, stage 3; Defer to PCP  Medication reconciliation completed: No   Automatic Epic Generated Protocol Data:        Requested Prescriptions   Pending Prescriptions Disp Refills    allopurinoL (ZYLOPRIM) 100 MG tablet [Pharmacy Med Name: ALLOPURINOL 100MG TABLETS] 180 tablet 3     Sig: TAKE 2 TABLETS BY MOUTH DAILY       Endocrinology:  Gout Agents - allopurinol Failed - 10/11/2020  5:22 AM        Failed - Cr is 1.3 or below and within 360 days     Creatinine   Date Value Ref Range Status   08/15/2020 1.9 (H) 0.5 - 1.4 mg/dL Final   08/01/2020 2.0 (H) 0.5 - 1.4 mg/dL Final   01/06/2020 1.8 (H) 0.5 - 1.4 mg/dL Final              Failed - eGFR is 60 or above and within 360 days     eGFR if non    Date Value Ref Range Status   08/15/2020 37.7 (A) >60 mL/min/1.73 m^2 Final     Comment:     Calculation used to obtain the estimated glomerular filtration  rate (eGFR) is the CKD-EPI equation.      08/01/2020 35.5 (A) >60 mL/min/1.73 m^2 Final     Comment:     Calculation used to obtain the estimated glomerular filtration  rate (eGFR) is the CKD-EPI equation.      01/06/2020 40.3 (A) >60 mL/min/1.73 m^2 Final     Comment:     Calculation used to obtain the estimated glomerular filtration  rate (eGFR) is the CKD-EPI equation.        eGFR if    Date Value Ref Range Status   08/15/2020 43.6 (A) >60 mL/min/1.73 m^2 Final   08/01/2020 41.0 (A) >60 mL/min/1.73 m^2 Final   01/06/2020 46.6 (A) >60 mL/min/1.73 m^2 Final               Passed - Patient is at least 18 years old        Passed - Office Visit within last 12 months or future 90 days.     Recent Outpatient Visits            2 weeks ago COVID-19 virus infection    [facility] Rosibel Levy, ROJAS    1 month ago Acute bilateral thoracic back pain    [facility] Jonah Harrison MD    2 months ago Annual physical exam    [facility] Jonah Harrison MD    12 months ago Chronic kidney disease, stage 3    [facility] Jonah Harrison MD    1 year ago Benign prostatic hyperplasia, unspecified whether lower urinary tract symptoms present    [facility] Jonah Harrison MD          Future Appointments              In 3 months Jonah Harrison MD [facility], [facility]                Passed - Uric Acid within 360 days     Uric Acid   Date Value Ref Range Status   08/01/2020 6.1 3.4 - 7.0 mg/dL Final   09/03/2019 6.0 3.4 - 7.0 mg/dL Final   10/13/2017 7.2 (H) 3.4 - 7.0 mg/dL Final              Passed - WBC within 360 days     WBC   Date Value Ref Range Status   08/15/2020 10.27 3.90 - 12.70 K/uL Final   08/01/2020 8.53 3.90 - 12.70 K/uL Final   09/03/2019 8.67 3.90 - 12.70 K/uL Final              Passed - RBC within 360 days     RBC   Date Value Ref Range Status   08/15/2020 3.82 (L) 4.60 - 6.20 M/uL Final   08/01/2020 4.06 (L) 4.60 - 6.20 M/uL Final   09/03/2019 3.91 (L) 4.60 - 6.20 M/uL Final              Passed - HGB within 360 days     Hemoglobin   Date Value Ref Range Status   08/15/2020 11.9 (L) 14.0 - 18.0 g/dL Final   08/01/2020 12.5 (L) 14.0 - 18.0 g/dL Final   09/03/2019 12.0 (L) 14.0 - 18.0 g/dL Final              Passed - HCT within 360 days     Hematocrit   Date Value Ref Range Status   08/15/2020 36.2 (L) 40.0 - 54.0 % Final   08/01/2020 39.6 (L) 40.0 - 54.0 % Final   09/03/2019 35.4 (L) 40.0 - 54.0 % Final              Passed - PLT within 360 days      Platelets   Date Value Ref Range Status   08/15/2020 257 150 - 350 K/uL Final   08/01/2020 253 150 - 350 K/uL Final   09/03/2019 278 150 - 350 K/uL Final              Passed - ALT completed     ALT   Date Value Ref Range Status   08/01/2020 23 10 - 44 U/L Final   09/03/2019 15 10 - 44 U/L Final   11/14/2018 16 0 - 44 IU/L Final              Passed - AST is 54 or below and within 360 days     AST ([place])   Date Value Ref Range Status   12/27/2015 43 17 - 59 U/L Final     AST   Date Value Ref Range Status   08/01/2020 29 10 - 40 U/L Final   09/03/2019 21 10 - 40 U/L Final   11/14/2018 20 0 - 40 IU/L Final                    Appointments  past 12m or future 3m with PCP    Date Provider   Last Visit   8/18/2020 Jonah Harrison MD   Next Visit   2/1/2021 Jonah Harrison MD   ED visits in past 90 days: 1        Note composed:11:25 AM 10/12/2020

## 2020-10-19 DIAGNOSIS — J30.9 ALLERGIC RHINITIS: ICD-10-CM

## 2020-10-19 NOTE — TELEPHONE ENCOUNTER
No new care gaps identified.  Powered by [employer]. Reference number: [id number]. 10/19/2020 5:18:30 PM   CDT

## 2020-10-20 RX ORDER — FLUTICASONE PROPIONATE 50 MCG
SPRAY, SUSPENSION (ML) NASAL
Qty: 48 G | Refills: 3 | Status: SHIPPED | OUTPATIENT
Start: 2020-10-20 | End: 2021-12-08

## 2020-10-20 NOTE — PROGRESS NOTES
Refill Authorization Note   Abel Dc is requesting a refill authorization.  Brief assessment and rationale for refill: Approve     Medication Therapy Plan: [facility]    Medication reconciliation completed: No   Comments:   Orders Placed This Encounter    fluticasone propionate (FLONASE) 50 mcg/actuation nasal spray      Requested Prescriptions   Signed Prescriptions Disp Refills    fluticasone propionate (FLONASE) 50 mcg/actuation nasal spray 48 g 3     Sig: SPRAY ONCE IN EACH NOSTRIL EVERY DAY       Ear, Nose, and Throat: Nasal Preparations - Corticosteroids Passed - 10/19/2020  5:18 PM        Passed - Patient is at least 18 years old        Passed - Office Visit within last 12 months or future 90 days.     Recent Outpatient Visits            4 weeks ago COVID-19 virus infection    [facility] Rosibel Levy NP    2 months ago Acute bilateral thoracic back pain    [facility] Jonah Harrison MD    2 months ago Annual physical exam    [facility] leena Harrison MD    1 year ago Chronic kidney disease, stage 3    [facility] leena Harrison MD    1 year ago Benign prostatic hyperplasia, unspecified whether lower urinary tract symptoms present    [facility] leena Harrison MD          Future Appointments              In 3 months Jonah Harrison MD [facility]                    Appointments  past 12m or future 3m with PCP    Date Provider   Last Visit   8/18/2020 Jonah Harrison MD   Next Visit   2/1/2021 Jonah Harrison MD   ED visits in past 90 days: 1     Note composed:11:43 AM 10/20/2020

## 2020-10-24 PROBLEM — N18.32 STAGE 3B CHRONIC KIDNEY DISEASE: Status: ACTIVE | Noted: 2018-11-13

## 2020-11-14 ENCOUNTER — OFFICE VISIT (OUTPATIENT)
Dept: INTERNAL MEDICINE | Facility: CLINIC | Age: 60
End: 2020-11-14
Payer: COMMERCIAL

## 2020-11-14 VITALS
HEART RATE: 64 BPM | OXYGEN SATURATION: 97 % | DIASTOLIC BLOOD PRESSURE: 84 MMHG | BODY MASS INDEX: 35.9 KG/M2 | WEIGHT: 279.75 LBS | HEIGHT: 74 IN | SYSTOLIC BLOOD PRESSURE: 130 MMHG

## 2020-11-14 DIAGNOSIS — G62.9 PERIPHERAL POLYNEUROPATHY: ICD-10-CM

## 2020-11-14 DIAGNOSIS — Z82.79 FAMILY HISTORY OF MARFAN SYNDROME: ICD-10-CM

## 2020-11-14 DIAGNOSIS — R91.8 PULMONARY NODULES: ICD-10-CM

## 2020-11-14 DIAGNOSIS — L81.9 DISCOLORATION OF SKIN: Primary | ICD-10-CM

## 2020-11-14 DIAGNOSIS — N40.0 BENIGN PROSTATIC HYPERPLASIA, UNSPECIFIED WHETHER LOWER URINARY TRACT SYMPTOMS PRESENT: ICD-10-CM

## 2020-11-14 PROCEDURE — 99999 PR PBB SHADOW E&M-EST. PATIENT-LVL V: ICD-10-PCS | Mod: PBBFAC,,, | Performed by: INTERNAL MEDICINE

## 2020-11-14 PROCEDURE — 99215 OFFICE O/P EST HI 40 MIN: CPT | Mod: S$GLB,,, | Performed by: INTERNAL MEDICINE

## 2020-11-14 PROCEDURE — 99999 PR PBB SHADOW E&M-EST. PATIENT-LVL V: CPT | Mod: PBBFAC,,, | Performed by: INTERNAL MEDICINE

## 2020-11-14 PROCEDURE — 99215 PR OFFICE/OUTPT VISIT, EST, LEVL V, 40-54 MIN: ICD-10-PCS | Mod: S$GLB,,, | Performed by: INTERNAL MEDICINE

## 2020-11-14 RX ORDER — GABAPENTIN 300 MG/1
CAPSULE ORAL
Qty: 450 CAPSULE | Refills: 3
Start: 2020-11-14 | End: 2021-01-07 | Stop reason: DRUGHIGH

## 2020-11-14 NOTE — Clinical Note
EMG ordered 11/14/2020, staff was unable to schedule. Please call patient to schedule this procedure appointment with him. Thanks!

## 2020-11-14 NOTE — PATIENT INSTRUCTIONS
Check to confirm that you had a flu shot sometime in the last few months (starts around August). If not go to [facility] or [facility].    We will schedule you with the Genetics team. See if your daughter received any genetic testing blood work and if so please see if you can get a copy of those results for your meeting with genetics.

## 2020-11-14 NOTE — PROGRESS NOTES
Subjective:       Patient ID: Abel Dc III is a 60 y.o. male.    Chief Complaint: Follow-up      Last visit with me 8/18/2020.     HPI    Patient reports ongoing discoloration in his feet.  Notes brown hyper pigmented spots, occasionally with small red bumps as well.  Does not itch or burn.  Has not noted significant lower leg swelling.    Patient's daughter was recently diagnosed with Marfan syndrome.    Patient went to the emergency room in August, had CT scan that showed pulmonary nodules, recommended follow-up in 12 months.    Patient continues to have symptoms of neuropathy.  Has been adjusting the dose of medications and is now finding better relief with gabapentin 3 times a day.  Unclear cause of neuropathy, he has not had an EMG in the past.    Reviewed PMH, PSH, SH, FH, allergies, and medications.     Review of Systems   All other systems reviewed and are negative.      Objective:      Physical Exam  Vitals signs and nursing note reviewed.   Constitutional:       Appearance: He is not ill-appearing.   Pulmonary:      Effort: Pulmonary effort is normal. No respiratory distress.      Breath sounds: Normal breath sounds. No wheezing.   Abdominal:      Palpations: Abdomen is soft. There is no mass.      Tenderness: There is no abdominal tenderness.   Musculoskeletal:         General: No tenderness.      Right lower leg: No edema.      Left lower leg: No edema.   Skin:     General: Skin is warm and dry.      Findings: Rash (Confluent hyper pigmented brown macules located throughout bilateral lower extremities distal to the knee, with General sparing of the foot except for some areas macule development in the dorsum of the right foot) present.   Neurological:      General: No focal deficit present.      Mental Status: He is alert and oriented to person, place, and time.   Psychiatric:         Mood and Affect: Mood normal.         Behavior: Behavior normal.         Vitals:    11/14/20 1003   BP: 130/84   BP  "Location: Right arm   Patient Position: Sitting   BP Method: Large (Manual)   Pulse: 64   SpO2: 97%   Weight: 126.9 kg (279 lb 12.2 oz)   Height: 6' 2" (1.88 m)     Body mass index is 35.92 kg/m².    RESULTS: Reviewed labs from last 12 months    Assessment:       1. Discoloration of skin    2. Benign prostatic hyperplasia, unspecified whether lower urinary tract symptoms present    3. Peripheral polyneuropathy    4. Family history of Marfan syndrome    5. Pulmonary nodules        Plan:   Abel was seen today for follow-up.    Diagnoses and all orders for this visit:    Discoloration of skin:  Prior diagnosis but this is a persisting problem.  No swelling or tenderness.  No pruritus.  Ferritin levels normal.  Refer to dermatology for further evaluation and recommendations.  -     Ambulatory referral/consult to Dermatology; Future    Benign prostatic hyperplasia, unspecified whether lower urinary tract symptoms present:  Prior diagnosis, persists, needs follow up with Urology, new order placed.  -     Ambulatory referral/consult to Urology; Future    Peripheral polyneuropathy:  Prior diagnosis, symptoms controlled with gabapentin however etiology unclear. Send for EMG.  -     gabapentin (NEURONTIN) 300 MG capsule; Take 1 capsule (300 mg total) by mouth once daily AND 2 capsules (600 mg total) once daily AND 2 capsules (600 mg total) every evening.  -     EMG W/ ULTRASOUND AND NERVE CONDUCTION TEST 2 Extremities; Future    Family history of Marfan syndrome:  New diagnosis in daughter. She has not had any complications related to this. I will send patient for evaluation with Genetics; if clinically concerning for Marfan patient may require aortic ultrasound.  -     Ambulatory referral/consult to Genetics; Future    Pulmonary nodules:  New diagnosis incidental on chest CT, repeat in 12 months for surveillance.    Follow up for Keep appointment with me in February.  Jonah Harrison MD, FACP [facility]    Portions of this note were completed using medical dictation software. Please excuse typographical or syntax errors that were missed on review.

## 2020-11-15 ENCOUNTER — PATIENT OUTREACH (OUTPATIENT)
Dept: ADMINISTRATIVE | Facility: OTHER | Age: 60
End: 2020-11-15

## 2020-11-15 DIAGNOSIS — Z12.11 ENCOUNTER FOR FIT (FECAL IMMUNOCHEMICAL TEST) SCREENING: Primary | ICD-10-CM

## 2020-11-16 ENCOUNTER — TELEPHONE (OUTPATIENT)
Dept: INTERNAL MEDICINE | Facility: CLINIC | Age: 60
End: 2020-11-16

## 2020-11-16 NOTE — PROGRESS NOTES
Requested updates within Care Everywhere.  Patient's chart was reviewed for overdue CT topics.  Immunizations reconciled.    Orders placed:[employer]

## 2020-11-17 ENCOUNTER — TELEPHONE (OUTPATIENT)
Dept: DERMATOLOGY | Facility: CLINIC | Age: 60
End: 2020-11-17

## 2020-11-17 NOTE — TELEPHONE ENCOUNTER
LVM for pt informing him that his appointment wont be covered at [facility]. If he is seen he would have to cover the cost.      TB       ----- Message from Gayle Calvillo MA sent at 11/16/2020  4:38 PM CST -----  Regarding: FW: DERMATOLOGY referral  Contact: preservice    ----- Message -----  From: Florakimber Hanna  Sent: 11/15/2020   6:25 AM CST  To: Hunter Mckenzie Staff, Deandre Calvo Staff  Subject: DERMATOLOGY referral                               The referral to DERMATOLOGY is denied because  [facility] is the only facility contracted with the [facility] Plans per the participating payors list initiated by the [facility] Dept. If the patient is seen at any other location, there is NO guarantee that the claims will be paid with or without an authorization on file, because there is no contract agreement.The patient will need to be rescheduled at the [facility] location. If the patient chooses NOT to have services rendered at that location all charges incurred will be the patient's responsibility.If the provider should have any questions or concerns regarding the contract agreement with the [facility] plans, please reach out to the [facility] Care Dept for further information. Please inform the provider and the patient on how they wish to proceed with this case at this time. Preservice cannot cancel nor reschedule appts, must be done by the ordering provider's office staff.

## 2020-11-18 ENCOUNTER — TELEPHONE (OUTPATIENT)
Dept: INTERNAL MEDICINE | Facility: CLINIC | Age: 60
End: 2020-11-18

## 2020-11-18 NOTE — TELEPHONE ENCOUNTER
----- Message from Roxana Aden sent at 11/18/2020  3:37 PM CST -----  Contact: Jr [phone]--  Needs Advice    Reason for call:--Questions--          Additional Information:Pt calling to speak with the nurse regarding EMG test that schedule for him to get done? Please call to advise.

## 2020-11-21 ENCOUNTER — TELEPHONE (OUTPATIENT)
Dept: INTERNAL MEDICINE | Facility: CLINIC | Age: 60
End: 2020-11-21

## 2020-11-21 NOTE — TELEPHONE ENCOUNTER
Please call the patient to notify that I received a message his insurance doesn't pay for the nerve conduction study at [facility]. Please have patient call his insurance co to see what facility they contract with for nerve conduction study (aka EMG).

## 2020-11-21 NOTE — TELEPHONE ENCOUNTER
----- Message from Flora Hanna sent at 11/21/2020 10:23 AM CST -----  Regarding: EMG referral  Contact: preservice  The EMG referral is denied because [facility] is the only facility contracted with the [facility] per the participating payors list initiated by the [facility] Dept. If the patient is seen at any other location, there is NO guarantee that the claims will be paid with or without an authorization on file, because there is no contract agreement.The patient will need to be rescheduled at the [facility] location. If the patient chooses NOT to have services rendered at that location all charges incurred will be the patient's responsibility.If the provider should have any questions or concerns regarding the contract agreement with the [facility], please reach out to the [facility] Care Dept for further information. Please inform the provider and the patient on how they wish to proceed with this case at this time. Preservice cannot cancel nor reschedule appts, must be done by the ordering provider's office staff.

## 2020-12-11 ENCOUNTER — PATIENT MESSAGE (OUTPATIENT)
Dept: OTHER | Facility: OTHER | Age: 60
End: 2020-12-11

## 2020-12-31 RX ORDER — AMLODIPINE BESYLATE 10 MG/1
TABLET ORAL
Qty: 90 TABLET | Refills: 3 | Status: SHIPPED | OUTPATIENT
Start: 2020-12-31 | End: 2021-12-15

## 2020-12-31 NOTE — TELEPHONE ENCOUNTER
No new care gaps identified.  Powered by [employer]. Reference number: [id number]. 12/31/2020 4:12:19 AM   CST

## 2021-01-07 ENCOUNTER — TELEPHONE (OUTPATIENT)
Dept: INTERNAL MEDICINE | Facility: CLINIC | Age: 61
End: 2021-01-07

## 2021-01-07 DIAGNOSIS — G62.9 PERIPHERAL POLYNEUROPATHY: ICD-10-CM

## 2021-01-07 RX ORDER — GABAPENTIN 800 MG/1
800 TABLET ORAL 3 TIMES DAILY
Qty: 270 TABLET | Refills: 3 | Status: SHIPPED | OUTPATIENT
Start: 2021-01-07 | End: 2021-08-09 | Stop reason: SDUPTHER

## 2021-01-07 RX ORDER — GABAPENTIN 300 MG/1
CAPSULE ORAL
Qty: 450 CAPSULE | Refills: 3 | Status: CANCELLED
Start: 2021-01-07 | End: 2022-01-07

## 2021-01-29 ENCOUNTER — TELEPHONE (OUTPATIENT)
Dept: GENETICS | Facility: CLINIC | Age: 61
End: 2021-01-29

## 2021-02-01 ENCOUNTER — TELEPHONE (OUTPATIENT)
Dept: GENETICS | Facility: CLINIC | Age: 61
End: 2021-02-01

## 2021-02-01 ENCOUNTER — HOSPITAL ENCOUNTER (OUTPATIENT)
Dept: RADIOLOGY | Facility: HOSPITAL | Age: 61
Discharge: HOME | End: 2021-02-01
Attending: INTERNAL MEDICINE
Payer: COMMERCIAL

## 2021-02-01 ENCOUNTER — OFFICE VISIT (OUTPATIENT)
Dept: INTERNAL MEDICINE | Facility: CLINIC | Age: 61
End: 2021-02-01
Payer: COMMERCIAL

## 2021-02-01 VITALS
SYSTOLIC BLOOD PRESSURE: 118 MMHG | WEIGHT: 291 LBS | DIASTOLIC BLOOD PRESSURE: 78 MMHG | HEIGHT: 74 IN | OXYGEN SATURATION: 100 % | BODY MASS INDEX: 37.35 KG/M2 | HEART RATE: 64 BPM

## 2021-02-01 DIAGNOSIS — Z12.11 COLON CANCER SCREENING: ICD-10-CM

## 2021-02-01 DIAGNOSIS — N25.81 SECONDARY HYPERPARATHYROIDISM OF RENAL ORIGIN: ICD-10-CM

## 2021-02-01 DIAGNOSIS — Z12.5 PROSTATE CANCER SCREENING: ICD-10-CM

## 2021-02-01 DIAGNOSIS — G62.9 PERIPHERAL POLYNEUROPATHY: ICD-10-CM

## 2021-02-01 DIAGNOSIS — E78.5 HYPERLIPIDEMIA, UNSPECIFIED HYPERLIPIDEMIA TYPE: ICD-10-CM

## 2021-02-01 DIAGNOSIS — N40.0 BENIGN PROSTATIC HYPERPLASIA, UNSPECIFIED WHETHER LOWER URINARY TRACT SYMPTOMS PRESENT: ICD-10-CM

## 2021-02-01 DIAGNOSIS — N18.32 STAGE 3B CHRONIC KIDNEY DISEASE: ICD-10-CM

## 2021-02-01 DIAGNOSIS — I10 ESSENTIAL HYPERTENSION: ICD-10-CM

## 2021-02-01 DIAGNOSIS — G62.9 PERIPHERAL POLYNEUROPATHY: Primary | ICD-10-CM

## 2021-02-01 DIAGNOSIS — M10.9 GOUT, ARTHRITIS: ICD-10-CM

## 2021-02-01 PROCEDURE — 99999 PR PBB SHADOW E&M-EST. PATIENT-LVL V: CPT | Mod: PBBFAC,,, | Performed by: INTERNAL MEDICINE

## 2021-02-01 PROCEDURE — 99999 PR PBB SHADOW E&M-EST. PATIENT-LVL V: ICD-10-PCS | Mod: PBBFAC,,, | Performed by: INTERNAL MEDICINE

## 2021-02-01 PROCEDURE — 99214 PR OFFICE/OUTPT VISIT, EST, LEVL IV, 30-39 MIN: ICD-10-PCS | Mod: S$GLB,,, | Performed by: INTERNAL MEDICINE

## 2021-02-01 PROCEDURE — 73630 X-RAY EXAM OF FOOT: CPT | Mod: TC,RT

## 2021-02-01 PROCEDURE — 73630 X-RAY EXAM OF FOOT: CPT | Mod: 26,RT,, | Performed by: RADIOLOGY

## 2021-02-01 PROCEDURE — 73630 XR FOOT COMPLETE 3 VIEW RIGHT: ICD-10-PCS | Mod: 26,RT,, | Performed by: RADIOLOGY

## 2021-02-01 PROCEDURE — 99214 OFFICE O/P EST MOD 30 MIN: CPT | Mod: S$GLB,,, | Performed by: INTERNAL MEDICINE

## 2021-02-02 ENCOUNTER — TELEPHONE (OUTPATIENT)
Dept: GENETICS | Facility: CLINIC | Age: 61
End: 2021-02-02

## 2021-02-02 ENCOUNTER — TELEPHONE (OUTPATIENT)
Dept: INTERNAL MEDICINE | Facility: CLINIC | Age: 61
End: 2021-02-02

## 2021-02-03 ENCOUNTER — TELEPHONE (OUTPATIENT)
Dept: INTERNAL MEDICINE | Facility: CLINIC | Age: 61
End: 2021-02-03

## 2021-02-03 RX ORDER — PROPRANOLOL HYDROCHLORIDE 40 MG/1
40 TABLET ORAL 2 TIMES DAILY
Qty: 180 TABLET | Refills: 3 | Status: SHIPPED | OUTPATIENT
Start: 2021-02-03 | End: 2022-01-27 | Stop reason: SDUPTHER

## 2021-04-05 ENCOUNTER — PATIENT MESSAGE (OUTPATIENT)
Dept: ADMINISTRATIVE | Facility: HOSPITAL | Age: 61
End: 2021-04-05

## 2021-04-15 ENCOUNTER — PATIENT MESSAGE (OUTPATIENT)
Dept: RESEARCH | Facility: HOSPITAL | Age: 61
End: 2021-04-15

## 2021-04-22 NOTE — TELEPHONE ENCOUNTER
Left VM to patient to call us back regarding referral  Please call to schedule the patient with Nephrology.

## 2021-05-03 ENCOUNTER — OFFICE VISIT (OUTPATIENT)
Dept: INTERNAL MEDICINE | Facility: CLINIC | Age: 61
End: 2021-05-03
Payer: COMMERCIAL

## 2021-05-03 ENCOUNTER — TELEPHONE (OUTPATIENT)
Dept: NEUROLOGY | Facility: CLINIC | Age: 61
End: 2021-05-03

## 2021-05-03 ENCOUNTER — PATIENT OUTREACH (OUTPATIENT)
Dept: ADMINISTRATIVE | Facility: OTHER | Age: 61
End: 2021-05-03

## 2021-05-03 ENCOUNTER — HOSPITAL ENCOUNTER (OUTPATIENT)
Dept: RADIOLOGY | Facility: HOSPITAL | Age: 61
Discharge: HOME | End: 2021-05-03
Attending: INTERNAL MEDICINE
Payer: COMMERCIAL

## 2021-05-03 DIAGNOSIS — M25.559 HIP PAIN: ICD-10-CM

## 2021-05-03 DIAGNOSIS — G62.9 NEUROPATHY: ICD-10-CM

## 2021-05-03 DIAGNOSIS — M25.559 HIP PAIN: Primary | ICD-10-CM

## 2021-05-03 PROCEDURE — 73521 X-RAY EXAM HIPS BI 2 VIEWS: CPT | Mod: TC

## 2021-05-03 PROCEDURE — 73521 X-RAY EXAM HIPS BI 2 VIEWS: CPT | Mod: 26,,, | Performed by: RADIOLOGY

## 2021-05-03 PROCEDURE — 99214 OFFICE O/P EST MOD 30 MIN: CPT | Mod: S$GLB,,, | Performed by: INTERNAL MEDICINE

## 2021-05-03 PROCEDURE — 73521 XR HIPS BILATERAL 2 VIEW INCL AP PELVIS: ICD-10-PCS | Mod: 26,,, | Performed by: RADIOLOGY

## 2021-05-03 PROCEDURE — 99999 PR PBB SHADOW E&M-EST. PATIENT-LVL V: ICD-10-PCS | Mod: PBBFAC,,, | Performed by: INTERNAL MEDICINE

## 2021-05-03 PROCEDURE — 99999 PR PBB SHADOW E&M-EST. PATIENT-LVL V: CPT | Mod: PBBFAC,,, | Performed by: INTERNAL MEDICINE

## 2021-05-03 PROCEDURE — 99214 PR OFFICE/OUTPT VISIT, EST, LEVL IV, 30-39 MIN: ICD-10-PCS | Mod: S$GLB,,, | Performed by: INTERNAL MEDICINE

## 2021-05-03 RX ORDER — GABAPENTIN 300 MG/1
300 CAPSULE ORAL 3 TIMES DAILY
COMMUNITY
Start: 2021-04-25 | End: 2021-08-02

## 2021-05-04 ENCOUNTER — HOSPITAL ENCOUNTER (OUTPATIENT)
Dept: RADIOLOGY | Facility: HOSPITAL | Age: 61
Discharge: HOME | End: 2021-05-04
Attending: ORTHOPAEDIC SURGERY
Payer: COMMERCIAL

## 2021-05-04 ENCOUNTER — OFFICE VISIT (OUTPATIENT)
Dept: ORTHOPEDICS | Facility: CLINIC | Age: 61
End: 2021-05-04
Payer: COMMERCIAL

## 2021-05-04 VITALS — BODY MASS INDEX: 37.23 KG/M2 | HEIGHT: 74 IN | WEIGHT: 290.13 LBS

## 2021-05-04 DIAGNOSIS — M41.55 SCOLIOSIS OF THORACOLUMBAR REGION DUE TO DEGENERATIVE DISEASE OF SPINE IN ADULT: Primary | ICD-10-CM

## 2021-05-04 DIAGNOSIS — M54.9 DORSALGIA, UNSPECIFIED: ICD-10-CM

## 2021-05-04 DIAGNOSIS — M25.559 HIP PAIN: ICD-10-CM

## 2021-05-04 DIAGNOSIS — M47.819 SPONDYLOSIS WITHOUT MYELOPATHY: ICD-10-CM

## 2021-05-04 PROCEDURE — 72100 X-RAY EXAM L-S SPINE 2/3 VWS: CPT | Mod: TC

## 2021-05-04 PROCEDURE — 72100 X-RAY EXAM L-S SPINE 2/3 VWS: CPT | Mod: 26,,, | Performed by: RADIOLOGY

## 2021-05-04 PROCEDURE — 99203 OFFICE O/P NEW LOW 30 MIN: CPT | Mod: S$GLB,,, | Performed by: ORTHOPAEDIC SURGERY

## 2021-05-04 PROCEDURE — 99203 PR OFFICE/OUTPT VISIT, NEW, LEVL III, 30-44 MIN: ICD-10-PCS | Mod: S$GLB,,, | Performed by: ORTHOPAEDIC SURGERY

## 2021-05-04 PROCEDURE — 72100 XR LUMBAR SPINE AP AND LATERAL: ICD-10-PCS | Mod: 26,,, | Performed by: RADIOLOGY

## 2021-05-04 PROCEDURE — 99999 PR PBB SHADOW E&M-EST. PATIENT-LVL IV: CPT | Mod: PBBFAC,,, | Performed by: ORTHOPAEDIC SURGERY

## 2021-05-04 PROCEDURE — 99999 PR PBB SHADOW E&M-EST. PATIENT-LVL IV: ICD-10-PCS | Mod: PBBFAC,,, | Performed by: ORTHOPAEDIC SURGERY

## 2021-05-05 VITALS
BODY MASS INDEX: 37.63 KG/M2 | WEIGHT: 293.19 LBS | HEIGHT: 74 IN | HEART RATE: 73 BPM | SYSTOLIC BLOOD PRESSURE: 138 MMHG | OXYGEN SATURATION: 95 % | TEMPERATURE: 99 F | DIASTOLIC BLOOD PRESSURE: 62 MMHG

## 2021-05-06 ENCOUNTER — TELEPHONE (OUTPATIENT)
Dept: ORTHOPEDICS | Facility: CLINIC | Age: 61
End: 2021-05-06

## 2021-05-12 ENCOUNTER — TELEPHONE (OUTPATIENT)
Dept: ORTHOPEDICS | Facility: CLINIC | Age: 61
End: 2021-05-12

## 2021-05-12 DIAGNOSIS — M51.36 DDD (DEGENERATIVE DISC DISEASE), LUMBAR: Primary | ICD-10-CM

## 2021-05-28 ENCOUNTER — OFFICE VISIT (OUTPATIENT)
Dept: ORTHOPEDICS | Facility: CLINIC | Age: 61
End: 2021-05-28
Payer: COMMERCIAL

## 2021-05-28 VITALS — HEIGHT: 74 IN | WEIGHT: 289.69 LBS | BODY MASS INDEX: 37.18 KG/M2

## 2021-05-28 DIAGNOSIS — M79.651 PAIN OF RIGHT THIGH: Primary | ICD-10-CM

## 2021-05-28 PROCEDURE — 99999 PR PBB SHADOW E&M-EST. PATIENT-LVL III: CPT | Mod: PBBFAC,,, | Performed by: ORTHOPAEDIC SURGERY

## 2021-05-28 PROCEDURE — 99213 OFFICE O/P EST LOW 20 MIN: CPT | Mod: S$GLB,,, | Performed by: ORTHOPAEDIC SURGERY

## 2021-05-28 PROCEDURE — 99213 PR OFFICE/OUTPT VISIT, EST, LEVL III, 20-29 MIN: ICD-10-PCS | Mod: S$GLB,,, | Performed by: ORTHOPAEDIC SURGERY

## 2021-05-28 PROCEDURE — 99999 PR PBB SHADOW E&M-EST. PATIENT-LVL III: ICD-10-PCS | Mod: PBBFAC,,, | Performed by: ORTHOPAEDIC SURGERY

## 2021-06-04 ENCOUNTER — TELEPHONE (OUTPATIENT)
Dept: INTERNAL MEDICINE | Facility: CLINIC | Age: 61
End: 2021-06-04

## 2021-06-07 ENCOUNTER — TELEPHONE (OUTPATIENT)
Dept: ORTHOPEDICS | Facility: CLINIC | Age: 61
End: 2021-06-07

## 2021-06-29 ENCOUNTER — TELEPHONE (OUTPATIENT)
Dept: INTERNAL MEDICINE | Facility: CLINIC | Age: 61
End: 2021-06-29

## 2021-07-28 ENCOUNTER — PATIENT OUTREACH (OUTPATIENT)
Dept: ADMINISTRATIVE | Facility: OTHER | Age: 61
End: 2021-07-28

## 2021-07-28 ENCOUNTER — OFFICE VISIT (OUTPATIENT)
Dept: ORTHOPEDICS | Facility: CLINIC | Age: 61
End: 2021-07-28
Payer: COMMERCIAL

## 2021-07-28 ENCOUNTER — HOSPITAL ENCOUNTER (OUTPATIENT)
Dept: RADIOLOGY | Facility: HOSPITAL | Age: 61
Discharge: HOME | End: 2021-07-28
Attending: ORTHOPAEDIC SURGERY
Payer: COMMERCIAL

## 2021-07-28 VITALS — HEIGHT: 74 IN | WEIGHT: 295.75 LBS | BODY MASS INDEX: 37.96 KG/M2

## 2021-07-28 DIAGNOSIS — M25.562 LEFT KNEE PAIN, UNSPECIFIED CHRONICITY: ICD-10-CM

## 2021-07-28 DIAGNOSIS — M25.562 LEFT KNEE PAIN, UNSPECIFIED CHRONICITY: Primary | ICD-10-CM

## 2021-07-28 DIAGNOSIS — M17.12 PRIMARY OSTEOARTHRITIS OF LEFT KNEE: Primary | ICD-10-CM

## 2021-07-28 DIAGNOSIS — E78.5 HYPERLIPIDEMIA: ICD-10-CM

## 2021-07-28 PROCEDURE — 99999 PR PBB SHADOW E&M-EST. PATIENT-LVL III: CPT | Mod: PBBFAC,,, | Performed by: ORTHOPAEDIC SURGERY

## 2021-07-28 PROCEDURE — 99999 PR PBB SHADOW E&M-EST. PATIENT-LVL III: ICD-10-PCS | Mod: PBBFAC,,, | Performed by: ORTHOPAEDIC SURGERY

## 2021-07-28 PROCEDURE — 73562 XR KNEE ORTHO LEFT: ICD-10-PCS | Mod: 26,LT,, | Performed by: RADIOLOGY

## 2021-07-28 PROCEDURE — 99213 PR OFFICE/OUTPT VISIT, EST, LEVL III, 20-29 MIN: ICD-10-PCS | Mod: 25,S$GLB,, | Performed by: ORTHOPAEDIC SURGERY

## 2021-07-28 PROCEDURE — 73560 X-RAY EXAM OF KNEE 1 OR 2: CPT | Mod: TC,RT,59

## 2021-07-28 PROCEDURE — 73562 X-RAY EXAM OF KNEE 3: CPT | Mod: 26,LT,, | Performed by: RADIOLOGY

## 2021-07-28 PROCEDURE — 96372 THER/PROPH/DIAG INJ SC/IM: CPT | Mod: PBBFAC | Performed by: ORTHOPAEDIC SURGERY

## 2021-07-28 PROCEDURE — 99213 OFFICE O/P EST LOW 20 MIN: CPT | Mod: 25,S$GLB,, | Performed by: ORTHOPAEDIC SURGERY

## 2021-07-28 PROCEDURE — 99213 OFFICE O/P EST LOW 20 MIN: CPT | Mod: PBBFAC | Performed by: ORTHOPAEDIC SURGERY

## 2021-07-28 RX ADMIN — TRIAMCINOLONE ACETONIDE 40 MG: 40 INJECTION, SUSPENSION INTRA-ARTICULAR; INTRAMUSCULAR at 03:07

## 2021-07-29 RX ORDER — PRAVASTATIN SODIUM 40 MG/1
TABLET ORAL
Qty: 90 TABLET | Refills: 3 | Status: SHIPPED | OUTPATIENT
Start: 2021-07-29 | End: 2022-07-07

## 2021-07-29 RX ORDER — TRIAMCINOLONE ACETONIDE 40 MG/ML
40 INJECTION, SUSPENSION INTRA-ARTICULAR; INTRAMUSCULAR
Status: DISCONTINUED | OUTPATIENT
Start: 2021-07-28 | End: 2021-07-29 | Stop reason: HOSPADM

## 2021-08-02 ENCOUNTER — OFFICE VISIT (OUTPATIENT)
Dept: INTERNAL MEDICINE | Facility: CLINIC | Age: 61
End: 2021-08-02
Payer: MEDICARE

## 2021-08-02 ENCOUNTER — LAB VISIT (OUTPATIENT)
Dept: LAB | Facility: HOSPITAL | Age: 61
End: 2021-08-02
Attending: FAMILY MEDICINE
Payer: MEDICARE

## 2021-08-02 VITALS
HEART RATE: 65 BPM | OXYGEN SATURATION: 98 % | WEIGHT: 291 LBS | HEIGHT: 73 IN | DIASTOLIC BLOOD PRESSURE: 73 MMHG | BODY MASS INDEX: 38.57 KG/M2 | RESPIRATION RATE: 18 BRPM | SYSTOLIC BLOOD PRESSURE: 125 MMHG

## 2021-08-02 DIAGNOSIS — M51.36 DDD (DEGENERATIVE DISC DISEASE), LUMBAR: ICD-10-CM

## 2021-08-02 DIAGNOSIS — F31.9 BIPOLAR AFFECTIVE DISORDER, REMISSION STATUS UNSPECIFIED: Chronic | ICD-10-CM

## 2021-08-02 DIAGNOSIS — Z12.11 ENCOUNTER FOR COLORECTAL CANCER SCREENING: ICD-10-CM

## 2021-08-02 DIAGNOSIS — F41.0 PANIC DISORDER: Chronic | ICD-10-CM

## 2021-08-02 DIAGNOSIS — Z79.899 ENCOUNTER FOR LONG-TERM (CURRENT) USE OF OTHER MEDICATIONS: ICD-10-CM

## 2021-08-02 DIAGNOSIS — Z00.00 ANNUAL PHYSICAL EXAM: Primary | ICD-10-CM

## 2021-08-02 DIAGNOSIS — Z76.89 ENCOUNTER TO ESTABLISH CARE WITH NEW DOCTOR: ICD-10-CM

## 2021-08-02 DIAGNOSIS — Z00.00 ANNUAL PHYSICAL EXAM: ICD-10-CM

## 2021-08-02 DIAGNOSIS — Z12.12 ENCOUNTER FOR COLORECTAL CANCER SCREENING: ICD-10-CM

## 2021-08-02 DIAGNOSIS — I10 ESSENTIAL HYPERTENSION: Chronic | ICD-10-CM

## 2021-08-02 DIAGNOSIS — G62.9 PERIPHERAL POLYNEUROPATHY: ICD-10-CM

## 2021-08-02 DIAGNOSIS — E66.01 SEVERE OBESITY (BMI 35.0-39.9) WITH COMORBIDITY: ICD-10-CM

## 2021-08-02 DIAGNOSIS — N18.32 STAGE 3B CHRONIC KIDNEY DISEASE: ICD-10-CM

## 2021-08-02 LAB
ALBUMIN SERPL BCP-MCNC: 4 G/DL (ref 3.5–5.2)
ALP SERPL-CCNC: 125 U/L (ref 55–135)
ALT SERPL W/O P-5'-P-CCNC: 24 U/L (ref 10–44)
ANION GAP SERPL CALC-SCNC: 9 MMOL/L (ref 8–16)
AST SERPL-CCNC: 28 U/L (ref 10–40)
BILIRUB SERPL-MCNC: 0.5 MG/DL (ref 0.1–1)
BUN SERPL-MCNC: 17 MG/DL (ref 6–20)
CALCIUM SERPL-MCNC: 10.7 MG/DL (ref 8.7–10.5)
CHLORIDE SERPL-SCNC: 102 MMOL/L (ref 95–110)
CHOLEST SERPL-MCNC: 164 MG/DL (ref 120–199)
CHOLEST/HDLC SERPL: 4.4 {RATIO} (ref 2–5)
CO2 SERPL-SCNC: 25 MMOL/L (ref 23–29)
CREAT SERPL-MCNC: 1.9 MG/DL (ref 0.5–1.4)
ERYTHROCYTE [DISTWIDTH] IN BLOOD BY AUTOMATED COUNT: 13.2 % (ref 11.5–14.5)
EST. GFR  (AFRICAN AMERICAN): 43.3 ML/MIN/1.73 M^2
EST. GFR  (NON AFRICAN AMERICAN): 37.5 ML/MIN/1.73 M^2
GLUCOSE SERPL-MCNC: 82 MG/DL (ref 70–110)
HCT VFR BLD AUTO: 41.3 % (ref 40–54)
HDLC SERPL-MCNC: 37 MG/DL (ref 40–75)
HDLC SERPL: 22.6 % (ref 20–50)
HGB BLD-MCNC: 13.8 G/DL (ref 14–18)
LDLC SERPL CALC-MCNC: 99.8 MG/DL (ref 63–159)
MCH RBC QN AUTO: 30.9 PG (ref 27–31)
MCHC RBC AUTO-ENTMCNC: 33.4 G/DL (ref 32–36)
MCV RBC AUTO: 92 FL (ref 82–98)
NONHDLC SERPL-MCNC: 127 MG/DL
PLATELET # BLD AUTO: 301 K/UL (ref 150–450)
PMV BLD AUTO: 10.7 FL (ref 9.2–12.9)
POTASSIUM SERPL-SCNC: 4.9 MMOL/L (ref 3.5–5.1)
PROT SERPL-MCNC: 8.8 G/DL (ref 6–8.4)
RBC # BLD AUTO: 4.47 M/UL (ref 4.6–6.2)
SODIUM SERPL-SCNC: 136 MMOL/L (ref 136–145)
TRIGL SERPL-MCNC: 136 MG/DL (ref 30–150)
TSH SERPL DL<=0.005 MIU/L-ACNC: 1.13 UIU/ML (ref 0.4–4)
WBC # BLD AUTO: 9.8 K/UL (ref 3.9–12.7)

## 2021-08-02 PROCEDURE — 36415 COLL VENOUS BLD VENIPUNCTURE: CPT | Performed by: FAMILY MEDICINE

## 2021-08-02 PROCEDURE — 83036 HEMOGLOBIN GLYCOSYLATED A1C: CPT | Performed by: FAMILY MEDICINE

## 2021-08-02 PROCEDURE — 84153 ASSAY OF PSA TOTAL: CPT | Performed by: FAMILY MEDICINE

## 2021-08-02 PROCEDURE — 99215 OFFICE O/P EST HI 40 MIN: CPT | Mod: PBBFAC | Performed by: FAMILY MEDICINE

## 2021-08-02 PROCEDURE — 99396 PREV VISIT EST AGE 40-64: CPT | Mod: S$GLB,,, | Performed by: FAMILY MEDICINE

## 2021-08-02 PROCEDURE — 84443 ASSAY THYROID STIM HORMONE: CPT | Performed by: FAMILY MEDICINE

## 2021-08-02 PROCEDURE — 99396 PR PREVENTIVE VISIT,EST,40-64: ICD-10-PCS | Mod: S$GLB,,, | Performed by: FAMILY MEDICINE

## 2021-08-02 PROCEDURE — 99999 PR PBB SHADOW E&M-EST. PATIENT-LVL V: ICD-10-PCS | Mod: PBBFAC,,, | Performed by: FAMILY MEDICINE

## 2021-08-02 PROCEDURE — 85027 COMPLETE CBC AUTOMATED: CPT | Performed by: FAMILY MEDICINE

## 2021-08-02 PROCEDURE — 80053 COMPREHEN METABOLIC PANEL: CPT | Performed by: FAMILY MEDICINE

## 2021-08-02 PROCEDURE — 99999 PR PBB SHADOW E&M-EST. PATIENT-LVL V: CPT | Mod: PBBFAC,,, | Performed by: FAMILY MEDICINE

## 2021-08-02 PROCEDURE — 80061 LIPID PANEL: CPT | Performed by: FAMILY MEDICINE

## 2021-08-02 RX ORDER — FLUOROURACIL 50 MG/G
CREAM TOPICAL
COMMUNITY
Start: 2021-02-23 | End: 2022-05-24

## 2021-08-02 RX ORDER — CLOTRIMAZOLE 1 %
CREAM (GRAM) TOPICAL
COMMUNITY
Start: 2021-02-12 | End: 2022-05-24

## 2021-08-03 ENCOUNTER — TELEPHONE (OUTPATIENT)
Dept: INTERNAL MEDICINE | Facility: CLINIC | Age: 61
End: 2021-08-03

## 2021-08-03 ENCOUNTER — TELEPHONE (OUTPATIENT)
Dept: ORTHOPEDICS | Facility: CLINIC | Age: 61
End: 2021-08-03

## 2021-08-03 LAB
COMPLEXED PSA SERPL-MCNC: 0.47 NG/ML (ref 0–4)
ESTIMATED AVG GLUCOSE: 108 MG/DL (ref 68–131)
HBA1C MFR BLD: 5.4 % (ref 4–5.6)

## 2021-08-04 DIAGNOSIS — Z00.00 ANNUAL PHYSICAL EXAM: ICD-10-CM

## 2021-08-04 DIAGNOSIS — G62.9 PERIPHERAL POLYNEUROPATHY: ICD-10-CM

## 2021-08-05 ENCOUNTER — PATIENT MESSAGE (OUTPATIENT)
Dept: INTERNAL MEDICINE | Facility: CLINIC | Age: 61
End: 2021-08-05

## 2021-08-09 RX ORDER — GABAPENTIN 300 MG/1
CAPSULE ORAL
Qty: 270 CAPSULE | Refills: 0 | OUTPATIENT
Start: 2021-08-09

## 2021-08-09 RX ORDER — GABAPENTIN 800 MG/1
800 TABLET ORAL 3 TIMES DAILY
Qty: 270 TABLET | Refills: 0 | Status: SHIPPED | OUTPATIENT
Start: 2021-08-09 | End: 2021-11-19

## 2021-08-11 ENCOUNTER — TELEPHONE (OUTPATIENT)
Dept: PAIN MEDICINE | Facility: CLINIC | Age: 61
End: 2021-08-11

## 2021-08-11 ENCOUNTER — TELEPHONE (OUTPATIENT)
Dept: INTERNAL MEDICINE | Facility: CLINIC | Age: 61
End: 2021-08-11

## 2021-08-16 ENCOUNTER — TELEPHONE (OUTPATIENT)
Dept: PAIN MEDICINE | Facility: CLINIC | Age: 61
End: 2021-08-16

## 2021-08-18 ENCOUNTER — OFFICE VISIT (OUTPATIENT)
Dept: ORTHOPEDICS | Facility: CLINIC | Age: 61
End: 2021-08-18
Payer: MEDICARE

## 2021-08-18 VITALS — BODY MASS INDEX: 38 KG/M2 | WEIGHT: 286.69 LBS | HEIGHT: 73 IN

## 2021-08-18 DIAGNOSIS — G89.29 CHRONIC BILATERAL LOW BACK PAIN WITHOUT SCIATICA: Primary | ICD-10-CM

## 2021-08-18 DIAGNOSIS — G62.9 PERIPHERAL POLYNEUROPATHY: ICD-10-CM

## 2021-08-18 DIAGNOSIS — M54.50 CHRONIC BILATERAL LOW BACK PAIN WITHOUT SCIATICA: Primary | ICD-10-CM

## 2021-08-18 DIAGNOSIS — M51.36 DDD (DEGENERATIVE DISC DISEASE), LUMBAR: ICD-10-CM

## 2021-08-18 PROCEDURE — 3044F HG A1C LEVEL LT 7.0%: CPT | Mod: CPTII,S$GLB,, | Performed by: ORTHOPAEDIC SURGERY

## 2021-08-18 PROCEDURE — 1125F AMNT PAIN NOTED PAIN PRSNT: CPT | Mod: CPTII,S$GLB,, | Performed by: ORTHOPAEDIC SURGERY

## 2021-08-18 PROCEDURE — 3044F PR MOST RECENT HEMOGLOBIN A1C LEVEL <7.0%: ICD-10-PCS | Mod: CPTII,S$GLB,, | Performed by: ORTHOPAEDIC SURGERY

## 2021-08-18 PROCEDURE — 99999 PR PBB SHADOW E&M-EST. PATIENT-LVL IV: CPT | Mod: PBBFAC,,, | Performed by: ORTHOPAEDIC SURGERY

## 2021-08-18 PROCEDURE — 3008F BODY MASS INDEX DOCD: CPT | Mod: CPTII,S$GLB,, | Performed by: ORTHOPAEDIC SURGERY

## 2021-08-18 PROCEDURE — 1159F PR MEDICATION LIST DOCUMENTED IN MEDICAL RECORD: ICD-10-PCS | Mod: CPTII,S$GLB,, | Performed by: ORTHOPAEDIC SURGERY

## 2021-08-18 PROCEDURE — 99214 OFFICE O/P EST MOD 30 MIN: CPT | Mod: S$GLB,,, | Performed by: ORTHOPAEDIC SURGERY

## 2021-08-18 PROCEDURE — 1125F PR PAIN SEVERITY QUANTIFIED, PAIN PRESENT: ICD-10-PCS | Mod: CPTII,S$GLB,, | Performed by: ORTHOPAEDIC SURGERY

## 2021-08-18 PROCEDURE — 99999 PR PBB SHADOW E&M-EST. PATIENT-LVL IV: ICD-10-PCS | Mod: PBBFAC,,, | Performed by: ORTHOPAEDIC SURGERY

## 2021-08-18 PROCEDURE — 3008F PR BODY MASS INDEX (BMI) DOCUMENTED: ICD-10-PCS | Mod: CPTII,S$GLB,, | Performed by: ORTHOPAEDIC SURGERY

## 2021-08-18 PROCEDURE — 99214 PR OFFICE/OUTPT VISIT, EST, LEVL IV, 30-39 MIN: ICD-10-PCS | Mod: S$GLB,,, | Performed by: ORTHOPAEDIC SURGERY

## 2021-08-18 PROCEDURE — 1159F MED LIST DOCD IN RCRD: CPT | Mod: CPTII,S$GLB,, | Performed by: ORTHOPAEDIC SURGERY

## 2021-08-26 DIAGNOSIS — K21.9 GASTROESOPHAGEAL REFLUX DISEASE: ICD-10-CM

## 2021-08-26 RX ORDER — OMEPRAZOLE 20 MG/1
CAPSULE, DELAYED RELEASE ORAL
Qty: 90 CAPSULE | Refills: 3 | Status: SHIPPED | OUTPATIENT
Start: 2021-08-26 | End: 2022-08-10

## 2021-09-17 ENCOUNTER — TELEPHONE (OUTPATIENT)
Dept: NEUROLOGY | Facility: CLINIC | Age: 61
End: 2021-09-17

## 2021-09-22 ENCOUNTER — TELEPHONE (OUTPATIENT)
Dept: NEUROLOGY | Facility: CLINIC | Age: 61
End: 2021-09-22

## 2021-09-24 ENCOUNTER — TELEPHONE (OUTPATIENT)
Dept: NEUROLOGY | Facility: CLINIC | Age: 61
End: 2021-09-24

## 2021-09-25 DIAGNOSIS — M1A.9XX0 CHRONIC GOUT WITHOUT TOPHUS, UNSPECIFIED CAUSE, UNSPECIFIED SITE: ICD-10-CM

## 2021-09-29 RX ORDER — ALLOPURINOL 100 MG/1
TABLET ORAL
Qty: 180 TABLET | Refills: 0 | Status: SHIPPED | OUTPATIENT
Start: 2021-09-29 | End: 2021-12-13

## 2021-10-07 DIAGNOSIS — Z72.0 TOBACCO USE: Primary | ICD-10-CM

## 2021-10-11 ENCOUNTER — OFFICE VISIT (OUTPATIENT)
Dept: NEUROLOGY | Facility: CLINIC | Age: 61
End: 2021-10-11
Payer: MEDICARE

## 2021-10-11 VITALS
WEIGHT: 287.69 LBS | DIASTOLIC BLOOD PRESSURE: 88 MMHG | HEART RATE: 67 BPM | BODY MASS INDEX: 38.13 KG/M2 | HEIGHT: 73 IN | SYSTOLIC BLOOD PRESSURE: 146 MMHG

## 2021-10-11 DIAGNOSIS — G62.9 PERIPHERAL POLYNEUROPATHY: ICD-10-CM

## 2021-10-11 DIAGNOSIS — M54.9 DORSALGIA, UNSPECIFIED: ICD-10-CM

## 2021-10-11 PROCEDURE — 99499 UNLISTED E&M SERVICE: CPT | Mod: HCNC,S$GLB,, | Performed by: PSYCHIATRY & NEUROLOGY

## 2021-10-11 PROCEDURE — 99213 OFFICE O/P EST LOW 20 MIN: CPT | Mod: PBBFAC | Performed by: PSYCHIATRY & NEUROLOGY

## 2021-10-11 PROCEDURE — 99499 RISK ADDL DX/OHS AUDIT: ICD-10-PCS | Mod: HCNC,S$GLB,, | Performed by: PSYCHIATRY & NEUROLOGY

## 2021-10-11 PROCEDURE — 99999 PR PBB SHADOW E&M-EST. PATIENT-LVL III: ICD-10-PCS | Mod: PBBFAC,HCNC,, | Performed by: PSYCHIATRY & NEUROLOGY

## 2021-10-11 PROCEDURE — 99999 PR PBB SHADOW E&M-EST. PATIENT-LVL III: CPT | Mod: PBBFAC,HCNC,, | Performed by: PSYCHIATRY & NEUROLOGY

## 2021-10-11 PROCEDURE — 99214 OFFICE O/P EST MOD 30 MIN: CPT | Mod: HCNC,S$GLB,, | Performed by: PSYCHIATRY & NEUROLOGY

## 2021-10-11 PROCEDURE — 99214 PR OFFICE/OUTPT VISIT, EST, LEVL IV, 30-39 MIN: ICD-10-PCS | Mod: HCNC,S$GLB,, | Performed by: PSYCHIATRY & NEUROLOGY

## 2021-10-13 ENCOUNTER — PATIENT OUTREACH (OUTPATIENT)
Dept: ADMINISTRATIVE | Facility: OTHER | Age: 61
End: 2021-10-13

## 2021-10-18 ENCOUNTER — TELEPHONE (OUTPATIENT)
Dept: NEUROLOGY | Facility: CLINIC | Age: 61
End: 2021-10-18

## 2021-10-18 NOTE — TELEPHONE ENCOUNTER
----- Message from Suhas Urban sent at 10/18/2021  2:51 PM CDT -----  Contact: patient/  [phone]  Patient calling to speak with you regarding his medication     Please advise

## 2021-11-02 ENCOUNTER — TELEPHONE (OUTPATIENT)
Dept: NEUROLOGY | Facility: CLINIC | Age: 61
End: 2021-11-02
Payer: MEDICAID

## 2021-11-18 ENCOUNTER — PATIENT MESSAGE (OUTPATIENT)
Dept: INTERNAL MEDICINE | Facility: CLINIC | Age: 61
End: 2021-11-18
Payer: MEDICAID

## 2021-11-19 DIAGNOSIS — G62.9 PERIPHERAL POLYNEUROPATHY: ICD-10-CM

## 2021-11-19 RX ORDER — GABAPENTIN 300 MG/1
300 CAPSULE ORAL 3 TIMES DAILY
Qty: 270 CAPSULE | Refills: 1 | Status: SHIPPED | OUTPATIENT
Start: 2021-11-19 | End: 2022-01-26 | Stop reason: SDUPTHER

## 2021-11-30 ENCOUNTER — PATIENT MESSAGE (OUTPATIENT)
Dept: INTERNAL MEDICINE | Facility: CLINIC | Age: 61
End: 2021-11-30
Payer: MEDICAID

## 2021-12-03 ENCOUNTER — PATIENT MESSAGE (OUTPATIENT)
Dept: INTERNAL MEDICINE | Facility: CLINIC | Age: 61
End: 2021-12-03
Payer: MEDICAID

## 2021-12-07 ENCOUNTER — PATIENT MESSAGE (OUTPATIENT)
Dept: INTERNAL MEDICINE | Facility: CLINIC | Age: 61
End: 2021-12-07
Payer: MEDICAID

## 2021-12-11 DIAGNOSIS — M1A.9XX0 CHRONIC GOUT WITHOUT TOPHUS, UNSPECIFIED CAUSE, UNSPECIFIED SITE: ICD-10-CM

## 2021-12-13 RX ORDER — ALLOPURINOL 100 MG/1
200 TABLET ORAL DAILY
Qty: 180 TABLET | Refills: 3 | Status: SHIPPED | OUTPATIENT
Start: 2021-12-13 | End: 2022-10-16

## 2021-12-15 RX ORDER — AMLODIPINE BESYLATE 10 MG/1
10 TABLET ORAL DAILY
Qty: 90 TABLET | Refills: 3 | Status: SHIPPED | OUTPATIENT
Start: 2021-12-15 | End: 2022-11-29

## 2021-12-18 ENCOUNTER — TELEPHONE (OUTPATIENT)
Dept: INTERNAL MEDICINE | Facility: CLINIC | Age: 61
End: 2021-12-18
Payer: MEDICAID

## 2021-12-20 ENCOUNTER — PATIENT MESSAGE (OUTPATIENT)
Dept: INTERNAL MEDICINE | Facility: CLINIC | Age: 61
End: 2021-12-20
Payer: MEDICAID

## 2022-01-01 NOTE — TELEPHONE ENCOUNTER
----- Message from Rajani Adames sent at 1/10/2020  2:25 PM CST -----  Contact:   Pt  [phone]  Pt  Called to follow up with the nurse in regards to an appt at a different location.  Call the pt back to verify   
Statement Selected

## 2022-01-06 ENCOUNTER — PATIENT MESSAGE (OUTPATIENT)
Dept: INTERNAL MEDICINE | Facility: CLINIC | Age: 62
End: 2022-01-06
Payer: MEDICAID

## 2022-01-08 ENCOUNTER — PATIENT MESSAGE (OUTPATIENT)
Dept: INTERNAL MEDICINE | Facility: CLINIC | Age: 62
End: 2022-01-08
Payer: MEDICAID

## 2022-01-08 DIAGNOSIS — N40.0 BENIGN PROSTATIC HYPERPLASIA, UNSPECIFIED WHETHER LOWER URINARY TRACT SYMPTOMS PRESENT: Primary | ICD-10-CM

## 2022-01-16 ENCOUNTER — PATIENT MESSAGE (OUTPATIENT)
Dept: INTERNAL MEDICINE | Facility: CLINIC | Age: 62
End: 2022-01-16
Payer: MEDICAID

## 2022-01-26 ENCOUNTER — PATIENT MESSAGE (OUTPATIENT)
Dept: INTERNAL MEDICINE | Facility: CLINIC | Age: 62
End: 2022-01-26
Payer: MEDICAID

## 2022-01-26 DIAGNOSIS — G62.9 PERIPHERAL POLYNEUROPATHY: ICD-10-CM

## 2022-01-26 RX ORDER — GABAPENTIN 300 MG/1
300 CAPSULE ORAL 3 TIMES DAILY
Qty: 270 CAPSULE | Refills: 1 | Status: SHIPPED | OUTPATIENT
Start: 2022-01-26 | End: 2022-05-16

## 2022-01-27 RX ORDER — PROPRANOLOL HYDROCHLORIDE 40 MG/1
40 TABLET ORAL 2 TIMES DAILY
Qty: 180 TABLET | Refills: 3 | Status: SHIPPED | OUTPATIENT
Start: 2022-01-27 | End: 2022-12-29

## 2022-01-27 NOTE — TELEPHONE ENCOUNTER
No new care gaps identified.  Powered by [employer]. Reference number: [id number].   1/27/2022 9:04:27 AM CST

## 2022-02-08 ENCOUNTER — HOSPITAL ENCOUNTER (EMERGENCY)
Facility: HOSPITAL | Age: 62
Discharge: HOME | End: 2022-02-08
Attending: EMERGENCY MEDICINE
Payer: MEDICARE

## 2022-02-08 VITALS
TEMPERATURE: 98 F | BODY MASS INDEX: 38.26 KG/M2 | OXYGEN SATURATION: 98 % | DIASTOLIC BLOOD PRESSURE: 60 MMHG | SYSTOLIC BLOOD PRESSURE: 135 MMHG | WEIGHT: 290 LBS | HEART RATE: 68 BPM | RESPIRATION RATE: 17 BRPM

## 2022-02-08 DIAGNOSIS — T50.905A MEDICATION SIDE EFFECT, INITIAL ENCOUNTER: Primary | ICD-10-CM

## 2022-02-08 DIAGNOSIS — S01.81XA LACERATION OF FOREHEAD, INITIAL ENCOUNTER: ICD-10-CM

## 2022-02-08 DIAGNOSIS — S09.90XA HEAD INJURY: ICD-10-CM

## 2022-02-08 LAB
ALBUMIN SERPL BCP-MCNC: 3.5 G/DL (ref 3.5–5.2)
ALP SERPL-CCNC: 107 U/L (ref 55–135)
ALT SERPL W/O P-5'-P-CCNC: 25 U/L (ref 10–44)
ANION GAP SERPL CALC-SCNC: 10 MMOL/L (ref 8–16)
AST SERPL-CCNC: 32 U/L (ref 10–40)
BASOPHILS # BLD AUTO: 0.07 K/UL (ref 0–0.2)
BASOPHILS NFR BLD: 0.9 % (ref 0–1.9)
BILIRUB SERPL-MCNC: 0.3 MG/DL (ref 0.1–1)
BUN SERPL-MCNC: 17 MG/DL (ref 8–23)
CALCIUM SERPL-MCNC: 9.7 MG/DL (ref 8.7–10.5)
CHLORIDE SERPL-SCNC: 102 MMOL/L (ref 95–110)
CO2 SERPL-SCNC: 22 MMOL/L (ref 23–29)
CREAT SERPL-MCNC: 1.9 MG/DL (ref 0.5–1.4)
DIFFERENTIAL METHOD: ABNORMAL
EOSINOPHIL # BLD AUTO: 0.5 K/UL (ref 0–0.5)
EOSINOPHIL NFR BLD: 6.5 % (ref 0–8)
ERYTHROCYTE [DISTWIDTH] IN BLOOD BY AUTOMATED COUNT: 13.1 % (ref 11.5–14.5)
EST. GFR  (AFRICAN AMERICAN): 43 ML/MIN/1.73 M^2
EST. GFR  (NON AFRICAN AMERICAN): 37.2 ML/MIN/1.73 M^2
ETHANOL SERPL-MCNC: <10 MG/DL
GLUCOSE SERPL-MCNC: 99 MG/DL (ref 70–110)
HCT VFR BLD AUTO: 39.8 % (ref 40–54)
HGB BLD-MCNC: 13.2 G/DL (ref 14–18)
IMM GRANULOCYTES # BLD AUTO: 0.05 K/UL (ref 0–0.04)
IMM GRANULOCYTES NFR BLD AUTO: 0.6 % (ref 0–0.5)
LYMPHOCYTES # BLD AUTO: 2 K/UL (ref 1–4.8)
LYMPHOCYTES NFR BLD: 26 % (ref 18–48)
MAGNESIUM SERPL-MCNC: 2.2 MG/DL (ref 1.6–2.6)
MCH RBC QN AUTO: 30.8 PG (ref 27–31)
MCHC RBC AUTO-ENTMCNC: 33.2 G/DL (ref 32–36)
MCV RBC AUTO: 93 FL (ref 82–98)
MONOCYTES # BLD AUTO: 0.9 K/UL (ref 0.3–1)
MONOCYTES NFR BLD: 10.8 % (ref 4–15)
NEUTROPHILS # BLD AUTO: 4.3 K/UL (ref 1.8–7.7)
NEUTROPHILS NFR BLD: 55.2 % (ref 38–73)
NRBC BLD-RTO: 0 /100 WBC
PLATELET # BLD AUTO: 229 K/UL (ref 150–450)
PMV BLD AUTO: 10 FL (ref 9.2–12.9)
POTASSIUM SERPL-SCNC: 4.1 MMOL/L (ref 3.5–5.1)
PROT SERPL-MCNC: 8.1 G/DL (ref 6–8.4)
RBC # BLD AUTO: 4.29 M/UL (ref 4.6–6.2)
SODIUM SERPL-SCNC: 134 MMOL/L (ref 136–145)
WBC # BLD AUTO: 7.84 K/UL (ref 3.9–12.7)

## 2022-02-08 PROCEDURE — 25000003 PHARM REV CODE 250: Mod: HCNC

## 2022-02-08 PROCEDURE — 93010 ELECTROCARDIOGRAM REPORT: CPT | Mod: HCNC,,, | Performed by: STUDENT IN AN ORGANIZED HEALTH CARE EDUCATION/TRAINING PROGRAM

## 2022-02-08 PROCEDURE — 99284 EMERGENCY DEPT VISIT MOD MDM: CPT | Mod: HCNC,25,, | Performed by: EMERGENCY MEDICINE

## 2022-02-08 PROCEDURE — 82077 ASSAY SPEC XCP UR&BREATH IA: CPT | Mod: HCNC | Performed by: EMERGENCY MEDICINE

## 2022-02-08 PROCEDURE — 99285 EMERGENCY DEPT VISIT HI MDM: CPT | Mod: 25,HCNC

## 2022-02-08 PROCEDURE — 80053 COMPREHEN METABOLIC PANEL: CPT | Mod: HCNC | Performed by: EMERGENCY MEDICINE

## 2022-02-08 PROCEDURE — 93010 EKG 12-LEAD: ICD-10-PCS | Mod: HCNC,,, | Performed by: STUDENT IN AN ORGANIZED HEALTH CARE EDUCATION/TRAINING PROGRAM

## 2022-02-08 PROCEDURE — 96361 HYDRATE IV INFUSION ADD-ON: CPT | Mod: HCNC,59

## 2022-02-08 PROCEDURE — 93005 ELECTROCARDIOGRAM TRACING: CPT | Mod: HCNC,59

## 2022-02-08 PROCEDURE — 99284 PR EMERGENCY DEPT VISIT,LEVEL IV: ICD-10-PCS | Mod: HCNC,25,, | Performed by: EMERGENCY MEDICINE

## 2022-02-08 PROCEDURE — 85025 COMPLETE CBC W/AUTO DIFF WBC: CPT | Mod: HCNC | Performed by: EMERGENCY MEDICINE

## 2022-02-08 PROCEDURE — 96360 HYDRATION IV INFUSION INIT: CPT | Mod: HCNC

## 2022-02-08 PROCEDURE — 12013 PR RESUPERF WND FACE 2.6-5 CM: ICD-10-PCS | Mod: ,,, | Performed by: EMERGENCY MEDICINE

## 2022-02-08 PROCEDURE — 25000003 PHARM REV CODE 250: Mod: HCNC | Performed by: EMERGENCY MEDICINE

## 2022-02-08 PROCEDURE — 12013 RPR F/E/E/N/L/M 2.6-5.0 CM: CPT | Mod: ,,, | Performed by: EMERGENCY MEDICINE

## 2022-02-08 PROCEDURE — 83735 ASSAY OF MAGNESIUM: CPT | Mod: HCNC | Performed by: EMERGENCY MEDICINE

## 2022-02-08 PROCEDURE — 12013 RPR F/E/E/N/L/M 2.6-5.0 CM: CPT | Mod: HCNC

## 2022-02-08 RX ORDER — LIDOCAINE HYDROCHLORIDE 10 MG/ML
INJECTION INFILTRATION; PERINEURAL
Status: COMPLETED
Start: 2022-02-08 | End: 2022-02-08

## 2022-02-08 RX ORDER — LIDOCAINE HYDROCHLORIDE AND EPINEPHRINE 10; 10 MG/ML; UG/ML
10 INJECTION, SOLUTION INFILTRATION; PERINEURAL ONCE
Status: DISCONTINUED | OUTPATIENT
Start: 2022-02-08 | End: 2022-02-08 | Stop reason: HOSPADM

## 2022-02-08 RX ADMIN — LIDOCAINE HYDROCHLORIDE: 10 INJECTION, SOLUTION INFILTRATION; PERINEURAL at 08:02

## 2022-02-08 RX ADMIN — SODIUM CHLORIDE 1000 ML: 0.9 INJECTION, SOLUTION INTRAVENOUS at 05:02

## 2022-02-08 NOTE — ED PROVIDER NOTES
"Encounter Date: 2/8/2022       History     Chief Complaint   Patient presents with    Fall     Lac to left eyebrow, fell on hard floor at cafe in [place]. Pt admits to having 3 [employer]'s with lunch. "I feel sleepy"     Pt is a 60 yo M with PMH of bipolar do, GERD, HTN, HLD, chronic back pain who presents after a fall when going from sitting to standing at a restaurant. Pt took his home meds and then drank 3 beers. He reports he can get lightheaded and dizzy after taking his medication even without drinking beer. Pt reports that he did not lose consciousness. He did hit his head and has a laceration to his left forehead. He denies weakness, numbness, vision changes, neck pain, other injuries. He has been able to ambulate since this occurred. He denies being on anti-coagulation.  He is uptodate on tetanus-2019.    The history is provided by the patient.     Review of patient's allergies indicates:   Allergen Reactions    Amitriptyline Other (See Comments) and Hallucinations     confusion    Hydrocodone      Other reaction(s): Hallucinations    Hydrocodone-acetaminophen      Other reaction(s): hyperactivity    Oxycodone Other (See Comments)     hallucinations    Pseudoephedrine      Other reaction(s): Hallucinations    Pseudoephedrine hcl      Other reaction(s): hyperactivity    Risperidone      Other reaction(s): Hallucinations  Other reaction(s): hyperactivity    Trazodone      Adverse reaction    Naproxen      Past Medical History:   Diagnosis Date    Bipolar disorder     manic depressive x 1992    BPH (benign prostatic hypertrophy)     Chronic low back pain     Depression     GERD (gastroesophageal reflux disease)     Gout     Gout, arthritis     Hyperlipidemia     Hypertension     Lumbar disc disease     Obesity     Panic disorder      Past Surgical History:   Procedure Laterality Date    HERNIA REPAIR      HUMERUS FRACTURE SURGERY  1971    Bone grafts required    MANDIBLE FRACTURE " "SURGERY      MUSCLE TRANSFER UPPER ARM      SHOULDER ARTHROSCOPY      SHOULDER SURGERY      TONSILLECTOMY       Family History   Problem Relation Age of Onset    Diabetes Mother     Breast cancer Mother     Marfan syndrome Daughter      Social History     Tobacco Use    Smoking status: Never Smoker    Smokeless tobacco: Current User     Types: Snuff    Tobacco comment: Patient uses "dip" tobacco   Substance Use Topics    Alcohol use: No     Alcohol/week: 0.0 standard drinks     Comment: Heavy alcohol use in the past.     Drug use: No     Review of Systems  General: No fever.  No chills.  Head: No headache.  No loss of consciousness or amnesia.  Neck: No neck pain.  Back: No back pain.  Extremities: No extremity pain.  Chest: No shortness of breath.  No chest pain.  Cardiovascular: No palpitations.  Abdomen: No abdominal pain.  No nausea or vomiting.  Integument: No rashes or bruising.  Eyes: No visual changes.  Urinary: No hematuria.  Neurologic: No numbness.  No focal weakness.    Physical Exam     Initial Vitals [02/08/22 1709]   BP Pulse Resp Temp SpO2   (!) 100/55 63 18 97.9 °F (36.6 °C) 95 %      MAP       --         Physical Exam    Nursing note and vitals reviewed.  Constitutional: He appears well-developed and well-nourished. He is not diaphoretic. No distress.   HENT:   Head: Normocephalic.   3 cm laceration to left forehead, linear, clean   Eyes: Conjunctivae and EOM are normal.   Neck: Neck supple.   Normal range of motion.  Cardiovascular: Normal rate and regular rhythm.   Pulmonary/Chest: No respiratory distress.   Abdominal: Abdomen is soft. He exhibits no distension. There is no abdominal tenderness.   Musculoskeletal:         General: Normal range of motion.      Cervical back: Normal range of motion and neck supple.     Neurological: He is alert and oriented to person, place, and time. He has normal strength. No sensory deficit. GCS score is 15. GCS eye subscore is 4. GCS verbal subscore " is 5. GCS motor subscore is 6.   Skin: Skin is warm and dry.   Psychiatric: He has a normal mood and affect. His behavior is normal. Judgment and thought content normal.         ED Course   Lac Repair    Date/Time: 2/8/2022 8:40 PM  Performed by: Sonal Levy MD  Authorized by: Sonal Levy MD     Consent:     Consent obtained:  Verbal    Risks discussed:  Pain and poor cosmetic result    Alternatives discussed:  No treatment  Universal protocol:     Patient identity confirmed:  Verbally with patient  Anesthesia:     Anesthesia method:  Local infiltration    Local anesthetic:  Lidocaine 1% w/o epi  Laceration details:     Location:  Face    Face location:  Forehead    Length (cm):  3  Pre-procedure details:     Preparation:  Patient was prepped and draped in usual sterile fashion and imaging obtained to evaluate for foreign bodies  Exploration:     Limited defect created (wound extended): no      Hemostasis achieved with:  Direct pressure    Imaging outcome: foreign body not noted      Wound exploration: wound explored through full range of motion and entire depth of wound visualized      Contaminated: no    Treatment:     Area cleansed with:  Saline    Amount of cleaning:  Standard    Irrigation solution:  Sterile saline    Irrigation volume:  20    Irrigation method:  Syringe    Visualized foreign bodies/material removed: no      Debridement:  None    Undermining:  None    Scar revision: no    Skin repair:     Repair method:  Sutures    Suture size:  5-0    Suture material:  Prolene    Suture technique:  Simple interrupted    Number of sutures:  6  Approximation:     Approximation:  Close  Repair type:     Repair type:  Simple  Post-procedure details:     Dressing:  Open (no dressing)    Procedure completion:  Tolerated well, no immediate complications      Labs Reviewed   CBC W/ AUTO DIFFERENTIAL - Abnormal; Notable for the following components:       Result Value    RBC 4.29 (*)      Hemoglobin 13.2 (*)     Hematocrit 39.8 (*)     Immature Granulocytes 0.6 (*)     Immature Grans (Abs) 0.05 (*)     All other components within normal limits   COMPREHENSIVE METABOLIC PANEL - Abnormal; Notable for the following components:    Sodium 134 (*)     CO2 22 (*)     Creatinine 1.9 (*)     eGFR if  43.0 (*)     eGFR if non  37.2 (*)     All other components within normal limits   MAGNESIUM   ALCOHOL,MEDICAL (ETHANOL)     EKG Readings: (Independently Interpreted)   Initial Reading: No STEMI. Previous EKG: Compared with most recent EKG Previous EKG Date: 8/15/2020.   18:22 sinus bradycardia with first degree block rate of 57 LBBB compared with most recent ekg no significant change       Imaging Results          CT Head Without Contrast (Final result)  Result time 02/08/22 19:20:35    Final result by Jonah Nelson MD (02/08/22 19:20:35)                 Impression:      No acute intracranial hemorrhage or large territory recent infarction.    Empty sella configuration, chronic.    Extensive chronic appearing paranasal sinus disease, worse from 2015.    Electronically signed by resident: Lizy Timmons  Date:    02/08/2022  Time:    19:09    Electronically signed by: Jonah Nelson MD  Date:    02/08/2022  Time:    19:20             Narrative:    EXAMINATION:  CT HEAD WITHOUT CONTRAST    CLINICAL HISTORY:  Head trauma, moderate-severe;    TECHNIQUE:  Low dose axial CT images obtained throughout the head without the use of intravenous contrast.  Axial, sagittal, and coronal reconstructions were performed.    COMPARISON:  CT head 06/30/2015.    FINDINGS:  Intracranial compartment:    Mild diffuse cerebral volume loss with compensatory sulcal and ventricular enlargement.  No evidence of hydrocephalus.    Empty sella configuration, unchanged.  No parenchymal mass, hemorrhage, edema, or major vascular distribution infarct.    No extra-axial blood or fluid  collections.    Skull/extracranial contents (limited evaluation):    No fracture.    Extensive, total and near-total opacification with patchy areas of hyperostosis of the bilateral maxillary sinuses, sphenoid sinuses, ethmoid air cells, and frontal sinuses, significantly worse from 2015 study.  Mastoid air cells are essentially clear.  Imaged portions of the orbits are within normal limits.                                 Medications   sodium chloride 0.9% bolus 1,000 mL (0 mLs Intravenous Stopped 2/8/22 1948)   LIDOcaine HCL 10 mg/ml (1%) 10 mg/mL (1 %) injection (  Given 2/8/22 2000)     Medical Decision Making:   History:   Old Medical Records: I decided to obtain old medical records.  Differential Diagnosis:   Laceration, ICH, concussion, medication side effect, orthostatic hypotension, cardiac syncope  Clinical Tests:   Lab Tests: Reviewed and Ordered  Radiological Study: Ordered and Reviewed  Medical Tests: Ordered and Reviewed  ED Management:  Patient feeling well, ambulating well  Had beer right after taking medication   Had a mechanical fall, laceration to forehead, repaired  CT head negative  EKG unchanged from prior  Plan for discharge             ED Course as of 02/09/22 0043   Tue Feb 08, 2022   1855 Negative for orthostatic hypotension [GK]      ED Course User Index  [GK] Sonal Levy MD             Clinical Impression:   Final diagnoses:  [S09.90XA] Head injury  [T50.905A] Medication side effect, initial encounter (Primary)  [S01.81XA] Laceration of forehead, initial encounter          ED Disposition Condition    Discharge Stable        ED Prescriptions     None        Follow-up Information     Follow up With Specialties Details Why Contact Info    Mayur Handy MD Family Medicine   [address] [facility] [address]  [phone]      [facility] Emergency Dept Emergency Medicine  As needed, If symptoms worsen [address] [facility] [address] [phone]            Sonal Levy MD  02/09/22 0043

## 2022-02-09 ENCOUNTER — TELEPHONE (OUTPATIENT)
Dept: INTERNAL MEDICINE | Facility: CLINIC | Age: 62
End: 2022-02-09
Payer: MEDICAID

## 2022-02-09 NOTE — ED NOTES
Updated on: 4/26/2019 12:41 PM    Name Date Status Dose VIS Date Route Site  Lot# Given By Verified By   TDAP 4/9/2019 Given 0.5 mL -- -- -- -- -- -- --   Exp. Date NDC # Product Time Location External Comment   -- -- -- 12:00 AM -- -- --   Updated by: Jonah Harrison MD

## 2022-02-09 NOTE — DISCHARGE INSTRUCTIONS
Keep your wound clean and dry.  Wash twice a day and apply Bacitracin 2-3 times a day.      You will need your sutures removed in 5-7 days. You can return here or go to your primary doctor to have them removed.    Seek immediate medical attention if you have pus from your wound, fever, increasing redness, severe pain, or for any other concern.    Rest and drink water when you get home. Be very careful when going from sitting to standing. Cross your ankles, pause and see how you are feeling before standing up.

## 2022-02-09 NOTE — TELEPHONE ENCOUNTER
----- Message from Joanie Tenorio sent at 2/9/2022  3:35 PM CST -----  Contact: ranjeet brooks wife [phone]  Patient is returning a phone call.  Who left a message for the patient: Lily Gaffney MA   Does patient know what this is regarding:  yes  Would you like a call back, or a response through your [facility] portal?:   call back  Comments:

## 2022-02-17 ENCOUNTER — OFFICE VISIT (OUTPATIENT)
Dept: INTERNAL MEDICINE | Facility: CLINIC | Age: 62
End: 2022-02-17
Payer: MEDICARE

## 2022-02-17 VITALS
WEIGHT: 289.81 LBS | SYSTOLIC BLOOD PRESSURE: 126 MMHG | BODY MASS INDEX: 37.19 KG/M2 | HEIGHT: 74 IN | OXYGEN SATURATION: 96 % | DIASTOLIC BLOOD PRESSURE: 80 MMHG | HEART RATE: 74 BPM

## 2022-02-17 DIAGNOSIS — E78.5 HYPERLIPIDEMIA, UNSPECIFIED HYPERLIPIDEMIA TYPE: Chronic | ICD-10-CM

## 2022-02-17 DIAGNOSIS — F31.9 BIPOLAR AFFECTIVE DISORDER, REMISSION STATUS UNSPECIFIED: Chronic | ICD-10-CM

## 2022-02-17 DIAGNOSIS — F41.0 PANIC DISORDER: Chronic | ICD-10-CM

## 2022-02-17 DIAGNOSIS — E66.01 SEVERE OBESITY (BMI 35.0-39.9) WITH COMORBIDITY: Chronic | ICD-10-CM

## 2022-02-17 DIAGNOSIS — I10 ESSENTIAL HYPERTENSION: Chronic | ICD-10-CM

## 2022-02-17 DIAGNOSIS — Z48.02 VISIT FOR SUTURE REMOVAL: Primary | ICD-10-CM

## 2022-02-17 PROCEDURE — 1160F PR REVIEW ALL MEDS BY PRESCRIBER/CLIN PHARMACIST DOCUMENTED: ICD-10-PCS | Mod: HCNC,CPTII,S$GLB, | Performed by: PHYSICIAN ASSISTANT

## 2022-02-17 PROCEDURE — 1160F RVW MEDS BY RX/DR IN RCRD: CPT | Mod: HCNC,CPTII,S$GLB, | Performed by: PHYSICIAN ASSISTANT

## 2022-02-17 PROCEDURE — 3074F PR MOST RECENT SYSTOLIC BLOOD PRESSURE < 130 MM HG: ICD-10-PCS | Mod: HCNC,CPTII,S$GLB, | Performed by: PHYSICIAN ASSISTANT

## 2022-02-17 PROCEDURE — 3079F DIAST BP 80-89 MM HG: CPT | Mod: HCNC,CPTII,S$GLB, | Performed by: PHYSICIAN ASSISTANT

## 2022-02-17 PROCEDURE — 99214 PR OFFICE/OUTPT VISIT, EST, LEVL IV, 30-39 MIN: ICD-10-PCS | Mod: HCNC,S$GLB,, | Performed by: PHYSICIAN ASSISTANT

## 2022-02-17 PROCEDURE — 99999 PR PBB SHADOW E&M-EST. PATIENT-LVL V: CPT | Mod: PBBFAC,HCNC,, | Performed by: PHYSICIAN ASSISTANT

## 2022-02-17 PROCEDURE — 99499 RISK ADDL DX/OHS AUDIT: ICD-10-PCS | Mod: S$GLB,,, | Performed by: PHYSICIAN ASSISTANT

## 2022-02-17 PROCEDURE — 3008F PR BODY MASS INDEX (BMI) DOCUMENTED: ICD-10-PCS | Mod: HCNC,CPTII,S$GLB, | Performed by: PHYSICIAN ASSISTANT

## 2022-02-17 PROCEDURE — 1159F PR MEDICATION LIST DOCUMENTED IN MEDICAL RECORD: ICD-10-PCS | Mod: HCNC,CPTII,S$GLB, | Performed by: PHYSICIAN ASSISTANT

## 2022-02-17 PROCEDURE — 3079F PR MOST RECENT DIASTOLIC BLOOD PRESSURE 80-89 MM HG: ICD-10-PCS | Mod: HCNC,CPTII,S$GLB, | Performed by: PHYSICIAN ASSISTANT

## 2022-02-17 PROCEDURE — 3008F BODY MASS INDEX DOCD: CPT | Mod: HCNC,CPTII,S$GLB, | Performed by: PHYSICIAN ASSISTANT

## 2022-02-17 PROCEDURE — 99499 UNLISTED E&M SERVICE: CPT | Mod: S$GLB,,, | Performed by: PHYSICIAN ASSISTANT

## 2022-02-17 PROCEDURE — 99214 OFFICE O/P EST MOD 30 MIN: CPT | Mod: HCNC,S$GLB,, | Performed by: PHYSICIAN ASSISTANT

## 2022-02-17 PROCEDURE — 99999 PR PBB SHADOW E&M-EST. PATIENT-LVL V: ICD-10-PCS | Mod: PBBFAC,HCNC,, | Performed by: PHYSICIAN ASSISTANT

## 2022-02-17 PROCEDURE — 3074F SYST BP LT 130 MM HG: CPT | Mod: HCNC,CPTII,S$GLB, | Performed by: PHYSICIAN ASSISTANT

## 2022-02-17 PROCEDURE — 1159F MED LIST DOCD IN RCRD: CPT | Mod: HCNC,CPTII,S$GLB, | Performed by: PHYSICIAN ASSISTANT

## 2022-02-17 RX ORDER — MUPIROCIN 20 MG/G
OINTMENT TOPICAL 2 TIMES DAILY
Qty: 44 G | Refills: 0 | Status: SHIPPED | OUTPATIENT
Start: 2022-02-17 | End: 2022-05-24

## 2022-02-17 NOTE — PROGRESS NOTES
Subjective:       Patient ID: Abel Dc III is a 61 y.o. male.        Chief Complaint: Suture / Staple Removal    Abel Dc III is an established patient of Mayur Handy MD here today for urgent care visit.    He was in the ED 2/8/22 due to a fall where he struck his head on left side, just above eyebrow.  He had just taken all of his medication, then drank 3 beers.  When he stood quickly from the table, he got LH and dizzy, fell, and hit his left forehead to the table.  He had a CT scan in the ED with no bleeding.  Laceration was repaired.      He has felt fine since discharge.  No headache.  No N/V.  No confusion.    He has 6 sutures in place.    Tetanus vaccine was 2019.    He has HTN controlled with norvasc 10 mg, lipids controlled with pravastatin 40 mg.    He takes prilosec for reflux.    He has CKD3.    Gout controlled with allopurinol.      LORETO and BPAD followed by psych.           Review of Systems   Constitutional: Negative for appetite change, chills, fatigue and fever.   HENT: Negative for congestion and sore throat.    Eyes: Negative for visual disturbance.   Respiratory: Negative for cough, chest tightness and shortness of breath.    Cardiovascular: Negative for chest pain, palpitations and leg swelling.   Gastrointestinal: Negative for abdominal pain, blood in stool, constipation, diarrhea, nausea and vomiting.   Genitourinary: Negative for dysuria, frequency, hematuria and urgency.   Musculoskeletal: Negative for arthralgias and back pain.   Skin: Positive for wound. Negative for rash.   Neurological: Negative for dizziness, syncope, weakness and headaches.   Psychiatric/Behavioral: Negative for dysphoric mood and sleep disturbance. The patient is not nervous/anxious.        Objective:      Physical Exam  Vitals and nursing note reviewed.   Constitutional:       Appearance: He is well-developed and well-nourished.   HENT:      Head: Normocephalic.        Right Ear: External ear normal.       Left Ear: External ear normal.      Mouth/Throat:      Mouth: Oropharynx is clear and moist.   Eyes:      Pupils: Pupils are equal, round, and reactive to light.   Cardiovascular:      Rate and Rhythm: Normal rate and regular rhythm.      Heart sounds: Normal heart sounds. No murmur heard.  No friction rub. No gallop.    Pulmonary:      Effort: Pulmonary effort is normal. No respiratory distress.      Breath sounds: Normal breath sounds.   Abdominal:      Palpations: Abdomen is soft.      Tenderness: There is no abdominal tenderness. There is no CVA tenderness.   Musculoskeletal:         General: No edema.   Skin:     General: Skin is warm and dry.   Neurological:      Mental Status: He is alert.   Psychiatric:         Mood and Affect: Mood and affect normal.         Assessment:       1. Visit for suture removal    2. Essential hypertension    3. Hyperlipidemia, unspecified hyperlipidemia type    4. Severe obesity (BMI 35.0-39.9) with comorbidity    5. Bipolar affective disorder, remission status unspecified    6. Panic disorder        Plan:       Abel was seen today for suture / staple removal.    Diagnoses and all orders for this visit:    Visit for suture removal - 6 sutures removed, patient tolerated well  -     mupirocin (BACTROBAN) 2 % ointment; Apply topically 2 (two) times daily.    Essential hypertension - stable and controlled  BP Readings from Last 5 Encounters:   02/17/22 126/80   02/08/22 135/60   10/11/21 (!) 146/88   08/02/21 125/73   05/03/21 138/62      Hyperlipidemia, unspecified hyperlipidemia type - stable and controlled  Lab Results   Component Value Date    CHOL 164 08/02/2021    TRIG 136 08/02/2021    HDL 37 (L) 08/02/2021    LDLCALC 99.8 08/02/2021     Severe obesity (BMI 35.0-39.9) with comorbidity - work on weight loss, healthy diet    Bipolar affective disorder, remission status unspecified - followed by psych    Panic disorder    Pt has been given instructions populated from [facility]  "database and has verbalized understanding of the after visit summary and information contained wherein.    Follow up with a primary care provider. May go to ER for acute shortness of breath, lightheadedness, fever, or any other emergent complaints or changes in condition.    "This note will be shared with the patient"    No future appointments.              "

## 2022-02-17 NOTE — PATIENT INSTRUCTIONS
Patient Education       Stitches Removal   Why is this procedure done?   Stitches are also called sutures. Stitches close a skin cut or wound. There are two kinds of stitches. Some stitches need to be taken out. Others melt away or dissolve as the wound heals.  The doctor takes most stitches out within 7 to 10 days after they are put in. The amount of time that the stitches stay in depends on how bad the cut is and where the cut is on your body. Stitches that stay in too long may cause scars, and may be hard to take out. If the doctor takes the stitches out too soon, the cut may open up again.       What happens during the procedure?   The doctor cleans the skin. The doctor uses special tools to  the knot at the end of the stitch. The doctor raises it away from the skin to show a small part of the stitch. Then, the doctor uses very sharp small scissors or a sharp knife blade to cut the stitch. Then, the doctor can pull the stitch out of the skin. This is repeated until all the stitches are taken out. The doctor cleans the cut and may put small strips or a special sticky tape over the cut to protect it.  What care is needed at home?   · Leave the sticky strips over the cut until they fall off or as directed by your doctor.  · Talk to your doctor about how to care for your wound. Ask your doctor about:  ? When you should change your bandages  ? When you may take a bath or shower  ? If you need to be careful with lifting things over 10 pounds (4.5 kg)  ? When you may go back to your normal activities like work or driving  ? How to protect your cut from the sun  · Be sure to wash your hands before and after touching your wound or dressing.  · Protect the cut site from being reinjured.  · Certain health problems like high blood sugar or long-term steroid use may affect healing. Make sure you take all drugs as ordered by your doctor.  · Do not pull on or pick at the stitches or sticky tape.  What follow-up care is  needed?   Your doctor may ask you to make visits to the office to check on your progress. Be sure to keep these visits.  What problems could happen?   · Infection  · Cut site reopens  · Scarring  · Large, firm scar tissue forms. This is a keloid and is more often seen in -Americans.  When do I need to call the doctor?   · Signs of infection. These include a fever of 100.4°F (38°C) or higher, chills, wound that will not heal.  · Signs of wound infection. These include swelling, redness, warmth around the wound; too much pain when touched; yellowish, greenish, or bloody discharge; foul smell coming from the cut site; cut site opens up.  Teach Back: Helping You Understand   The Teach Back Method helps you understand the information we are giving you. After you talk with the staff, tell them in your own words what you learned. This helps to make sure the staff has described each thing clearly. It also helps to explain things that may have been confusing. Before going home, make sure you can do these:  · I can tell you about my procedure.  · I can tell you how to care for my cut site.  · I can tell you what I will do if I have a fever or swelling, redness, or drainage from my wound.  Where can I learn more?   N[facility] Choices  https://www.nhs.uk/common-health-questions/accidents-first-aid-and-treatments/how-should-i-care-for-my-stitches/   Last Reviewed Date   2019-08-09  Consumer Information Use and Disclaimer   This information is not specific medical advice and does not replace information you receive from your health care provider. This is only a brief summary of general information. It does NOT include all information about conditions, illnesses, injuries, tests, procedures, treatments, therapies, discharge instructions or life-style choices that may apply to you. You must talk with your health care provider for complete information about your health and treatment options. This information should not be used to  decide whether or not to accept your health care providers advice, instructions or recommendations. Only your health care provider has the knowledge and training to provide advice that is right for you.  Copyright   Copyright © 2021 [employer] and its affiliates and/or licensors. All rights reserved.

## 2022-04-04 ENCOUNTER — PATIENT MESSAGE (OUTPATIENT)
Dept: INTERNAL MEDICINE | Facility: CLINIC | Age: 62
End: 2022-04-04
Payer: COMMERCIAL

## 2022-04-05 NOTE — TELEPHONE ENCOUNTER
Yes, I haven't seen him since August but he's seen some other providers since. If he wants to set up an appt with me, that's fine. Help him schedule. No override.

## 2022-05-10 ENCOUNTER — OFFICE VISIT (OUTPATIENT)
Dept: INTERNAL MEDICINE | Facility: CLINIC | Age: 62
End: 2022-05-10
Payer: COMMERCIAL

## 2022-05-10 VITALS
SYSTOLIC BLOOD PRESSURE: 128 MMHG | DIASTOLIC BLOOD PRESSURE: 82 MMHG | OXYGEN SATURATION: 96 % | HEART RATE: 74 BPM | BODY MASS INDEX: 37.55 KG/M2 | WEIGHT: 292.56 LBS | HEIGHT: 74 IN

## 2022-05-10 DIAGNOSIS — J06.9 UPPER RESPIRATORY TRACT INFECTION, UNSPECIFIED TYPE: Primary | ICD-10-CM

## 2022-05-10 DIAGNOSIS — R05.9 COUGH: ICD-10-CM

## 2022-05-10 LAB
INFLUENZA A, MOLECULAR: NEGATIVE
INFLUENZA B, MOLECULAR: NEGATIVE
SPECIMEN SOURCE: NORMAL

## 2022-05-10 PROCEDURE — U0005 INFEC AGEN DETEC AMPLI PROBE: HCPCS | Performed by: STUDENT IN AN ORGANIZED HEALTH CARE EDUCATION/TRAINING PROGRAM

## 2022-05-10 PROCEDURE — 99999 PR PBB SHADOW E&M-EST. PATIENT-LVL IV: CPT | Mod: PBBFAC,GC,, | Performed by: STUDENT IN AN ORGANIZED HEALTH CARE EDUCATION/TRAINING PROGRAM

## 2022-05-10 PROCEDURE — 99214 OFFICE O/P EST MOD 30 MIN: CPT | Mod: PBBFAC | Performed by: STUDENT IN AN ORGANIZED HEALTH CARE EDUCATION/TRAINING PROGRAM

## 2022-05-10 PROCEDURE — 87502 INFLUENZA DNA AMP PROBE: CPT | Performed by: STUDENT IN AN ORGANIZED HEALTH CARE EDUCATION/TRAINING PROGRAM

## 2022-05-10 PROCEDURE — 99999 PR PBB SHADOW E&M-EST. PATIENT-LVL IV: ICD-10-PCS | Mod: PBBFAC,GC,, | Performed by: STUDENT IN AN ORGANIZED HEALTH CARE EDUCATION/TRAINING PROGRAM

## 2022-05-10 PROCEDURE — 99214 OFFICE O/P EST MOD 30 MIN: CPT | Mod: GC,S$GLB,, | Performed by: STUDENT IN AN ORGANIZED HEALTH CARE EDUCATION/TRAINING PROGRAM

## 2022-05-10 PROCEDURE — 99214 PR OFFICE/OUTPT VISIT, EST, LEVL IV, 30-39 MIN: ICD-10-PCS | Mod: GC,S$GLB,, | Performed by: STUDENT IN AN ORGANIZED HEALTH CARE EDUCATION/TRAINING PROGRAM

## 2022-05-10 PROCEDURE — U0003 INFECTIOUS AGENT DETECTION BY NUCLEIC ACID (DNA OR RNA); SEVERE ACUTE RESPIRATORY SYNDROME CORONAVIRUS 2 (SARS-COV-2) (CORONAVIRUS DISEASE [COVID-19]), AMPLIFIED PROBE TECHNIQUE, MAKING USE OF HIGH THROUGHPUT TECHNOLOGIES AS DESCRIBED BY CMS-2020-01-R: HCPCS | Performed by: STUDENT IN AN ORGANIZED HEALTH CARE EDUCATION/TRAINING PROGRAM

## 2022-05-10 RX ORDER — BENZONATATE 100 MG/1
100 CAPSULE ORAL 3 TIMES DAILY PRN
Qty: 30 CAPSULE | Refills: 0 | Status: SHIPPED | OUTPATIENT
Start: 2022-05-10 | End: 2022-05-20

## 2022-05-10 NOTE — PROGRESS NOTES
Name: Abel Dc III  : 1960  Date of Service: 05/10/2022     Reason for visit:   Chief Complaint   Patient presents with    Cough    Nasal Congestion     HPI:   Mr. Dc is a 61 year-old male with HTN, HLD, CKD 3b, peripheral neuropathy, gout, obesity, and bipolar disorder presenting for an urgent care evaluation of several symptoms. His wife has had URI sx for the past 10 days and for the past 4 days he has had a productive cough with sneezing. Denies associated fevers, chills, night sweats, body aches, chest pain, palpitations, shortness of breath, sinus congestion, sore throat, changes in bowel/bladder habits. He has been taking flonase and claritin with some relief and had tessalon perles yesterday with a significant relief in his cough.    His wife has been his only known ill contact. She started feeling sick around 10 days ago with fevers, chills, productive cough, sinus tenderness, and body aches. Last week when her symptoms started she got tested for covid and influenza and was negative and has been trying to manage her symptoms supportively but still feels ill.     PMH:   Past Medical History:   Diagnosis Date    Bipolar disorder     manic depressive x     BPH (benign prostatic hypertrophy)     Chronic low back pain     Depression     GERD (gastroesophageal reflux disease)     Gout     Gout, arthritis     Hyperlipidemia     Hypertension     Lumbar disc disease     Obesity     Panic disorder      Surgical History:   Past Surgical History:   Procedure Laterality Date    HERNIA REPAIR      HUMERUS FRACTURE SURGERY  1971    Bone grafts required    MANDIBLE FRACTURE SURGERY      MUSCLE TRANSFER UPPER ARM      SHOULDER ARTHROSCOPY      SHOULDER SURGERY      TONSILLECTOMY       Allergies:   Review of patient's allergies indicates:   Allergen Reactions    Amitriptyline Other (See Comments) and Hallucinations     confusion    Hydrocodone      Other reaction(s):  Hallucinations    Hydrocodone-acetaminophen      Other reaction(s): hyperactivity    Oxycodone Other (See Comments)     hallucinations    Pseudoephedrine      Other reaction(s): Hallucinations    Pseudoephedrine hcl      Other reaction(s): hyperactivity    Risperidone      Other reaction(s): Hallucinations  Other reaction(s): hyperactivity    Trazodone      Adverse reaction    Naproxen      Medications:     Current Outpatient Medications:     allopurinoL (ZYLOPRIM) 100 MG tablet, Take 2 tablets (200 mg total) by mouth once daily., Disp: 180 tablet, Rfl: 3    amLODIPine (NORVASC) 10 MG tablet, Take 1 tablet (10 mg total) by mouth once daily., Disp: 90 tablet, Rfl: 3    aspirin 81 mg Tab, Take 1 tablet by mouth., Disp: , Rfl:     busPIRone (BUSPAR) 15 MG tablet, Take 15 mg by mouth 3 (three) times daily. , Disp: , Rfl: 4    chlorhexidine (PERIDEX) 0.12 % solution, RINSE WITH 2 PRUDENCIO TID, Disp: , Rfl: 1    cholecalciferol, vitamin D3, 2,000 unit Cap, Take 1 capsule by mouth once daily., Disp: , Rfl:     clotrimazole (LOTRIMIN) 1 % cream, APPLY TO THE AFFECTED AREA EVERY DAY, Disp: , Rfl:     fluorouraciL (EFUDEX) 5 % cream, APPLY TO THE AFFECTED AREA EVERY DAY FOR 2 TO 4 WEEKS, Disp: , Rfl:     FLUoxetine 40 MG capsule, Take 40 mg by mouth once daily., Disp: , Rfl: 5    fluticasone propionate (FLONASE) 50 mcg/actuation nasal spray, USE 1 SPRAY IN EACH NOSTRIL EVERY DAY, Disp: 48 g, Rfl: 2    gabapentin (NEURONTIN) 300 MG capsule, Take 1 capsule (300 mg total) by mouth 3 (three) times daily., Disp: 270 capsule, Rfl: 1    lidocaine HCl 2% (XYLOCAINE) 2 % Soln, 15 mLs by Transmucosal route., Disp: , Rfl:     mupirocin (BACTROBAN) 2 % ointment, Apply topically 2 (two) times daily., Disp: 44 g, Rfl: 0    omeprazole (PRILOSEC) 20 MG capsule, TAKE ONE CAPSULE BY MOUTH ONCE DAILY, Disp: 90 capsule, Rfl: 3    OXcarbazepine (TRILEPTAL) 300 MG Tab, TK 2 TS PO QD, Disp: , Rfl: 3    pravastatin (PRAVACHOL) 40  "MG tablet, TAKE 1 TABLET BY MOUTH EVERY EVENING, Disp: 90 tablet, Rfl: 3    propranoloL (INDERAL) 40 MG tablet, Take 1 tablet (40 mg total) by mouth 2 (two) times a day., Disp: 180 tablet, Rfl: 3    quetiapine (SEROQUEL) 50 MG tablet, TK 1 T PO TID, Disp: , Rfl: 0    Family History:   Family History   Problem Relation Age of Onset    Diabetes Mother     Breast cancer Mother     Marfan syndrome Daughter      Social History:   Social History     Socioeconomic History    Marital status:    Occupational History    Occupation: disability for manic-depressive state   Tobacco Use    Smoking status: Never Smoker    Smokeless tobacco: Current User     Types: Snuff    Tobacco comment: Patient uses "dip" tobacco   Substance and Sexual Activity    Alcohol use: No     Alcohol/week: 0.0 standard drinks     Comment: Heavy alcohol use in the past.     Drug use: No    Sexual activity: Yes     Partners: Female   Social History Narrative    No longer working, is on disability     Review of Systems:   Review of Systems   Constitutional: Negative for chills, fever and malaise/fatigue.   HENT: Negative for congestion, ear pain and sore throat.    Respiratory: Positive for cough and sputum production. Negative for shortness of breath.    Cardiovascular: Negative for chest pain, orthopnea, claudication and leg swelling.   Gastrointestinal: Negative for constipation, diarrhea, nausea and vomiting.   Genitourinary: Negative for dysuria and frequency.   Musculoskeletal: Positive for back pain (chronic low back pain). Negative for joint pain and myalgias.   Skin: Negative for itching and rash.   Neurological: Negative for weakness and headaches.     Vitals:   Vitals:    05/10/22 1349   BP: 128/82   BP Location: Right arm   Patient Position: Sitting   BP Method: Large (Manual)   Pulse: 74   SpO2: 96%   Weight: 132.7 kg (292 lb 8.8 oz)   Height: 6' 2" (1.88 m)     Body mass index is 37.56 kg/m².    Physical Exam:   Physical " Exam  Constitutional:       General: He is not in acute distress.     Appearance: Normal appearance. He is obese. He is not ill-appearing, toxic-appearing or diaphoretic.   HENT:      Head: Normocephalic and atraumatic.      Nose: No congestion or rhinorrhea.      Mouth/Throat:      Pharynx: No oropharyngeal exudate or posterior oropharyngeal erythema.      Comments: No exudates or erythema  Eyes:      General: No scleral icterus.  Cardiovascular:      Rate and Rhythm: Normal rate and regular rhythm.   Pulmonary:      Effort: Pulmonary effort is normal.      Breath sounds: Normal breath sounds.      Comments: Sitting upright, speaking in clear and complete sentences  Abdominal:      General: Bowel sounds are normal. There is no distension.      Tenderness: There is no abdominal tenderness.   Musculoskeletal:         General: No swelling.      Cervical back: Normal range of motion. No rigidity.      Right lower leg: No edema.      Left lower leg: No edema.   Lymphadenopathy:      Cervical: No cervical adenopathy.   Skin:     General: Skin is warm.      Coloration: Skin is not jaundiced.   Neurological:      General: No focal deficit present.      Mental Status: He is alert and oriented to person, place, and time.      Cranial Nerves: No cranial nerve deficit.      Motor: No weakness.       Assessment/Plan:   Mr. Dc is a 61 year-old male with  HTN, HLD, CKD 3b, peripheral neuropathy, gout, obesity, and bipolar disorder presenting to clinic for urgent care evaluation of four days of a productive cough with a known positive ill contact.     -Suspect sx are related to a viral URI. Has audible/clear breath sounds throughout all lung fields and no warning sx.   -Sent covid and influenza tests  -Sent tessalon perles  -Discussed warning/alarm symptoms with the patient.     Discussed with Dr. Mayur Egan MD  Internal Medicine PGY-III  [facility]    Preceptor note:    Patient's  history and physical discussed, please refer to resident physician's note for specific details. Check swabs, considering virus cause. Wife similar symptoms. Consider treatment of covid such as antibody infusion.   I agree with resident's assessment and plan.      Mayur Handy  Staff Physician  [facility]

## 2022-05-11 LAB
SARS-COV-2 RNA RESP QL NAA+PROBE: NOT DETECTED
SARS-COV-2- CYCLE NUMBER: NORMAL

## 2022-05-18 ENCOUNTER — PATIENT MESSAGE (OUTPATIENT)
Dept: INTERNAL MEDICINE | Facility: CLINIC | Age: 62
End: 2022-05-18
Payer: COMMERCIAL

## 2022-05-19 ENCOUNTER — PES CALL (OUTPATIENT)
Dept: ADMINISTRATIVE | Facility: CLINIC | Age: 62
End: 2022-05-19
Payer: COMMERCIAL

## 2022-05-24 ENCOUNTER — OFFICE VISIT (OUTPATIENT)
Dept: INTERNAL MEDICINE | Facility: CLINIC | Age: 62
End: 2022-05-24
Payer: COMMERCIAL

## 2022-05-24 VITALS
SYSTOLIC BLOOD PRESSURE: 134 MMHG | DIASTOLIC BLOOD PRESSURE: 80 MMHG | HEIGHT: 74 IN | WEIGHT: 288.13 LBS | BODY MASS INDEX: 36.98 KG/M2 | OXYGEN SATURATION: 97 % | HEART RATE: 59 BPM

## 2022-05-24 DIAGNOSIS — M51.36 DDD (DEGENERATIVE DISC DISEASE), LUMBAR: Primary | ICD-10-CM

## 2022-05-24 DIAGNOSIS — Z12.11 ENCOUNTER FOR COLORECTAL CANCER SCREENING: ICD-10-CM

## 2022-05-24 DIAGNOSIS — N18.32 STAGE 3B CHRONIC KIDNEY DISEASE: ICD-10-CM

## 2022-05-24 DIAGNOSIS — Z12.12 ENCOUNTER FOR COLORECTAL CANCER SCREENING: ICD-10-CM

## 2022-05-24 DIAGNOSIS — Z79.899 ENCOUNTER FOR LONG-TERM (CURRENT) USE OF OTHER MEDICATIONS: ICD-10-CM

## 2022-05-24 DIAGNOSIS — F31.9 BIPOLAR AFFECTIVE DISORDER, REMISSION STATUS UNSPECIFIED: Chronic | ICD-10-CM

## 2022-05-24 DIAGNOSIS — F17.290 NICOTINE DEPENDENCE, OTHER TOBACCO PRODUCT, UNCOMPLICATED: ICD-10-CM

## 2022-05-24 DIAGNOSIS — I10 ESSENTIAL HYPERTENSION: ICD-10-CM

## 2022-05-24 DIAGNOSIS — E66.01 SEVERE OBESITY (BMI 35.0-39.9) WITH COMORBIDITY: ICD-10-CM

## 2022-05-24 PROCEDURE — 99999 PR PBB SHADOW E&M-EST. PATIENT-LVL III: CPT | Mod: PBBFAC,,, | Performed by: FAMILY MEDICINE

## 2022-05-24 PROCEDURE — 99214 OFFICE O/P EST MOD 30 MIN: CPT | Mod: S$GLB,,, | Performed by: FAMILY MEDICINE

## 2022-05-24 PROCEDURE — 99999 PR PBB SHADOW E&M-EST. PATIENT-LVL III: ICD-10-PCS | Mod: PBBFAC,,, | Performed by: FAMILY MEDICINE

## 2022-05-24 PROCEDURE — 99214 PR OFFICE/OUTPT VISIT, EST, LEVL IV, 30-39 MIN: ICD-10-PCS | Mod: S$GLB,,, | Performed by: FAMILY MEDICINE

## 2022-05-24 NOTE — PROGRESS NOTES
Subjective:       Patient ID: Abel Dc III is a 61 y.o. male.    Chief Complaint: Follow-up    HPI    Abel Dc III is a 61 y.o. male PMHx HTN, HLD, GERD, CKD, OA, Gout, BPH for checkup.    Wheezing in chest, coughing productive of phlegm. Sent portal msg requesting chest xray.   Seen in res clinic 2wks ago for similar symptoms. covid and flu neg at that time.   This has gradually improved by time of appt today.     Chronic low back pain and left hip pain. Discussed phys therapy with pt and says that has done in mult times in past without relief.     #Cards: HTN, HLD  - reg: Norvasc 10 qd; Pravastatin 40 qd     #GI: GERD  - reg: Prilosec 20 qd  - tries to limit triggers     #Nephro: CKD3b     #Neuro: Peripheral neuropathy  - reg: Gabapentin 800 (1qam, 1qpm, 2 qhs) -- discussed cutting back on this given kidney disease     #Ortho: OA Lt knee, Chronic low back pain  - est w/ Dr. Salgado,  7/2021     #Rheum: Gout  - reg: Allopurinol 100 2tabs qhs     #Uro: BPH     #Psych: LORETO, Bipolar  - est w/ Dr. Jese Alvarado, sees q3m  - reg: Propranolol 40 bid, Buspar 15 tid, Prozac 40 qd, Trileptal 300 bid, Seroquel 50 qd     #BMI 37     #Social:  - dips tobacco chronically, no teeth    Review of Systems   Constitutional: Negative for chills and fever.   Respiratory: Negative for cough and shortness of breath.    Cardiovascular: Negative for chest pain.   Musculoskeletal: Positive for arthralgias and back pain.         Past Medical History:   Diagnosis Date    Bipolar disorder     manic depressive x 1992    BPH (benign prostatic hypertrophy)     Chronic low back pain     Depression     GERD (gastroesophageal reflux disease)     Gout     Gout, arthritis     Hyperlipidemia     Hypertension     Lumbar disc disease     Obesity     Panic disorder         Prior to Admission medications    Medication Sig Start Date End Date Taking? Authorizing Provider   allopurinoL (ZYLOPRIM) 100 MG tablet Take 2 tablets (200 mg  total) by mouth once daily. 12/13/21   Mayur Handy MD   amLODIPine (NORVASC) 10 MG tablet Take 1 tablet (10 mg total) by mouth once daily. 12/15/21   Mayur Handy MD   aspirin 81 mg Tab Take 1 tablet by mouth. 6/21/12   Historical Provider   busPIRone (BUSPAR) 15 MG tablet Take 15 mg by mouth 3 (three) times daily.  3/10/16   Historical Provider   chlorhexidine (PERIDEX) 0.12 % solution RINSE WITH 2 PRUDENCIO TID 8/24/18   Historical Provider   cholecalciferol, vitamin D3, 2,000 unit Cap Take 1 capsule by mouth once daily.    Historical Provider   clotrimazole (LOTRIMIN) 1 % cream APPLY TO THE AFFECTED AREA EVERY DAY 2/12/21   Historical Provider   fluorouraciL (EFUDEX) 5 % cream APPLY TO THE AFFECTED AREA EVERY DAY FOR 2 TO 4 WEEKS 2/23/21   Historical Provider   FLUoxetine 40 MG capsule Take 40 mg by mouth once daily. 3/20/19   Historical Provider   fluticasone propionate (FLONASE) 50 mcg/actuation nasal spray USE 1 SPRAY IN EACH NOSTRIL EVERY DAY 12/8/21   Mayur Handy MD   gabapentin (NEURONTIN) 300 MG capsule TAKE 1 CAPSULE(300 MG) BY MOUTH THREE TIMES DAILY 5/16/22   Ellen Mcintyre PA-C   lidocaine HCl 2% (XYLOCAINE) 2 % Soln 15 mLs by Transmucosal route. 12/7/18   Historical Provider   mupirocin (BACTROBAN) 2 % ointment Apply topically 2 (two) times daily. 2/17/22   Haylie Carreno PA-C   omeprazole (PRILOSEC) 20 MG capsule TAKE ONE CAPSULE BY MOUTH ONCE DAILY 8/26/21   Mayur Handy MD   OXcarbazepine (TRILEPTAL) 300 MG Tab TK 2 TS PO QD 11/21/18   Historical Provider   pravastatin (PRAVACHOL) 40 MG tablet TAKE 1 TABLET BY MOUTH EVERY EVENING 7/29/21   Mayur Handy MD   propranoloL (INDERAL) 40 MG tablet Take 1 tablet (40 mg total) by mouth 2 (two) times a day. 1/27/22   Mayur Handy MD   quetiapine (SEROQUEL) 50 MG tablet TK 1 T PO TID 5/25/16   Historical Provider        Past medical history, surgical history, and family medical history reviewed and updated as  "appropriate.    Medications and allergies reviewed.     Objective:          Vitals:    05/24/22 0804   BP: 134/80   BP Location: Left arm   Patient Position: Sitting   BP Method: Medium (Manual)   Pulse: (!) 59   SpO2: 97%   Weight: 130.7 kg (288 lb 2.3 oz)   Height: 6' 2" (1.88 m)     Body mass index is 37 kg/m².  Physical Exam  Vitals and nursing note reviewed.   Constitutional:       General: He is not in acute distress.     Appearance: He is not ill-appearing, toxic-appearing or diaphoretic.   Pulmonary:      Effort: Pulmonary effort is normal. No respiratory distress.   Neurological:      Mental Status: He is alert and oriented to person, place, and time.   Psychiatric:         Mood and Affect: Mood normal.         Behavior: Behavior normal.         Lab Results   Component Value Date    WBC 7.84 02/08/2022    HGB 13.2 (L) 02/08/2022    HCT 39.8 (L) 02/08/2022     02/08/2022    CHOL 164 08/02/2021    TRIG 136 08/02/2021    HDL 37 (L) 08/02/2021    ALT 25 02/08/2022    AST 32 02/08/2022     (L) 02/08/2022    K 4.1 02/08/2022     02/08/2022    CREATININE 1.9 (H) 02/08/2022    BUN 17 02/08/2022    CO2 22 (L) 02/08/2022    TSH 1.128 08/02/2021    PSA 0.47 08/02/2021    INR 0.9 08/01/2020    HGBA1C 5.4 08/02/2021       Assessment:       1. DDD (degenerative disc disease), lumbar    2. Encounter for colorectal cancer screening    3. Encounter for long-term (current) use of other medications    4. Severe obesity (BMI 35.0-39.9) with comorbidity    5. Essential hypertension    6. Bipolar affective disorder, remission status unspecified    7. Stage 3b chronic kidney disease    8. Nicotine dependence, other tobacco product, uncomplicated          Plan:     Problem List Items Addressed This Visit        Neuro    DDD (degenerative disc disease), lumbar - Primary    Overview     Chronic, persistent issue. Takes tylenol prn. Has met with ortho and neuro in the past. Mri ordered but not obtained. Will add on " pain referral              Psychiatric    Bipolar disorder (Chronic)    Overview     Sees outside Psychiatrist at [place], Dr Jese Alvarado Jr..              Cardiac/Vascular    Essential hypertension    Overview     bp controlled today, cont curr reg.              Renal/    Stage 3b chronic kidney disease    Overview     Needs to cut down on Gabapentin use  Limit NSAIDs and other nephrotoxic medicines.              Endocrine    Severe obesity (BMI 35.0-39.9) with comorbidity       Other    Encounter for long-term (current) use of other medications    Nicotine dependence, other tobacco product, uncomplicated      Other Visit Diagnoses     Encounter for colorectal cancer screening            [facility] reviewed with patient.     Follow up in about 3 months (around 8/24/2022) for Annual.    Mayur Handy MD  Family Medicine / Primary Care  [facility]  5/24/2022

## 2022-07-07 DIAGNOSIS — E78.5 HYPERLIPIDEMIA: ICD-10-CM

## 2022-07-07 RX ORDER — PRAVASTATIN SODIUM 40 MG/1
TABLET ORAL
Qty: 90 TABLET | Refills: 0 | Status: SHIPPED | OUTPATIENT
Start: 2022-07-07 | End: 2022-07-20

## 2022-07-07 NOTE — TELEPHONE ENCOUNTER
Refill Authorization Note   Abel Dc  is requesting a refill authorization.  Brief Assessment and Rationale for Refill:  Approve     Medication Therapy Plan:       Medication Reconciliation Completed: No   Comments:     No Care Gaps recommended.     Note composed:5:19 PM 07/07/2022

## 2022-07-07 NOTE — TELEPHONE ENCOUNTER
No new care gaps identified.  [facility] Embedded Care Gaps. Reference number: [id number]. 7/07/2022   3:07:17 PM CDT

## 2022-07-11 ENCOUNTER — PATIENT MESSAGE (OUTPATIENT)
Dept: INTERNAL MEDICINE | Facility: CLINIC | Age: 62
End: 2022-07-11
Payer: COMMERCIAL

## 2022-07-11 DIAGNOSIS — G62.9 PERIPHERAL POLYNEUROPATHY: ICD-10-CM

## 2022-07-11 RX ORDER — GABAPENTIN 300 MG/1
300 CAPSULE ORAL 3 TIMES DAILY
Qty: 90 CAPSULE | Refills: 3 | Status: CANCELLED | OUTPATIENT
Start: 2022-07-11

## 2022-07-11 NOTE — TELEPHONE ENCOUNTER
No new care gaps identified.  [facility] Embedded Care Gaps. Reference number: [id number]. 7/11/2022   11:35:52 AM TANNAT

## 2022-07-13 ENCOUNTER — PATIENT MESSAGE (OUTPATIENT)
Dept: INTERNAL MEDICINE | Facility: CLINIC | Age: 62
End: 2022-07-13
Payer: COMMERCIAL

## 2022-07-14 ENCOUNTER — TELEPHONE (OUTPATIENT)
Dept: INTERNAL MEDICINE | Facility: CLINIC | Age: 62
End: 2022-07-14
Payer: COMMERCIAL

## 2022-07-14 ENCOUNTER — PATIENT MESSAGE (OUTPATIENT)
Dept: INTERNAL MEDICINE | Facility: CLINIC | Age: 62
End: 2022-07-14
Payer: COMMERCIAL

## 2022-07-14 NOTE — TELEPHONE ENCOUNTER
----- Message from Yennifer Zapata sent at 7/14/2022 12:55 PM CDT -----  Contact: [phone]  Pt would like a call from the office. Pt said he has been leaving messages and nobody has called him back about Pt was wondering if he could get a prescription for Cymbalta for Neuropothy in his feet. The Gabapentine 300 mg three times a day does not seem to work for the pt.

## 2022-07-16 ENCOUNTER — TELEPHONE (OUTPATIENT)
Dept: FAMILY MEDICINE | Facility: CLINIC | Age: 62
End: 2022-07-16
Payer: COMMERCIAL

## 2022-07-16 NOTE — TELEPHONE ENCOUNTER
Pt stated in he does not want the Gabapentin it is NOT working and is asking if he can have a Rx for Cymbalta ?

## 2022-07-16 NOTE — TELEPHONE ENCOUNTER
----- Message from Yvonne Liu sent at 7/15/2022  1:16 PM CDT -----  Regarding: pt called  Name of Who is Calling: RICHA GUERRERO III [[id number]]      What is the request in detail: pt is transferring from the [facility]. He is requesting to be seen today. Pt has chills, stomach ache, and feels clammy. Please advise       Can the clinic reply by [facility]: No      [facility] [phone]

## 2022-07-18 ENCOUNTER — TELEPHONE (OUTPATIENT)
Dept: INTERNAL MEDICINE | Facility: CLINIC | Age: 62
End: 2022-07-18
Payer: COMMERCIAL

## 2022-07-18 NOTE — TELEPHONE ENCOUNTER
Spoke with pt. Pt states he feels better. Went to urgent care and tested neg for covid twice.. he also is trying to get his gabapentin refill which was sent on 7/7 pharm needs prior authorization.

## 2022-07-18 NOTE — TELEPHONE ENCOUNTER
----- Message from Codi Cobos sent at 7/15/2022  4:55 PM CDT -----  Contact: [facility]  Patient Message: I have chills, sweats, very tired     Appointment date requested if any: 07/16/2022      Additional information: Please contact pt to schedule at [phone].

## 2022-07-20 DIAGNOSIS — E78.5 HYPERLIPIDEMIA: ICD-10-CM

## 2022-07-20 RX ORDER — PRAVASTATIN SODIUM 40 MG/1
TABLET ORAL
Qty: 90 TABLET | Refills: 0 | Status: SHIPPED | OUTPATIENT
Start: 2022-07-20 | End: 2022-09-07 | Stop reason: SDUPTHER

## 2022-07-20 NOTE — TELEPHONE ENCOUNTER
Refill Decision Note   Abel Dc  is requesting a refill authorization.  Brief Assessment and Rationale for Refill:  Approve    -Medication-Related Problems Identified: Requires labs  Medication Therapy Plan:       Medication Reconciliation Completed: No   Comments:     Provider Staff:     Action is required for this patient.   Please see care gap opportunities below in Care Due Message.     Thanks!  [facility]     Appointments      Date Provider   Last Visit   5/24/2022 Mayur Handy MD   Next Visit   Visit date not found Mayur Handy MD     Note composed:12:26 PM 07/20/2022           Note composed:12:26 PM 07/20/2022

## 2022-07-20 NOTE — TELEPHONE ENCOUNTER
Spoke to pt and he is willing to go to Neuro but he needs a referral.  Transferred him to scheduling for pain clinic appt.  He will get the Neurontin but it is not available until 07/23 and that Rx will only last him 30 days. He will look for these services on the [facility].

## 2022-07-20 NOTE — TELEPHONE ENCOUNTER
Care Due:                  Date            Visit Type   Department     Provider  --------------------------------------------------------------------------------                                EP -                              PRIMARY      [facility] INTERNAL  Last Visit: 05-      CARE ([facility])   MEDICINE       Mayur Handy  Next Visit: None Scheduled  None         None Found                                                            Last  Test          Frequency    Reason                     Performed    Due Date  --------------------------------------------------------------------------------    Lipid Panel.  12 months..  pravastatin..............  08- 07-    [facility] Embedded Care Gaps. Reference number: [id number]. 7/20/2022   6:17:10 AM CDT

## 2022-08-08 ENCOUNTER — TELEPHONE (OUTPATIENT)
Dept: INTERNAL MEDICINE | Facility: CLINIC | Age: 62
End: 2022-08-08
Payer: COMMERCIAL

## 2022-08-18 ENCOUNTER — TELEPHONE (OUTPATIENT)
Dept: INTERNAL MEDICINE | Facility: CLINIC | Age: 62
End: 2022-08-18
Payer: COMMERCIAL

## 2022-08-18 ENCOUNTER — NURSE TRIAGE (OUTPATIENT)
Dept: ADMINISTRATIVE | Facility: CLINIC | Age: 62
End: 2022-08-18
Payer: COMMERCIAL

## 2022-08-18 NOTE — TELEPHONE ENCOUNTER
"    Reason for Disposition   MILD difficulty breathing (e.g., minimal/no SOB at rest, SOB with walking, pulse < 100) of new-onset or worse than normal    Additional Information   Negative: SEVERE difficulty breathing (e.g., struggling for each breath, speaks in single words, pulse > 120)   Negative: Breathing stopped and hasn't returned   Negative: Choking on something   Negative: Bluish (or gray) lips or face   Negative: Difficult to awaken or acting confused (e.g., disoriented, slurred speech)   Negative: Passed out (i.e., fainted, collapsed and was not responding)   Negative: Wheezing started suddenly after medicine, an allergic food, or bee sting   Negative: Stridor   Negative: Slow, shallow and weak breathing   Negative: Sounds like a life-threatening emergency to the triager   Negative: Chest pain   Negative: Wheezing (high pitched whistling sound) and previous asthma attacks or use of asthma medicines   Negative: Difficulty breathing and within 14 days of COVID-19 Exposure   Negative: Difficulty breathing and only present when coughing   Negative: Difficulty breathing and only from stuffy nose   Negative: Difficulty breathing and only from stuffy nose or runny nose from common cold   Negative: MODERATE difficulty breathing (e.g., speaks in phrases, SOB even at rest, pulse 100-120) of new-onset or worse than normal   Negative: Wheezing can be heard across the room   Negative: Drooling or spitting out saliva (because can't swallow)   Negative: Any history of prior "blood clot" in leg or lungs   Negative: Illness requiring prolonged bedrest in past month (e.g., immobilization, long hospital stay)   Negative: Hip or leg fracture (broken bone) in past month (or had cast on leg or ankle in past month)   Negative: Major surgery in the past month   Negative: Long-distance travel in past month (e.g., car, bus, train, plane; with trip lasting 6 or more hours)   Negative: Cancer treatment in " "past six months (or has cancer now)   Negative: Extra heart beats OR irregular heart beating (i.e., "palpitations")   Negative: Fever > 103 F (39.4 C)   Negative: Fever > 101 F (38.3 C) and over 60 years of age   Negative: Fever > 100.0 F (37.8 C) and bedridden (e.g., nursing home patient, stroke, chronic illness, recovering from surgery)   Negative: Fever > 100.0 F (37.8 C) and diabetes mellitus or weak immune system (e.g., HIV positive, cancer chemo, splenectomy, organ transplant, chronic steroids)   Negative: Periods where breathing stops and then resumes normally and bedridden (e.g., nursing home patient, CVA)   Negative: Pregnant or postpartum (from 0 to 6 weeks after delivery)   Negative: Patient sounds very sick or weak to the triager    Protocols used: BREATHING DIFFICULTY-A-OH    Pt stated he takes Seroquel 50 mg tid for bipolar. Stated he feels light headed and dizzy when he stands since taking the medication. Pt advised to discuss the side effects with his psychiatrist.     Pt stated he wants to see  A doctor today because for the last two months he has sob when walking. Pt advised to see a MD in the office today. Placed pt on hold to find an appointment. No appointments were available.     Pt stated his PCP's office called while he was on hold and set him an appointment two days from now with another Provider. Pt advised to go to Urgent Care today to see a MD due to new onset sob when walking or go to the ED. Pt verbalized understanding.  "

## 2022-08-18 NOTE — TELEPHONE ENCOUNTER
----- Message from Yajaira Olivarez sent at 8/18/2022 10:46 AM CDT -----  Contact: [phone]  Pt called to advise that he has been having issues with his breathing with standing suddenly. Pt says he noticed it about two months ago and would like to be seen as soon as possible. Please Advise

## 2022-08-23 ENCOUNTER — HOSPITAL ENCOUNTER (OUTPATIENT)
Dept: RADIOLOGY | Facility: HOSPITAL | Age: 62
Discharge: HOME | End: 2022-08-23
Attending: PHYSICIAN ASSISTANT
Payer: COMMERCIAL

## 2022-08-23 ENCOUNTER — OFFICE VISIT (OUTPATIENT)
Dept: INTERNAL MEDICINE | Facility: CLINIC | Age: 62
End: 2022-08-23
Payer: COMMERCIAL

## 2022-08-23 VITALS
HEIGHT: 74 IN | SYSTOLIC BLOOD PRESSURE: 120 MMHG | DIASTOLIC BLOOD PRESSURE: 78 MMHG | BODY MASS INDEX: 36.07 KG/M2 | WEIGHT: 281.06 LBS | OXYGEN SATURATION: 96 % | HEART RATE: 70 BPM

## 2022-08-23 DIAGNOSIS — R06.09 DOE (DYSPNEA ON EXERTION): Primary | ICD-10-CM

## 2022-08-23 DIAGNOSIS — Z12.5 PROSTATE CANCER SCREENING: ICD-10-CM

## 2022-08-23 DIAGNOSIS — R06.09 DOE (DYSPNEA ON EXERTION): ICD-10-CM

## 2022-08-23 DIAGNOSIS — E78.5 HYPERLIPIDEMIA, UNSPECIFIED HYPERLIPIDEMIA TYPE: ICD-10-CM

## 2022-08-23 PROCEDURE — 99214 OFFICE O/P EST MOD 30 MIN: CPT | Mod: PBBFAC,25 | Performed by: PHYSICIAN ASSISTANT

## 2022-08-23 PROCEDURE — 3008F PR BODY MASS INDEX (BMI) DOCUMENTED: ICD-10-PCS | Mod: CPTII,S$GLB,, | Performed by: PHYSICIAN ASSISTANT

## 2022-08-23 PROCEDURE — 71046 X-RAY EXAM CHEST 2 VIEWS: CPT | Mod: TC

## 2022-08-23 PROCEDURE — 1160F RVW MEDS BY RX/DR IN RCRD: CPT | Mod: CPTII,S$GLB,, | Performed by: PHYSICIAN ASSISTANT

## 2022-08-23 PROCEDURE — 3078F PR MOST RECENT DIASTOLIC BLOOD PRESSURE < 80 MM HG: ICD-10-PCS | Mod: CPTII,S$GLB,, | Performed by: PHYSICIAN ASSISTANT

## 2022-08-23 PROCEDURE — 71046 XR CHEST PA AND LATERAL: ICD-10-PCS | Mod: 26,,, | Performed by: RADIOLOGY

## 2022-08-23 PROCEDURE — 99999 PR PBB SHADOW E&M-EST. PATIENT-LVL IV: ICD-10-PCS | Mod: PBBFAC,,, | Performed by: PHYSICIAN ASSISTANT

## 2022-08-23 PROCEDURE — 3074F PR MOST RECENT SYSTOLIC BLOOD PRESSURE < 130 MM HG: ICD-10-PCS | Mod: CPTII,S$GLB,, | Performed by: PHYSICIAN ASSISTANT

## 2022-08-23 PROCEDURE — 1159F PR MEDICATION LIST DOCUMENTED IN MEDICAL RECORD: ICD-10-PCS | Mod: CPTII,S$GLB,, | Performed by: PHYSICIAN ASSISTANT

## 2022-08-23 PROCEDURE — 3078F DIAST BP <80 MM HG: CPT | Mod: CPTII,S$GLB,, | Performed by: PHYSICIAN ASSISTANT

## 2022-08-23 PROCEDURE — 1160F PR REVIEW ALL MEDS BY PRESCRIBER/CLIN PHARMACIST DOCUMENTED: ICD-10-PCS | Mod: CPTII,S$GLB,, | Performed by: PHYSICIAN ASSISTANT

## 2022-08-23 PROCEDURE — 3008F BODY MASS INDEX DOCD: CPT | Mod: CPTII,S$GLB,, | Performed by: PHYSICIAN ASSISTANT

## 2022-08-23 PROCEDURE — 99999 PR PBB SHADOW E&M-EST. PATIENT-LVL IV: CPT | Mod: PBBFAC,,, | Performed by: PHYSICIAN ASSISTANT

## 2022-08-23 PROCEDURE — 1159F MED LIST DOCD IN RCRD: CPT | Mod: CPTII,S$GLB,, | Performed by: PHYSICIAN ASSISTANT

## 2022-08-23 PROCEDURE — 99214 OFFICE O/P EST MOD 30 MIN: CPT | Mod: S$GLB,,, | Performed by: PHYSICIAN ASSISTANT

## 2022-08-23 PROCEDURE — 99214 PR OFFICE/OUTPT VISIT, EST, LEVL IV, 30-39 MIN: ICD-10-PCS | Mod: S$GLB,,, | Performed by: PHYSICIAN ASSISTANT

## 2022-08-23 PROCEDURE — 3074F SYST BP LT 130 MM HG: CPT | Mod: CPTII,S$GLB,, | Performed by: PHYSICIAN ASSISTANT

## 2022-08-23 PROCEDURE — 71046 X-RAY EXAM CHEST 2 VIEWS: CPT | Mod: 26,,, | Performed by: RADIOLOGY

## 2022-08-23 NOTE — PROGRESS NOTES
"Subjective:       Patient ID: Abel Dc III is a 61 y.o. male.    Chief Complaint: Shortness of Breath    HPI     Established pt of Mayur Handy MD (new to me)    Here with concerns of horton for the past 2 months,having to stop and catch his breath often. No cp, cough, wheezing, edema or orthopnea. Dips tobacco for 50yrs. Never smoker.     Had stress test in 2013 for similar problems which was negative.     Concerns of side effects of meds dizziness, specifically since taking Seroquel when he stands up, brief a few secs. Happened years ago with same med but higher dose. He has discussed with outside psychiatrist. Plans to possible make med adjustments.         Past Medical History:   Diagnosis Date    Bipolar disorder     manic depressive x 1992    BPH (benign prostatic hypertrophy)     Chronic low back pain     Depression     GERD (gastroesophageal reflux disease)     Gout     Gout, arthritis     Hyperlipidemia     Hypertension     Lumbar disc disease     Obesity     Panic disorder      Social History     Tobacco Use    Smoking status: Never Smoker    Smokeless tobacco: Current User     Types: Snuff    Tobacco comment: Patient uses "dip" tobacco   Substance Use Topics    Alcohol use: No     Alcohol/week: 0.0 standard drinks     Comment: Heavy alcohol use in the past.     Drug use: No     Review of patient's allergies indicates:   Allergen Reactions    Amitriptyline Other (See Comments) and Hallucinations     confusion    Hydrocodone      Other reaction(s): Hallucinations    Hydrocodone-acetaminophen      Other reaction(s): hyperactivity    Oxycodone Other (See Comments)     hallucinations    Pseudoephedrine      Other reaction(s): Hallucinations    Pseudoephedrine hcl      Other reaction(s): hyperactivity    Risperidone      Other reaction(s): Hallucinations  Other reaction(s): hyperactivity    Trazodone      Adverse reaction    Naproxen        Review of Systems   Constitutional: " "Negative for chills, fever and unexpected weight change.   Respiratory: Positive for shortness of breath. Negative for cough.    Cardiovascular: Negative for chest pain and leg swelling.   Gastrointestinal: Negative for abdominal pain, nausea and vomiting.   Integumentary:  Negative for rash.   Neurological: Negative for weakness and headaches.         Objective: /78 (BP Location: Right arm, Patient Position: Sitting, BP Method: Large (Manual))   Pulse 70   Ht 6' 2" (1.88 m)   Wt 127.5 kg (281 lb 1.4 oz)   SpO2 96%   BMI 36.09 kg/m²         Physical Exam  Vitals reviewed.   Constitutional:       General: He is not in acute distress.     Appearance: He is well-developed.   HENT:      Head: Normocephalic and atraumatic.   Cardiovascular:      Rate and Rhythm: Normal rate and regular rhythm.      Heart sounds: No murmur heard.  Pulmonary:      Effort: Pulmonary effort is normal.      Breath sounds: Normal breath sounds. No wheezing or rales.   Abdominal:      General: Bowel sounds are normal.      Palpations: Abdomen is soft.      Tenderness: There is no abdominal tenderness.   Musculoskeletal:      Right lower leg: No edema.      Left lower leg: No edema.   Skin:     General: Skin is warm and dry.      Findings: No rash.   Neurological:      Mental Status: He is alert.         Assessment:       Problem List Items Addressed This Visit    None     Visit Diagnoses     ABEL (dyspnea on exertion)    -  Primary    Prostate cancer screening              Plan:         Abel was seen today for shortness of breath.    Diagnoses and all orders for this visit:    ABEL (dyspnea on exertion)  -     X-Ray Chest PA And Lateral; Future  -     CBC Auto Differential; Future  -     Comprehensive Metabolic Panel; Future  -     Stress Echo Which stress agent will be used? Treadmill Exercise; Color Flow Doppler? No; Future    Prostate cancer screening  -     PSA, Screening; Future    Hyperlipidemia, unspecified hyperlipidemia " type  -     Lipid Panel; Future        Lab, imaging and stress test as above for eval of horton  Further recs to follow pending results  ED precautions discussed  Keep f/u with PCP    Future Appointments   Date Time Provider Department Center   8/24/2022 11:00 AM [facility]  Mario [facility]   8/29/2022  1:30 PM EXERCISE, STRESS ECHO [facility] ECHOSTR Mario Neves   9/7/2022  1:20 PM Mayur Handy MD [facility] Mario MILLER

## 2022-08-24 ENCOUNTER — PATIENT MESSAGE (OUTPATIENT)
Dept: INTERNAL MEDICINE | Facility: CLINIC | Age: 62
End: 2022-08-24
Payer: COMMERCIAL

## 2022-08-24 ENCOUNTER — TELEPHONE (OUTPATIENT)
Dept: UROLOGY | Facility: CLINIC | Age: 62
End: 2022-08-24
Payer: COMMERCIAL

## 2022-08-24 ENCOUNTER — LAB VISIT (OUTPATIENT)
Dept: LAB | Facility: HOSPITAL | Age: 62
End: 2022-08-24
Payer: COMMERCIAL

## 2022-08-24 DIAGNOSIS — E78.5 HYPERLIPIDEMIA, UNSPECIFIED HYPERLIPIDEMIA TYPE: ICD-10-CM

## 2022-08-24 DIAGNOSIS — R06.09 DOE (DYSPNEA ON EXERTION): Primary | ICD-10-CM

## 2022-08-24 DIAGNOSIS — Z46.6 ENCOUNTER FOR REMOVAL OF URETERAL STENT: Primary | ICD-10-CM

## 2022-08-24 DIAGNOSIS — Z12.5 PROSTATE CANCER SCREENING: ICD-10-CM

## 2022-08-24 DIAGNOSIS — R06.09 DOE (DYSPNEA ON EXERTION): ICD-10-CM

## 2022-08-24 LAB
ALBUMIN SERPL BCP-MCNC: 3.6 G/DL (ref 3.5–5.2)
ALP SERPL-CCNC: 103 U/L (ref 55–135)
ALT SERPL W/O P-5'-P-CCNC: 20 U/L (ref 10–44)
ANION GAP SERPL CALC-SCNC: 8 MMOL/L (ref 8–16)
AST SERPL-CCNC: 23 U/L (ref 10–40)
BASOPHILS # BLD AUTO: 0.08 K/UL (ref 0–0.2)
BASOPHILS NFR BLD: 0.9 % (ref 0–1.9)
BILIRUB SERPL-MCNC: 0.4 MG/DL (ref 0.1–1)
BUN SERPL-MCNC: 13 MG/DL (ref 8–23)
CALCIUM SERPL-MCNC: 9.6 MG/DL (ref 8.7–10.5)
CHLORIDE SERPL-SCNC: 103 MMOL/L (ref 95–110)
CHOLEST SERPL-MCNC: 155 MG/DL (ref 120–199)
CHOLEST/HDLC SERPL: 4.6 {RATIO} (ref 2–5)
CO2 SERPL-SCNC: 29 MMOL/L (ref 23–29)
COMPLEXED PSA SERPL-MCNC: 2.1 NG/ML (ref 0–4)
CREAT SERPL-MCNC: 1.8 MG/DL (ref 0.5–1.4)
DIFFERENTIAL METHOD: ABNORMAL
EOSINOPHIL # BLD AUTO: 0.5 K/UL (ref 0–0.5)
EOSINOPHIL NFR BLD: 5.7 % (ref 0–8)
ERYTHROCYTE [DISTWIDTH] IN BLOOD BY AUTOMATED COUNT: 13.4 % (ref 11.5–14.5)
EST. GFR  (NO RACE VARIABLE): 42.3 ML/MIN/1.73 M^2
GLUCOSE SERPL-MCNC: 98 MG/DL (ref 70–110)
HCT VFR BLD AUTO: 39.4 % (ref 40–54)
HDLC SERPL-MCNC: 34 MG/DL (ref 40–75)
HDLC SERPL: 21.9 % (ref 20–50)
HGB BLD-MCNC: 13.2 G/DL (ref 14–18)
IMM GRANULOCYTES # BLD AUTO: 0.04 K/UL (ref 0–0.04)
IMM GRANULOCYTES NFR BLD AUTO: 0.5 % (ref 0–0.5)
LDLC SERPL CALC-MCNC: 87.6 MG/DL (ref 63–159)
LYMPHOCYTES # BLD AUTO: 2.3 K/UL (ref 1–4.8)
LYMPHOCYTES NFR BLD: 27.6 % (ref 18–48)
MCH RBC QN AUTO: 30.8 PG (ref 27–31)
MCHC RBC AUTO-ENTMCNC: 33.5 G/DL (ref 32–36)
MCV RBC AUTO: 92 FL (ref 82–98)
MONOCYTES # BLD AUTO: 0.8 K/UL (ref 0.3–1)
MONOCYTES NFR BLD: 9.7 % (ref 4–15)
NEUTROPHILS # BLD AUTO: 4.7 K/UL (ref 1.8–7.7)
NEUTROPHILS NFR BLD: 55.6 % (ref 38–73)
NONHDLC SERPL-MCNC: 121 MG/DL
NRBC BLD-RTO: 0 /100 WBC
PLATELET # BLD AUTO: 258 K/UL (ref 150–450)
PMV BLD AUTO: 10.3 FL (ref 9.2–12.9)
POTASSIUM SERPL-SCNC: 4.6 MMOL/L (ref 3.5–5.1)
PROT SERPL-MCNC: 7.5 G/DL (ref 6–8.4)
RBC # BLD AUTO: 4.29 M/UL (ref 4.6–6.2)
SODIUM SERPL-SCNC: 140 MMOL/L (ref 136–145)
TRIGL SERPL-MCNC: 167 MG/DL (ref 30–150)
WBC # BLD AUTO: 8.49 K/UL (ref 3.9–12.7)

## 2022-08-24 PROCEDURE — 84153 ASSAY OF PSA TOTAL: CPT | Performed by: PHYSICIAN ASSISTANT

## 2022-08-24 PROCEDURE — 36415 COLL VENOUS BLD VENIPUNCTURE: CPT | Performed by: PHYSICIAN ASSISTANT

## 2022-08-24 PROCEDURE — 80061 LIPID PANEL: CPT | Performed by: PHYSICIAN ASSISTANT

## 2022-08-24 PROCEDURE — 85025 COMPLETE CBC W/AUTO DIFF WBC: CPT | Performed by: PHYSICIAN ASSISTANT

## 2022-08-24 PROCEDURE — 80053 COMPREHEN METABOLIC PANEL: CPT | Performed by: PHYSICIAN ASSISTANT

## 2022-08-24 NOTE — TELEPHONE ENCOUNTER
Encounter for removal of ureteral stent  -     Case Request Operating Room: REMOVAL-STENT      I am not familiar with this case and don't see any notes on this pt for urologic procedure.  Is this right?

## 2022-08-25 ENCOUNTER — TELEPHONE (OUTPATIENT)
Dept: INTERNAL MEDICINE | Facility: CLINIC | Age: 62
End: 2022-08-25

## 2022-08-31 ENCOUNTER — TELEPHONE (OUTPATIENT)
Dept: INTERNAL MEDICINE | Facility: CLINIC | Age: 62
End: 2022-08-31
Payer: COMMERCIAL

## 2022-08-31 NOTE — TELEPHONE ENCOUNTER
----- Message from Mayur Handy MD sent at 8/31/2022  9:26 AM CDT -----  Contact[facility]/[place]/[phone]  I do not.   ----- Message -----  From: Lily Gaffney MA  Sent: 8/31/2022   9:18 AM CDT  To: Mayur Handy MD      ----- Message -----  From: Eun Min  Sent: 8/31/2022   9:12 AM CDT  To: [employer] Staff    Paulette said that she is calling in regards to needing to know if Dr Handy manages pt's INR. Please advise

## 2022-09-06 ENCOUNTER — TELEPHONE (OUTPATIENT)
Dept: INTERNAL MEDICINE | Facility: CLINIC | Age: 62
End: 2022-09-06
Payer: COMMERCIAL

## 2022-09-06 NOTE — TELEPHONE ENCOUNTER
----- Message from Carlotta Bernabe sent at 9/6/2022  9:37 AM CDT -----  Contact: [facility] [employer]/[place]/  Calling back to f/u with Lily on who is going to be managing pt's INR. Please advise.

## 2022-09-07 ENCOUNTER — OFFICE VISIT (OUTPATIENT)
Dept: INTERNAL MEDICINE | Facility: CLINIC | Age: 62
End: 2022-09-07
Payer: COMMERCIAL

## 2022-09-07 VITALS
HEIGHT: 74 IN | BODY MASS INDEX: 35.96 KG/M2 | DIASTOLIC BLOOD PRESSURE: 88 MMHG | OXYGEN SATURATION: 96 % | WEIGHT: 280.19 LBS | SYSTOLIC BLOOD PRESSURE: 130 MMHG | HEART RATE: 68 BPM

## 2022-09-07 DIAGNOSIS — Z00.00 ANNUAL PHYSICAL EXAM: Primary | ICD-10-CM

## 2022-09-07 DIAGNOSIS — F41.9 ANXIETY: ICD-10-CM

## 2022-09-07 DIAGNOSIS — F17.290 NICOTINE DEPENDENCE, OTHER TOBACCO PRODUCT, UNCOMPLICATED: ICD-10-CM

## 2022-09-07 DIAGNOSIS — E78.2 MIXED HYPERLIPIDEMIA: ICD-10-CM

## 2022-09-07 DIAGNOSIS — I10 ESSENTIAL HYPERTENSION: ICD-10-CM

## 2022-09-07 DIAGNOSIS — F31.9 BIPOLAR AFFECTIVE DISORDER, REMISSION STATUS UNSPECIFIED: ICD-10-CM

## 2022-09-07 DIAGNOSIS — Z79.899 ENCOUNTER FOR LONG-TERM (CURRENT) USE OF OTHER MEDICATIONS: ICD-10-CM

## 2022-09-07 DIAGNOSIS — Z12.12 ENCOUNTER FOR COLORECTAL CANCER SCREENING: ICD-10-CM

## 2022-09-07 DIAGNOSIS — E66.01 SEVERE OBESITY (BMI 35.0-39.9) WITH COMORBIDITY: ICD-10-CM

## 2022-09-07 DIAGNOSIS — N18.32 STAGE 3B CHRONIC KIDNEY DISEASE: ICD-10-CM

## 2022-09-07 DIAGNOSIS — Z12.11 ENCOUNTER FOR COLORECTAL CANCER SCREENING: ICD-10-CM

## 2022-09-07 PROCEDURE — 3075F PR MOST RECENT SYSTOLIC BLOOD PRESS GE 130-139MM HG: ICD-10-PCS | Mod: CPTII,S$GLB,, | Performed by: FAMILY MEDICINE

## 2022-09-07 PROCEDURE — 3079F PR MOST RECENT DIASTOLIC BLOOD PRESSURE 80-89 MM HG: ICD-10-PCS | Mod: CPTII,S$GLB,, | Performed by: FAMILY MEDICINE

## 2022-09-07 PROCEDURE — 3008F PR BODY MASS INDEX (BMI) DOCUMENTED: ICD-10-PCS | Mod: CPTII,S$GLB,, | Performed by: FAMILY MEDICINE

## 2022-09-07 PROCEDURE — 1159F PR MEDICATION LIST DOCUMENTED IN MEDICAL RECORD: ICD-10-PCS | Mod: CPTII,S$GLB,, | Performed by: FAMILY MEDICINE

## 2022-09-07 PROCEDURE — 3008F BODY MASS INDEX DOCD: CPT | Mod: CPTII,S$GLB,, | Performed by: FAMILY MEDICINE

## 2022-09-07 PROCEDURE — 99396 PR PREVENTIVE VISIT,EST,40-64: ICD-10-PCS | Mod: S$GLB,,, | Performed by: FAMILY MEDICINE

## 2022-09-07 PROCEDURE — 1159F MED LIST DOCD IN RCRD: CPT | Mod: CPTII,S$GLB,, | Performed by: FAMILY MEDICINE

## 2022-09-07 PROCEDURE — 99999 PR PBB SHADOW E&M-EST. PATIENT-LVL V: CPT | Mod: PBBFAC,,, | Performed by: FAMILY MEDICINE

## 2022-09-07 PROCEDURE — 99215 OFFICE O/P EST HI 40 MIN: CPT | Mod: PBBFAC | Performed by: FAMILY MEDICINE

## 2022-09-07 PROCEDURE — 99999 PR PBB SHADOW E&M-EST. PATIENT-LVL V: ICD-10-PCS | Mod: PBBFAC,,, | Performed by: FAMILY MEDICINE

## 2022-09-07 PROCEDURE — 99396 PREV VISIT EST AGE 40-64: CPT | Mod: S$GLB,,, | Performed by: FAMILY MEDICINE

## 2022-09-07 PROCEDURE — 3079F DIAST BP 80-89 MM HG: CPT | Mod: CPTII,S$GLB,, | Performed by: FAMILY MEDICINE

## 2022-09-07 PROCEDURE — 3075F SYST BP GE 130 - 139MM HG: CPT | Mod: CPTII,S$GLB,, | Performed by: FAMILY MEDICINE

## 2022-09-07 RX ORDER — PRAVASTATIN SODIUM 40 MG/1
40 TABLET ORAL NIGHTLY
Qty: 90 TABLET | Refills: 3 | Status: SHIPPED | OUTPATIENT
Start: 2022-09-07 | End: 2023-09-10

## 2022-09-07 NOTE — PROGRESS NOTES
Subjective:       Patient ID: Abel Dc III is a 61 y.o. male.    Chief Complaint: Annual Exam    HPI    Abel Dc III is a 61 y.o. male PMHx HTN, HLD, GERD, CKD, OA, Gout, BPH for checkup.    Recent labs 8/2022     #Cards: HTN, HLD  - reg: Norvasc 10 qd; Pravastatin 40 qd     #GI: GERD  - reg: Prilosec 20 qd  - tries to limit triggers     #Nephro: CKD3b     #Neuro: Peripheral neuropathy  - est w/ Dr. Aguirre,  10/2021 - mri ordered but never obtained  - reg: Gabapentin 300 tid  (relief currently)  - *max dose of gabapentin is 900mg/d given kidney dz     #Ortho: OA Lt knee, Chronic low back pain  - est w/ Dr. Salgado,  7/2021     #Rheum: Gout  - reg: Allopurinol 100 2tabs qhs     #Uro: BPH     #Psych: LORETO, Bipolar  - est w/ Dr. Jese Alvarado, sees q3m  - reg: Propranolol 40 bid, Buspar 15 tid, Prozac 40 qd, Trileptal 300 bid, Seroquel 50 tid     #BMI 35    Review of Systems   Constitutional:  Negative for chills, fatigue and fever.   HENT:  Negative for congestion.    Respiratory:  Negative for cough and shortness of breath.    Cardiovascular:  Negative for chest pain and palpitations.   Gastrointestinal:  Negative for abdominal pain, constipation, diarrhea, nausea and vomiting.   Genitourinary:  Negative for dysuria and hematuria.   Musculoskeletal:  Negative for back pain.   Skin:  Negative for rash.   Neurological:  Negative for weakness, numbness and headaches.       Past Medical History:   Diagnosis Date    Bipolar disorder     manic depressive x 1992    BPH (benign prostatic hypertrophy)     Chronic low back pain     Depression     GERD (gastroesophageal reflux disease)     Gout     Gout, arthritis     Hyperlipidemia     Hypertension     Lumbar disc disease     Obesity     Panic disorder         Prior to Admission medications    Medication Sig Start Date End Date Taking? Authorizing Provider   allopurinoL (ZYLOPRIM) 100 MG tablet Take 2 tablets (200 mg total) by mouth once daily. 12/13/21   Mayur CAM  "MD Ole   amLODIPine (NORVASC) 10 MG tablet Take 1 tablet (10 mg total) by mouth once daily. 12/15/21   Mayur Handy MD   aspirin 81 mg Tab Take 1 tablet by mouth. 6/21/12   Historical Provider   busPIRone (BUSPAR) 15 MG tablet Take 15 mg by mouth 3 (three) times daily.  3/10/16   Historical Provider   cholecalciferol, vitamin D3, 2,000 unit Cap Take 1 capsule by mouth once daily.    Historical Provider   FLUoxetine 40 MG capsule Take 40 mg by mouth once daily. 3/20/19   Historical Provider   fluticasone propionate (FLONASE) 50 mcg/actuation nasal spray USE 1 SPRAY IN EACH NOSTRIL EVERY DAY 12/8/21   Mayur Handy MD   gabapentin (NEURONTIN) 300 MG capsule Take 1 capsule (300 mg total) by mouth 3 (three) times daily. 7/7/22   Mayur Handy MD   omeprazole (PRILOSEC) 20 MG capsule TAKE 1 CAPSULE BY MOUTH EVERY DAY 8/10/22   Mayur Handy MD   OXcarbazepine (TRILEPTAL) 300 MG Tab TK 2 TS PO QD 11/21/18   Historical Provider   pravastatin (PRAVACHOL) 40 MG tablet TAKE 1 TABLET BY MOUTH EVERY EVENING 7/20/22   Mayur Handy MD   propranoloL (INDERAL) 40 MG tablet Take 1 tablet (40 mg total) by mouth 2 (two) times a day. 1/27/22   Mayur Handy MD   quetiapine (SEROQUEL) 50 MG tablet TK 1 T PO TID 5/25/16   Historical Provider        Past medical history, surgical history, and family medical history reviewed and updated as appropriate.    Medications and allergies reviewed.     Objective:          Vitals:    09/07/22 1318   BP: 130/88   BP Location: Right arm   Patient Position: Sitting   BP Method: Medium (Manual)   Pulse: 68   SpO2: 96%   Weight: 127.1 kg (280 lb 3.3 oz)   Height: 6' 2" (1.88 m)     Body mass index is 35.98 kg/m².  Physical Exam  Vitals and nursing note reviewed.   Constitutional:       General: He is not in acute distress.     Appearance: He is obese. He is not ill-appearing.   Cardiovascular:      Rate and Rhythm: Normal rate.   Pulmonary:      Effort: Pulmonary effort is normal. " No respiratory distress.   Neurological:      Mental Status: He is alert and oriented to person, place, and time.   Psychiatric:         Mood and Affect: Mood normal.         Behavior: Behavior normal.       Lab Results   Component Value Date    WBC 8.49 08/24/2022    HGB 13.2 (L) 08/24/2022    HCT 39.4 (L) 08/24/2022     08/24/2022    CHOL 155 08/24/2022    TRIG 167 (H) 08/24/2022    HDL 34 (L) 08/24/2022    ALT 20 08/24/2022    AST 23 08/24/2022     08/24/2022    K 4.6 08/24/2022     08/24/2022    CREATININE 1.8 (H) 08/24/2022    BUN 13 08/24/2022    CO2 29 08/24/2022    TSH 1.128 08/02/2021    PSA 2.1 08/24/2022    INR 0.9 08/01/2020    HGBA1C 5.4 08/02/2021       Assessment:       1. Annual physical exam    2. Mixed hyperlipidemia    3. Encounter for long-term (current) use of other medications    4. Nicotine dependence, other tobacco product, uncomplicated    5. Stage 3b chronic kidney disease    6. Severe obesity (BMI 35.0-39.9) with comorbidity    7. Essential hypertension    8. Encounter for colorectal cancer screening    9. Bipolar affective disorder, remission status unspecified    10. Anxiety          Plan:   1. Annual physical exam    2. Mixed hyperlipidemia  -     pravastatin (PRAVACHOL) 40 MG tablet    3. Encounter for long-term (current) use of other medications    4. Nicotine dependence, other tobacco product, uncomplicated    5. Stage 3b chronic kidney disease  Overview:  Needs to cut down on Gabapentin use  Limit NSAIDs and other nephrotoxic medicines.      6. Severe obesity (BMI 35.0-39.9) with comorbidity    7. Essential hypertension  Overview:  bp controlled today, cont curr reg      8. Encounter for colorectal cancer screening  -     Ambulatory referral/consult to Endo Procedure     9. Bipolar affective disorder, remission status unspecified  Overview:  Sees outside Psychiatrist at [place], Dr Jese Alvarado Jr..    Orders:  -     Ambulatory referral/consult to  Primary Care Behavioral Health (Non-Opioids)    10. Anxiety  -     Ambulatory referral/consult to Primary Care Behavioral Health (Non-Opioids)      Health maintenance reviewed with patient.     No follow-ups on file.    Mayur Handy MD  Family Medicine / Primary Care  [facility]  9/7/2022

## 2022-09-12 DIAGNOSIS — E78.2 MIXED HYPERLIPIDEMIA: ICD-10-CM

## 2022-09-12 RX ORDER — PRAVASTATIN SODIUM 40 MG/1
TABLET ORAL
Qty: 90 TABLET | Refills: 3 | OUTPATIENT
Start: 2022-09-12

## 2022-09-12 NOTE — TELEPHONE ENCOUNTER
Refill Decision Note   Abel Dc  is requesting a refill authorization.  Brief Assessment and Rationale for Refill:  Quick Discontinue     Medication Therapy Plan:       Medication Reconciliation Completed: No   Comments:     No Care Gaps recommended.     Note composed:3:11 PM 09/12/2022

## 2022-09-12 NOTE — TELEPHONE ENCOUNTER
No new care gaps identified.  [facility] Embedded Care Gaps. Reference number: [id number]. 9/12/2022   2:39:00 PM CDT

## 2022-09-16 ENCOUNTER — PATIENT MESSAGE (OUTPATIENT)
Dept: INTERNAL MEDICINE | Facility: CLINIC | Age: 62
End: 2022-09-16
Payer: COMMERCIAL

## 2022-09-16 DIAGNOSIS — Z12.83 SKIN CANCER SCREENING: Primary | ICD-10-CM

## 2022-11-03 ENCOUNTER — PATIENT MESSAGE (OUTPATIENT)
Dept: INTERNAL MEDICINE | Facility: CLINIC | Age: 62
End: 2022-11-03
Payer: COMMERCIAL

## 2022-11-03 DIAGNOSIS — M79.676 PAIN OF TOE, UNSPECIFIED LATERALITY: Primary | ICD-10-CM

## 2022-11-05 ENCOUNTER — TELEPHONE (OUTPATIENT)
Dept: INTERNAL MEDICINE | Facility: CLINIC | Age: 62
End: 2022-11-05
Payer: COMMERCIAL

## 2022-11-08 ENCOUNTER — PES CALL (OUTPATIENT)
Dept: ADMINISTRATIVE | Facility: CLINIC | Age: 62
End: 2022-11-08
Payer: COMMERCIAL

## 2022-11-14 ENCOUNTER — PATIENT MESSAGE (OUTPATIENT)
Dept: INTERNAL MEDICINE | Facility: CLINIC | Age: 62
End: 2022-11-14
Payer: COMMERCIAL

## 2022-11-15 ENCOUNTER — PATIENT MESSAGE (OUTPATIENT)
Dept: INTERNAL MEDICINE | Facility: CLINIC | Age: 62
End: 2022-11-15
Payer: COMMERCIAL

## 2022-11-15 ENCOUNTER — TELEPHONE (OUTPATIENT)
Dept: NEUROLOGY | Facility: CLINIC | Age: 62
End: 2022-11-15
Payer: COMMERCIAL

## 2022-11-15 DIAGNOSIS — R26.2 DIFFICULTY WALKING: Primary | ICD-10-CM

## 2022-11-15 NOTE — TELEPHONE ENCOUNTER
Patient submitted an appointment request to Dr. Aguirre for dizziness. The patient wants to be seen on the [facility] and will call the [facility] to be seen by a neurologist. Patient's PCP placed a referral. Appt request to Dr. Aguirre has been canceled.

## 2022-11-16 ENCOUNTER — TELEPHONE (OUTPATIENT)
Dept: NEUROLOGY | Facility: CLINIC | Age: 62
End: 2022-11-16
Payer: COMMERCIAL

## 2022-11-16 NOTE — TELEPHONE ENCOUNTER
----- Message from Christy Trinidad LPN sent at 11/16/2022  8:57 AM CST -----  Good morning! This patient was scheduled for the wrong facility. He is requesting to be seen in your dept at your facility because it is close to his house. Can you all please help assist this patient with an available appointment. Thank you in advance for your assistance. Any questions, please give me a call at ext [phone]. Thanks Ms Harrison

## 2022-11-16 NOTE — TELEPHONE ENCOUNTER
Spoke with patient regarding scheduling an appointment for dizziness, patient is wanting to become established in the [facility] due to it being closer to home. Patient is being seen for a new problem, dizziness. Patient scheduled for January 9 at 10 am with Tameka Hutchins NP. Patient is placed on the wait list

## 2022-11-17 ENCOUNTER — OFFICE VISIT (OUTPATIENT)
Dept: PODIATRY | Facility: CLINIC | Age: 62
End: 2022-11-17
Payer: COMMERCIAL

## 2022-11-17 DIAGNOSIS — M79.2 NEURITIS: Primary | ICD-10-CM

## 2022-11-17 PROCEDURE — 99999 PR PBB SHADOW E&M-EST. PATIENT-LVL III: CPT | Mod: PBBFAC,,, | Performed by: STUDENT IN AN ORGANIZED HEALTH CARE EDUCATION/TRAINING PROGRAM

## 2022-11-17 PROCEDURE — 99203 OFFICE O/P NEW LOW 30 MIN: CPT | Mod: S$GLB,,, | Performed by: STUDENT IN AN ORGANIZED HEALTH CARE EDUCATION/TRAINING PROGRAM

## 2022-11-17 PROCEDURE — 1160F RVW MEDS BY RX/DR IN RCRD: CPT | Mod: CPTII,S$GLB,, | Performed by: STUDENT IN AN ORGANIZED HEALTH CARE EDUCATION/TRAINING PROGRAM

## 2022-11-17 PROCEDURE — 99203 PR OFFICE/OUTPT VISIT, NEW, LEVL III, 30-44 MIN: ICD-10-PCS | Mod: S$GLB,,, | Performed by: STUDENT IN AN ORGANIZED HEALTH CARE EDUCATION/TRAINING PROGRAM

## 2022-11-17 PROCEDURE — 1159F MED LIST DOCD IN RCRD: CPT | Mod: CPTII,S$GLB,, | Performed by: STUDENT IN AN ORGANIZED HEALTH CARE EDUCATION/TRAINING PROGRAM

## 2022-11-17 PROCEDURE — 1160F PR REVIEW ALL MEDS BY PRESCRIBER/CLIN PHARMACIST DOCUMENTED: ICD-10-PCS | Mod: CPTII,S$GLB,, | Performed by: STUDENT IN AN ORGANIZED HEALTH CARE EDUCATION/TRAINING PROGRAM

## 2022-11-17 PROCEDURE — 1159F PR MEDICATION LIST DOCUMENTED IN MEDICAL RECORD: ICD-10-PCS | Mod: CPTII,S$GLB,, | Performed by: STUDENT IN AN ORGANIZED HEALTH CARE EDUCATION/TRAINING PROGRAM

## 2022-11-17 PROCEDURE — 99999 PR PBB SHADOW E&M-EST. PATIENT-LVL III: ICD-10-PCS | Mod: PBBFAC,,, | Performed by: STUDENT IN AN ORGANIZED HEALTH CARE EDUCATION/TRAINING PROGRAM

## 2022-11-17 NOTE — PROGRESS NOTES
PODIATRY NOTE     PATIENT ID:  Abel Dc III is a 62 y.o. male.     CHIEF CONCERN:   Foot Pain (Left foot 2nd toe)           MEDICAL DECISION MAKING:        ICD-10-CM ICD-9-CM    1. Neuritis - Left Foot  M79.2 729.2           I counseled the patient on the patient's conditions, their implications and medical management.   Management for deep peroneal nerve entrapment.   Nerve pain cream from [facility].   Follow up in about 2 months or sooner if injection is needed. We also discussed the possibility of requiring surgery if conservative measures fail.      HISTORY OF PRESENT ILLNESS:  Abel Dc III is a 62 y.o. male with concerns regarding: left 2nd toe numbness and pain  Patient reports symptoms/signs have been present for some time now.   Trauma/injury to the area:  no.     Pain is worse at night.      Patient Active Problem List   Diagnosis    Essential hypertension    Mixed hyperlipidemia    Bipolar disorder    Panic disorder    DDD (degenerative disc disease), lumbar    Gout, arthritis    GERD (gastroesophageal reflux disease)    BPH (benign prostatic hypertrophy)    Lumbar stenosis    History of shoulder surgery:  shoulder surgery, massive rotator cuff repair, biceps tenodesis, synovectomy    Rotator cuff syndrome of right shoulder    Subacromial impingement    Acromioclavicular joint arthritis    Biceps tendonitis    Hip pain, bilateral    Acquired scoliosis    Acquired spondylolisthesis    Lumbago    Degeneration of lumbar or lumbosacral intervertebral disc    Spondylosis without myelopathy    Spinal stenosis, lumbar region, without neurogenic claudication    Severe obesity (BMI 35.0-39.9) with comorbidity    Bilateral knee pain    Difficulty walking    Stage 3b chronic kidney disease    Secondary hyperparathyroidism of renal origin    Tremor    Gallstones without obstruction of gallbladder    Pulmonary nodules -- repeat CT chest in August 2021    Pain of right thigh    Encounter for  long-term (current) use of other medications    Nicotine dependence, other tobacco product, uncomplicated           Current Outpatient Medications on File Prior to Visit   Medication Sig Dispense Refill    allopurinoL (ZYLOPRIM) 100 MG tablet Take 2 tablets (200 mg total) by mouth once daily. 180 tablet 0    amLODIPine (NORVASC) 10 MG tablet Take 1 tablet (10 mg total) by mouth once daily. 90 tablet 3    aspirin 81 mg Tab Take 1 tablet by mouth.      busPIRone (BUSPAR) 15 MG tablet Take 15 mg by mouth 3 (three) times daily.   4    cholecalciferol, vitamin D3, 2,000 unit Cap Take 1 capsule by mouth once daily.      FLUoxetine 40 MG capsule Take 40 mg by mouth once daily.  5    fluticasone propionate (FLONASE) 50 mcg/actuation nasal spray SPRAY ONCE IN EACH NOSTRIL EVERY DAY 48 g 3    gabapentin (NEURONTIN) 300 MG capsule TAKE 1 CAPSULE(300 MG) BY MOUTH THREE TIMES DAILY 90 capsule 3    omeprazole (PRILOSEC) 20 MG capsule TAKE 1 CAPSULE BY MOUTH EVERY DAY 90 capsule 3    OXcarbazepine (TRILEPTAL) 300 MG Tab TK 2 TS PO QD  3    pravastatin (PRAVACHOL) 40 MG tablet Take 1 tablet (40 mg total) by mouth every evening. 90 tablet 3    propranoloL (INDERAL) 40 MG tablet Take 1 tablet (40 mg total) by mouth 2 (two) times a day. 180 tablet 3    quetiapine (SEROQUEL) 50 MG tablet TK 1 T PO TID  0     No current facility-administered medications on file prior to visit.           Review of patient's allergies indicates:   Allergen Reactions    Amitriptyline Other (See Comments) and Hallucinations     confusion    Hydrocodone      Other reaction(s): Hallucinations    Hydrocodone-acetaminophen      Other reaction(s): hyperactivity    Oxycodone Other (See Comments)     hallucinations    Pseudoephedrine      Other reaction(s): Hallucinations    Pseudoephedrine hcl      Other reaction(s): hyperactivity    Risperidone      Other reaction(s): Hallucinations  Other reaction(s): hyperactivity    Trazodone      Adverse reaction    Naproxen                 ROS:   General ROS: negative for - chills, fever or night sweats  Respiratory ROS: no cough, shortness of breath, or wheezing  Cardiovascular ROS: no chest pain or dyspnea on exertion  Musculoskeletal ROS: negative for - joint pain and joint stiffness  Neurological ROS: positive for - numbness/tingling  Dermatological ROS: negative for acne, dry skin, and eczema      EXAM:     There were no vitals filed for this visit.     General:  Alert and Oriented x 3;  No acute distress      Right  Lower extremity exam:    Vascular:   Dorsalis Pedis:  present   Posterior Tibial:  present  Capillary refill time:  2 seconds  Temperature of toes warm to touch  Edema:  none       Neurological:     Sharp touch:  decreased  Light touch: decreased  Tinels Sign:  Present at DPN  [facility]:   Absent        Dermatological:       Musculoskeletal:

## 2022-11-22 ENCOUNTER — PATIENT MESSAGE (OUTPATIENT)
Dept: INTERNAL MEDICINE | Facility: CLINIC | Age: 62
End: 2022-11-22
Payer: COMMERCIAL

## 2023-01-04 ENCOUNTER — OFFICE VISIT (OUTPATIENT)
Dept: NEUROLOGY | Facility: CLINIC | Age: 63
End: 2023-01-04
Payer: COMMERCIAL

## 2023-01-04 VITALS
BODY MASS INDEX: 35.45 KG/M2 | SYSTOLIC BLOOD PRESSURE: 115 MMHG | WEIGHT: 276.13 LBS | HEART RATE: 65 BPM | DIASTOLIC BLOOD PRESSURE: 73 MMHG

## 2023-01-04 DIAGNOSIS — Z51.81 THERAPEUTIC DRUG MONITORING: ICD-10-CM

## 2023-01-04 DIAGNOSIS — E66.01 SEVERE OBESITY (BMI 35.0-39.9) WITH COMORBIDITY: ICD-10-CM

## 2023-01-04 DIAGNOSIS — I10 ESSENTIAL HYPERTENSION: ICD-10-CM

## 2023-01-04 DIAGNOSIS — N18.32 STAGE 3B CHRONIC KIDNEY DISEASE: ICD-10-CM

## 2023-01-04 DIAGNOSIS — R42 DIZZINESS: Primary | ICD-10-CM

## 2023-01-04 DIAGNOSIS — G62.9 PERIPHERAL POLYNEUROPATHY: ICD-10-CM

## 2023-01-04 DIAGNOSIS — R42 DIZZINESS AND GIDDINESS: ICD-10-CM

## 2023-01-04 DIAGNOSIS — R25.1 TREMOR: ICD-10-CM

## 2023-01-04 PROCEDURE — 3078F DIAST BP <80 MM HG: CPT | Mod: CPTII,S$GLB,, | Performed by: NURSE PRACTITIONER

## 2023-01-04 PROCEDURE — 3008F BODY MASS INDEX DOCD: CPT | Mod: CPTII,S$GLB,, | Performed by: NURSE PRACTITIONER

## 2023-01-04 PROCEDURE — 99999 PR PBB SHADOW E&M-EST. PATIENT-LVL V: ICD-10-PCS | Mod: PBBFAC,,, | Performed by: NURSE PRACTITIONER

## 2023-01-04 PROCEDURE — 1159F MED LIST DOCD IN RCRD: CPT | Mod: CPTII,S$GLB,, | Performed by: NURSE PRACTITIONER

## 2023-01-04 PROCEDURE — 3074F SYST BP LT 130 MM HG: CPT | Mod: CPTII,S$GLB,, | Performed by: NURSE PRACTITIONER

## 2023-01-04 PROCEDURE — 1159F PR MEDICATION LIST DOCUMENTED IN MEDICAL RECORD: ICD-10-PCS | Mod: CPTII,S$GLB,, | Performed by: NURSE PRACTITIONER

## 2023-01-04 PROCEDURE — 99215 OFFICE O/P EST HI 40 MIN: CPT | Mod: S$GLB,,, | Performed by: NURSE PRACTITIONER

## 2023-01-04 PROCEDURE — 99215 PR OFFICE/OUTPT VISIT, EST, LEVL V, 40-54 MIN: ICD-10-PCS | Mod: S$GLB,,, | Performed by: NURSE PRACTITIONER

## 2023-01-04 PROCEDURE — 3074F PR MOST RECENT SYSTOLIC BLOOD PRESSURE < 130 MM HG: ICD-10-PCS | Mod: CPTII,S$GLB,, | Performed by: NURSE PRACTITIONER

## 2023-01-04 PROCEDURE — 99999 PR PBB SHADOW E&M-EST. PATIENT-LVL V: CPT | Mod: PBBFAC,,, | Performed by: NURSE PRACTITIONER

## 2023-01-04 PROCEDURE — 3008F PR BODY MASS INDEX (BMI) DOCUMENTED: ICD-10-PCS | Mod: CPTII,S$GLB,, | Performed by: NURSE PRACTITIONER

## 2023-01-04 PROCEDURE — 3078F PR MOST RECENT DIASTOLIC BLOOD PRESSURE < 80 MM HG: ICD-10-PCS | Mod: CPTII,S$GLB,, | Performed by: NURSE PRACTITIONER

## 2023-01-04 NOTE — ASSESSMENT & PLAN NOTE
Chronic, episodic   Most consistent with hypoperfusion with symptoms from sit to stand   Not orthostatic in clinic today, but is on several Rx that can promote orthostasis  Also poorly hydrated at baseline with significant caffeine intake    Obtain baseline MRI brain, serologies  Discussed conservative strategies to combat symptoms -- needs to decrease caffeine intake & increase other fluid intake, safety precaution from sit to stand, etc  Can consider changing some Rx in the future

## 2023-01-04 NOTE — PATIENT INSTRUCTIONS
I suspect that your symptoms are related to some change in your blood pressure when you change positions.     This can be a side effect of several of the your medications (amlodipine, Seroquel, Prozac, propranolol)    This is also likely worsened by the fact that you are not properly hydrated.    - caffeine actually dehydrates you. I want you to work to decrease caffeine intake by HALF!   - start drinking more fluids during the day like water, gatorade or body armour  - take your time when you change positions, like standing after being seated. Drinking some water just before standing can sometimes help    Will check some labs and also obtain baseline MRI of the brain

## 2023-01-04 NOTE — PROGRESS NOTES
"NEUROLOGY  Outpatient Follow Up Visit     [facility]  [address] [facility], [place] [address]  [phone] (office) / [phone] (fax)    Patient Name:  Abel Dc III  :  1960  MR #:  [id number]  Acct #:  [id number]    Date of  Visit: 2023    Other Physicians:  Mayur Handy MD (Primary Care Physician); No ref. provider found (Referring)      CHIEF COMPLAINT: Dizziness      INTERVAL HISTORY:  Abel Dc III is a 62 y.o. R-handed male seen in follow up for new concerns regarding dizziness.    The patient is new to me today, but has been evaluated by Dr. Aguirre (10/2021 for neuropathy) and Dr. Murguia ( for tremor) in the past.     Medical history is otherwise significant for HTN, HLD, obesity, lumbar DDD with chronic low back pain, bipolar disorder, CKD3, gout, panic disorder and essential tremor    Onset: "years ago"    Description:  When he stands up, he feels like his BP drops and he may faint. His body feels weak, like he is going to fall. His legs will start to shake. Sometimes, recurs when he starts to walk. Has had some sensation of spinning, but more often like lightheadedness. Has not had any LOC / syncope. Denies palpitations. No change in vision. No HA. Able to speak, move all extremities.     How long do symptoms last? Minutes     Frequency: daily    Positions that worsen symptoms: standing from seated    Recent head trauma:  2022 - Had taken his Rx, then started drinking beer. Stood up and legs started to shake, causing him to fall and hit his head. No LOC. Went to the ED at [facility] for laceration repair. Had normal CT scan.     Hydration:  Drinks mostly cokes during the day, at least a 12 pk per day. Only drinks water when taking his medication, maybe 2 bottles at most on a good day. Usually doesn't eat much, may not eat at all.     Recent changes in medication: no    Hearing loss: no     Recent infections:no    Alcohol / substances:  No routine ETOH use " -- quit since fall in 2/2022    Neck pain: no    Anxiety: yes     Other:  Does have peripheral neuropathy described as burning, pins / needles in the bottoms of his feet     Rx review:  Norvasc 10 mg daily  Buspar 15 mg TID  Prozac 40 mg daily  Gabapentin 300 mg TID  Trileptal 300 mg BID  Propranolol 40 mg BID  Seroquel 50 mg TID    Allergies:  Review of patient's allergies indicates:   Allergen Reactions    Amitriptyline Other (See Comments) and Hallucinations     confusion    Hydrocodone      Other reaction(s): Hallucinations    Hydrocodone-acetaminophen      Other reaction(s): hyperactivity    Oxycodone Other (See Comments)     hallucinations    Pseudoephedrine      Other reaction(s): Hallucinations    Pseudoephedrine hcl      Other reaction(s): hyperactivity    Risperidone      Other reaction(s): Hallucinations  Other reaction(s): hyperactivity    Trazodone      Adverse reaction    Naproxen        Current Medications:  Current Outpatient Medications   Medication Sig Dispense Refill    allopurinoL (ZYLOPRIM) 100 MG tablet TAKE 2 TABLETS(200 MG) BY MOUTH EVERY  tablet 0    amLODIPine (NORVASC) 10 MG tablet TAKE 1 TABLET(10 MG) BY MOUTH EVERY DAY 90 tablet 3    aspirin 81 mg Tab Take 1 tablet by mouth.      busPIRone (BUSPAR) 15 MG tablet Take 15 mg by mouth 3 (three) times daily.   4    cholecalciferol, vitamin D3, 2,000 unit Cap Take 1 capsule by mouth once daily.      FLUoxetine 40 MG capsule Take 40 mg by mouth once daily.  5    fluticasone propionate (FLONASE) 50 mcg/actuation nasal spray SPRAY ONCE IN EACH NOSTRIL EVERY DAY 48 g 3    gabapentin (NEURONTIN) 300 MG capsule TAKE 1 CAPSULE(300 MG) BY MOUTH THREE TIMES DAILY 90 capsule 3    omeprazole (PRILOSEC) 20 MG capsule TAKE 1 CAPSULE BY MOUTH EVERY DAY 90 capsule 3    OXcarbazepine (TRILEPTAL) 300 MG Tab TK 2 TS PO QD  3    pravastatin (PRAVACHOL) 40 MG tablet Take 1 tablet (40 mg total) by mouth every evening. 90 tablet 3    propranoloL (INDERAL) 40  MG tablet TAKE 1 TABLET(40 MG) BY MOUTH TWICE DAILY 180 tablet 2    quetiapine (SEROQUEL) 50 MG tablet TK 1 T PO TID  0     No current facility-administered medications for this visit.       Past Medical History:  Past Medical History:   Diagnosis Date    Bipolar disorder     manic depressive x 1992    BPH (benign prostatic hypertrophy)     Chronic low back pain     Depression     GERD (gastroesophageal reflux disease)     Gout     Gout, arthritis     Hyperlipidemia     Hypertension     Lumbar disc disease     Obesity     Panic disorder        Past Surgical History:  Past Surgical History:   Procedure Laterality Date    HERNIA REPAIR      HUMERUS FRACTURE SURGERY  1971    Bone grafts required    MANDIBLE FRACTURE SURGERY      MUSCLE TRANSFER UPPER ARM      SHOULDER ARTHROSCOPY      SHOULDER SURGERY      TONSILLECTOMY         Family History:  family history includes Breast cancer in his mother; Dementia in his father; Diabetes in his mother; Marfan syndrome in his daughter.    Social History:   reports that he has never smoked. His smokeless tobacco use includes snuff. He reports that he does not drink alcohol and does not use drugs.      PHYSICAL EXAM:  /73 (BP Location: Right arm, Patient Position: Standing, BP Method: Large (Automatic))   Pulse 65   Wt 125.3 kg (276 lb 2 oz)   BMI 35.45 kg/m²     General: Well groomed. NAD.  Pulmonary: Normal effort and rate.   Musculoskeletal: No obvious joint deformities, moves all extremities well.  Extremities: No clubbing, cyanosis or edema.     Neurological exam:  Mental status: Awake and alert.  Oriented to person, place, time and situation. Recent and remote memory appear to be intact. Fund of knowledge normal. Normal attention and concentration.   Speech/Language: Fluent and appropriate. No dysarthria or aphasia on conversation. Able to follow complex commands.   Cranial nerves (II-XII): Visual fields full. Pupils equal round and reactive to light, extraocular  movements intact, no ptosis, no nystagmus. Facial sensation and symmetry intact bilaterally. Hearing grossly intact. Palate deferred. Shoulder shrug normal bilaterally. Normal tongue protrusion.   Motor: 5 out of 5 strength throughout the upper and lower extremities bilaterally. Normal bulk and tone. No abnormal movements noted. No drift appreciated.   Sensation: Intact to light touch, pinprick and vibration.   DTR: 1+ at the knees and biceps bilaterally.  Coordination: Finger-nose-finger testing intact bilaterally. JT normal bilaterally. No tremor. Romberg absent.   Gait: Normal gait, including heel, toe walk. Unable to tandem.     DIAGNOSTIC DATA:  I have personally reviewed provider notes, labs and imaging made available to me today.     Imaging:  CTH 2/2022:  FINDINGS:  Intracranial compartment:     Mild diffuse cerebral volume loss with compensatory sulcal and ventricular enlargement.  No evidence of hydrocephalus.     Empty sella configuration, unchanged.  No parenchymal mass, hemorrhage, edema, or major vascular distribution infarct.     No extra-axial blood or fluid collections.     Skull/extracranial contents (limited evaluation):     No fracture.     Extensive, total and near-total opacification with patchy areas of hyperostosis of the bilateral maxillary sinuses, sphenoid sinuses, ethmoid air cells, and frontal sinuses, significantly worse from 2015 study.  Mastoid air cells are essentially clear.  Imaged portions of the orbits are within normal limits.     Impression:  No acute intracranial hemorrhage or large territory recent infarction.  Empty sella configuration, chronic.  Extensive chronic appearing paranasal sinus disease, worse from 2015.     Cardiac:  EKG 2/2022:   Program found technically poor ECG   Sinus bradycardia with 1st degree A-V block   Left bundle branch block    EKG 8/2020:  Sinus rhythm with 1st degree A-V block with Fusion complexes   Left bundle branch block     Labs:  CBC:   Lab  Results   Component Value Date    WBC 8.49 08/24/2022    HGB 13.2 (L) 08/24/2022    HCT 39.4 (L) 08/24/2022     08/24/2022    MCV 92 08/24/2022    RDW 13.4 08/24/2022     BMP:   Lab Results   Component Value Date     08/24/2022    K 4.6 08/24/2022     08/24/2022    CO2 29 08/24/2022    BUN 13 08/24/2022    CREATININE 1.8 (H) 08/24/2022    GLU 98 08/24/2022    CALCIUM 9.6 08/24/2022    MG 2.2 02/08/2022    PHOS 3.4 01/06/2020     LFTS;   Lab Results   Component Value Date    PROT 7.5 08/24/2022    ALBUMIN 3.6 08/24/2022    BILITOT 0.4 08/24/2022    AST 23 08/24/2022    ALKPHOS 103 08/24/2022    ALT 20 08/24/2022     COAGS:   Lab Results   Component Value Date    INR 0.9 08/01/2020     FLP:   Lab Results   Component Value Date    CHOL 155 08/24/2022    HDL 34 (L) 08/24/2022    LDLCALC 87.6 08/24/2022    TRIG 167 (H) 08/24/2022    CHOLHDL 21.9 08/24/2022     Lab Results   Component Value Date    HGBA1C 5.4 08/02/2021     Lab Results   Component Value Date    SFQHVEGZ99 640 08/01/2020     Lab Results   Component Value Date    TSH 1.128 08/02/2021       ASSESSMENT & PLAN:  Abel Dc III is a 62 y.o. R-handed male seen in follow up for new concerns regarding dizziness.     Problem List Items Addressed This Visit          Neuro    Tremor       Cardiac/Vascular    Essential hypertension    Overview     bp controlled today, cont curr reg            Renal/    Stage 3b chronic kidney disease    Overview     Needs to cut down on Gabapentin use  Limit NSAIDs and other nephrotoxic medicines.            Endocrine    Severe obesity (BMI 35.0-39.9) with comorbidity       Other    Dizziness - Primary    Current Assessment & Plan     Chronic, episodic   Most consistent with hypoperfusion with symptoms from sit to stand   Not orthostatic in clinic today, but is on several Rx that can promote orthostasis  Also poorly hydrated at baseline with significant caffeine intake    Obtain baseline MRI brain,  serologies  Discussed conservative strategies to combat symptoms -- needs to decrease caffeine intake & increase other fluid intake, safety precaution from sit to stand, etc  Can consider changing some Rx in the future           Other Visit Diagnoses       Therapeutic drug monitoring        Peripheral polyneuropathy        Dizziness and giddiness                Follow up: 8 weeks     I spent a total of 56 minutes on the day of the visit.    This includes face to face time with the patient, as well as non-face to face time preparing for and completing the visit (review of prior diagnostic testing and clinical notes, obtaining or reviewing history, documenting clinical information in the EMR, independently interpreting and communicating results to the patient/family and coordinating ongoing care).               I appreciate the opportunity to participate in the care of this patient. Please feel free to contact me with any questions or concerns.       Tameka Hutchins, ACNPC-AG  [facility]  [address] [place] [address]

## 2023-01-06 ENCOUNTER — LAB VISIT (OUTPATIENT)
Dept: LAB | Facility: HOSPITAL | Age: 63
End: 2023-01-06
Payer: COMMERCIAL

## 2023-01-06 DIAGNOSIS — E66.01 SEVERE OBESITY (BMI 35.0-39.9) WITH COMORBIDITY: ICD-10-CM

## 2023-01-06 DIAGNOSIS — Z51.81 THERAPEUTIC DRUG MONITORING: ICD-10-CM

## 2023-01-06 DIAGNOSIS — G62.9 PERIPHERAL POLYNEUROPATHY: ICD-10-CM

## 2023-01-06 DIAGNOSIS — R42 DIZZINESS: ICD-10-CM

## 2023-01-06 LAB
ALBUMIN SERPL BCP-MCNC: 3.8 G/DL (ref 3.5–5.2)
ALP SERPL-CCNC: 109 U/L (ref 55–135)
ALT SERPL W/O P-5'-P-CCNC: 20 U/L (ref 10–44)
ANION GAP SERPL CALC-SCNC: 10 MMOL/L (ref 8–16)
AST SERPL-CCNC: 28 U/L (ref 10–40)
BILIRUB SERPL-MCNC: 0.4 MG/DL (ref 0.1–1)
BUN SERPL-MCNC: 23 MG/DL (ref 8–23)
CALCIUM SERPL-MCNC: 10.3 MG/DL (ref 8.7–10.5)
CHLORIDE SERPL-SCNC: 104 MMOL/L (ref 95–110)
CO2 SERPL-SCNC: 25 MMOL/L (ref 23–29)
CREAT SERPL-MCNC: 1.6 MG/DL (ref 0.5–1.4)
EST. GFR  (NO RACE VARIABLE): 48.4 ML/MIN/1.73 M^2
ESTIMATED AVG GLUCOSE: 105 MG/DL (ref 68–131)
GLUCOSE SERPL-MCNC: 97 MG/DL (ref 70–110)
HBA1C MFR BLD: 5.3 % (ref 4–5.6)
POTASSIUM SERPL-SCNC: 4.3 MMOL/L (ref 3.5–5.1)
PROT SERPL-MCNC: 8.4 G/DL (ref 6–8.4)
SODIUM SERPL-SCNC: 139 MMOL/L (ref 136–145)
T4 FREE SERPL-MCNC: 0.68 NG/DL (ref 0.71–1.51)
TSH SERPL DL<=0.005 MIU/L-ACNC: 1.71 UIU/ML (ref 0.4–4)
VIT B12 SERPL-MCNC: 605 PG/ML (ref 210–950)

## 2023-01-06 PROCEDURE — 80183 DRUG SCRN QUANT OXCARBAZEPIN: CPT | Performed by: NURSE PRACTITIONER

## 2023-01-06 PROCEDURE — 83921 ORGANIC ACID SINGLE QUANT: CPT | Performed by: NURSE PRACTITIONER

## 2023-01-06 PROCEDURE — 80053 COMPREHEN METABOLIC PANEL: CPT | Performed by: NURSE PRACTITIONER

## 2023-01-06 PROCEDURE — 83036 HEMOGLOBIN GLYCOSYLATED A1C: CPT | Performed by: NURSE PRACTITIONER

## 2023-01-06 PROCEDURE — 84443 ASSAY THYROID STIM HORMONE: CPT | Performed by: NURSE PRACTITIONER

## 2023-01-06 PROCEDURE — 84425 ASSAY OF VITAMIN B-1: CPT | Performed by: NURSE PRACTITIONER

## 2023-01-06 PROCEDURE — 84439 ASSAY OF FREE THYROXINE: CPT | Performed by: NURSE PRACTITIONER

## 2023-01-06 PROCEDURE — 82607 VITAMIN B-12: CPT | Performed by: NURSE PRACTITIONER

## 2023-01-07 LAB — OXCARBAZEPINE METABOLITE: 12 MCG/ML (ref 10–35)

## 2023-01-10 LAB
METHYLMALONATE SERPL-SCNC: 0.57 UMOL/L
VIT B1 BLD-MCNC: 113 UG/L (ref 38–122)

## 2023-01-11 ENCOUNTER — PATIENT MESSAGE (OUTPATIENT)
Dept: NEUROLOGY | Facility: CLINIC | Age: 63
End: 2023-01-11
Payer: COMMERCIAL

## 2023-01-17 ENCOUNTER — PATIENT MESSAGE (OUTPATIENT)
Dept: NEUROLOGY | Facility: CLINIC | Age: 63
End: 2023-01-17
Payer: COMMERCIAL

## 2023-01-17 ENCOUNTER — OFFICE VISIT (OUTPATIENT)
Dept: PODIATRY | Facility: CLINIC | Age: 63
End: 2023-01-17
Payer: COMMERCIAL

## 2023-01-17 DIAGNOSIS — M79.2 NEURITIS: Primary | ICD-10-CM

## 2023-01-17 PROCEDURE — 99999 PR PBB SHADOW E&M-EST. PATIENT-LVL III: CPT | Mod: PBBFAC,,, | Performed by: STUDENT IN AN ORGANIZED HEALTH CARE EDUCATION/TRAINING PROGRAM

## 2023-01-17 PROCEDURE — 3044F HG A1C LEVEL LT 7.0%: CPT | Mod: CPTII,S$GLB,, | Performed by: STUDENT IN AN ORGANIZED HEALTH CARE EDUCATION/TRAINING PROGRAM

## 2023-01-17 PROCEDURE — 3044F PR MOST RECENT HEMOGLOBIN A1C LEVEL <7.0%: ICD-10-PCS | Mod: CPTII,S$GLB,, | Performed by: STUDENT IN AN ORGANIZED HEALTH CARE EDUCATION/TRAINING PROGRAM

## 2023-01-17 PROCEDURE — 99213 PR OFFICE/OUTPT VISIT, EST, LEVL III, 20-29 MIN: ICD-10-PCS | Mod: 25,S$GLB,, | Performed by: STUDENT IN AN ORGANIZED HEALTH CARE EDUCATION/TRAINING PROGRAM

## 2023-01-17 PROCEDURE — 1160F RVW MEDS BY RX/DR IN RCRD: CPT | Mod: CPTII,S$GLB,, | Performed by: STUDENT IN AN ORGANIZED HEALTH CARE EDUCATION/TRAINING PROGRAM

## 2023-01-17 PROCEDURE — 1159F PR MEDICATION LIST DOCUMENTED IN MEDICAL RECORD: ICD-10-PCS | Mod: CPTII,S$GLB,, | Performed by: STUDENT IN AN ORGANIZED HEALTH CARE EDUCATION/TRAINING PROGRAM

## 2023-01-17 PROCEDURE — 64450 PR NERVE BLOCK INJ, ANES/STEROID, OTHER PERIPHERAL: ICD-10-PCS | Mod: LT,S$GLB,, | Performed by: STUDENT IN AN ORGANIZED HEALTH CARE EDUCATION/TRAINING PROGRAM

## 2023-01-17 PROCEDURE — 64450 NJX AA&/STRD OTHER PN/BRANCH: CPT | Mod: LT,S$GLB,, | Performed by: STUDENT IN AN ORGANIZED HEALTH CARE EDUCATION/TRAINING PROGRAM

## 2023-01-17 PROCEDURE — 99213 OFFICE O/P EST LOW 20 MIN: CPT | Mod: 25,S$GLB,, | Performed by: STUDENT IN AN ORGANIZED HEALTH CARE EDUCATION/TRAINING PROGRAM

## 2023-01-17 PROCEDURE — 99999 PR PBB SHADOW E&M-EST. PATIENT-LVL III: ICD-10-PCS | Mod: PBBFAC,,, | Performed by: STUDENT IN AN ORGANIZED HEALTH CARE EDUCATION/TRAINING PROGRAM

## 2023-01-17 PROCEDURE — 1159F MED LIST DOCD IN RCRD: CPT | Mod: CPTII,S$GLB,, | Performed by: STUDENT IN AN ORGANIZED HEALTH CARE EDUCATION/TRAINING PROGRAM

## 2023-01-17 PROCEDURE — 1160F PR REVIEW ALL MEDS BY PRESCRIBER/CLIN PHARMACIST DOCUMENTED: ICD-10-PCS | Mod: CPTII,S$GLB,, | Performed by: STUDENT IN AN ORGANIZED HEALTH CARE EDUCATION/TRAINING PROGRAM

## 2023-01-17 RX ORDER — DIAZEPAM 5 MG/1
5 TABLET ORAL
Qty: 1 TABLET | Refills: 0 | Status: SHIPPED | OUTPATIENT
Start: 2023-01-17 | End: 2023-02-17

## 2023-01-17 RX ORDER — BUPIVACAINE HYDROCHLORIDE 5 MG/ML
3 INJECTION, SOLUTION EPIDURAL; INTRACAUDAL
Status: COMPLETED | OUTPATIENT
Start: 2023-01-17 | End: 2023-01-17

## 2023-01-17 RX ADMIN — BUPIVACAINE HYDROCHLORIDE 15 MG: 5 INJECTION, SOLUTION EPIDURAL; INTRACAUDAL at 04:01

## 2023-01-17 NOTE — PROGRESS NOTES
PODIATRY NOTE     PATIENT ID:  Abel Dc III is a 62 y.o. male.     CHIEF CONCERN:   Foot Pain             MEDICAL DECISION MAKING:      No diagnosis found.      I counseled the patient on the patient's conditions, their implications and medical management.   Management for deep peroneal nerve entrapment.   With the patient's verbal consent an injection of 2 mL of 0.5% Marcaine plain was injected around the deep peroneal nerve of the left foot.  Patient tolerated the procedure well without any immediate complications.  Patient is to keep a log of his symptoms and to follow-up in 1 month with that information.    Elias Salinas DPM        HISTORY OF PRESENT ILLNESS:  Abel Dc III is a 62 y.o. male with concerns regarding: left 2nd toe numbness and pain  Patient reports symptoms/signs have been present for some time now.   Trauma/injury to the area:  no.     Pain is worse at night.    01/17/2023:   Patient returns for left foot pain.  He was unable to get the cream because it was too expensive.  He continues to have symptoms.    Patient Active Problem List   Diagnosis    Essential hypertension    Mixed hyperlipidemia    Bipolar disorder    Panic disorder    DDD (degenerative disc disease), lumbar    Gout, arthritis    GERD (gastroesophageal reflux disease)    BPH (benign prostatic hypertrophy)    Lumbar stenosis    History of shoulder surgery:  shoulder surgery, massive rotator cuff repair, biceps tenodesis, synovectomy    Rotator cuff syndrome of right shoulder    Subacromial impingement    Acromioclavicular joint arthritis    Biceps tendonitis    Hip pain, bilateral    Acquired scoliosis    Acquired spondylolisthesis    Lumbago    Degeneration of lumbar or lumbosacral intervertebral disc    Spondylosis without myelopathy    Spinal stenosis, lumbar region, without neurogenic claudication    Severe obesity (BMI 35.0-39.9) with comorbidity    Bilateral knee pain    Difficulty walking    Stage 3b chronic kidney  disease    Secondary hyperparathyroidism of renal origin    Tremor    Gallstones without obstruction of gallbladder    Pulmonary nodules -- repeat CT chest in August 2021    Pain of right thigh    Encounter for long-term (current) use of other medications    Nicotine dependence, other tobacco product, uncomplicated    Dizziness           Current Outpatient Medications on File Prior to Visit   Medication Sig Dispense Refill    allopurinoL (ZYLOPRIM) 100 MG tablet TAKE 2 TABLETS(200 MG) BY MOUTH EVERY  tablet 0    amLODIPine (NORVASC) 10 MG tablet TAKE 1 TABLET(10 MG) BY MOUTH EVERY DAY 90 tablet 3    aspirin 81 mg Tab Take 1 tablet by mouth.      busPIRone (BUSPAR) 15 MG tablet Take 15 mg by mouth 3 (three) times daily.   4    cholecalciferol, vitamin D3, 2,000 unit Cap Take 1 capsule by mouth once daily.      FLUoxetine 40 MG capsule Take 40 mg by mouth once daily.  5    fluticasone propionate (FLONASE) 50 mcg/actuation nasal spray SPRAY ONCE IN EACH NOSTRIL EVERY DAY 48 g 3    gabapentin (NEURONTIN) 300 MG capsule TAKE 1 CAPSULE(300 MG) BY MOUTH THREE TIMES DAILY 90 capsule 3    omeprazole (PRILOSEC) 20 MG capsule TAKE 1 CAPSULE BY MOUTH EVERY DAY 90 capsule 3    OXcarbazepine (TRILEPTAL) 300 MG Tab TK 2 TS PO QD  3    pravastatin (PRAVACHOL) 40 MG tablet Take 1 tablet (40 mg total) by mouth every evening. 90 tablet 3    propranoloL (INDERAL) 40 MG tablet TAKE 1 TABLET(40 MG) BY MOUTH TWICE DAILY 180 tablet 2    quetiapine (SEROQUEL) 50 MG tablet TK 1 T PO TID  0     No current facility-administered medications on file prior to visit.           Review of patient's allergies indicates:   Allergen Reactions    Amitriptyline Other (See Comments) and Hallucinations     confusion    Hydrocodone      Other reaction(s): Hallucinations    Hydrocodone-acetaminophen      Other reaction(s): hyperactivity    Oxycodone Other (See Comments)     hallucinations    Pseudoephedrine      Other reaction(s): Hallucinations     Pseudoephedrine hcl      Other reaction(s): hyperactivity    Risperidone      Other reaction(s): Hallucinations  Other reaction(s): hyperactivity    Trazodone      Adverse reaction    Naproxen                ROS:   General ROS: negative for - chills, fever or night sweats  Respiratory ROS: no cough, shortness of breath, or wheezing  Cardiovascular ROS: no chest pain or dyspnea on exertion  Musculoskeletal ROS: negative for - joint pain and joint stiffness  Neurological ROS: positive for - numbness/tingling  Dermatological ROS: negative for acne, dry skin, and eczema      EXAM:     There were no vitals filed for this visit.     General:  Alert and Oriented x 3;  No acute distress      Right  Lower extremity exam:    Vascular:   Dorsalis Pedis:  present   Posterior Tibial:  present  Capillary refill time:  2 seconds  Temperature of toes warm to touch  Edema:  none       Neurological:     Sharp touch:  decreased  Light touch: decreased  Tinels Sign:  Present at DPN  Mulders Click:   Absent        Dermatological:       Musculoskeletal:

## 2023-01-18 ENCOUNTER — PATIENT MESSAGE (OUTPATIENT)
Dept: RADIOLOGY | Facility: HOSPITAL | Age: 63
End: 2023-01-18
Payer: COMMERCIAL

## 2023-02-03 ENCOUNTER — PATIENT MESSAGE (OUTPATIENT)
Dept: ADMINISTRATIVE | Facility: HOSPITAL | Age: 63
End: 2023-02-03
Payer: COMMERCIAL

## 2023-02-03 ENCOUNTER — PATIENT OUTREACH (OUTPATIENT)
Dept: ADMINISTRATIVE | Facility: HOSPITAL | Age: 63
End: 2023-02-03
Payer: COMMERCIAL

## 2023-02-03 NOTE — PROGRESS NOTES
2023 Care Everywhere updates requested and reviewed.  Immunizations reconciled. Media reports reviewed.  Duplicate HM overrides and  orders removed.  Overdue HM topic chart audit and/or requested.  Overdue lab testing linked to upcoming lab appointments if applies.  New patient  Future Appointments   Date Time Provider Department Center   2023  8:40 AM Elias Salinas DPM [facility]   2023  4:20 PM Jonah Dia MD [facility]

## 2023-02-17 ENCOUNTER — TELEPHONE (OUTPATIENT)
Dept: FAMILY MEDICINE | Facility: CLINIC | Age: 63
End: 2023-02-17

## 2023-02-17 ENCOUNTER — OFFICE VISIT (OUTPATIENT)
Dept: FAMILY MEDICINE | Facility: CLINIC | Age: 63
End: 2023-02-17
Payer: COMMERCIAL

## 2023-02-17 VITALS
OXYGEN SATURATION: 97 % | HEIGHT: 74 IN | TEMPERATURE: 97 F | DIASTOLIC BLOOD PRESSURE: 70 MMHG | WEIGHT: 272.5 LBS | BODY MASS INDEX: 34.97 KG/M2 | SYSTOLIC BLOOD PRESSURE: 128 MMHG | HEART RATE: 56 BPM

## 2023-02-17 DIAGNOSIS — I10 ESSENTIAL HYPERTENSION: ICD-10-CM

## 2023-02-17 DIAGNOSIS — I49.9 CARDIAC ARRHYTHMIA, UNSPECIFIED CARDIAC ARRHYTHMIA TYPE: ICD-10-CM

## 2023-02-17 DIAGNOSIS — R07.89 CHEST TIGHTNESS: ICD-10-CM

## 2023-02-17 DIAGNOSIS — R06.09 DOE (DYSPNEA ON EXERTION): Primary | ICD-10-CM

## 2023-02-17 PROBLEM — Z71.89 ADVANCE CARE PLANNING: Status: ACTIVE | Noted: 2023-02-17

## 2023-02-17 PROBLEM — R00.1 BRADYCARDIA: Status: ACTIVE | Noted: 2023-02-17

## 2023-02-17 PROBLEM — R07.9 CHEST PAIN: Status: ACTIVE | Noted: 2023-02-17

## 2023-02-17 PROCEDURE — 1160F PR REVIEW ALL MEDS BY PRESCRIBER/CLIN PHARMACIST DOCUMENTED: ICD-10-PCS | Mod: CPTII,S$GLB,, | Performed by: FAMILY MEDICINE

## 2023-02-17 PROCEDURE — 3044F PR MOST RECENT HEMOGLOBIN A1C LEVEL <7.0%: ICD-10-PCS | Mod: CPTII,S$GLB,, | Performed by: FAMILY MEDICINE

## 2023-02-17 PROCEDURE — 3044F HG A1C LEVEL LT 7.0%: CPT | Mod: CPTII,S$GLB,, | Performed by: FAMILY MEDICINE

## 2023-02-17 PROCEDURE — 93010 EKG 12-LEAD: ICD-10-PCS | Mod: S$GLB,,, | Performed by: INTERNAL MEDICINE

## 2023-02-17 PROCEDURE — 99214 PR OFFICE/OUTPT VISIT, EST, LEVL IV, 30-39 MIN: ICD-10-PCS | Mod: S$GLB,,, | Performed by: FAMILY MEDICINE

## 2023-02-17 PROCEDURE — 3008F BODY MASS INDEX DOCD: CPT | Mod: CPTII,S$GLB,, | Performed by: FAMILY MEDICINE

## 2023-02-17 PROCEDURE — 93005 ELECTROCARDIOGRAM TRACING: CPT | Mod: S$GLB,,, | Performed by: FAMILY MEDICINE

## 2023-02-17 PROCEDURE — 1160F RVW MEDS BY RX/DR IN RCRD: CPT | Mod: CPTII,S$GLB,, | Performed by: FAMILY MEDICINE

## 2023-02-17 PROCEDURE — 99999 PR PBB SHADOW E&M-EST. PATIENT-LVL III: ICD-10-PCS | Mod: PBBFAC,,, | Performed by: FAMILY MEDICINE

## 2023-02-17 PROCEDURE — 3074F SYST BP LT 130 MM HG: CPT | Mod: CPTII,S$GLB,, | Performed by: FAMILY MEDICINE

## 2023-02-17 PROCEDURE — 93010 ELECTROCARDIOGRAM REPORT: CPT | Mod: S$GLB,,, | Performed by: INTERNAL MEDICINE

## 2023-02-17 PROCEDURE — 3008F PR BODY MASS INDEX (BMI) DOCUMENTED: ICD-10-PCS | Mod: CPTII,S$GLB,, | Performed by: FAMILY MEDICINE

## 2023-02-17 PROCEDURE — 93005 EKG 12-LEAD: ICD-10-PCS | Mod: S$GLB,,, | Performed by: FAMILY MEDICINE

## 2023-02-17 PROCEDURE — 99999 PR PBB SHADOW E&M-EST. PATIENT-LVL III: CPT | Mod: PBBFAC,,, | Performed by: FAMILY MEDICINE

## 2023-02-17 PROCEDURE — 99214 OFFICE O/P EST MOD 30 MIN: CPT | Mod: S$GLB,,, | Performed by: FAMILY MEDICINE

## 2023-02-17 PROCEDURE — 3078F DIAST BP <80 MM HG: CPT | Mod: CPTII,S$GLB,, | Performed by: FAMILY MEDICINE

## 2023-02-17 PROCEDURE — 3074F PR MOST RECENT SYSTOLIC BLOOD PRESSURE < 130 MM HG: ICD-10-PCS | Mod: CPTII,S$GLB,, | Performed by: FAMILY MEDICINE

## 2023-02-17 PROCEDURE — 1159F PR MEDICATION LIST DOCUMENTED IN MEDICAL RECORD: ICD-10-PCS | Mod: CPTII,S$GLB,, | Performed by: FAMILY MEDICINE

## 2023-02-17 PROCEDURE — 1159F MED LIST DOCD IN RCRD: CPT | Mod: CPTII,S$GLB,, | Performed by: FAMILY MEDICINE

## 2023-02-17 PROCEDURE — 3078F PR MOST RECENT DIASTOLIC BLOOD PRESSURE < 80 MM HG: ICD-10-PCS | Mod: CPTII,S$GLB,, | Performed by: FAMILY MEDICINE

## 2023-02-17 RX ORDER — AMLODIPINE BESYLATE 10 MG/1
5 TABLET ORAL DAILY
Qty: 90 TABLET | Refills: 3
Start: 2023-02-17 | End: 2023-02-28

## 2023-02-17 NOTE — TELEPHONE ENCOUNTER
Report given to [facility] ED nurse, Gail, notifying them that pt. Is on his way there for evaluation due to abnormal ECG with symptomatic bradycardia prior to today's visit.

## 2023-02-17 NOTE — PROGRESS NOTES
"Subjective:       Patient ID: Abel Dc III is a 62 y.o. male.    Chief Complaint: Establish Care    HPI:  Pleasant 62-year-old gentleman here today because of dyspnea upon exertion with just walking, some chest sensations of achiness and some abdominal pain as well during these episodes of dizziness and lightheadedness.  He is trouble with orthostasis.  No known underlying CAD.  Has a few risk factors.  Has not been seen by Cardiology to date.  She is tobacco is a nonsmoker.  He is not very active at this point.  He is disability and has a fairly sedentary lifestyle.  He does take a beta-blocker and is running in the high 50s 4 pulse rate.  His blood pressure is fairly low at 120 systolic so I am going to have him taking 5 mg of amlodipine instead of 10 until he is cleared by Cardiology.     Review of Systems   Constitutional: Negative.    HENT: Negative.     Eyes: Negative.    Respiratory:  Positive for chest tightness and shortness of breath.    Cardiovascular:  Positive for chest pain.   Gastrointestinal: Negative.    Endocrine: Negative.    Genitourinary: Negative.    Musculoskeletal: Negative.    Skin: Negative.    Allergic/Immunologic: Negative.    Neurological: Negative.    Hematological: Negative.    Psychiatric/Behavioral: Negative.       Objective:      Vitals:    02/17/23 1604   BP: 128/70   Pulse: (!) 56   Temp: 97.4 °F (36.3 °C)   TempSrc: Oral   SpO2: 97%   Weight: 123.6 kg (272 lb 7.8 oz)   Height: 6' 2" (1.88 m)      Physical Exam  Vitals and nursing note reviewed.   Constitutional:       Appearance: Normal appearance. He is obese.   HENT:      Head: Normocephalic and atraumatic.      Nose: Nose normal.      Mouth/Throat:      Mouth: Mucous membranes are moist.      Pharynx: Oropharynx is clear.   Eyes:      Extraocular Movements: Extraocular movements intact.      Conjunctiva/sclera: Conjunctivae normal.      Pupils: Pupils are equal, round, and reactive to light.   Cardiovascular:      Rate " and Rhythm: Bradycardia present. Rhythm irregular.   Pulmonary:      Effort: Pulmonary effort is normal.      Breath sounds: Normal breath sounds.   Abdominal:      General: Abdomen is flat. Bowel sounds are normal.      Palpations: Abdomen is soft.   Musculoskeletal:         General: Normal range of motion.      Cervical back: Normal range of motion and neck supple.   Skin:     General: Skin is warm and dry.      Capillary Refill: Capillary refill takes less than 2 seconds.   Neurological:      General: No focal deficit present.      Mental Status: He is alert and oriented to person, place, and time.   Psychiatric:         Mood and Affect: Mood normal.         Behavior: Behavior normal.         Thought Content: Thought content normal.         Judgment: Judgment normal.       Results for orders placed or performed in visit on 01/06/23   Oxcarbazepine level   Result Value Ref Range    Oxcarbazepine 12 10 - 35 mcg/mL   Comprehensive metabolic panel   Result Value Ref Range    Sodium 139 136 - 145 mmol/L    Potassium 4.3 3.5 - 5.1 mmol/L    Chloride 104 95 - 110 mmol/L    CO2 25 23 - 29 mmol/L    Glucose 97 70 - 110 mg/dL    BUN 23 8 - 23 mg/dL    Creatinine 1.6 (H) 0.5 - 1.4 mg/dL    Calcium 10.3 8.7 - 10.5 mg/dL    Total Protein 8.4 6.0 - 8.4 g/dL    Albumin 3.8 3.5 - 5.2 g/dL    Total Bilirubin 0.4 0.1 - 1.0 mg/dL    Alkaline Phosphatase 109 55 - 135 U/L    AST 28 10 - 40 U/L    ALT 20 10 - 44 U/L    Anion Gap 10 8 - 16 mmol/L    eGFR 48.4 (A) >60 mL/min/1.73 m^2   TSH   Result Value Ref Range    TSH 1.711 0.400 - 4.000 uIU/mL   T4, FREE   Result Value Ref Range    Free T4 0.68 (L) 0.71 - 1.51 ng/dL   Hemoglobin A1C   Result Value Ref Range    Hemoglobin A1C 5.3 4.0 - 5.6 %    Estimated Avg Glucose 105 68 - 131 mg/dL   Methylmalonic Acid, Serum   Result Value Ref Range    Methlymalonic Acid 0.57 (H) <0.40 umol/L   Vitamin B1   Result Value Ref Range    Thiamine 113 38 - 122 ug/L   Vitamin B12   Result Value Ref  Range    Vitamin B-12 605 210 - 950 pg/mL     *Note: Due to a large number of results and/or encounters for the requested time period, some results have not been displayed. A complete set of results can be found in Results Review.      Assessment:       1. ABEL (dyspnea on exertion)    2. Chest tightness    3. Essential hypertension        Plan:       ABEL (dyspnea on exertion)    Chest tightness    Essential hypertension  -     amLODIPine (NORVASC) 10 MG tablet; Take 0.5 tablets (5 mg total) by mouth once daily.  Dispense: 90 tablet; Refill: 3  -     Ambulatory referral/consult to Cardiology; Future; Expected date: 02/24/2023          Medication List with Changes/Refills   Current Medications    ALLOPURINOL (ZYLOPRIM) 100 MG TABLET    TAKE 2 TABLETS(200 MG) BY MOUTH EVERY DAY    ASPIRIN 81 MG TAB    Take 1 tablet by mouth.    BUSPIRONE (BUSPAR) 15 MG TABLET    Take 15 mg by mouth 3 (three) times daily.     CHOLECALCIFEROL, VITAMIN D3, 2,000 UNIT CAP    Take 1 capsule by mouth once daily.    FLUOXETINE 40 MG CAPSULE    Take 40 mg by mouth once daily.    FLUTICASONE PROPIONATE (FLONASE) 50 MCG/ACTUATION NASAL SPRAY    SPRAY ONCE IN EACH NOSTRIL EVERY DAY    GABAPENTIN (NEURONTIN) 300 MG CAPSULE    TAKE 1 CAPSULE(300 MG) BY MOUTH THREE TIMES DAILY    OMEPRAZOLE (PRILOSEC) 20 MG CAPSULE    TAKE 1 CAPSULE BY MOUTH EVERY DAY    OXCARBAZEPINE (TRILEPTAL) 300 MG TAB    TK 2 TS PO QD    PRAVASTATIN (PRAVACHOL) 40 MG TABLET    Take 1 tablet (40 mg total) by mouth every evening.    PROPRANOLOL (INDERAL) 40 MG TABLET    TAKE 1 TABLET(40 MG) BY MOUTH TWICE DAILY    QUETIAPINE (SEROQUEL) 50 MG TABLET    TK 1 T PO TID   Changed and/or Refilled Medications    Modified Medication Previous Medication    AMLODIPINE (NORVASC) 10 MG TABLET amLODIPine (NORVASC) 10 MG tablet       Take 0.5 tablets (5 mg total) by mouth once daily.    TAKE 1 TABLET(10 MG) BY MOUTH EVERY DAY   Discontinued Medications    DIAZEPAM (VALIUM) 5 MG TABLET    Take  1 tablet (5 mg total) by mouth On call Procedure for Anxiety (prior to MRI).    LIDOCAINE (LIDODERM) 5 %    Place 1 patch onto the skin once daily. Remove & Discard patch within 12 hours or as directed by MD    TIZANIDINE (ZANAFLEX) 4 MG TABLET    Take 1 tablet (4 mg total) by mouth every 6 (six) hours as needed (muscle spasms).

## 2023-02-20 ENCOUNTER — TELEPHONE (OUTPATIENT)
Dept: CARDIOLOGY | Facility: HOSPITAL | Age: 63
End: 2023-02-20
Payer: COMMERCIAL

## 2023-02-20 DIAGNOSIS — R00.1 SYMPTOMATIC BRADYCARDIA: ICD-10-CM

## 2023-02-20 DIAGNOSIS — Z95.0 CARDIAC PACEMAKER IN SITU: Primary | ICD-10-CM

## 2023-02-20 NOTE — TELEPHONE ENCOUNTER
Pt agreed to date and time for device check 2/28/23 @11:40.  Informed him of location.  Pt stated he feels good today post ppm implant.  Gave pt contact information for pacemaker clinic

## 2023-02-22 ENCOUNTER — DOCUMENTATION ONLY (OUTPATIENT)
Dept: CARDIOLOGY | Facility: HOSPITAL | Age: 63
End: 2023-02-22
Payer: COMMERCIAL

## 2023-02-28 ENCOUNTER — TELEPHONE (OUTPATIENT)
Dept: CARDIOLOGY | Facility: HOSPITAL | Age: 63
End: 2023-02-28
Payer: COMMERCIAL

## 2023-02-28 ENCOUNTER — CLINICAL SUPPORT (OUTPATIENT)
Dept: CARDIOLOGY | Facility: HOSPITAL | Age: 63
End: 2023-02-28
Attending: INTERNAL MEDICINE
Payer: COMMERCIAL

## 2023-02-28 ENCOUNTER — DOCUMENTATION ONLY (OUTPATIENT)
Dept: CARDIOLOGY | Facility: HOSPITAL | Age: 63
End: 2023-02-28
Payer: COMMERCIAL

## 2023-02-28 DIAGNOSIS — Z95.0 CARDIAC PACEMAKER IN SITU: ICD-10-CM

## 2023-02-28 DIAGNOSIS — R00.1 SYMPTOMATIC BRADYCARDIA: ICD-10-CM

## 2023-02-28 PROCEDURE — 93280 PM DEVICE PROGR EVAL DUAL: CPT | Mod: 26,,, | Performed by: INTERNAL MEDICINE

## 2023-02-28 PROCEDURE — 93280 CARDIAC DEVICE CHECK - IN CLINIC & HOSPITAL: ICD-10-PCS | Mod: 26,,, | Performed by: INTERNAL MEDICINE

## 2023-02-28 NOTE — PROGRESS NOTES
Pt. Seen in clinic for device interrogation. Post staple removal pt. Reports feeling lightheaded/dizzy.Pt. given water/soda and cookies. Pt. Still reported lightheadedness.Pt. placed in lying position Ap/Vs on monitor HR 60. BP 75/52. After monitoring pt. For approx. 30 mins. And serial BP's pt. denied dizziness or lightheadedness and /59. Dr. Smith came to assess pt. Per Dr. Smith discontinue Amlodipine. Pt. Verbalized understanding. F/u scheduled with TWYLA Blancas on 3/14. Pt. Declined wheelchair, pt. Walked to waiting room without any complaints

## 2023-03-04 ENCOUNTER — NURSE TRIAGE (OUTPATIENT)
Dept: ADMINISTRATIVE | Facility: CLINIC | Age: 63
End: 2023-03-04
Payer: COMMERCIAL

## 2023-03-05 NOTE — TELEPHONE ENCOUNTER
Patient c/o an elevated BP. Last BP was 183/100. He is 3 weeks post of pacemaker placement. States that cariology advised him to stop taking amlodipine around the time of his surgery due to hypotension. Patient denies any symptoms. Advised per protocol to be seen within the net 24 hours. Also attempted to contact cardiology but no on is on call for [facility]. Advised patient to go to the nearest ED for further evaluation. Patient VU. Advised the patient to call back with any further questions or if symptoms worsen.      Reason for Disposition   Systolic BP  >= 180 OR Diastolic >= 110    Additional Information   Negative: Difficult to awaken or acting confused (e.g., disoriented, slurred speech)   Negative: SEVERE difficulty breathing (e.g., struggling for each breath, speaks in single words)   Negative: [1] Weakness of the face, arm or leg on one side of the body AND [2] new-onset   Negative: [1] Numbness (i.e., loss of sensation) of the face, arm or leg on one side of the body AND [2] new-onset   Negative: [1] Chest pain lasts > 5 minutes AND [2] history of heart disease (i.e., heart attack, bypass surgery, angina, angioplasty, CHF)   Negative: [1] Chest pain AND [2] took nitrogylcerin AND [3] pain was not relieved   Negative: Sounds like a life-threatening emergency to the triager   Negative: [1] Systolic BP  >= 160 OR Diastolic >= 100 AND [2] cardiac or neurologic symptoms (e.g., chest pain, difficulty breathing, unsteady gait, blurred vision)   Negative: [1] Pregnant 20 or more weeks (or postpartum < 6 weeks) AND [2] new hand or face swelling   Negative: [1] Pregnant 20 or more weeks (or postpartum < 6 weeks) AND [2] Systolic BP >= 160 OR Diastolic >= 100   Negative: [1] Systolic BP  >= 200 OR Diastolic >= 120 AND [2] having NO cardiac or neurologic symptoms   Negative: [1] Pregnant 20 or more weeks (or postpartum < 6 weeks) AND [2] Systolic BP  >= 140 OR Diastolic >= 90   Negative: [1] Systolic BP  >= 180 OR  Diastolic >= 110 AND [2] missed most recent dose of blood pressure medication    Protocols used: Blood Pressure - High-A-AH

## 2023-03-06 DIAGNOSIS — I10 ESSENTIAL HYPERTENSION: Primary | ICD-10-CM

## 2023-03-06 RX ORDER — AMLODIPINE BESYLATE 5 MG/1
5 TABLET ORAL DAILY
Qty: 90 TABLET | Refills: 3 | Status: SHIPPED | OUTPATIENT
Start: 2023-03-06 | End: 2023-04-11 | Stop reason: DRUGHIGH

## 2023-03-07 ENCOUNTER — OFFICE VISIT (OUTPATIENT)
Dept: FAMILY MEDICINE | Facility: CLINIC | Age: 63
End: 2023-03-07
Payer: COMMERCIAL

## 2023-03-07 VITALS
BODY MASS INDEX: 34.4 KG/M2 | HEIGHT: 74 IN | RESPIRATION RATE: 19 BRPM | DIASTOLIC BLOOD PRESSURE: 74 MMHG | OXYGEN SATURATION: 95 % | SYSTOLIC BLOOD PRESSURE: 118 MMHG | WEIGHT: 268.06 LBS | HEART RATE: 68 BPM

## 2023-03-07 DIAGNOSIS — Z95.0 PACEMAKER: ICD-10-CM

## 2023-03-07 DIAGNOSIS — Z00.00 WELLNESS EXAMINATION: ICD-10-CM

## 2023-03-07 DIAGNOSIS — I10 ESSENTIAL HYPERTENSION: Primary | ICD-10-CM

## 2023-03-07 PROCEDURE — 1159F MED LIST DOCD IN RCRD: CPT | Mod: CPTII,S$GLB,, | Performed by: FAMILY MEDICINE

## 2023-03-07 PROCEDURE — 3044F HG A1C LEVEL LT 7.0%: CPT | Mod: CPTII,S$GLB,, | Performed by: FAMILY MEDICINE

## 2023-03-07 PROCEDURE — 1160F PR REVIEW ALL MEDS BY PRESCRIBER/CLIN PHARMACIST DOCUMENTED: ICD-10-PCS | Mod: CPTII,S$GLB,, | Performed by: FAMILY MEDICINE

## 2023-03-07 PROCEDURE — 99213 PR OFFICE/OUTPT VISIT, EST, LEVL III, 20-29 MIN: ICD-10-PCS | Mod: S$GLB,,, | Performed by: FAMILY MEDICINE

## 2023-03-07 PROCEDURE — 99213 OFFICE O/P EST LOW 20 MIN: CPT | Mod: S$GLB,,, | Performed by: FAMILY MEDICINE

## 2023-03-07 PROCEDURE — 3044F PR MOST RECENT HEMOGLOBIN A1C LEVEL <7.0%: ICD-10-PCS | Mod: CPTII,S$GLB,, | Performed by: FAMILY MEDICINE

## 2023-03-07 PROCEDURE — 3078F DIAST BP <80 MM HG: CPT | Mod: CPTII,S$GLB,, | Performed by: FAMILY MEDICINE

## 2023-03-07 PROCEDURE — 3078F PR MOST RECENT DIASTOLIC BLOOD PRESSURE < 80 MM HG: ICD-10-PCS | Mod: CPTII,S$GLB,, | Performed by: FAMILY MEDICINE

## 2023-03-07 PROCEDURE — 1160F RVW MEDS BY RX/DR IN RCRD: CPT | Mod: CPTII,S$GLB,, | Performed by: FAMILY MEDICINE

## 2023-03-07 PROCEDURE — 3074F SYST BP LT 130 MM HG: CPT | Mod: CPTII,S$GLB,, | Performed by: FAMILY MEDICINE

## 2023-03-07 PROCEDURE — 3008F BODY MASS INDEX DOCD: CPT | Mod: CPTII,S$GLB,, | Performed by: FAMILY MEDICINE

## 2023-03-07 PROCEDURE — 3008F PR BODY MASS INDEX (BMI) DOCUMENTED: ICD-10-PCS | Mod: CPTII,S$GLB,, | Performed by: FAMILY MEDICINE

## 2023-03-07 PROCEDURE — 3074F PR MOST RECENT SYSTOLIC BLOOD PRESSURE < 130 MM HG: ICD-10-PCS | Mod: CPTII,S$GLB,, | Performed by: FAMILY MEDICINE

## 2023-03-07 PROCEDURE — 99999 PR PBB SHADOW E&M-EST. PATIENT-LVL IV: ICD-10-PCS | Mod: PBBFAC,,, | Performed by: FAMILY MEDICINE

## 2023-03-07 PROCEDURE — 99999 PR PBB SHADOW E&M-EST. PATIENT-LVL IV: CPT | Mod: PBBFAC,,, | Performed by: FAMILY MEDICINE

## 2023-03-07 PROCEDURE — 1159F PR MEDICATION LIST DOCUMENTED IN MEDICAL RECORD: ICD-10-PCS | Mod: CPTII,S$GLB,, | Performed by: FAMILY MEDICINE

## 2023-03-07 NOTE — PROGRESS NOTES
"Subjective:       Patient ID: Abel Dc III is a 62 y.o. male.    Chief Complaint: Hospital Follow Up    HPI:  Pleasant 62-year-old patient here today for hospital follow-up.  He was displaying symptomatic bradycardia without signs of cardiovascular disease.  No CAD or CHF symptoms.  High blood pressure is controlled at this time.  Continue current treatment plan.  Has shortness of breath with exertion.  He has had a pacemaker placed.  He is feeling much better and is very grateful which I am thankful for.  Patient has a cardiology appointment next week on the 14th with Dr. Harrison.    Review of Systems   Constitutional: Negative.    HENT: Negative.     Eyes: Negative.    Respiratory: Negative.     Cardiovascular: Negative.    Gastrointestinal: Negative.    Endocrine: Negative.    Genitourinary: Negative.    Musculoskeletal: Negative.    Skin: Negative.    Allergic/Immunologic: Negative.    Neurological: Negative.    Hematological: Negative.    Psychiatric/Behavioral: Negative.       Objective:      Vitals:    03/07/23 1443   BP: 118/74   Pulse: 68   Resp: 19   SpO2: 95%   Weight: 121.6 kg (268 lb 1.3 oz)   Height: 6' 2" (1.88 m)      Physical Exam  Vitals and nursing note reviewed.   Constitutional:       Appearance: Normal appearance. He is obese.   HENT:      Head: Normocephalic and atraumatic.      Nose: Nose normal.      Mouth/Throat:      Mouth: Mucous membranes are moist.      Pharynx: Oropharynx is clear.   Eyes:      Extraocular Movements: Extraocular movements intact.      Conjunctiva/sclera: Conjunctivae normal.      Pupils: Pupils are equal, round, and reactive to light.   Cardiovascular:      Rate and Rhythm: Normal rate and regular rhythm.   Pulmonary:      Effort: Pulmonary effort is normal.      Breath sounds: Normal breath sounds.   Abdominal:      General: Abdomen is flat. Bowel sounds are normal.      Palpations: Abdomen is soft.   Musculoskeletal:         General: Normal range of motion.      " Cervical back: Normal range of motion and neck supple.   Skin:     General: Skin is warm and dry.      Capillary Refill: Capillary refill takes less than 2 seconds.      Comments: The pacemaker site shows excellent healing.  Steri-Strips still in place.   Neurological:      General: No focal deficit present.      Mental Status: He is alert and oriented to person, place, and time.   Psychiatric:         Mood and Affect: Mood normal.         Behavior: Behavior normal.         Thought Content: Thought content normal.         Judgment: Judgment normal.       Results for orders placed or performed during the hospital encounter of 03/04/23   CBC auto differential   Result Value Ref Range    WBC 9.54 3.90 - 12.70 K/uL    RBC 4.59 (L) 4.60 - 6.20 M/uL    Hemoglobin 14.4 14.0 - 18.0 g/dL    Hematocrit 42.1 40.0 - 54.0 %    MCV 92 82 - 98 fL    MCH 31.4 (H) 27.0 - 31.0 pg    MCHC 34.2 32.0 - 36.0 g/dL    RDW 13.2 11.5 - 14.5 %    Platelets 268 150 - 450 K/uL    MPV 10.1 9.2 - 12.9 fL    Immature Granulocytes 0.2 0.0 - 0.5 %    Gran # (ANC) 4.6 1.8 - 7.7 K/uL    Immature Grans (Abs) 0.02 0.00 - 0.04 K/uL    Lymph # 3.1 1.0 - 4.8 K/uL    Mono # 1.2 (H) 0.3 - 1.0 K/uL    Eos # 0.6 (H) 0.0 - 0.5 K/uL    Baso # 0.09 0.00 - 0.20 K/uL    nRBC 0 0 /100 WBC    Gran % 48.4 38.0 - 73.0 %    Lymph % 32.0 18.0 - 48.0 %    Mono % 12.6 4.0 - 15.0 %    Eosinophil % 5.9 0.0 - 8.0 %    Basophil % 0.9 0.0 - 1.9 %    Differential Method Automated    Comprehensive metabolic panel (CMP)   Result Value Ref Range    Sodium 135 (L) 136 - 145 mmol/L    Potassium 4.4 3.5 - 5.1 mmol/L    Chloride 100 95 - 110 mmol/L    CO2 26 22 - 31 mmol/L    Glucose 103 70 - 110 mg/dL    BUN 11 9 - 21 mg/dL    Creatinine 1.47 (H) 0.50 - 1.40 mg/dL    Calcium 9.6 8.4 - 10.2 mg/dL    Total Protein 8.5 (H) 6.0 - 8.4 g/dL    Albumin 4.4 3.5 - 5.2 g/dL    Total Bilirubin 0.4 0.2 - 1.3 mg/dL    Alkaline Phosphatase 118 38 - 145 U/L    AST 39 17 - 59 U/L    ALT 33 0 - 50 U/L     Anion Gap 9 8 - 16 mmol/L    eGFR 54 (A) >60 mL/min/1.73 m^2     *Note: Due to a large number of results and/or encounters for the requested time period, some results have not been displayed. A complete set of results can be found in Results Review.      Assessment:       1. Essential hypertension    2. Pacemaker    3. Wellness examination        Plan:       Essential hypertension  Comments:  Controlled continue same plan.    Pacemaker  Comments:  Operational, follow up with Cardiology with me as recommended.    Wellness examination  -     Case Request Endoscopy: COLONOSCOPY          Medication List with Changes/Refills   Current Medications    ALLOPURINOL (ZYLOPRIM) 100 MG TABLET    TAKE 2 TABLETS(200 MG) BY MOUTH EVERY DAY    AMLODIPINE (NORVASC) 5 MG TABLET    Take 1 tablet (5 mg total) by mouth once daily.    ASPIRIN 81 MG TAB    Take 1 tablet by mouth once daily.    BUSPIRONE (BUSPAR) 15 MG TABLET    Take 15 mg by mouth 3 (three) times daily.     CHOLECALCIFEROL, VITAMIN D3, 2,000 UNIT CAP    Take 1 capsule by mouth once daily.    FLUOXETINE 40 MG CAPSULE    Take 40 mg by mouth once daily.    FLUTICASONE PROPIONATE (FLONASE) 50 MCG/ACTUATION NASAL SPRAY    SPRAY ONCE IN EACH NOSTRIL EVERY DAY    GABAPENTIN (NEURONTIN) 300 MG CAPSULE    TAKE 1 CAPSULE(300 MG) BY MOUTH THREE TIMES DAILY    OMEPRAZOLE (PRILOSEC) 20 MG CAPSULE    TAKE 1 CAPSULE BY MOUTH EVERY DAY    OXCARBAZEPINE (TRILEPTAL) 300 MG TAB    Take 600 mg by mouth once daily.    PRAVASTATIN (PRAVACHOL) 40 MG TABLET    Take 1 tablet (40 mg total) by mouth every evening.    PROPRANOLOL (INDERAL) 40 MG TABLET    TAKE 1 TABLET(40 MG) BY MOUTH TWICE DAILY    QUETIAPINE (SEROQUEL) 50 MG TABLET    Take 50 mg by mouth nightly.

## 2023-03-07 NOTE — PATIENT INSTRUCTIONS
Look up the Mediterranean diet online and try to incorporate as much that into your normal eating.    Talk to her cardiologist about activity levels and also about vaccinations.

## 2023-03-14 ENCOUNTER — OFFICE VISIT (OUTPATIENT)
Dept: CARDIOLOGY | Facility: CLINIC | Age: 63
End: 2023-03-14
Payer: COMMERCIAL

## 2023-03-14 VITALS
WEIGHT: 267.88 LBS | HEART RATE: 60 BPM | HEIGHT: 74 IN | BODY MASS INDEX: 34.38 KG/M2 | SYSTOLIC BLOOD PRESSURE: 134 MMHG | DIASTOLIC BLOOD PRESSURE: 81 MMHG

## 2023-03-14 DIAGNOSIS — E66.01 SEVERE OBESITY (BMI 35.0-39.9) WITH COMORBIDITY: Primary | ICD-10-CM

## 2023-03-14 DIAGNOSIS — R00.1 SYMPTOMATIC BRADYCARDIA: ICD-10-CM

## 2023-03-14 DIAGNOSIS — I10 ESSENTIAL HYPERTENSION: ICD-10-CM

## 2023-03-14 DIAGNOSIS — E78.2 MIXED HYPERLIPIDEMIA: ICD-10-CM

## 2023-03-14 PROCEDURE — 99024 PR POST-OP FOLLOW-UP VISIT: ICD-10-PCS | Mod: S$GLB,,,

## 2023-03-14 PROCEDURE — 99999 PR PBB SHADOW E&M-EST. PATIENT-LVL II: CPT | Mod: PBBFAC,,,

## 2023-03-14 PROCEDURE — 99999 PR PBB SHADOW E&M-EST. PATIENT-LVL II: ICD-10-PCS | Mod: PBBFAC,,,

## 2023-03-14 PROCEDURE — 3008F PR BODY MASS INDEX (BMI) DOCUMENTED: ICD-10-PCS | Mod: CPTII,S$GLB,,

## 2023-03-14 PROCEDURE — 3044F PR MOST RECENT HEMOGLOBIN A1C LEVEL <7.0%: ICD-10-PCS | Mod: CPTII,S$GLB,,

## 2023-03-14 PROCEDURE — 99024 POSTOP FOLLOW-UP VISIT: CPT | Mod: S$GLB,,,

## 2023-03-14 PROCEDURE — 3079F DIAST BP 80-89 MM HG: CPT | Mod: CPTII,S$GLB,,

## 2023-03-14 PROCEDURE — 3075F PR MOST RECENT SYSTOLIC BLOOD PRESS GE 130-139MM HG: ICD-10-PCS | Mod: CPTII,S$GLB,,

## 2023-03-14 PROCEDURE — 3044F HG A1C LEVEL LT 7.0%: CPT | Mod: CPTII,S$GLB,,

## 2023-03-14 PROCEDURE — 3008F BODY MASS INDEX DOCD: CPT | Mod: CPTII,S$GLB,,

## 2023-03-14 PROCEDURE — 3075F SYST BP GE 130 - 139MM HG: CPT | Mod: CPTII,S$GLB,,

## 2023-03-14 PROCEDURE — 3079F PR MOST RECENT DIASTOLIC BLOOD PRESSURE 80-89 MM HG: ICD-10-PCS | Mod: CPTII,S$GLB,,

## 2023-03-14 NOTE — PROGRESS NOTES
Subjective:    Patient ID:  Abel Dc III is a 62 y.o. male patient here for evaluation Hypertension and Hyperlipidemia      History of Present Illness:     Mr. Dc is a pleasant 62 year old M who [facility]. Amlodipine was discontinued due to hypotension. However, he recently went to the ER for asymptomatic hypertension. Amlodipine 5 mg was resumed. BP has been improved, today 134/81 mmHg.   He has no complaints today, PPM site CDI. Device function ok. See ROS.     Echo 2/18/2023 EF 55% abnormal wall motion given RV pacemaker.   2013 Dr. Galeana negative MPI.           Review of patient's allergies indicates:   Allergen Reactions    Amitriptyline Other (See Comments) and Hallucinations     confusion    Hydrocodone      Other reaction(s): Hallucinations    Hydrocodone-acetaminophen      Other reaction(s): hyperactivity    Oxycodone Other (See Comments)     hallucinations    Pseudoephedrine      Other reaction(s): Hallucinations    Pseudoephedrine hcl      Other reaction(s): hyperactivity    Risperidone      Other reaction(s): Hallucinations  Other reaction(s): hyperactivity    Trazodone      Adverse reaction    Naproxen        Past Medical History:   Diagnosis Date    Bipolar disorder     manic depressive x 1992    BPH (benign prostatic hypertrophy)     Chronic low back pain     Depression     GERD (gastroesophageal reflux disease)     Gout     Gout, arthritis     Hyperlipidemia     Hypertension     Lumbar disc disease     Obesity     Panic disorder      Past Surgical History:   Procedure Laterality Date    A-V CARDIAC PACEMAKER INSERTION Left 2/18/2023    Procedure: INSERTION, CARDIAC PACEMAKER, DUAL CHAMBER;  Surgeon: Carlos Smith MD;  Location: [facility];  Service: Cardiology;  Laterality: Left;    HERNIA REPAIR      HUMERUS FRACTURE SURGERY  1971    Bone grafts required    INSERTION, PACEMAKER, TEMPORARY TRANSVENOUS  2/18/2023    Procedure: Insertion,  "Pacemaker, Temporary Transvenous;  Surgeon: Carlos Smith MD;  Location: [facility];  Service: Cardiology;;    MANDIBLE FRACTURE SURGERY      MUSCLE TRANSFER UPPER ARM      SHOULDER ARTHROSCOPY      SHOULDER SURGERY      TONSILLECTOMY       Social History     Tobacco Use    Smoking status: Never    Smokeless tobacco: Current     Types: Snuff    Tobacco comments:     Patient uses "dip" tobacco   Substance Use Topics    Alcohol use: No     Alcohol/week: 0.0 standard drinks     Comment: Heavy alcohol use in the past.     Drug use: No        REVIEW OF SYSTEMS: As noted in HPI   CARDIOVASCULAR: No recent chest pain, palpitations, arm, neck, or jaw pain  RESPIRATORY: No recent fever, cough chills, SOB or congestion  : No blood in the urine  GI: No Nausea, vomiting, constipation, diarrhea, blood, or reflux.  MUSCULOSKELETAL: No myalgias  NEURO: No lightheadedness or dizziness  EYES: No Double vision, blurry, vision or headache        Objective        Vitals:    03/14/23 1243   BP: 134/81   Pulse: 60       LIPIDS - LAST 2   Lab Results   Component Value Date    CHOL 155 08/24/2022    CHOL 164 08/02/2021    HDL 34 (L) 08/24/2022    HDL 37 (L) 08/02/2021    LDLCALC 87.6 08/24/2022    LDLCALC 99.8 08/02/2021    TRIG 167 (H) 08/24/2022    TRIG 136 08/02/2021    CHOLHDL 21.9 08/24/2022    CHOLHDL 22.6 08/02/2021       CBC - LAST 2  Lab Results   Component Value Date    WBC 9.54 03/04/2023    WBC 16.45 (H) 02/18/2023    RBC 4.59 (L) 03/04/2023    RBC 4.81 02/18/2023    HGB 14.4 03/04/2023    HGB 15.1 02/18/2023    HCT 42.1 03/04/2023    HCT 42.8 02/18/2023    MCV 92 03/04/2023    MCV 89 02/18/2023    MCH 31.4 (H) 03/04/2023    MCH 31.4 (H) 02/18/2023    MCHC 34.2 03/04/2023    MCHC 35.3 02/18/2023    RDW 13.2 03/04/2023    RDW 13.3 02/18/2023     03/04/2023     02/18/2023    MPV 10.1 03/04/2023    MPV 10.5 02/18/2023    GRAN 4.6 03/04/2023    GRAN 48.4 03/04/2023    LYMPH 3.1 03/04/2023    LYMPH 32.0 " 03/04/2023    MONO 1.2 (H) 03/04/2023    MONO 12.6 03/04/2023    BASO 0.09 03/04/2023    BASO 0.08 02/18/2023    NRBC 0 03/04/2023    NRBC 0 02/18/2023       CHEMISTRY & LIVER FUNCTION - LAST 2  Lab Results   Component Value Date     (L) 03/04/2023     (L) 02/18/2023    K 4.4 03/04/2023    K 4.8 02/18/2023     03/04/2023     02/18/2023    CO2 26 03/04/2023    CO2 24 02/18/2023    ANIONGAP 9 03/04/2023    ANIONGAP 8 02/18/2023    BUN 11 03/04/2023    BUN 24 (H) 02/18/2023    CREATININE 1.47 (H) 03/04/2023    CREATININE 1.88 (H) 02/18/2023     03/04/2023     (H) 02/18/2023    CALCIUM 9.6 03/04/2023    CALCIUM 9.9 02/18/2023    MG 2.3 02/18/2023    MG 2.4 02/17/2023    ALBUMIN 4.4 03/04/2023    ALBUMIN 4.7 02/17/2023    PROT 8.5 (H) 03/04/2023    PROT 9.1 (H) 02/17/2023    ALKPHOS 118 03/04/2023    ALKPHOS 117 02/17/2023    ALT 33 03/04/2023    ALT 33 02/17/2023    AST 39 03/04/2023    AST 40 02/17/2023    BILITOT 0.4 03/04/2023    BILITOT 0.8 02/17/2023        CARDIAC PROFILE - LAST 2  Lab Results   Component Value Date    BNP <10 06/30/2015     (H) 02/06/2015     (H) 12/11/2008    CPKMB 3.1 12/11/2008    CPKMB 3.8 12/11/2008    TROPONINI 0.029 02/18/2023    TROPONINI <0.012 02/18/2023        COAGULATION - LAST 2  Lab Results   Component Value Date    INR 0.9 08/01/2020    INR 1.1 12/11/2008    APTT 27.1 12/11/2008       ENDOCRINE & PSA - LAST 2  Lab Results   Component Value Date    HGBA1C 5.3 01/06/2023    HGBA1C 5.4 08/02/2021    TSH 0.669 02/18/2023    TSH 1.711 01/06/2023    PSA 2.1 08/24/2022    PSA 0.47 08/02/2021        ECHOCARDIOGRAM RESULTS  Results for orders placed during the hospital encounter of 02/17/23    Echo    Interpretation Summary  · The left ventricle is normal in size with concentric remodeling and normal systolic function.  · The estimated ejection fraction is 55%.  · There is abnormal septal wall motion consistent with right ventricular  pacemaker.  · Normal left ventricular diastolic function.  · Normal right ventricular size with normal right ventricular systolic function.  · IVC not well visualized.  · The estimated PA systolic pressure is at least 17 mmHg.      CURRENT/PREVIOUS VISIT EKG  Results for orders placed or performed during the hospital encounter of 03/04/23   EKG 12-lead    Collection Time: 03/04/23  7:55 PM    Narrative    Test Reason : I10,    Vent. Rate : 060 BPM     Atrial Rate : 060 BPM     P-R Int : 264 ms          QRS Dur : 176 ms      QT Int : 496 ms       P-R-T Axes : 008 -18 129 degrees     QTc Int : 496 ms    Atrial-paced rhythm with prolonged AV conduction  Left bundle branch block  Abnormal ECG  When compared with ECG of 18-FEB-2023 11:05,  Electronic atrial pacemaker has replaced Electronic ventricular pacemaker  Confirmed by Neda MELENDEZ, Morgan COLE ([id number]) on 3/5/2023 4:29:21 PM    Referred By: RUSSELL   SELF           Confirmed By:Morgan Red MD       PHYSICAL EXAM  CONSTITUTIONAL: Well built, well nourished in no apparent distress  NECK: no carotid bruit, no JVD  LUNGS: CTA  CHEST WALL: no tenderness. PPM site CDI, steri strips in place.   HEART: regular rate and rhythm, S1, S2 normal, no murmur, click, rub or gallop   ABDOMEN: soft, non-tender; bowel sounds normal; no masses,  no organomegaly  EXTREMITIES: Extremities normal, no edema, no calf tenderness noted  NEURO: AAO X 3    I HAVE REVIEWED :    The vital signs, nurses notes, and all the pertinent radiology and labs.    Current Outpatient Medications   Medication Instructions    allopurinoL (ZYLOPRIM) 100 MG tablet TAKE 2 TABLETS(200 MG) BY MOUTH EVERY DAY    amLODIPine (NORVASC) 5 mg, Oral, Daily    aspirin 81 mg Tab 1 tablet, Oral, Daily    busPIRone (BUSPAR) 15 mg, Oral, 3 times daily    cholecalciferol, vitamin D3, 2,000 unit Cap 1 capsule, Oral, Daily    FLUoxetine 40 mg, Oral, Daily    fluticasone propionate (FLONASE) 50 mcg/actuation nasal spray  SPRAY ONCE IN EACH NOSTRIL EVERY DAY    gabapentin (NEURONTIN) 300 MG capsule TAKE 1 CAPSULE(300 MG) BY MOUTH THREE TIMES DAILY    omeprazole (PRILOSEC) 20 MG capsule TAKE 1 CAPSULE BY MOUTH EVERY DAY    OXcarbazepine (TRILEPTAL) 600 mg, Oral, Daily    pravastatin (PRAVACHOL) 40 mg, Oral, Nightly    propranoloL (INDERAL) 40 MG tablet TAKE 1 TABLET(40 MG) BY MOUTH TWICE DAILY    QUEtiapine (SEROQUEL) 50 mg, Oral, Nightly        Assessment & Plan     Symptomatic bradycardia  S/p PPM in situ   Normal device function   Incision CDI     Mixed hyperlipidemia  LDL 87   Continue pravasatin     Low level/low impact aerobic exercise 5x's/wk recommended. Heart healthy diet and risk factor modification discussed with patient.       Essential hypertension  BP ok today   Continue norvasc 5 mg and inderal 40 mg BID   Low Na diet           Follow up in about 6 months (around 9/14/2023).

## 2023-03-14 NOTE — ASSESSMENT & PLAN NOTE
LDL 87   Continue pravasatin     Low level/low impact aerobic exercise 5x's/wk recommended. Heart healthy diet and risk factor modification discussed with patient.      No

## 2023-04-07 ENCOUNTER — PATIENT MESSAGE (OUTPATIENT)
Dept: CARDIOLOGY | Facility: CLINIC | Age: 63
End: 2023-04-07
Payer: COMMERCIAL

## 2023-04-09 ENCOUNTER — NURSE TRIAGE (OUTPATIENT)
Dept: ADMINISTRATIVE | Facility: CLINIC | Age: 63
End: 2023-04-09
Payer: COMMERCIAL

## 2023-04-09 NOTE — TELEPHONE ENCOUNTER
[place]    PCP:  Dr. Jonah Dia    Pt reports BP: 169/102 HI: 73.  Takes Amlodipine 5 mg once daily and Propanolol 40 mg BID.  He reports also took extra ASA 81 mg.  He has a cardiac pacemaker.  Denies dizziness, HA, CP, blurred vision, difficulty breathing, and weakness.  Per protocol, care advised is see PCP within 3 days.  Unable to schedule to with PCP/Office within timeframe.  OCA offered and declined.  Advised to UCC/ED if PCP unable to see him within timeframe of disposition.  Pt VU.  Message routed high priority to PCP and Cardiology.    Reason for Disposition   Systolic BP  >= 160 OR Diastolic >= 100    Additional Information   Negative: Difficult to awaken or acting confused (e.g., disoriented, slurred speech)   Negative: SEVERE difficulty breathing (e.g., struggling for each breath, speaks in single words)   Negative: [1] Weakness of the face, arm or leg on one side of the body AND [2] new-onset   Negative: [1] Numbness (i.e., loss of sensation) of the face, arm or leg on one side of the body AND [2] new-onset   Negative: [1] Chest pain lasts > 5 minutes AND [2] history of heart disease (i.e., heart attack, bypass surgery, angina, angioplasty, CHF)   Negative: [1] Chest pain AND [2] took nitrogylcerin AND [3] pain was not relieved   Negative: Sounds like a life-threatening emergency to the triager   Negative: [1] Systolic BP  >= 160 OR Diastolic >= 100 AND [2] cardiac or neurologic symptoms (e.g., chest pain, difficulty breathing, unsteady gait, blurred vision)   Negative: [1] Pregnant 20 or more weeks (or postpartum < 6 weeks) AND [2] new hand or face swelling   Negative: [1] Pregnant 20 or more weeks (or postpartum < 6 weeks) AND [2] Systolic BP >= 160 OR Diastolic >= 100   Negative: [1] Systolic BP  >= 200 OR Diastolic >= 120 AND [2] having NO cardiac or neurologic symptoms   Negative: [1] Pregnant 20 or more weeks (or postpartum < 6 weeks) AND [2] Systolic BP  >= 140 OR Diastolic >= 90   Negative: [1]  Systolic BP  >= 180 OR Diastolic >= 110 AND [2] missed most recent dose of blood pressure medication   Negative: Systolic BP  >= 180 OR Diastolic >= 110   Negative: Ran out of BP medications    Protocols used: Blood Pressure - High-A-AH

## 2023-04-10 ENCOUNTER — PATIENT MESSAGE (OUTPATIENT)
Dept: FAMILY MEDICINE | Facility: CLINIC | Age: 63
End: 2023-04-10
Payer: COMMERCIAL

## 2023-04-10 ENCOUNTER — TELEPHONE (OUTPATIENT)
Dept: FAMILY MEDICINE | Facility: CLINIC | Age: 63
End: 2023-04-10
Payer: COMMERCIAL

## 2023-04-10 NOTE — TELEPHONE ENCOUNTER
----- Message from Vicenta Juan sent at 4/10/2023  4:41 PM CDT -----  Contact: pt  Type:  Same Day Appointment Request    Caller is requesting a same day appointment.  Caller declined first available appointment listed below.    Name of Caller:pt  When is the first available appointment???  Symptoms:High bp  Best Call Back Number:[phone] (home)     Additional Information: Pt states he need to be seen as soon as possible. States that his bp has been running at 160/100 and higher for the last week. He also spoke with the on call nurse 4/9 and she told him he need to be seen by pcp within 3 days. Please advise thank you

## 2023-04-11 ENCOUNTER — PATIENT MESSAGE (OUTPATIENT)
Dept: ADMINISTRATIVE | Facility: HOSPITAL | Age: 63
End: 2023-04-11
Payer: COMMERCIAL

## 2023-04-11 ENCOUNTER — TELEPHONE (OUTPATIENT)
Dept: FAMILY MEDICINE | Facility: CLINIC | Age: 63
End: 2023-04-11
Payer: COMMERCIAL

## 2023-04-11 DIAGNOSIS — I10 ESSENTIAL HYPERTENSION: Primary | ICD-10-CM

## 2023-04-11 RX ORDER — AMLODIPINE BESYLATE 5 MG/1
5 TABLET ORAL 2 TIMES DAILY
Qty: 180 TABLET | Refills: 3 | Status: SHIPPED | OUTPATIENT
Start: 2023-04-11 | End: 2024-02-05

## 2023-04-11 NOTE — TELEPHONE ENCOUNTER
Pt. states that cardiologist advised him today to take amlodipine 5mg tablets twice daily until next appt.  Instructed pt. To go to ED for any symptoms such as headache or blurry vision.  Pt. Vu.  Pt. Also confirmed appt. With TWYLA Rosa, on 4/17/23.

## 2023-04-11 NOTE — TELEPHONE ENCOUNTER
Spoke to pt and advised to increase amlodipine to 5mg BID per Dr Bailon. Appt already scheduled with PCP

## 2023-04-11 NOTE — TELEPHONE ENCOUNTER
----- Message from Basia Rivera sent at 4/11/2023 10:02 AM CDT -----  Contact: pt  Type:  Sooner Appointment Request    Caller is requesting a sooner appointment.  Caller declined first available appointment listed below.  Caller will not accept being placed on the waitlist and is requesting a message be sent to doctor.  Name of Caller:pt   When is the first available appointment?June (pt refused)  Symptoms:Blood pressure has been high for a week 160 over 100  Would the patient rather a call back or a response via [facility]? [employer]  [facility] Call Back Number:[phone]  Additional Information:     Pt Preferred Date Range: 4/13/2023 - 4/13/2023

## 2023-04-13 ENCOUNTER — TELEPHONE (OUTPATIENT)
Dept: FAMILY MEDICINE | Facility: CLINIC | Age: 63
End: 2023-04-13
Payer: COMMERCIAL

## 2023-04-13 NOTE — TELEPHONE ENCOUNTER
----- Message from Jonah Dia MD sent at 4/13/2023  1:43 PM CDT -----  Contact: Patient Portual  He will need to see me 1st.  I do not even have a diagnosis put in.  He can do a virtual.  ----- Message -----  From: Rico Bertrand MA  Sent: 4/13/2023   8:09 AM CDT  To: Jonah Dia MD    Referral needed for ortho   ----- Message -----  From: Jesus Thompson  Sent: 4/13/2023   7:56 AM CDT  To: [place] Staff    .Type:  Patient Requesting Referral    Who Called:pt  Does the patient already have the specialty appointment scheduled?:  If yes, what is the date of that appointment?:  Referral to What Specialty: Ortho  Reason for Referral: pain in both knees  Does the patient want the referral with a specific physician?:  [facility]  Patient Requesting a Response?: yes   Would the patient rather a call back or a response [facility]?  Call back  Best Call Back Number:[phone]  Additional Information: I need a Referral to see an Orthopedic. I have pain in both knees

## 2023-04-17 ENCOUNTER — OFFICE VISIT (OUTPATIENT)
Dept: FAMILY MEDICINE | Facility: CLINIC | Age: 63
End: 2023-04-17
Payer: COMMERCIAL

## 2023-04-17 VITALS
HEART RATE: 63 BPM | BODY MASS INDEX: 34.49 KG/M2 | OXYGEN SATURATION: 96 % | WEIGHT: 268.75 LBS | TEMPERATURE: 99 F | DIASTOLIC BLOOD PRESSURE: 84 MMHG | HEIGHT: 74 IN | SYSTOLIC BLOOD PRESSURE: 118 MMHG

## 2023-04-17 DIAGNOSIS — Z95.0 PACEMAKER: ICD-10-CM

## 2023-04-17 DIAGNOSIS — I10 ESSENTIAL HYPERTENSION: Primary | ICD-10-CM

## 2023-04-17 DIAGNOSIS — N18.30 STAGE 3 CHRONIC KIDNEY DISEASE, UNSPECIFIED WHETHER STAGE 3A OR 3B CKD: ICD-10-CM

## 2023-04-17 PROCEDURE — 3079F PR MOST RECENT DIASTOLIC BLOOD PRESSURE 80-89 MM HG: ICD-10-PCS | Mod: CPTII,S$GLB,, | Performed by: NURSE PRACTITIONER

## 2023-04-17 PROCEDURE — 3008F PR BODY MASS INDEX (BMI) DOCUMENTED: ICD-10-PCS | Mod: CPTII,S$GLB,, | Performed by: NURSE PRACTITIONER

## 2023-04-17 PROCEDURE — 99999 PR PBB SHADOW E&M-EST. PATIENT-LVL IV: CPT | Mod: PBBFAC,,, | Performed by: NURSE PRACTITIONER

## 2023-04-17 PROCEDURE — 3074F SYST BP LT 130 MM HG: CPT | Mod: CPTII,S$GLB,, | Performed by: NURSE PRACTITIONER

## 2023-04-17 PROCEDURE — 99214 PR OFFICE/OUTPT VISIT, EST, LEVL IV, 30-39 MIN: ICD-10-PCS | Mod: S$GLB,,, | Performed by: NURSE PRACTITIONER

## 2023-04-17 PROCEDURE — 3074F PR MOST RECENT SYSTOLIC BLOOD PRESSURE < 130 MM HG: ICD-10-PCS | Mod: CPTII,S$GLB,, | Performed by: NURSE PRACTITIONER

## 2023-04-17 PROCEDURE — 3079F DIAST BP 80-89 MM HG: CPT | Mod: CPTII,S$GLB,, | Performed by: NURSE PRACTITIONER

## 2023-04-17 PROCEDURE — 3008F BODY MASS INDEX DOCD: CPT | Mod: CPTII,S$GLB,, | Performed by: NURSE PRACTITIONER

## 2023-04-17 PROCEDURE — 99999 PR PBB SHADOW E&M-EST. PATIENT-LVL IV: ICD-10-PCS | Mod: PBBFAC,,, | Performed by: NURSE PRACTITIONER

## 2023-04-17 PROCEDURE — 99214 OFFICE O/P EST MOD 30 MIN: CPT | Mod: S$GLB,,, | Performed by: NURSE PRACTITIONER

## 2023-04-17 PROCEDURE — 3044F HG A1C LEVEL LT 7.0%: CPT | Mod: CPTII,S$GLB,, | Performed by: NURSE PRACTITIONER

## 2023-04-17 PROCEDURE — 3044F PR MOST RECENT HEMOGLOBIN A1C LEVEL <7.0%: ICD-10-PCS | Mod: CPTII,S$GLB,, | Performed by: NURSE PRACTITIONER

## 2023-04-17 PROCEDURE — 1159F PR MEDICATION LIST DOCUMENTED IN MEDICAL RECORD: ICD-10-PCS | Mod: CPTII,S$GLB,, | Performed by: NURSE PRACTITIONER

## 2023-04-17 PROCEDURE — 1159F MED LIST DOCD IN RCRD: CPT | Mod: CPTII,S$GLB,, | Performed by: NURSE PRACTITIONER

## 2023-04-17 RX ORDER — LANOLIN ALCOHOL/MO/W.PET/CERES
100 CREAM (GRAM) TOPICAL DAILY
COMMUNITY

## 2023-04-17 NOTE — PROGRESS NOTES
Subjective:       Patient ID: Abel Dc III is a 62 y.o. male.    Chief Complaint: Follow-up    HPI  S/p pacer placement 2/2 Yaoitz II on 2/17/23  Presents today for HTN--elevated readings at home. Notified cardiology who increased amlodipine to 10mg daily--now controlled at home and in office today.   Feeling great, tolerating medications without adverse effect  Increased energy since pacer       Vitals:    04/17/23 1359   BP: 118/84   Pulse: 63   Temp: 98.6 °F (37 °C)     Review of Systems   Constitutional:  Negative for fever.   Respiratory:  Negative for shortness of breath.    Cardiovascular:  Negative for chest pain and leg swelling.     Past Medical History:   Diagnosis Date    Bipolar disorder     manic depressive x 1992    BPH (benign prostatic hypertrophy)     Chronic low back pain     Depression     GERD (gastroesophageal reflux disease)     Gout     Gout, arthritis     Hyperlipidemia     Hypertension     Lumbar disc disease     Obesity     Panic disorder      Objective:      Physical Exam  Vitals and nursing note reviewed.   Constitutional:       General: He is not in acute distress.     Appearance: He is not diaphoretic.   HENT:      Head: Normocephalic.   Eyes:      General: Lids are normal.         Right eye: No discharge.         Left eye: No discharge.   Neck:      Trachea: No tracheal deviation.   Cardiovascular:      Rate and Rhythm: Normal rate and regular rhythm.      Heart sounds: Normal heart sounds.   Pulmonary:      Effort: Pulmonary effort is normal.   Skin:     Coloration: Skin is not pale.   Neurological:      Mental Status: He is alert and oriented to person, place, and time.   Psychiatric:         Speech: Speech normal.         Behavior: Behavior normal.         Thought Content: Thought content normal.         Judgment: Judgment normal.       Assessment:       1. Essential hypertension    2. Pacemaker    3. Stage 3 chronic kidney disease, unspecified whether stage 3a or 3b CKD         Plan:       Essential hypertension   Controlled; continue current medication    Pacemaker   Site with routine healing    Stage 3 chronic kidney disease, unspecified whether stage 3a or 3b CKD   Labs reviewed--stable         FU 5 months as scheduled with PCP, sooner as needed      Medication List with Changes/Refills   Current Medications    ALLOPURINOL (ZYLOPRIM) 100 MG TABLET    Take 2 tablets (200 mg total) by mouth once daily.    AMLODIPINE (NORVASC) 5 MG TABLET    Take 1 tablet (5 mg total) by mouth 2 (two) times daily.    ASPIRIN 81 MG TAB    Take 1 tablet by mouth once daily.    BUSPIRONE (BUSPAR) 15 MG TABLET    Take 15 mg by mouth 3 (three) times daily.     CHOLECALCIFEROL, VITAMIN D3, 2,000 UNIT CAP    Take 1 capsule by mouth once daily.    CYANOCOBALAMIN (VITAMIN B-12) 1000 MCG TABLET    Take 100 mcg by mouth once daily.    FLUOXETINE 40 MG CAPSULE    Take 40 mg by mouth once daily.    FLUTICASONE PROPIONATE (FLONASE) 50 MCG/ACTUATION NASAL SPRAY    SPRAY ONCE IN EACH NOSTRIL EVERY DAY    GABAPENTIN (NEURONTIN) 300 MG CAPSULE    TAKE 1 CAPSULE(300 MG) BY MOUTH THREE TIMES DAILY    OMEPRAZOLE (PRILOSEC) 20 MG CAPSULE    TAKE 1 CAPSULE BY MOUTH EVERY DAY    OXCARBAZEPINE (TRILEPTAL) 300 MG TAB    Take 600 mg by mouth once daily.    PRAVASTATIN (PRAVACHOL) 40 MG TABLET    Take 1 tablet (40 mg total) by mouth every evening.    PROPRANOLOL (INDERAL) 40 MG TABLET    TAKE 1 TABLET(40 MG) BY MOUTH TWICE DAILY    QUETIAPINE (SEROQUEL) 50 MG TABLET    Take 50 mg by mouth nightly.

## 2023-04-25 ENCOUNTER — TELEPHONE (OUTPATIENT)
Dept: GASTROENTEROLOGY | Facility: CLINIC | Age: 63
End: 2023-04-25
Payer: COMMERCIAL

## 2023-05-09 ENCOUNTER — TELEPHONE (OUTPATIENT)
Dept: CARDIOLOGY | Facility: HOSPITAL | Age: 63
End: 2023-05-09
Payer: COMMERCIAL

## 2023-05-09 ENCOUNTER — CLINICAL SUPPORT (OUTPATIENT)
Dept: CARDIOLOGY | Facility: HOSPITAL | Age: 63
End: 2023-05-09
Attending: INTERNAL MEDICINE
Payer: COMMERCIAL

## 2023-05-09 DIAGNOSIS — R00.1 SYMPTOMATIC BRADYCARDIA: ICD-10-CM

## 2023-05-09 DIAGNOSIS — Z95.0 CARDIAC PACEMAKER IN SITU: ICD-10-CM

## 2023-05-09 PROCEDURE — 93280 CARDIAC DEVICE CHECK - IN CLINIC & HOSPITAL: ICD-10-PCS | Mod: 26,,, | Performed by: INTERNAL MEDICINE

## 2023-05-09 PROCEDURE — 93280 PM DEVICE PROGR EVAL DUAL: CPT | Mod: 26,,, | Performed by: INTERNAL MEDICINE

## 2023-05-17 ENCOUNTER — TELEPHONE (OUTPATIENT)
Dept: GASTROENTEROLOGY | Facility: CLINIC | Age: 63
End: 2023-05-17
Payer: COMMERCIAL

## 2023-05-17 NOTE — TELEPHONE ENCOUNTER
Called pt in regards to scheduling referral no answer left vm with callback #  Case request cancelled at this time.

## 2023-05-29 ENCOUNTER — CLINICAL SUPPORT (OUTPATIENT)
Dept: CARDIOLOGY | Facility: HOSPITAL | Age: 63
End: 2023-05-29
Payer: COMMERCIAL

## 2023-05-29 DIAGNOSIS — Z95.0 PRESENCE OF CARDIAC PACEMAKER: ICD-10-CM

## 2023-05-29 PROCEDURE — 93296 REM INTERROG EVL PM/IDS: CPT | Mod: PO | Performed by: INTERNAL MEDICINE

## 2023-05-29 PROCEDURE — 93294 CARDIAC DEVICE CHECK - REMOTE: ICD-10-PCS | Mod: ,,, | Performed by: INTERNAL MEDICINE

## 2023-05-29 PROCEDURE — 93294 REM INTERROG EVL PM/LDLS PM: CPT | Mod: ,,, | Performed by: INTERNAL MEDICINE

## 2023-06-20 ENCOUNTER — TELEPHONE (OUTPATIENT)
Dept: CARDIOLOGY | Facility: HOSPITAL | Age: 63
End: 2023-06-20
Payer: COMMERCIAL

## 2023-06-20 NOTE — TELEPHONE ENCOUNTER
Call placed to patient yesterday to reconnect to home monitor, unable to send transmission  Call placed today with conference call with Leatt to troubleshoot  [employer] to send new cell adaptor with instructions for patient to call [employer] when received to send transmission and reconnect

## 2023-06-29 ENCOUNTER — TELEPHONE (OUTPATIENT)
Dept: CARDIOLOGY | Facility: HOSPITAL | Age: 63
End: 2023-06-29
Payer: COMMERCIAL

## 2023-06-29 NOTE — TELEPHONE ENCOUNTER
Call placed to patient to f/u on cell adaptor and home monitor  Patient states he has not received  Call placed to [employer], has not processed, another order placed  Informed patient that I will f/u on Monday   In clinic appt wed

## 2023-07-03 ENCOUNTER — TELEPHONE (OUTPATIENT)
Dept: CARDIOLOGY | Facility: HOSPITAL | Age: 63
End: 2023-07-03
Payer: COMMERCIAL

## 2023-07-03 NOTE — TELEPHONE ENCOUNTER
Call placed to patient to f/u on cell adaptor sent from [employer]  Patient states he received today  Attempted to send transmission, unable to transmit  Suggested to call [employer] and have them assist sending and connection, # on patient's home monitor  Awaiting transmission  Will f/u on Monday

## 2023-07-05 ENCOUNTER — OFFICE VISIT (OUTPATIENT)
Dept: CARDIOLOGY | Facility: CLINIC | Age: 63
End: 2023-07-05
Payer: COMMERCIAL

## 2023-07-05 ENCOUNTER — PATIENT MESSAGE (OUTPATIENT)
Dept: CARDIOLOGY | Facility: HOSPITAL | Age: 63
End: 2023-07-05
Payer: COMMERCIAL

## 2023-07-05 VITALS
HEIGHT: 74 IN | WEIGHT: 272.94 LBS | HEART RATE: 70 BPM | DIASTOLIC BLOOD PRESSURE: 75 MMHG | BODY MASS INDEX: 35.03 KG/M2 | SYSTOLIC BLOOD PRESSURE: 133 MMHG

## 2023-07-05 DIAGNOSIS — E66.2 MORBID (SEVERE) OBESITY WITH ALVEOLAR HYPOVENTILATION: ICD-10-CM

## 2023-07-05 DIAGNOSIS — I10 ESSENTIAL HYPERTENSION: ICD-10-CM

## 2023-07-05 DIAGNOSIS — E78.2 MIXED HYPERLIPIDEMIA: ICD-10-CM

## 2023-07-05 DIAGNOSIS — I48.0 PAF (PAROXYSMAL ATRIAL FIBRILLATION): Primary | ICD-10-CM

## 2023-07-05 PROCEDURE — 99214 PR OFFICE/OUTPT VISIT, EST, LEVL IV, 30-39 MIN: ICD-10-PCS | Mod: S$GLB,,,

## 2023-07-05 PROCEDURE — 99999 PR PBB SHADOW E&M-EST. PATIENT-LVL III: CPT | Mod: PBBFAC,,,

## 2023-07-05 PROCEDURE — 3078F PR MOST RECENT DIASTOLIC BLOOD PRESSURE < 80 MM HG: ICD-10-PCS | Mod: CPTII,S$GLB,,

## 2023-07-05 PROCEDURE — 3075F PR MOST RECENT SYSTOLIC BLOOD PRESS GE 130-139MM HG: ICD-10-PCS | Mod: CPTII,S$GLB,,

## 2023-07-05 PROCEDURE — 3008F BODY MASS INDEX DOCD: CPT | Mod: CPTII,S$GLB,,

## 2023-07-05 PROCEDURE — 3008F PR BODY MASS INDEX (BMI) DOCUMENTED: ICD-10-PCS | Mod: CPTII,S$GLB,,

## 2023-07-05 PROCEDURE — 3044F PR MOST RECENT HEMOGLOBIN A1C LEVEL <7.0%: ICD-10-PCS | Mod: CPTII,S$GLB,,

## 2023-07-05 PROCEDURE — 1159F MED LIST DOCD IN RCRD: CPT | Mod: CPTII,S$GLB,,

## 2023-07-05 PROCEDURE — 3078F DIAST BP <80 MM HG: CPT | Mod: CPTII,S$GLB,,

## 2023-07-05 PROCEDURE — 99214 OFFICE O/P EST MOD 30 MIN: CPT | Mod: S$GLB,,,

## 2023-07-05 PROCEDURE — 3044F HG A1C LEVEL LT 7.0%: CPT | Mod: CPTII,S$GLB,,

## 2023-07-05 PROCEDURE — 1159F PR MEDICATION LIST DOCUMENTED IN MEDICAL RECORD: ICD-10-PCS | Mod: CPTII,S$GLB,,

## 2023-07-05 PROCEDURE — 3075F SYST BP GE 130 - 139MM HG: CPT | Mod: CPTII,S$GLB,,

## 2023-07-05 PROCEDURE — 99999 PR PBB SHADOW E&M-EST. PATIENT-LVL III: ICD-10-PCS | Mod: PBBFAC,,,

## 2023-07-05 RX ORDER — METOPROLOL SUCCINATE 50 MG/1
50 TABLET, EXTENDED RELEASE ORAL DAILY
Qty: 30 TABLET | Refills: 11 | Status: SHIPPED | OUTPATIENT
Start: 2023-07-05 | End: 2024-07-04

## 2023-07-05 NOTE — ASSESSMENT & PLAN NOTE
Switch inderal to Toprol 50 mg nightly   After risks benefits discussion with patient, he would prefer to start OAC to reduce risks of CVA

## 2023-07-05 NOTE — PROGRESS NOTES
Subjective:    Patient ID:  Abel Dc III is a 62 y.o. male patient here for evaluation Follow-up    History of Present Illness:     Mr. Dc is a 62 year old F who follows with Dr. Smith here today for a follow up. He has been feeling well. No CP dizziness palps SOB edema orthopnea syncope.   Has been having PAF on his PPM since March. Longest episode 11 hrs. Asymptomatic.     CHADsVASc2 SCORE:  Age (>65+1, >75+2): 0  Sex (F+1): 0  CHF History: 0  HTN History: 1  CVA/TIA/VTE (+2):   Vascular disease History:   Diabetes History:      Total Score: 1     Unadjusted stroke rate:   [David L, Khadijah M, Poonam GY. Evaluation of risk stratification schemes for ischaemic stroke and bleeding in [id number] patients with atrial fibrillation: the [place] Atrial Fibrillation cohort study. Eur Heart J 2012; 33:1500.]  0 points: 0.2% per year  1 point:  0.6% per year  2 points: 2.2% per year  3 points: 3.2% per year  4 points: 4.8% per year  5 points: 7.2% per year  6 points: 9.7% per year  7 points: 11.1% per year  8 points: 11% per year  9 points: 12.2% per year      Most Recent Echocardiogram Results  Results for orders placed during the hospital encounter of 02/17/23    Echo    Interpretation Summary  · The left ventricle is normal in size with concentric remodeling and normal systolic function.  · The estimated ejection fraction is 55%.  · There is abnormal septal wall motion consistent with right ventricular pacemaker.  · Normal left ventricular diastolic function.  · Normal right ventricular size with normal right ventricular systolic function.  · IVC not well visualized.  · The estimated PA systolic pressure is at least 17 mmHg.    Other Most Recent Cardiology Results  Results for orders placed in visit on 05/29/23    Cardiac device check - Remote    Narrative  Battery and Leads (BL)  Normal parameters noted on battery and lead(s)  Auto-threshold measurement unavailable or programmed off on lead(s)    Presenting  Rhythm (ME)  Atrial Pacing-Ventricular Pacing (AP-)  Atrial Pacing-Ventricular Sensing (AP-VS)    Arrhythmic events (AE)  Paroxysmal atrial fibrillation and/or flutter  Device-identified arrhythmic events without corresponding EGMs and/or details noted    Cardiovascular Physiologic Parameters (CPP)  No abnormalities in physiologic parameters identified    Transmission Information (TI)  Device Summary Report  Patient-Initiated Interrogation Report    Follow Up (FU)  Continue remote monitoring with quarterly reporting    Additional Comments  Pacemaker Report  Monitoring period: 2/28/23-4/1/23    Battery/Lead Status:9.3-10.6yrs    Ap: 48%  : 26%    Device Defined Counters:  Atrial Fibrillation/Flutter:  5  EGMs illustrate AF/AFL, 1min 22sec longest in duration.  AF burden <1%      REVIEW OF SYSTEMS: As noted in HPI   CARDIOVASCULAR: No recent chest pain, palpitations, arm/neck/jaw pain, or edema.  RESPIRATORY: No recent fever, cough, SOB.  : No blood in the urine  GI: No reflux, nausea, vomiting, or blood in stool.   MUSCULOSKELETAL: No falls.   NEURO: No headaches, syncope, or dizziness.  EYES: No sudden changes in vision.     Past Medical History:   Diagnosis Date    Bipolar disorder     manic depressive x 1992    BPH (benign prostatic hypertrophy)     Chronic low back pain     Depression     GERD (gastroesophageal reflux disease)     Gout     Gout, arthritis     Hyperlipidemia     Hypertension     Lumbar disc disease     Obesity     Panic disorder      Past Surgical History:   Procedure Laterality Date    A-V CARDIAC PACEMAKER INSERTION Left 2/18/2023    Procedure: INSERTION, CARDIAC PACEMAKER, DUAL CHAMBER;  Surgeon: Carlos Smith MD;  Location: [facility];  Service: Cardiology;  Laterality: Left;    HERNIA REPAIR      HUMERUS FRACTURE SURGERY  1971    Bone grafts required    INSERTION, PACEMAKER, TEMPORARY TRANSVENOUS  2/18/2023    Procedure: Insertion, Pacemaker, Temporary Transvenous;  Surgeon:  "Carlos Smith MD;  Location: [facility];  Service: Cardiology;;    MANDIBLE FRACTURE SURGERY      MUSCLE TRANSFER UPPER ARM      SHOULDER ARTHROSCOPY      SHOULDER SURGERY      TONSILLECTOMY       Social History     Tobacco Use    Smoking status: Never    Smokeless tobacco: Current     Types: Snuff    Tobacco comments:     Patient uses "dip" tobacco   Substance Use Topics    Alcohol use: No     Alcohol/week: 0.0 standard drinks     Comment: Heavy alcohol use in the past.     Drug use: No         Objective      Vitals:    07/05/23 1452   BP: 133/75   Pulse: 70       LAST EKG  Results for orders placed or performed during the hospital encounter of 03/04/23   EKG 12-lead    Collection Time: 03/04/23  7:55 PM    Narrative    Test Reason : I10,    Vent. Rate : 060 BPM     Atrial Rate : 060 BPM     P-R Int : 264 ms          QRS Dur : 176 ms      QT Int : 496 ms       P-R-T Axes : 008 -18 129 degrees     QTc Int : 496 ms    Atrial-paced rhythm with prolonged AV conduction  Left bundle branch block  Abnormal ECG  When compared with ECG of 18-FEB-2023 11:05,  Electronic atrial pacemaker has replaced Electronic ventricular pacemaker  Confirmed by Neda MELENDEZ, Morgan COLE ([id number]) on 3/5/2023 4:29:21 PM    Referred By: [email or web address]   SELF           Confirmed By:Morgan Red MD     LIPIDS - LAST 2   Lab Results   Component Value Date    CHOL 155 08/24/2022    CHOL 164 08/02/2021    HDL 34 (L) 08/24/2022    HDL 37 (L) 08/02/2021    LDLCALC 87.6 08/24/2022    LDLCALC 99.8 08/02/2021    TRIG 167 (H) 08/24/2022    TRIG 136 08/02/2021    CHOLHDL 21.9 08/24/2022    CHOLHDL 22.6 08/02/2021     CARDIAC PROFILE - LAST 2  Lab Results   Component Value Date    BNP <10 06/30/2015     (H) 02/06/2015     (H) 12/11/2008    CPKMB 3.1 12/11/2008    CPKMB 3.8 12/11/2008    TROPONINI 0.029 02/18/2023    TROPONINI <0.012 02/18/2023      CBC - LAST 2  Lab Results   Component Value Date    WBC 9.54 03/04/2023    WBC 16.45 (H) " 02/18/2023    RBC 4.59 (L) 03/04/2023    RBC 4.81 02/18/2023    HGB 14.4 03/04/2023    HGB 15.1 02/18/2023    HCT 42.1 03/04/2023    HCT 42.8 02/18/2023     03/04/2023     02/18/2023     Lab Results   Component Value Date    INR 0.9 08/01/2020    INR 1.1 12/11/2008    APTT 27.1 12/11/2008     CHEMISTRY - LAST 2  Lab Results   Component Value Date     (L) 03/04/2023     (L) 02/18/2023    K 4.4 03/04/2023    K 4.8 02/18/2023     03/04/2023     02/18/2023    CO2 26 03/04/2023    CO2 24 02/18/2023    ANIONGAP 9 03/04/2023    ANIONGAP 8 02/18/2023    BUN 11 03/04/2023    BUN 24 (H) 02/18/2023    CREATININE 1.47 (H) 03/04/2023    CREATININE 1.88 (H) 02/18/2023     03/04/2023     (H) 02/18/2023    CALCIUM 9.6 03/04/2023    CALCIUM 9.9 02/18/2023    MG 2.3 02/18/2023    MG 2.4 02/17/2023    ALBUMIN 4.4 03/04/2023    ALBUMIN 4.7 02/17/2023    PROT 8.5 (H) 03/04/2023    PROT 9.1 (H) 02/17/2023    ALKPHOS 118 03/04/2023    ALKPHOS 117 02/17/2023    ALT 33 03/04/2023    ALT 33 02/17/2023    AST 39 03/04/2023    AST 40 02/17/2023    BILITOT 0.4 03/04/2023    BILITOT 0.8 02/17/2023      ENDOCRINE - LAST 2  Lab Results   Component Value Date    HGBA1C 5.3 01/06/2023    HGBA1C 5.4 08/02/2021    TSH 0.669 02/18/2023    TSH 1.711 01/06/2023        PHYSICAL EXAM  CONSTITUTIONAL: Well built, well nourished in no apparent distress  NECK: no carotid bruit, no JVD  LUNGS: CTA  CHEST WALL: no tenderness  HEART: regular rate and rhythm, S1, S2 normal, no murmur, click, rub or gallop   ABDOMEN: soft, non-tender; bowel sounds normal; no masses,  no organomegaly  EXTREMITIES: Extremities normal, no edema, no calf tenderness noted  NEURO: AAO X 3    I HAVE REVIEWED :    The vital signs, most recent cardiac testing, and most recent pertinent non-cardiology provider notes.    Current Outpatient Medications   Medication Instructions    allopurinoL (ZYLOPRIM) 200 mg, Oral, Daily    amLODIPine  (NORVASC) 5 mg, Oral, 2 times daily    apixaban (ELIQUIS) 5 mg, Oral, 2 times daily    aspirin 81 mg Tab 1 tablet, Oral, Daily    busPIRone (BUSPAR) 15 mg, Oral, 3 times daily    cholecalciferol, vitamin D3, 2,000 unit Cap 1 capsule, Oral, Daily    cyanocobalamin (VITAMIN B-12) 100 mcg, Oral, Daily    FLUoxetine 40 mg, Oral, Daily    fluticasone propionate (FLONASE) 50 mcg/actuation nasal spray SPRAY ONCE IN EACH NOSTRIL EVERY DAY    gabapentin (NEURONTIN) 300 MG capsule TAKE 1 CAPSULE(300 MG) BY MOUTH THREE TIMES DAILY    metoprolol succinate (TOPROL-XL) 50 mg, Oral, Daily    omeprazole (PRILOSEC) 20 MG capsule TAKE 1 CAPSULE BY MOUTH EVERY DAY    OXcarbazepine (TRILEPTAL) 600 mg, Oral, Daily    pravastatin (PRAVACHOL) 40 mg, Oral, Nightly    QUEtiapine (SEROQUEL) 50 mg, Oral, Nightly      Assessment & Plan     PAF (paroxysmal atrial fibrillation)  Switch inderal to Toprol 50 mg nightly   Low CHADs but after risks benefits discussion with patient, he would prefer to start OAC to reduce risks of CVA rather than continuing to monitor his burden       Mixed hyperlipidemia  Continue statin     Essential hypertension  Well controlled   Continue norvasc 5 mg BID   Switch inderal to toprol   Low Na diet     He is obese, snores at night, and is having PAF. Will arrange for home sleep study.       Follow up in about 3 months (around 10/5/2023).     Naomi Peters, PA-C [facility]   Office: [phone]

## 2023-07-07 ENCOUNTER — TELEPHONE (OUTPATIENT)
Dept: CARDIOLOGY | Facility: HOSPITAL | Age: 63
End: 2023-07-07
Payer: COMMERCIAL

## 2023-07-07 NOTE — TELEPHONE ENCOUNTER
Pt. Called into clinic and wanted to know if he had any AF, assisted pt. In sending transmission. Notified pt. No new events since previously reported. Instructed pt. To notify clinic with any other questions concerned

## 2023-07-28 ENCOUNTER — TELEPHONE (OUTPATIENT)
Dept: CARDIOLOGY | Facility: CLINIC | Age: 63
End: 2023-07-28
Payer: COMMERCIAL

## 2023-07-28 DIAGNOSIS — K21.9 GASTROESOPHAGEAL REFLUX DISEASE: ICD-10-CM

## 2023-07-28 NOTE — TELEPHONE ENCOUNTER
Care Due:                  Date            Visit Type   Department     Provider  --------------------------------------------------------------------------------                                \[facility] [place] [facility]\"" FAMILY  Last Visit: 03-      FOLLOW UP    MEDICINE       Jonah Dia                               -                              [facility]  Next Visit: 09-      CARE ([facility])   MEDICINE       Jonah Dia                                                            Last  Test          Frequency    Reason                     Performed    Due Date  --------------------------------------------------------------------------------    Lipid Panel.  12 months..  pravastatin..............  08- 08-    Uric Acid...  12 months..  allopurinoL..............  Not Found    Overdue    [employer] Embedded Care Due Messages. Reference number: [id number].   7/28/2023 1:11:03 PM CDT

## 2023-07-28 NOTE — TELEPHONE ENCOUNTER
----- Message from Monica Hawthorne sent at 7/28/2023  2:03 PM CDT -----  Regarding: returning call  Contact: Patient  Type:  Patient Returning Call    Who Called:  Patient  Who Left Message for Patient:  Fanta  Does the patient know what this is regarding?:  apt     Best Call Back Number:  [phone]    Additional Information:  Please call to advise thanks!

## 2023-07-29 RX ORDER — OMEPRAZOLE 20 MG/1
CAPSULE, DELAYED RELEASE ORAL
Qty: 90 CAPSULE | Refills: 3 | Status: SHIPPED | OUTPATIENT
Start: 2023-07-29

## 2023-07-29 NOTE — TELEPHONE ENCOUNTER
Refill Routing Note   Medication(s) are not appropriate for processing by [facility] for the following reason(s):      No active prescription written by provider    [facility] action(s):  Defer Care Due:  Labs due            Appointments  past 12m or future 3m with PCP    Date Provider   Last Visit   3/7/2023 Jonah Dia MD   Next Visit   9/7/2023 Jonah Dia MD   ED visits in past 90 days: 0        Note composed:9:47 AM 07/29/2023

## 2023-08-01 ENCOUNTER — PATIENT MESSAGE (OUTPATIENT)
Dept: CARDIOLOGY | Facility: CLINIC | Age: 63
End: 2023-08-01
Payer: COMMERCIAL

## 2023-08-27 ENCOUNTER — CLINICAL SUPPORT (OUTPATIENT)
Dept: CARDIOLOGY | Facility: HOSPITAL | Age: 63
End: 2023-08-27
Payer: COMMERCIAL

## 2023-08-27 DIAGNOSIS — Z95.0 PRESENCE OF CARDIAC PACEMAKER: ICD-10-CM

## 2023-08-27 PROCEDURE — 93296 REM INTERROG EVL PM/IDS: CPT | Mod: PO | Performed by: INTERNAL MEDICINE

## 2023-09-05 DIAGNOSIS — G62.9 PERIPHERAL POLYNEUROPATHY: ICD-10-CM

## 2023-09-05 RX ORDER — GABAPENTIN 300 MG/1
CAPSULE ORAL
Qty: 270 CAPSULE | Refills: 3 | Status: SHIPPED | OUTPATIENT
Start: 2023-09-05 | End: 2023-09-10

## 2023-09-05 NOTE — TELEPHONE ENCOUNTER
No care due was identified.  [facility] Embedded Care Due Messages. Reference number: [id number].   9/05/2023 12:09:08 PM CDT

## 2023-09-07 ENCOUNTER — OFFICE VISIT (OUTPATIENT)
Dept: FAMILY MEDICINE | Facility: CLINIC | Age: 63
End: 2023-09-07
Payer: COMMERCIAL

## 2023-09-07 ENCOUNTER — TELEPHONE (OUTPATIENT)
Dept: FAMILY MEDICINE | Facility: CLINIC | Age: 63
End: 2023-09-07

## 2023-09-07 ENCOUNTER — LAB VISIT (OUTPATIENT)
Dept: LAB | Facility: HOSPITAL | Age: 63
End: 2023-09-07
Attending: FAMILY MEDICINE
Payer: COMMERCIAL

## 2023-09-07 VITALS
HEART RATE: 71 BPM | SYSTOLIC BLOOD PRESSURE: 116 MMHG | HEIGHT: 74 IN | DIASTOLIC BLOOD PRESSURE: 76 MMHG | WEIGHT: 276 LBS | RESPIRATION RATE: 20 BRPM | OXYGEN SATURATION: 95 % | BODY MASS INDEX: 35.42 KG/M2

## 2023-09-07 DIAGNOSIS — E78.2 MIXED HYPERLIPIDEMIA: ICD-10-CM

## 2023-09-07 DIAGNOSIS — R79.89 OTHER SPECIFIED ABNORMAL FINDINGS OF BLOOD CHEMISTRY: ICD-10-CM

## 2023-09-07 DIAGNOSIS — N18.30 STAGE 3 CHRONIC KIDNEY DISEASE, UNSPECIFIED WHETHER STAGE 3A OR 3B CKD: ICD-10-CM

## 2023-09-07 DIAGNOSIS — Z12.11 ENCOUNTER FOR SCREENING COLONOSCOPY: ICD-10-CM

## 2023-09-07 DIAGNOSIS — Z12.5 SCREENING FOR MALIGNANT NEOPLASM OF PROSTATE: ICD-10-CM

## 2023-09-07 DIAGNOSIS — F31.70 BIPOLAR AFFECTIVE DISORDER IN REMISSION: ICD-10-CM

## 2023-09-07 DIAGNOSIS — I10 ESSENTIAL HYPERTENSION: Primary | ICD-10-CM

## 2023-09-07 DIAGNOSIS — E66.01 SEVERE OBESITY (BMI 35.0-35.9 WITH COMORBIDITY): ICD-10-CM

## 2023-09-07 LAB
CHOLEST SERPL-MCNC: 144 MG/DL (ref 120–199)
CHOLEST/HDLC SERPL: 4.5 {RATIO} (ref 2–5)
COMPLEXED PSA SERPL-MCNC: 0.52 NG/ML (ref 0–4)
ESTIMATED AVG GLUCOSE: 100 MG/DL (ref 68–131)
HBA1C MFR BLD: 5.1 % (ref 4–5.6)
HDLC SERPL-MCNC: 32 MG/DL (ref 40–75)
HDLC SERPL: 22.2 % (ref 20–50)
LDLC SERPL CALC-MCNC: 91 MG/DL (ref 63–159)
NONHDLC SERPL-MCNC: 112 MG/DL
TRIGL SERPL-MCNC: 105 MG/DL (ref 30–150)

## 2023-09-07 PROCEDURE — 83036 HEMOGLOBIN GLYCOSYLATED A1C: CPT | Performed by: FAMILY MEDICINE

## 2023-09-07 PROCEDURE — 1159F PR MEDICATION LIST DOCUMENTED IN MEDICAL RECORD: ICD-10-PCS | Mod: CPTII,S$GLB,, | Performed by: FAMILY MEDICINE

## 2023-09-07 PROCEDURE — 3078F PR MOST RECENT DIASTOLIC BLOOD PRESSURE < 80 MM HG: ICD-10-PCS | Mod: CPTII,S$GLB,, | Performed by: FAMILY MEDICINE

## 2023-09-07 PROCEDURE — 1160F RVW MEDS BY RX/DR IN RCRD: CPT | Mod: CPTII,S$GLB,, | Performed by: FAMILY MEDICINE

## 2023-09-07 PROCEDURE — 99999 PR PBB SHADOW E&M-EST. PATIENT-LVL IV: ICD-10-PCS | Mod: PBBFAC,,, | Performed by: FAMILY MEDICINE

## 2023-09-07 PROCEDURE — 3008F BODY MASS INDEX DOCD: CPT | Mod: CPTII,S$GLB,, | Performed by: FAMILY MEDICINE

## 2023-09-07 PROCEDURE — 3044F HG A1C LEVEL LT 7.0%: CPT | Mod: CPTII,S$GLB,, | Performed by: FAMILY MEDICINE

## 2023-09-07 PROCEDURE — 3044F PR MOST RECENT HEMOGLOBIN A1C LEVEL <7.0%: ICD-10-PCS | Mod: CPTII,S$GLB,, | Performed by: FAMILY MEDICINE

## 2023-09-07 PROCEDURE — 3008F PR BODY MASS INDEX (BMI) DOCUMENTED: ICD-10-PCS | Mod: CPTII,S$GLB,, | Performed by: FAMILY MEDICINE

## 2023-09-07 PROCEDURE — 82274 ASSAY TEST FOR BLOOD FECAL: CPT | Performed by: FAMILY MEDICINE

## 2023-09-07 PROCEDURE — 1160F PR REVIEW ALL MEDS BY PRESCRIBER/CLIN PHARMACIST DOCUMENTED: ICD-10-PCS | Mod: CPTII,S$GLB,, | Performed by: FAMILY MEDICINE

## 2023-09-07 PROCEDURE — 99214 OFFICE O/P EST MOD 30 MIN: CPT | Mod: S$GLB,,, | Performed by: FAMILY MEDICINE

## 2023-09-07 PROCEDURE — 99999 PR PBB SHADOW E&M-EST. PATIENT-LVL IV: CPT | Mod: PBBFAC,,, | Performed by: FAMILY MEDICINE

## 2023-09-07 PROCEDURE — 99214 PR OFFICE/OUTPT VISIT, EST, LEVL IV, 30-39 MIN: ICD-10-PCS | Mod: S$GLB,,, | Performed by: FAMILY MEDICINE

## 2023-09-07 PROCEDURE — 84153 ASSAY OF PSA TOTAL: CPT | Performed by: FAMILY MEDICINE

## 2023-09-07 PROCEDURE — 3078F DIAST BP <80 MM HG: CPT | Mod: CPTII,S$GLB,, | Performed by: FAMILY MEDICINE

## 2023-09-07 PROCEDURE — 1159F MED LIST DOCD IN RCRD: CPT | Mod: CPTII,S$GLB,, | Performed by: FAMILY MEDICINE

## 2023-09-07 PROCEDURE — 80061 LIPID PANEL: CPT | Performed by: FAMILY MEDICINE

## 2023-09-07 PROCEDURE — 36415 COLL VENOUS BLD VENIPUNCTURE: CPT | Mod: PO | Performed by: FAMILY MEDICINE

## 2023-09-07 PROCEDURE — 3074F SYST BP LT 130 MM HG: CPT | Mod: CPTII,S$GLB,, | Performed by: FAMILY MEDICINE

## 2023-09-07 PROCEDURE — 3074F PR MOST RECENT SYSTOLIC BLOOD PRESSURE < 130 MM HG: ICD-10-PCS | Mod: CPTII,S$GLB,, | Performed by: FAMILY MEDICINE

## 2023-09-07 NOTE — PATIENT INSTRUCTIONS
Try the Mediterranean diet look it up online and incorporate as much as you can hear diet.  Eat better, eat your calories, eat lower portion sizes and exercise little bit.

## 2023-09-07 NOTE — PROGRESS NOTES
"Subjective:       Patient ID: Abel Dc III is a 62 y.o. male.    Chief Complaint: Follow-up    HPI:  Pleasant 62-year-old patient here for follow-up.  He did have atrial fibrillation and has been followed by Cardiology since on anticoagulation currently.  Basically he is now with a pacemaker and has a stable pulse.  He is bipolar disorder controlled medication continues on that for.  He is in jail more less forced and studies [place] now with his mother.  He seems happy.  No marital discord because she is not in the room anymore patient's hypertension is under control.  Lipids are due along with PSA.  Also will screen for diabetes because of his weight at 35 which is severe obesity.  Recommend Mediterranean as much as possible decrease portion sizes and get    Review of Systems   Constitutional: Negative.    HENT: Negative.     Eyes: Negative.    Respiratory: Negative.     Cardiovascular: Negative.    Gastrointestinal: Negative.    Endocrine: Negative.    Genitourinary: Negative.    Musculoskeletal: Negative.    Skin: Negative.    Allergic/Immunologic: Negative.    Neurological: Negative.    Hematological: Negative.    Psychiatric/Behavioral: Negative.         Objective:      Vitals:    09/07/23 0845   BP: 116/76   Pulse: 71   Resp: 20   SpO2: 95%   Weight: 125.2 kg (276 lb 0.3 oz)   Height: 6' 2" (1.88 m)      Physical Exam  Vitals and nursing note reviewed.   Constitutional:       Appearance: Normal appearance. He is obese.   HENT:      Head: Normocephalic and atraumatic.      Nose: Nose normal.      Mouth/Throat:      Mouth: Mucous membranes are moist.      Pharynx: Oropharynx is clear.   Eyes:      Extraocular Movements: Extraocular movements intact.      Conjunctiva/sclera: Conjunctivae normal.      Pupils: Pupils are equal, round, and reactive to light.   Cardiovascular:      Rate and Rhythm: Normal rate and regular rhythm.   Pulmonary:      Effort: Pulmonary effort is normal.      Breath sounds: " Normal breath sounds.   Abdominal:      General: Abdomen is flat. Bowel sounds are normal.      Palpations: Abdomen is soft.   Musculoskeletal:         General: Normal range of motion.      Cervical back: Normal range of motion and neck supple.   Skin:     General: Skin is warm and dry.      Capillary Refill: Capillary refill takes less than 2 seconds.   Neurological:      General: No focal deficit present.      Mental Status: He is alert and oriented to person, place, and time.   Psychiatric:         Mood and Affect: Mood normal.         Behavior: Behavior normal.         Thought Content: Thought content normal.         Judgment: Judgment normal.         Results for orders placed or performed during the hospital encounter of 03/04/23   CBC auto differential   Result Value Ref Range    WBC 9.54 3.90 - 12.70 K/uL    RBC 4.59 (L) 4.60 - 6.20 M/uL    Hemoglobin 14.4 14.0 - 18.0 g/dL    Hematocrit 42.1 40.0 - 54.0 %    MCV 92 82 - 98 fL    MCH 31.4 (H) 27.0 - 31.0 pg    MCHC 34.2 32.0 - 36.0 g/dL    RDW 13.2 11.5 - 14.5 %    Platelets 268 150 - 450 K/uL    MPV 10.1 9.2 - 12.9 fL    Immature Granulocytes 0.2 0.0 - 0.5 %    Gran # (ANC) 4.6 1.8 - 7.7 K/uL    Immature Grans (Abs) 0.02 0.00 - 0.04 K/uL    Lymph # 3.1 1.0 - 4.8 K/uL    Mono # 1.2 (H) 0.3 - 1.0 K/uL    Eos # 0.6 (H) 0.0 - 0.5 K/uL    Baso # 0.09 0.00 - 0.20 K/uL    nRBC 0 0 /100 WBC    Gran % 48.4 38.0 - 73.0 %    Lymph % 32.0 18.0 - 48.0 %    Mono % 12.6 4.0 - 15.0 %    Eosinophil % 5.9 0.0 - 8.0 %    Basophil % 0.9 0.0 - 1.9 %    Differential Method Automated    Comprehensive metabolic panel (CMP)   Result Value Ref Range    Sodium 135 (L) 136 - 145 mmol/L    Potassium 4.4 3.5 - 5.1 mmol/L    Chloride 100 95 - 110 mmol/L    CO2 26 22 - 31 mmol/L    Glucose 103 70 - 110 mg/dL    BUN 11 9 - 21 mg/dL    Creatinine 1.47 (H) 0.50 - 1.40 mg/dL    Calcium 9.6 8.4 - 10.2 mg/dL    Total Protein 8.5 (H) 6.0 - 8.4 g/dL    Albumin 4.4 3.5 - 5.2 g/dL    Total Bilirubin  0.4 0.2 - 1.3 mg/dL    Alkaline Phosphatase 118 38 - 145 U/L    AST 39 17 - 59 U/L    ALT 33 0 - 50 U/L    Anion Gap 9 8 - 16 mmol/L    eGFR 54 (A) >60 mL/min/1.73 m^2     *Note: Due to a large number of results and/or encounters for the requested time period, some results have not been displayed. A complete set of results can be found in Results Review.      Assessment:       1. Essential hypertension    2. Stage 3 chronic kidney disease, unspecified whether stage 3a or 3b CKD    3. Severe obesity (BMI 35.0-35.9 with comorbidity)    4. Bipolar affective disorder in remission    5. Mixed hyperlipidemia    6. Encounter for screening colonoscopy    7. Screening for malignant neoplasm of prostate    8. Other specified abnormal findings of blood chemistry        Plan:       Essential hypertension  Comments:  Controlled continue current therapy.    Stage 3 chronic kidney disease, unspecified whether stage 3a or 3b CKD  -     Hemoglobin A1C; Future; Expected date: 09/07/2023    Severe obesity (BMI 35.0-35.9 with comorbidity)  Comments:  Recommend Mediterranean diet etc..    Bipolar affective disorder in remission  Comments:  Continue medication.  Currently controlled.    Mixed hyperlipidemia  -     Lipid Panel; Future; Expected date: 09/07/2023    Encounter for screening colonoscopy  -     Fecal Immunochemical Test (iFOBT); Future; Expected date: 09/07/2023    Screening for malignant neoplasm of prostate  -     PSA, Screening; Future; Expected date: 09/07/2023    Other specified abnormal findings of blood chemistry  -     Hemoglobin A1C; Future; Expected date: 09/07/2023          Medication List with Changes/Refills   Current Medications    ALLOPURINOL (ZYLOPRIM) 100 MG TABLET    Take 2 tablets (200 mg total) by mouth once daily.    AMLODIPINE (NORVASC) 5 MG TABLET    Take 1 tablet (5 mg total) by mouth 2 (two) times daily.    APIXABAN (ELIQUIS) 5 MG TAB    Take 1 tablet (5 mg total) by mouth 2 (two) times daily.     ASPIRIN 81 MG TAB    Take 1 tablet by mouth once daily.    BUSPIRONE (BUSPAR) 15 MG TABLET    Take 15 mg by mouth 3 (three) times daily.     CHOLECALCIFEROL, VITAMIN D3, 2,000 UNIT CAP    Take 1 capsule by mouth once daily.    CYANOCOBALAMIN (VITAMIN B-12) 1000 MCG TABLET    Take 100 mcg by mouth once daily.    FLUOXETINE 40 MG CAPSULE    Take 40 mg by mouth once daily.    FLUTICASONE PROPIONATE (FLONASE) 50 MCG/ACTUATION NASAL SPRAY    SPRAY ONCE IN EACH NOSTRIL EVERY DAY    GABAPENTIN (NEURONTIN) 300 MG CAPSULE    TAKE 1 CAPSULE(300 MG) BY MOUTH THREE TIMES DAILY    METOPROLOL SUCCINATE (TOPROL-XL) 50 MG 24 HR TABLET    Take 1 tablet (50 mg total) by mouth once daily.    OMEPRAZOLE (PRILOSEC) 20 MG CAPSULE    TAKE 1 CAPSULE BY MOUTH EVERY DAY    OXCARBAZEPINE (TRILEPTAL) 300 MG TAB    Take 600 mg by mouth once daily.    PRAVASTATIN (PRAVACHOL) 40 MG TABLET    Take 1 tablet (40 mg total) by mouth every evening.    QUETIAPINE (SEROQUEL) 50 MG TABLET    Take 50 mg by mouth nightly.

## 2023-09-08 ENCOUNTER — PATIENT MESSAGE (OUTPATIENT)
Dept: FAMILY MEDICINE | Facility: CLINIC | Age: 63
End: 2023-09-08
Payer: COMMERCIAL

## 2023-09-09 DIAGNOSIS — G62.9 PERIPHERAL POLYNEUROPATHY: ICD-10-CM

## 2023-09-09 DIAGNOSIS — E78.2 MIXED HYPERLIPIDEMIA: ICD-10-CM

## 2023-09-09 NOTE — TELEPHONE ENCOUNTER
No care due was identified.  [facility] Embedded Care Due Messages. Reference number: [id number].   9/09/2023 1:07:28 PM CDT

## 2023-09-10 RX ORDER — PRAVASTATIN SODIUM 40 MG/1
40 TABLET ORAL NIGHTLY
Qty: 90 TABLET | Refills: 3 | Status: SHIPPED | OUTPATIENT
Start: 2023-09-10

## 2023-09-10 RX ORDER — GABAPENTIN 300 MG/1
CAPSULE ORAL
Qty: 90 CAPSULE | Refills: 1 | Status: SHIPPED | OUTPATIENT
Start: 2023-09-10

## 2023-09-10 NOTE — TELEPHONE ENCOUNTER
Refill Routing Note   Medication(s) are not appropriate for processing by [facility] for the following reason(s):      Medication outside of protocol  No active prescription written by provider    [facility] action(s):  Route  Defer Care Due:  None identified     Medication Therapy Plan: NO ACTIVE SCRIPT(PRAVASTATIN); GABAPENTIN(OP)      Appointments  past 12m or future 3m with PCP    Date Provider   Last Visit   9/7/2023 Jonah Dia MD   Next Visit   Visit date not found Jonah Dia MD   ED visits in past 90 days: 0        Note composed:9:07 AM 09/10/2023

## 2023-09-11 ENCOUNTER — OFFICE VISIT (OUTPATIENT)
Dept: PODIATRY | Facility: CLINIC | Age: 63
End: 2023-09-11
Payer: COMMERCIAL

## 2023-09-11 ENCOUNTER — HOSPITAL ENCOUNTER (OUTPATIENT)
Dept: RADIOLOGY | Facility: HOSPITAL | Age: 63
Discharge: HOME | End: 2023-09-11
Attending: STUDENT IN AN ORGANIZED HEALTH CARE EDUCATION/TRAINING PROGRAM
Payer: COMMERCIAL

## 2023-09-11 VITALS — BODY MASS INDEX: 35.42 KG/M2 | WEIGHT: 276 LBS | HEIGHT: 74 IN

## 2023-09-11 DIAGNOSIS — M79.2 NEURITIS: Primary | ICD-10-CM

## 2023-09-11 DIAGNOSIS — M79.2 NEURITIS: ICD-10-CM

## 2023-09-11 PROCEDURE — 99213 OFFICE O/P EST LOW 20 MIN: CPT | Mod: 25,S$GLB,, | Performed by: STUDENT IN AN ORGANIZED HEALTH CARE EDUCATION/TRAINING PROGRAM

## 2023-09-11 PROCEDURE — 3044F HG A1C LEVEL LT 7.0%: CPT | Mod: CPTII,S$GLB,, | Performed by: STUDENT IN AN ORGANIZED HEALTH CARE EDUCATION/TRAINING PROGRAM

## 2023-09-11 PROCEDURE — 99999 PR PBB SHADOW E&M-EST. PATIENT-LVL III: ICD-10-PCS | Mod: PBBFAC,,, | Performed by: STUDENT IN AN ORGANIZED HEALTH CARE EDUCATION/TRAINING PROGRAM

## 2023-09-11 PROCEDURE — 1159F PR MEDICATION LIST DOCUMENTED IN MEDICAL RECORD: ICD-10-PCS | Mod: CPTII,S$GLB,, | Performed by: STUDENT IN AN ORGANIZED HEALTH CARE EDUCATION/TRAINING PROGRAM

## 2023-09-11 PROCEDURE — 3008F BODY MASS INDEX DOCD: CPT | Mod: CPTII,S$GLB,, | Performed by: STUDENT IN AN ORGANIZED HEALTH CARE EDUCATION/TRAINING PROGRAM

## 2023-09-11 PROCEDURE — 99213 PR OFFICE/OUTPT VISIT, EST, LEVL III, 20-29 MIN: ICD-10-PCS | Mod: 25,S$GLB,, | Performed by: STUDENT IN AN ORGANIZED HEALTH CARE EDUCATION/TRAINING PROGRAM

## 2023-09-11 PROCEDURE — 73630 X-RAY EXAM OF FOOT: CPT | Mod: 26,RT,, | Performed by: RADIOLOGY

## 2023-09-11 PROCEDURE — 1160F RVW MEDS BY RX/DR IN RCRD: CPT | Mod: CPTII,S$GLB,, | Performed by: STUDENT IN AN ORGANIZED HEALTH CARE EDUCATION/TRAINING PROGRAM

## 2023-09-11 PROCEDURE — 1160F PR REVIEW ALL MEDS BY PRESCRIBER/CLIN PHARMACIST DOCUMENTED: ICD-10-PCS | Mod: CPTII,S$GLB,, | Performed by: STUDENT IN AN ORGANIZED HEALTH CARE EDUCATION/TRAINING PROGRAM

## 2023-09-11 PROCEDURE — 1159F MED LIST DOCD IN RCRD: CPT | Mod: CPTII,S$GLB,, | Performed by: STUDENT IN AN ORGANIZED HEALTH CARE EDUCATION/TRAINING PROGRAM

## 2023-09-11 PROCEDURE — 73630 X-RAY EXAM OF FOOT: CPT | Mod: TC,PO,RT

## 2023-09-11 PROCEDURE — 3008F PR BODY MASS INDEX (BMI) DOCUMENTED: ICD-10-PCS | Mod: CPTII,S$GLB,, | Performed by: STUDENT IN AN ORGANIZED HEALTH CARE EDUCATION/TRAINING PROGRAM

## 2023-09-11 PROCEDURE — 99999 PR PBB SHADOW E&M-EST. PATIENT-LVL III: CPT | Mod: PBBFAC,,, | Performed by: STUDENT IN AN ORGANIZED HEALTH CARE EDUCATION/TRAINING PROGRAM

## 2023-09-11 PROCEDURE — 3044F PR MOST RECENT HEMOGLOBIN A1C LEVEL <7.0%: ICD-10-PCS | Mod: CPTII,S$GLB,, | Performed by: STUDENT IN AN ORGANIZED HEALTH CARE EDUCATION/TRAINING PROGRAM

## 2023-09-11 PROCEDURE — 73630 XR FOOT COMPLETE 3 VIEW RIGHT: ICD-10-PCS | Mod: 26,RT,, | Performed by: RADIOLOGY

## 2023-09-11 RX ORDER — LIDOCAINE HYDROCHLORIDE 10 MG/ML
3 INJECTION INFILTRATION; PERINEURAL
Status: COMPLETED | OUTPATIENT
Start: 2023-09-11 | End: 2023-09-11

## 2023-09-11 RX ADMIN — LIDOCAINE HYDROCHLORIDE 3 ML: 10 INJECTION INFILTRATION; PERINEURAL at 02:09

## 2023-09-11 NOTE — PROGRESS NOTES
PODIATRY NOTE     PATIENT ID:  Abel Dc III is a 62 y.o. male.     CHIEF CONCERN:   neuritis             MEDICAL DECISION MAKING:        ICD-10-CM ICD-9-CM    1. Neuritis - Right Foot  M79.2 729.2 X-Ray Foot Complete Right            I counseled the patient on the patient's conditions, their implications and medical management.   Management for deep peroneal nerve entrapment.   With the patient's verbal consent an injection of 2 mL of 1% lidocaine plain was injected around the deep peroneal nerve of the right foot.  Patient tolerated the procedure well without any immediate complications.  Patient is to keep a log of his symptoms and to follow-up in 1 month with that information.  X-ray right foot  F/u 1 month.           Elias Salinas DPM        HISTORY OF PRESENT ILLNESS:  Abel Dc III is a 62 y.o. male with concerns regarding: right 2nd toe numbness and pain  Patient reports symptoms/signs have been present for some time now.   Trauma/injury to the area:  no.     Pain is worse at night.    01/17/2023:   Patient returns for right foot pain.  He was unable to get the cream because it was too expensive.  He continues to have symptoms.    9/11/23: Mr. Dc returns for 2nd toe pain of the right side. He had a DPN injection January 2023 that lasted about a month but the pain is back. He also reports pain when he curls his 2nd toe at the PIPJ.    Patient Active Problem List   Diagnosis    Essential hypertension    Mixed hyperlipidemia    Bipolar disorder    Panic disorder    DDD (degenerative disc disease), lumbar    Gout, arthritis    GERD (gastroesophageal reflux disease)    BPH (benign prostatic hypertrophy)    Lumbar stenosis    History of shoulder surgery:  shoulder surgery, massive rotator cuff repair, biceps tenodesis, synovectomy    Rotator cuff syndrome of right shoulder    Subacromial impingement    Acromioclavicular joint arthritis    Biceps tendonitis    Hip pain, bilateral    Acquired scoliosis     Acquired spondylolisthesis    Lumbago    Degeneration of lumbar or lumbosacral intervertebral disc    Spondylosis without myelopathy    Spinal stenosis, lumbar region, without neurogenic claudication    Severe obesity (BMI 35.0-39.9) with comorbidity    Bilateral knee pain    Difficulty walking    Stage 3b chronic kidney disease    Secondary hyperparathyroidism of renal origin    Tremor    Gallstones without obstruction of gallbladder    Pulmonary nodules -- repeat CT chest in August 2021    Pain of right thigh    Encounter for long-term (current) use of other medications    Nicotine dependence, other tobacco product, uncomplicated    Dizziness    Chest pain    Symptomatic bradycardia    Advance care planning    PAF (paroxysmal atrial fibrillation)           Current Outpatient Medications on File Prior to Visit   Medication Sig Dispense Refill    allopurinoL (ZYLOPRIM) 100 MG tablet Take 2 tablets (200 mg total) by mouth once daily. 180 tablet 3    amLODIPine (NORVASC) 5 MG tablet Take 1 tablet (5 mg total) by mouth 2 (two) times daily. 180 tablet 3    apixaban (ELIQUIS) 5 mg Tab Take 1 tablet (5 mg total) by mouth 2 (two) times daily. 180 tablet 3    aspirin 81 mg Tab Take 1 tablet by mouth once daily.      busPIRone (BUSPAR) 15 MG tablet Take 15 mg by mouth 3 (three) times daily.   4    cholecalciferol, vitamin D3, 2,000 unit Cap Take 1 capsule by mouth once daily.      cyanocobalamin (VITAMIN B-12) 1000 MCG tablet Take 100 mcg by mouth once daily.      FLUoxetine 40 MG capsule Take 40 mg by mouth once daily.  5    fluticasone propionate (FLONASE) 50 mcg/actuation nasal spray SPRAY ONCE IN EACH NOSTRIL EVERY DAY (Patient taking differently: 1 spray by Each Nostril route once daily.) 48 g 3    gabapentin (NEURONTIN) 300 MG capsule TAKE 1 CAPSULE(300 MG) BY MOUTH THREE TIMES DAILY 90 capsule 1    metoprolol succinate (TOPROL-XL) 50 MG 24 hr tablet Take 1 tablet (50 mg total) by mouth once daily. 30 tablet 11     "omeprazole (PRILOSEC) 20 MG capsule TAKE 1 CAPSULE BY MOUTH EVERY DAY 90 capsule 3    OXcarbazepine (TRILEPTAL) 300 MG Tab Take 600 mg by mouth once daily.  3    pravastatin (PRAVACHOL) 40 MG tablet TAKE 1 TABLET(40 MG) BY MOUTH EVERY EVENING 90 tablet 3    quetiapine (SEROQUEL) 50 MG tablet Take 50 mg by mouth nightly.  0     No current facility-administered medications on file prior to visit.           Review of patient's allergies indicates:   Allergen Reactions    Amitriptyline Other (See Comments) and Hallucinations     confusion    Hydrocodone      Other reaction(s): Hallucinations    Hydrocodone-acetaminophen      Other reaction(s): hyperactivity    Oxycodone Other (See Comments)     hallucinations    Pseudoephedrine      Other reaction(s): Hallucinations    Pseudoephedrine hcl      Other reaction(s): hyperactivity    Risperidone      Other reaction(s): Hallucinations  Other reaction(s): hyperactivity    Trazodone      Adverse reaction    Naproxen                ROS:   General ROS: negative for - chills, fever or night sweats  Respiratory ROS: no cough, shortness of breath, or wheezing  Cardiovascular ROS: no chest pain or dyspnea on exertion  Musculoskeletal ROS: negative for - joint pain and joint stiffness  Neurological ROS: positive for - numbness/tingling  Dermatological ROS: negative for acne, dry skin, and eczema      EXAM:     Vitals:    09/11/23 1431   Weight: 125.2 kg (276 lb 0.3 oz)   Height: 6' 2" (1.88 m)        General:  Alert and Oriented x 3;  No acute distress      Right  Lower extremity exam:    Vascular:   Dorsalis Pedis:  present   Posterior Tibial:  present  Capillary refill time:  2 seconds  Temperature of toes warm to touch  Edema:  none       Neurological:     Sharp touch:  decreased  Light touch: decreased  Tinels Sign:  Present at DPN  Mulders Click:   Absent        Dermatological:       Musculoskeletal:   Pain and crepitation at the right PIPJ of the 2nd toe.                 "

## 2023-09-12 NOTE — TELEPHONE ENCOUNTER
Per Dr. Edward Covarrubias, anticipate discharge home tomorrow. Dr. Edward Covarrubias states that pulmonologist has determined patient will need \"full dose Eliquis for 3 months. \"    Patient has qualified for meds through [email or web address]'s financial assistance program (2 weeks of meds free). Eliquis card will be placed on chart to be used after 2 weeks of [facility]-covered meds, but patient will need additional assistance to cover the cost of the remaining needed doses. Patient is currently Medicaid-pending, CM/SW to check on options for medication coverage for this patient. Pt. Confirmed he will use paper included with his fitkit to catch stool per instructions.

## 2023-09-18 ENCOUNTER — OFFICE VISIT (OUTPATIENT)
Dept: CARDIOLOGY | Facility: CLINIC | Age: 63
End: 2023-09-18
Payer: COMMERCIAL

## 2023-09-18 VITALS
HEART RATE: 60 BPM | HEIGHT: 74 IN | WEIGHT: 278.44 LBS | BODY MASS INDEX: 35.73 KG/M2 | DIASTOLIC BLOOD PRESSURE: 82 MMHG | SYSTOLIC BLOOD PRESSURE: 127 MMHG

## 2023-09-18 DIAGNOSIS — I10 ESSENTIAL HYPERTENSION: ICD-10-CM

## 2023-09-18 DIAGNOSIS — I48.0 PAF (PAROXYSMAL ATRIAL FIBRILLATION): Primary | ICD-10-CM

## 2023-09-18 DIAGNOSIS — E78.2 MIXED HYPERLIPIDEMIA: ICD-10-CM

## 2023-09-18 DIAGNOSIS — R00.1 SYMPTOMATIC BRADYCARDIA: ICD-10-CM

## 2023-09-18 PROCEDURE — 1159F PR MEDICATION LIST DOCUMENTED IN MEDICAL RECORD: ICD-10-PCS | Mod: CPTII,S$GLB,, | Performed by: INTERNAL MEDICINE

## 2023-09-18 PROCEDURE — 99999 PR PBB SHADOW E&M-EST. PATIENT-LVL III: CPT | Mod: PBBFAC,,, | Performed by: INTERNAL MEDICINE

## 2023-09-18 PROCEDURE — 3008F BODY MASS INDEX DOCD: CPT | Mod: CPTII,S$GLB,, | Performed by: INTERNAL MEDICINE

## 2023-09-18 PROCEDURE — 3079F PR MOST RECENT DIASTOLIC BLOOD PRESSURE 80-89 MM HG: ICD-10-PCS | Mod: CPTII,S$GLB,, | Performed by: INTERNAL MEDICINE

## 2023-09-18 PROCEDURE — 3044F PR MOST RECENT HEMOGLOBIN A1C LEVEL <7.0%: ICD-10-PCS | Mod: CPTII,S$GLB,, | Performed by: INTERNAL MEDICINE

## 2023-09-18 PROCEDURE — 1160F PR REVIEW ALL MEDS BY PRESCRIBER/CLIN PHARMACIST DOCUMENTED: ICD-10-PCS | Mod: CPTII,S$GLB,, | Performed by: INTERNAL MEDICINE

## 2023-09-18 PROCEDURE — 99999 PR PBB SHADOW E&M-EST. PATIENT-LVL III: ICD-10-PCS | Mod: PBBFAC,,, | Performed by: INTERNAL MEDICINE

## 2023-09-18 PROCEDURE — 99214 PR OFFICE/OUTPT VISIT, EST, LEVL IV, 30-39 MIN: ICD-10-PCS | Mod: S$GLB,,, | Performed by: INTERNAL MEDICINE

## 2023-09-18 PROCEDURE — 3074F PR MOST RECENT SYSTOLIC BLOOD PRESSURE < 130 MM HG: ICD-10-PCS | Mod: CPTII,S$GLB,, | Performed by: INTERNAL MEDICINE

## 2023-09-18 PROCEDURE — 3074F SYST BP LT 130 MM HG: CPT | Mod: CPTII,S$GLB,, | Performed by: INTERNAL MEDICINE

## 2023-09-18 PROCEDURE — 3008F PR BODY MASS INDEX (BMI) DOCUMENTED: ICD-10-PCS | Mod: CPTII,S$GLB,, | Performed by: INTERNAL MEDICINE

## 2023-09-18 PROCEDURE — 3044F HG A1C LEVEL LT 7.0%: CPT | Mod: CPTII,S$GLB,, | Performed by: INTERNAL MEDICINE

## 2023-09-18 PROCEDURE — 99214 OFFICE O/P EST MOD 30 MIN: CPT | Mod: S$GLB,,, | Performed by: INTERNAL MEDICINE

## 2023-09-18 PROCEDURE — 1160F RVW MEDS BY RX/DR IN RCRD: CPT | Mod: CPTII,S$GLB,, | Performed by: INTERNAL MEDICINE

## 2023-09-18 PROCEDURE — 1159F MED LIST DOCD IN RCRD: CPT | Mod: CPTII,S$GLB,, | Performed by: INTERNAL MEDICINE

## 2023-09-18 PROCEDURE — 3079F DIAST BP 80-89 MM HG: CPT | Mod: CPTII,S$GLB,, | Performed by: INTERNAL MEDICINE

## 2023-09-18 NOTE — PROGRESS NOTES
Subjective:    Patient ID:  Abel Dc III is a 62 y.o. male who presents for follow-up of paroxysmal atrial fibrillation    HPI  He comes for follow up with no major problems, no chest pain, no shortness of breath.      Review of Systems   Constitutional: Negative for decreased appetite, malaise/fatigue, weight gain and weight loss.   Cardiovascular:  Negative for chest pain, dyspnea on exertion, leg swelling, palpitations and syncope.   Respiratory:  Negative for cough and shortness of breath.    Gastrointestinal: Negative.    Neurological:  Negative for weakness.   All other systems reviewed and are negative.       Objective:      Physical Exam  Vitals and nursing note reviewed.   Constitutional:       Appearance: Normal appearance. He is well-developed.   HENT:      Head: Normocephalic.   Eyes:      Pupils: Pupils are equal, round, and reactive to light.   Neck:      Thyroid: No thyromegaly.      Vascular: No carotid bruit or JVD.   Cardiovascular:      Rate and Rhythm: Normal rate and regular rhythm.      Chest Wall: PMI is not displaced.      Pulses: Normal pulses and intact distal pulses.      Heart sounds: Normal heart sounds. No murmur heard.     No gallop.   Pulmonary:      Effort: Pulmonary effort is normal.      Breath sounds: Normal breath sounds.   Abdominal:      Palpations: Abdomen is soft. There is no mass.      Tenderness: There is no abdominal tenderness.   Musculoskeletal:         General: Normal range of motion.      Cervical back: Normal range of motion and neck supple.   Skin:     General: Skin is warm.   Neurological:      Mental Status: He is alert and oriented to person, place, and time.      Sensory: No sensory deficit.      Deep Tendon Reflexes: Reflexes are normal and symmetric.             Most Recent EKG Results  Results for orders placed or performed during the hospital encounter of 03/04/23   EKG 12-lead    Collection Time: 03/04/23  7:55 PM    Narrative    Test Reason :  I10,    Vent. Rate : 060 BPM     Atrial Rate : 060 BPM     P-R Int : 264 ms          QRS Dur : 176 ms      QT Int : 496 ms       P-R-T Axes : 008 -18 129 degrees     QTc Int : 496 ms    Atrial-paced rhythm with prolonged AV conduction  Left bundle branch block  Abnormal ECG  When compared with ECG of 18-FEB-2023 11:05,  Electronic atrial pacemaker has replaced Electronic ventricular pacemaker  Confirmed by Neda MELENDEZ, Morgan COLE ([id number]) on 3/5/2023 4:29:21 PM    Referred By: RUSSELL   SELF           Confirmed By:Morgan Red MD       Most Recent Echocardiogram Results  Results for orders placed during the hospital encounter of 02/17/23    Echo    Interpretation Summary  · The left ventricle is normal in size with concentric remodeling and normal systolic function.  · The estimated ejection fraction is 55%.  · There is abnormal septal wall motion consistent with right ventricular pacemaker.  · Normal left ventricular diastolic function.  · Normal right ventricular size with normal right ventricular systolic function.  · IVC not well visualized.  · The estimated PA systolic pressure is at least 17 mmHg.      Most Recent Nuclear Stress Test Results  No results found for this or any previous visit.      Most Recent Cardiac PET Stress Test Results  No results found for this or any previous visit.      Most Recent Cardiovascular Angiogram results  No results found for this or any previous visit.      Other Most Recent Cardiology Results  Results for orders placed in visit on 08/27/23    Cardiac device check - Remote    Narrative  Battery and Leads (BL)  Normal parameters noted on battery and lead(s)    Presenting Rhythm (DE)  Atrial Pacing-Ventricular Sensing (AP-VS)    Arrhythmic events (AE)  Paroxysmal atrial fibrillation and/or flutter  Undersensing is noted    Cardiovascular Physiologic Parameters (CPP)  No abnormalities in physiologic parameters identified    Transmission Information (TI)  Clinical Alert  Report    Follow Up (FU)  Practitioner contacted with findings based on Escalation Criteria  Continue remote monitoring with quarterly reporting    Additional Comments  Pacemaker Report  Monitoring period: 5/9/23-7/3/23    Battery/Lead Status: 7.9-9.4 years    Ap: 44%  : 18%    Device Defined Counters:  Atrial Fibrillation/Flutter:  9  EGMs illustrate AF/AFL, longest 8hr43m in duration.  AF burden <1%      Confirmation of known AF vs new onset escalated to follow up tab on July 5th, 2023, 6:31 am MST.      Labs reviewed    Assessment:       1. PAF (paroxysmal atrial fibrillation)    2. Symptomatic bradycardia    3. Mixed hyperlipidemia    4. Essential hypertension         Plan:     Continue:  ASA, Beta blocker, DOAC, and Statin  Regular exercise program  Weight loss  Low cholesterol diet  Nine month follow-up with Candice Petit

## 2023-09-19 LAB — HEMOCCULT STL QL IA: NEGATIVE

## 2023-11-14 ENCOUNTER — OFFICE VISIT (OUTPATIENT)
Dept: PODIATRY | Facility: CLINIC | Age: 63
End: 2023-11-14
Payer: COMMERCIAL

## 2023-11-14 VITALS — HEIGHT: 74 IN | WEIGHT: 278 LBS | BODY MASS INDEX: 35.68 KG/M2

## 2023-11-14 DIAGNOSIS — M79.2 NEURITIS: Primary | ICD-10-CM

## 2023-11-14 PROCEDURE — 3008F PR BODY MASS INDEX (BMI) DOCUMENTED: ICD-10-PCS | Mod: CPTII,S$GLB,, | Performed by: STUDENT IN AN ORGANIZED HEALTH CARE EDUCATION/TRAINING PROGRAM

## 2023-11-14 PROCEDURE — 3044F PR MOST RECENT HEMOGLOBIN A1C LEVEL <7.0%: ICD-10-PCS | Mod: CPTII,S$GLB,, | Performed by: STUDENT IN AN ORGANIZED HEALTH CARE EDUCATION/TRAINING PROGRAM

## 2023-11-14 PROCEDURE — 1159F MED LIST DOCD IN RCRD: CPT | Mod: CPTII,S$GLB,, | Performed by: STUDENT IN AN ORGANIZED HEALTH CARE EDUCATION/TRAINING PROGRAM

## 2023-11-14 PROCEDURE — 64455 NJX AA&/STRD PLTR COM DG NRV: CPT | Mod: RT,S$GLB,, | Performed by: STUDENT IN AN ORGANIZED HEALTH CARE EDUCATION/TRAINING PROGRAM

## 2023-11-14 PROCEDURE — 99999 PR PBB SHADOW E&M-EST. PATIENT-LVL III: ICD-10-PCS | Mod: PBBFAC,,, | Performed by: STUDENT IN AN ORGANIZED HEALTH CARE EDUCATION/TRAINING PROGRAM

## 2023-11-14 PROCEDURE — 64455 NEUROMA: ICD-10-PCS | Mod: RT,S$GLB,, | Performed by: STUDENT IN AN ORGANIZED HEALTH CARE EDUCATION/TRAINING PROGRAM

## 2023-11-14 PROCEDURE — 1160F PR REVIEW ALL MEDS BY PRESCRIBER/CLIN PHARMACIST DOCUMENTED: ICD-10-PCS | Mod: CPTII,S$GLB,, | Performed by: STUDENT IN AN ORGANIZED HEALTH CARE EDUCATION/TRAINING PROGRAM

## 2023-11-14 PROCEDURE — 99999 PR PBB SHADOW E&M-EST. PATIENT-LVL III: CPT | Mod: PBBFAC,,, | Performed by: STUDENT IN AN ORGANIZED HEALTH CARE EDUCATION/TRAINING PROGRAM

## 2023-11-14 PROCEDURE — 3008F BODY MASS INDEX DOCD: CPT | Mod: CPTII,S$GLB,, | Performed by: STUDENT IN AN ORGANIZED HEALTH CARE EDUCATION/TRAINING PROGRAM

## 2023-11-14 PROCEDURE — 1159F PR MEDICATION LIST DOCUMENTED IN MEDICAL RECORD: ICD-10-PCS | Mod: CPTII,S$GLB,, | Performed by: STUDENT IN AN ORGANIZED HEALTH CARE EDUCATION/TRAINING PROGRAM

## 2023-11-14 PROCEDURE — 99213 PR OFFICE/OUTPT VISIT, EST, LEVL III, 20-29 MIN: ICD-10-PCS | Mod: 25,S$GLB,, | Performed by: STUDENT IN AN ORGANIZED HEALTH CARE EDUCATION/TRAINING PROGRAM

## 2023-11-14 PROCEDURE — 99213 OFFICE O/P EST LOW 20 MIN: CPT | Mod: 25,S$GLB,, | Performed by: STUDENT IN AN ORGANIZED HEALTH CARE EDUCATION/TRAINING PROGRAM

## 2023-11-14 PROCEDURE — 1160F RVW MEDS BY RX/DR IN RCRD: CPT | Mod: CPTII,S$GLB,, | Performed by: STUDENT IN AN ORGANIZED HEALTH CARE EDUCATION/TRAINING PROGRAM

## 2023-11-14 PROCEDURE — 3044F HG A1C LEVEL LT 7.0%: CPT | Mod: CPTII,S$GLB,, | Performed by: STUDENT IN AN ORGANIZED HEALTH CARE EDUCATION/TRAINING PROGRAM

## 2023-11-14 RX ORDER — DEXAMETHASONE SODIUM PHOSPHATE 4 MG/ML
4 INJECTION, SOLUTION INTRA-ARTICULAR; INTRALESIONAL; INTRAMUSCULAR; INTRAVENOUS; SOFT TISSUE
Status: COMPLETED | OUTPATIENT
Start: 2023-11-14 | End: 2023-11-14

## 2023-11-14 RX ADMIN — DEXAMETHASONE SODIUM PHOSPHATE 4 MG: 4 INJECTION, SOLUTION INTRA-ARTICULAR; INTRALESIONAL; INTRAMUSCULAR; INTRAVENOUS; SOFT TISSUE at 09:11

## 2023-11-14 NOTE — PROGRESS NOTES
PODIATRY NOTE     PATIENT ID:  Abel Dc III is a 63 y.o. male.     CHIEF CONCERN:   Foot Pain             MEDICAL DECISION MAKING:        ICD-10-CM ICD-9-CM    1. Neuritis - Right Foot  M79.2 729.2 Neuroma              I counseled the patient on the patient's conditions, their implications and medical management.   Management for deep peroneal nerve entrapment.   Injection given to the right 1st and 2nd interspace. May need surgery for DPN release if no improvement  F/u 1 month.       Neuroma    Date/Time: 11/14/2023 8:40 AM    Performed by: Elias Salinas DPM  Authorized by: Elias Salinas DPM    Consent Done?:  Yes (Verbal)  Indications:  Pain and diagnostic evaluation  Location Right Foot:  Second Webspace  Needle size:  25 G  Approach:  Dorsal  Patient tolerance:  Patient tolerated the procedure well with no immediate complications        Elias Salinas DPM        HISTORY OF PRESENT ILLNESS:  Abel Dc III is a 63 y.o. male with concerns regarding: right 2nd toe numbness and pain  Patient reports symptoms/signs have been present for some time now.   Trauma/injury to the area:  no.     Pain is worse at night.    01/17/2023:   Patient returns for right foot pain.  He was unable to get the cream because it was too expensive.  He continues to have symptoms.    9/11/23: Mr. Dc returns for 2nd toe pain of the right side. He had a DPN injection January 2023 that lasted about a month but the pain is back. He also reports pain when he curls his 2nd toe at the PIPJ.    11/14/23: No improvement after the last injection. Continues to have pain to the 1st and 2nd toes but only at night.     Patient Active Problem List   Diagnosis    Essential hypertension    Mixed hyperlipidemia    Bipolar disorder    Panic disorder    DDD (degenerative disc disease), lumbar    Gout, arthritis    GERD (gastroesophageal reflux disease)    BPH (benign prostatic hypertrophy)    Lumbar stenosis    History of shoulder surgery:   shoulder surgery, massive rotator cuff repair, biceps tenodesis, synovectomy    Rotator cuff syndrome of right shoulder    Subacromial impingement    Acromioclavicular joint arthritis    Biceps tendonitis    Hip pain, bilateral    Acquired scoliosis    Acquired spondylolisthesis    Lumbago    Degeneration of lumbar or lumbosacral intervertebral disc    Spondylosis without myelopathy    Spinal stenosis, lumbar region, without neurogenic claudication    Severe obesity (BMI 35.0-39.9) with comorbidity    Bilateral knee pain    Difficulty walking    Stage 3b chronic kidney disease    Secondary hyperparathyroidism of renal origin    Tremor    Gallstones without obstruction of gallbladder    Pulmonary nodules -- repeat CT chest in August 2021    Pain of right thigh    Encounter for long-term (current) use of other medications    Nicotine dependence, other tobacco product, uncomplicated    Dizziness    Chest pain    Symptomatic bradycardia    Advance care planning    PAF (paroxysmal atrial fibrillation)           Current Outpatient Medications on File Prior to Visit   Medication Sig Dispense Refill    allopurinoL (ZYLOPRIM) 100 MG tablet Take 2 tablets (200 mg total) by mouth once daily. 180 tablet 3    amLODIPine (NORVASC) 5 MG tablet Take 1 tablet (5 mg total) by mouth 2 (two) times daily. 180 tablet 3    apixaban (ELIQUIS) 5 mg Tab Take 1 tablet (5 mg total) by mouth 2 (two) times daily. 180 tablet 3    aspirin 81 mg Tab Take 1 tablet by mouth once daily.      busPIRone (BUSPAR) 15 MG tablet Take 15 mg by mouth 3 (three) times daily.   4    cholecalciferol, vitamin D3, 2,000 unit Cap Take 1 capsule by mouth once daily.      cyanocobalamin (VITAMIN B-12) 1000 MCG tablet Take 100 mcg by mouth once daily.      FLUoxetine 40 MG capsule Take 40 mg by mouth once daily.  5    fluticasone propionate (FLONASE) 50 mcg/actuation nasal spray SPRAY ONCE IN EACH NOSTRIL EVERY DAY (Patient taking differently: 1 spray by Each Nostril  "route once daily.) 48 g 3    gabapentin (NEURONTIN) 300 MG capsule TAKE 1 CAPSULE(300 MG) BY MOUTH THREE TIMES DAILY 90 capsule 1    metoprolol succinate (TOPROL-XL) 50 MG 24 hr tablet Take 1 tablet (50 mg total) by mouth once daily. 30 tablet 11    omeprazole (PRILOSEC) 20 MG capsule TAKE 1 CAPSULE BY MOUTH EVERY DAY 90 capsule 3    OXcarbazepine (TRILEPTAL) 300 MG Tab Take 600 mg by mouth once daily.  3    pravastatin (PRAVACHOL) 40 MG tablet TAKE 1 TABLET(40 MG) BY MOUTH EVERY EVENING 90 tablet 3    quetiapine (SEROQUEL) 50 MG tablet Take 50 mg by mouth nightly.  0     No current facility-administered medications on file prior to visit.           Review of patient's allergies indicates:   Allergen Reactions    Amitriptyline Other (See Comments) and Hallucinations     confusion    Hydrocodone      Other reaction(s): Hallucinations    Hydrocodone-acetaminophen      Other reaction(s): hyperactivity    Oxycodone Other (See Comments)     hallucinations    Pseudoephedrine      Other reaction(s): Hallucinations    Pseudoephedrine hcl      Other reaction(s): hyperactivity    Risperidone      Other reaction(s): Hallucinations  Other reaction(s): hyperactivity    Trazodone      Adverse reaction    Naproxen                ROS:   General ROS: negative for - chills, fever or night sweats  Respiratory ROS: no cough, shortness of breath, or wheezing  Cardiovascular ROS: no chest pain or dyspnea on exertion  Musculoskeletal ROS: negative for - joint pain and joint stiffness  Neurological ROS: positive for - numbness/tingling  Dermatological ROS: negative for acne, dry skin, and eczema      EXAM:     Vitals:    11/14/23 0828   Weight: 126.1 kg (278 lb)   Height: 6' 2" (1.88 m)        General:  Alert and Oriented x 3;  No acute distress      Right  Lower extremity exam:    Vascular:   Dorsalis Pedis:  present   Posterior Tibial:  present  Capillary refill time:  2 seconds  Temperature of toes warm to touch  Edema:  none "       Neurological:     Sharp touch:  decreased  Light touch: decreased  Tinels Sign:  Present at DPN  Mulders Click:   + at the 2nd interspace         Dermatological:       Musculoskeletal:   Pain and crepitation at the right PIPJ of the 2nd toe.

## 2023-11-25 ENCOUNTER — CLINICAL SUPPORT (OUTPATIENT)
Dept: CARDIOLOGY | Facility: HOSPITAL | Age: 63
End: 2023-11-25
Payer: COMMERCIAL

## 2023-11-25 DIAGNOSIS — Z95.0 PRESENCE OF CARDIAC PACEMAKER: ICD-10-CM

## 2023-11-25 PROCEDURE — 93294 REM INTERROG EVL PM/LDLS PM: CPT | Mod: ,,, | Performed by: INTERNAL MEDICINE

## 2023-11-25 PROCEDURE — 93294 CARDIAC DEVICE CHECK - REMOTE: ICD-10-PCS | Mod: ,,, | Performed by: INTERNAL MEDICINE

## 2023-11-25 PROCEDURE — 93296 REM INTERROG EVL PM/IDS: CPT | Mod: PO | Performed by: INTERNAL MEDICINE

## 2023-12-14 ENCOUNTER — PATIENT MESSAGE (OUTPATIENT)
Dept: CARDIOLOGY | Facility: CLINIC | Age: 63
End: 2023-12-14
Payer: COMMERCIAL

## 2023-12-28 ENCOUNTER — CLINICAL SUPPORT (OUTPATIENT)
Dept: CARDIOLOGY | Facility: HOSPITAL | Age: 63
End: 2023-12-28
Payer: COMMERCIAL

## 2023-12-28 ENCOUNTER — HOSPITAL ENCOUNTER (OUTPATIENT)
Dept: CARDIOLOGY | Facility: HOSPITAL | Age: 63
Discharge: HOME | End: 2023-12-28
Attending: INTERNAL MEDICINE
Payer: COMMERCIAL

## 2023-12-28 DIAGNOSIS — R00.1 BRADYCARDIA, UNSPECIFIED: ICD-10-CM

## 2023-12-28 PROCEDURE — 93296 REM INTERROG EVL PM/IDS: CPT | Mod: PO | Performed by: INTERNAL MEDICINE

## 2023-12-28 NOTE — TELEPHONE ENCOUNTER
Called and made pt aware   Occupational Therapy    Visit Type: initial evaluation and treatment  SUBJECTIVE  Patient agreed to participate in therapy this date.  RN in agreement to work with patient for therapy session. Patient's family in agreement to work with patient for therapy session.  Patient verbally agrees to allow the following to be present during session: spouse  Patient / Family Goal: maximize function and return home    Pain   Patient reports pain is not an issue/concern.    OBJECTIVE     Cognitive Status   Orientation    - Oriented to: person, place, time and situation  Functional Communication   - Overall Status: within functional limits   - Forms of Communication: verbal  Attention Span    - Attention: intact  Following Direction   - follows all commands and directions consistently  Transition Between Tasks   - transitions without difficulty  Memory   - intact  Safety Awareness/Insight   - intact  Awareness of Deficits   - fully aware of deficits    Hand Dominance: right-handed      Range of Motion (ROM)   (degrees unless noted; active unless noted; norms in ( ); negative=lacking to 0, positive=beyond 0)  WFL: LUE, RUE    Strength  (out of 5 unless noted, standard test position unless noted)   WFL: LUE, RUE      Sitting Balance  (KKIO = base of support)  Static      - Trial 1 details: independent  Dynamic      - Trial 1 details: supervision    Standing Balance  (KIKO = base of support)  Firm Surface: Double Leg      - Static, Eyes Open       - Trial 1 details: moderate assist and with double UE support  Decreased standing balance due to patient maintaining non weight bearing right lower extremity       Coordination  LUE: intact      - Finger-Nose: intact     - Rapid Alternating Movement Test:        pronation/supination: intact         finger tapping: intact  RUE: intact       - Finger-Nose: intact     - Rapid Alternating Movement Test:        pronation/supination: intact         finger tapping: intact      Transfers  Assistive  devices: gait belt, 2-wheeled walker  - Sit to stand: moderate assist, with tactile cues, with verbal cues  - Stand to sit: minimal assist, with verbal cues, with tactile cues  Verbal and tactile cues provided for technique and to maintain non weight bearing on right lower extremity.       Activities of Daily Living (ADLs)  Lower Body Dressing:   - Footwear:       - Assistance: modified independent       - Position: chair       - Type: socks  Verbal education given to patient regarding use of bedside commode in order to progress independence with ADL tasks to maintain non weight bearing.     Vision  Glasses: wears all the time  Head Position   - normal    Patient with decreased peripheral vision in left eye.     Interventions    Training provided: activity tolerance, ADL training, balance retraining, bed mobility training, safety training, transfer training, compensatory techniques and body mechanicsEducation given to patient and spouse regarding techniques to increase visual perception and compensatory methods for increasing independence and vision during ADL tasks. Patient with appropriate insight into deficits, but limited mobility and balance when standing and maintaining non weight bearing. Education given regarding bedside commode to continue to increase independence with self care tasks.   Skilled input: verbal instruction/cues and tactile instruction/cues  Verbal Consent: Writer verbally educated and received verbal consent for hand placement, positioning of patient, and techniques to be performed today from patient for clothing adjustments for techniques and therapist position for techniques as described above and how they are pertinent to the patient's plan of care.         Education:   - Present and ready to learn: patient  Education provided during session:  - Role of occupational therapy, plan of care, fall and safety precautions while in hospital.    - Results of above outlined education: Verbalizes  understanding, Demonstrates understanding and Needs reinforcement    ASSESSMENT   Patient will benefit from inpatient skilled therapy to address current assessed functional limitations and impairments.  Interferring components: decreased activity tolerance, decreased insight into deficit and weight bearing status    Discharge needs based on today's assessment:  - Current level of function: significantly below baseline level of function  - Therapy needs at discharge: therapy 5 or more times per week  - Activities of daily living (ADLs) requiring support at discharge: transfers, dressing, bathing, toileting and ambulation  - Impairments that require further therapy intervention: activity tolerance, balance and pain  AM-PAC  - Prior Level of Function: IND/MOD I ([facility] 22-24)       Key: MOD A=moderate assistance, IND/MOD I=independent/modified independent  - Generalized Current Level of Function     - Current Self-Cares: 18       Scoring Key= >21 Modified Independent; 20-21 Supervision; 18-19 Minimal assist; 13-18 Moderate assist; 9-12 Max assist; <9 Total assist    1=unable, 2=a lot, 3=a little, 4=none  1. Understand 10 minute speech  2. Understand familiar people during conversation  3. Remember to take meds  4. Remember where items are placed  5. Remember list of 4-5 errands w/out writing it down  6. Completing complicated task (checkbook, med management)  AM-PAC Cognition Screen:  Cognition Score: 24/24  Cognition Interpretation: no impairment suspected    Therapy Diagnosis:   Decreased ability to perform self care tasks and functional transfers.     Discharge recommendations pending based on whether or not podiatrist upgrades weight bearing status, as she is currently non weightbearing to right lower extremity which is significantly limited her independence with functional mobility and self care tasks.             Personal Occupations Profile Affected: bathing/showering, functional mobility/transfers,  toileting/toilet hygiene, lower body dressing      Clinical decision making: Moderate - Patient has several limitations (3-5), comorbidities and/or complexities, as noted in detailed assessment above, that impact their occupational profile.  Resulting in several treatment options and minimal to moderate task modification consistent with moderate clinical decision making complexity.    PLAN (while hospitalized)  Suggestions for next session as indicated: Progression of ADLs and functional transfer completion.     OT Frequency: 3-5 x per week      PT/OT Mobility Equipment for Discharge: TBD  PT/OT ADL Equipment for Discharge: TBD    A minimum of 8 minutes per session x 1 week in the acute setting.     Interventions: ADL retraining, functional transfer training, upper extremity strengthening/ROM, activity tolerance training, cognitive retraining, patient/family training, compensatory technique education, balance, bed mobility training, energy conservation, compensatory techniques, therapeutic exercise, transfer training, therapeutic activity and patient education  Agreement to plan and goals: patient agrees with goals and treatment plan      GOALS  Long Term Goals: (to be met by time of discharge from hospital)  Lower body dressing: Patient will complete lower body dressing in sitting contact guard or touching/steadying.  Toileting: Patient will complete toileting contact guard or touching/steadying.  Toilet transfer: Patient will complete toilet transfer with gait belt and 2-wheeled walker, contact guard or touching/steadying.     Documented in the chart in the following areas: Prior Level of Function. Assessment/Plan.    Patient at End of Session:   Location: in chair  Safety measures: alarm system in place/re-engaged and call light within reach  Handoff to: nurse and family/caregiver (Gladys via Epic chat)      Therapy procedure time and total treatment time can be found documented on the Time Entry flowsheet

## 2024-01-09 ENCOUNTER — OFFICE VISIT (OUTPATIENT)
Dept: PODIATRY | Facility: CLINIC | Age: 64
End: 2024-01-09
Payer: COMMERCIAL

## 2024-01-09 VITALS — HEIGHT: 74 IN | WEIGHT: 278 LBS | BODY MASS INDEX: 35.68 KG/M2

## 2024-01-09 DIAGNOSIS — M79.2 NEURITIS: Primary | ICD-10-CM

## 2024-01-09 PROCEDURE — 3008F BODY MASS INDEX DOCD: CPT | Mod: CPTII,S$GLB,, | Performed by: STUDENT IN AN ORGANIZED HEALTH CARE EDUCATION/TRAINING PROGRAM

## 2024-01-09 PROCEDURE — 1160F RVW MEDS BY RX/DR IN RCRD: CPT | Mod: CPTII,S$GLB,, | Performed by: STUDENT IN AN ORGANIZED HEALTH CARE EDUCATION/TRAINING PROGRAM

## 2024-01-09 PROCEDURE — 99213 OFFICE O/P EST LOW 20 MIN: CPT | Mod: S$GLB,,, | Performed by: STUDENT IN AN ORGANIZED HEALTH CARE EDUCATION/TRAINING PROGRAM

## 2024-01-09 PROCEDURE — 99999 PR PBB SHADOW E&M-EST. PATIENT-LVL II: CPT | Mod: PBBFAC,,, | Performed by: STUDENT IN AN ORGANIZED HEALTH CARE EDUCATION/TRAINING PROGRAM

## 2024-01-09 PROCEDURE — 1159F MED LIST DOCD IN RCRD: CPT | Mod: CPTII,S$GLB,, | Performed by: STUDENT IN AN ORGANIZED HEALTH CARE EDUCATION/TRAINING PROGRAM

## 2024-01-09 NOTE — PROGRESS NOTES
PODIATRY NOTE     PATIENT ID:  Abel Dc III is a 63 y.o. male.     CHIEF CONCERN:   Foot Pain (Rt nd toe)             MEDICAL DECISION MAKING:        ICD-10-CM ICD-9-CM    1. Neuritis - Right Foot  M79.2 729.2                 I counseled the patient on the patient's conditions, their implications and medical management.   No improvement with shots. He has gotten improvement with topicals though. Continue those. F/u as needed.    Elias Salinas DPM        HISTORY OF PRESENT ILLNESS:  Abel Dc III is a 63 y.o. male with concerns regarding: right 2nd toe numbness and pain  Patient reports symptoms/signs have been present for some time now.   Trauma/injury to the area:  no.     Pain is worse at night.    01/17/2023:   Patient returns for right foot pain.  He was unable to get the cream because it was too expensive.  He continues to have symptoms.    9/11/23: Mr. Dc returns for 2nd toe pain of the right side. He had a DPN injection January 2023 that lasted about a month but the pain is back. He also reports pain when he curls his 2nd toe at the PIPJ.    11/14/23: No improvement after the last injection. Continues to have pain to the 1st and 2nd toes but only at night.     1/9/24  F/u R foot neuropathy.    Patient Active Problem List   Diagnosis    Essential hypertension    Mixed hyperlipidemia    Bipolar disorder    Panic disorder    DDD (degenerative disc disease), lumbar    Gout, arthritis    GERD (gastroesophageal reflux disease)    BPH (benign prostatic hypertrophy)    Lumbar stenosis    History of shoulder surgery:  shoulder surgery, massive rotator cuff repair, biceps tenodesis, synovectomy    Rotator cuff syndrome of right shoulder    Subacromial impingement    Acromioclavicular joint arthritis    Biceps tendonitis    Hip pain, bilateral    Acquired scoliosis    Acquired spondylolisthesis    Lumbago    Degeneration of lumbar or lumbosacral intervertebral disc    Spondylosis without myelopathy     Spinal stenosis, lumbar region, without neurogenic claudication    Severe obesity (BMI 35.0-39.9) with comorbidity    Bilateral knee pain    Difficulty walking    Stage 3b chronic kidney disease    Secondary hyperparathyroidism of renal origin    Tremor    Gallstones without obstruction of gallbladder    Pulmonary nodules -- repeat CT chest in August 2021    Pain of right thigh    Encounter for long-term (current) use of other medications    Nicotine dependence, other tobacco product, uncomplicated    Dizziness    Chest pain    Symptomatic bradycardia    Advance care planning    PAF (paroxysmal atrial fibrillation)           Current Outpatient Medications on File Prior to Visit   Medication Sig Dispense Refill    allopurinoL (ZYLOPRIM) 100 MG tablet Take 2 tablets (200 mg total) by mouth once daily. 180 tablet 3    amLODIPine (NORVASC) 5 MG tablet Take 1 tablet (5 mg total) by mouth 2 (two) times daily. 180 tablet 3    apixaban (ELIQUIS) 5 mg Tab Take 1 tablet (5 mg total) by mouth 2 (two) times daily. 180 tablet 3    aspirin 81 mg Tab Take 1 tablet by mouth once daily.      busPIRone (BUSPAR) 15 MG tablet Take 15 mg by mouth 3 (three) times daily.   4    cholecalciferol, vitamin D3, 2,000 unit Cap Take 1 capsule by mouth once daily.      cyanocobalamin (VITAMIN B-12) 1000 MCG tablet Take 100 mcg by mouth once daily.      FLUoxetine 40 MG capsule Take 40 mg by mouth once daily.  5    fluticasone propionate (FLONASE) 50 mcg/actuation nasal spray SPRAY ONCE IN EACH NOSTRIL EVERY DAY 48 g 3    gabapentin (NEURONTIN) 300 MG capsule TAKE 1 CAPSULE(300 MG) BY MOUTH THREE TIMES DAILY 90 capsule 1    metoprolol succinate (TOPROL-XL) 50 MG 24 hr tablet Take 1 tablet (50 mg total) by mouth once daily. 30 tablet 11    omeprazole (PRILOSEC) 20 MG capsule TAKE 1 CAPSULE BY MOUTH EVERY DAY 90 capsule 3    OXcarbazepine (TRILEPTAL) 300 MG Tab Take 600 mg by mouth once daily.  3    pravastatin (PRAVACHOL) 40 MG tablet TAKE 1  "TABLET(40 MG) BY MOUTH EVERY EVENING 90 tablet 3    quetiapine (SEROQUEL) 50 MG tablet Take 50 mg by mouth nightly.  0     No current facility-administered medications on file prior to visit.           Review of patient's allergies indicates:   Allergen Reactions    Amitriptyline Other (See Comments) and Hallucinations     confusion    Hydrocodone      Other reaction(s): Hallucinations    Hydrocodone-acetaminophen      Other reaction(s): hyperactivity    Oxycodone Other (See Comments)     hallucinations    Pseudoephedrine      Other reaction(s): Hallucinations    Pseudoephedrine hcl      Other reaction(s): hyperactivity    Risperidone      Other reaction(s): Hallucinations  Other reaction(s): hyperactivity    Trazodone      Adverse reaction    Naproxen                ROS:   General ROS: negative for - chills, fever or night sweats  Respiratory ROS: no cough, shortness of breath, or wheezing  Cardiovascular ROS: no chest pain or dyspnea on exertion  Musculoskeletal ROS: negative for - joint pain and joint stiffness  Neurological ROS: positive for - numbness/tingling  Dermatological ROS: negative for acne, dry skin, and eczema      EXAM:     Vitals:    01/09/24 1524   Weight: 126.1 kg (278 lb)   Height: 6' 2" (1.88 m)        General:  Alert and Oriented x 3;  No acute distress      Right  Lower extremity exam:    Vascular:   Dorsalis Pedis:  present   Posterior Tibial:  present  Capillary refill time:  2 seconds  Temperature of toes warm to touch  Edema:  none       Neurological:     Sharp touch:  decreased  Light touch: decreased  Tinels Sign:  Present at DPN  Mulders Click:   + at the 2nd interspace         Dermatological:       Musculoskeletal:   Pain and crepitation at the right PIPJ of the 2nd toe.                 "

## 2024-01-24 LAB
OHS CV AF BURDEN PERCENT: < 1
OHS CV DC REMOTE DEVICE TYPE: NORMAL
OHS CV RV PACING PERCENT: 11 %

## 2024-02-05 DIAGNOSIS — I10 ESSENTIAL HYPERTENSION: ICD-10-CM

## 2024-02-05 RX ORDER — AMLODIPINE BESYLATE 5 MG/1
5 TABLET ORAL 2 TIMES DAILY
Qty: 180 TABLET | Refills: 3 | Status: SHIPPED | OUTPATIENT
Start: 2024-02-05 | End: 2024-02-05 | Stop reason: SDUPTHER

## 2024-02-06 RX ORDER — AMLODIPINE BESYLATE 5 MG/1
5 TABLET ORAL 2 TIMES DAILY
Qty: 180 TABLET | Refills: 2 | Status: SHIPPED | OUTPATIENT
Start: 2024-02-06

## 2024-02-18 DIAGNOSIS — M1A.9XX0 CHRONIC GOUT WITHOUT TOPHUS, UNSPECIFIED CAUSE, UNSPECIFIED SITE: ICD-10-CM

## 2024-02-18 RX ORDER — ALLOPURINOL 100 MG/1
200 TABLET ORAL
Qty: 180 TABLET | Refills: 3 | Status: SHIPPED | OUTPATIENT
Start: 2024-02-18

## 2024-02-18 NOTE — TELEPHONE ENCOUNTER
Refill Routing Note   Medication(s) are not appropriate for processing by [facility] for the following reason(s):        Required labs outdated  Required labs abnormal    [facility] action(s):  Defer               Appointments  past 12m or future 3m with PCP    Date Provider   Last Visit   9/7/2023 Jonah Dia MD   Next Visit   3/12/2024 Jonah Dia MD   ED visits in past 90 days: 0        Note composed:9:48 AM 02/18/2024

## 2024-02-18 NOTE — TELEPHONE ENCOUNTER
No care due was identified.  [facility] Embedded Care Due Messages. Reference number: [id number].   2/18/2024 6:31:19 AM CST

## 2024-02-20 ENCOUNTER — OFFICE VISIT (OUTPATIENT)
Dept: PODIATRY | Facility: CLINIC | Age: 64
End: 2024-02-20
Payer: COMMERCIAL

## 2024-02-20 VITALS — BODY MASS INDEX: 35.68 KG/M2 | HEIGHT: 74 IN | WEIGHT: 278 LBS

## 2024-02-20 DIAGNOSIS — L84 CORN OR CALLUS: Primary | ICD-10-CM

## 2024-02-20 PROCEDURE — 99213 OFFICE O/P EST LOW 20 MIN: CPT | Mod: S$GLB,,, | Performed by: STUDENT IN AN ORGANIZED HEALTH CARE EDUCATION/TRAINING PROGRAM

## 2024-02-20 PROCEDURE — 99999 PR PBB SHADOW E&M-EST. PATIENT-LVL III: CPT | Mod: PBBFAC,,, | Performed by: STUDENT IN AN ORGANIZED HEALTH CARE EDUCATION/TRAINING PROGRAM

## 2024-02-20 PROCEDURE — 1159F MED LIST DOCD IN RCRD: CPT | Mod: CPTII,S$GLB,, | Performed by: STUDENT IN AN ORGANIZED HEALTH CARE EDUCATION/TRAINING PROGRAM

## 2024-02-20 PROCEDURE — 1160F RVW MEDS BY RX/DR IN RCRD: CPT | Mod: CPTII,S$GLB,, | Performed by: STUDENT IN AN ORGANIZED HEALTH CARE EDUCATION/TRAINING PROGRAM

## 2024-02-20 PROCEDURE — 3008F BODY MASS INDEX DOCD: CPT | Mod: CPTII,S$GLB,, | Performed by: STUDENT IN AN ORGANIZED HEALTH CARE EDUCATION/TRAINING PROGRAM

## 2024-02-20 NOTE — PROGRESS NOTES
PODIATRY NOTE     PATIENT ID:  Abel Dc III is a 63 y.o. male.     CHIEF CONCERN:   Wound Check             MEDICAL DECISION MAKING:        ICD-10-CM ICD-9-CM    1. Corn or callus  [id number] [id number]                   I counseled the patient on the patient's conditions, their implications and medical management.   Callus was trimmed of the left toe.  Spacer given.  F/u as needed.    Elias Salinas DPM        HISTORY OF PRESENT ILLNESS:  Abel cD III is a 63 y.o. male with concerns regarding: right 2nd toe numbness and pain  Patient reports symptoms/signs have been present for some time now.   Trauma/injury to the area:  no.     Pain is worse at night.    01/17/2023:   Patient returns for right foot pain.  He was unable to get the cream because it was too expensive.  He continues to have symptoms.    9/11/23: Mr. Dc returns for 2nd toe pain of the right side. He had a DPN injection January 2023 that lasted about a month but the pain is back. He also reports pain when he curls his 2nd toe at the PIPJ.    11/14/23: No improvement after the last injection. Continues to have pain to the 1st and 2nd toes but only at night.     1/9/24  F/u R foot neuropathy.    2/20/24: Mr. Dc presents today with new issue to the L great toe. It is painful to walk and stand.    Patient Active Problem List   Diagnosis    Essential hypertension    Mixed hyperlipidemia    Bipolar disorder    Panic disorder    DDD (degenerative disc disease), lumbar    Gout, arthritis    GERD (gastroesophageal reflux disease)    BPH (benign prostatic hypertrophy)    Lumbar stenosis    History of shoulder surgery:  shoulder surgery, massive rotator cuff repair, biceps tenodesis, synovectomy    Rotator cuff syndrome of right shoulder    Subacromial impingement    Acromioclavicular joint arthritis    Biceps tendonitis    Hip pain, bilateral    Acquired scoliosis    Acquired spondylolisthesis    Lumbago    Degeneration of lumbar or lumbosacral intervertebral  disc    Spondylosis without myelopathy    Spinal stenosis, lumbar region, without neurogenic claudication    Severe obesity (BMI 35.0-39.9) with comorbidity    Bilateral knee pain    Difficulty walking    Stage 3b chronic kidney disease    Secondary hyperparathyroidism of renal origin    Tremor    Gallstones without obstruction of gallbladder    Pulmonary nodules -- repeat CT chest in August 2021    Pain of right thigh    Encounter for long-term (current) use of other medications    Nicotine dependence, other tobacco product, uncomplicated    Dizziness    Chest pain    Symptomatic bradycardia    Advance care planning    PAF (paroxysmal atrial fibrillation)           Current Outpatient Medications on File Prior to Visit   Medication Sig Dispense Refill    allopurinoL (ZYLOPRIM) 100 MG tablet TAKE 2 TABLETS(200 MG) BY MOUTH EVERY  tablet 3    amLODIPine (NORVASC) 5 MG tablet Take 1 tablet (5 mg total) by mouth 2 (two) times daily. 180 tablet 2    apixaban (ELIQUIS) 5 mg Tab Take 1 tablet (5 mg total) by mouth 2 (two) times daily. 180 tablet 3    aspirin 81 mg Tab Take 1 tablet by mouth once daily.      busPIRone (BUSPAR) 15 MG tablet Take 15 mg by mouth 3 (three) times daily.   4    cholecalciferol, vitamin D3, 2,000 unit Cap Take 1 capsule by mouth once daily.      cyanocobalamin (VITAMIN B-12) 1000 MCG tablet Take 100 mcg by mouth once daily.      FLUoxetine 40 MG capsule Take 40 mg by mouth once daily.  5    fluticasone propionate (FLONASE) 50 mcg/actuation nasal spray SPRAY ONCE IN EACH NOSTRIL EVERY DAY 48 g 3    gabapentin (NEURONTIN) 300 MG capsule TAKE 1 CAPSULE(300 MG) BY MOUTH THREE TIMES DAILY 90 capsule 1    metoprolol succinate (TOPROL-XL) 50 MG 24 hr tablet Take 1 tablet (50 mg total) by mouth once daily. 30 tablet 11    omeprazole (PRILOSEC) 20 MG capsule TAKE 1 CAPSULE BY MOUTH EVERY DAY 90 capsule 3    OXcarbazepine (TRILEPTAL) 300 MG Tab Take 600 mg by mouth once daily.  3    pravastatin  "(PRAVACHOL) 40 MG tablet TAKE 1 TABLET(40 MG) BY MOUTH EVERY EVENING 90 tablet 3    quetiapine (SEROQUEL) 50 MG tablet Take 50 mg by mouth nightly.  0     No current facility-administered medications on file prior to visit.           Review of patient's allergies indicates:   Allergen Reactions    Amitriptyline Other (See Comments) and Hallucinations     confusion    Hydrocodone      Other reaction(s): Hallucinations    Hydrocodone-acetaminophen      Other reaction(s): hyperactivity    Oxycodone Other (See Comments)     hallucinations    Pseudoephedrine      Other reaction(s): Hallucinations    Pseudoephedrine hcl      Other reaction(s): hyperactivity    Risperidone      Other reaction(s): Hallucinations  Other reaction(s): hyperactivity    Trazodone      Adverse reaction    Naproxen                ROS:   General ROS: negative for - chills, fever or night sweats  Respiratory ROS: no cough, shortness of breath, or wheezing  Cardiovascular ROS: no chest pain or dyspnea on exertion  Musculoskeletal ROS: negative for - joint pain and joint stiffness  Neurological ROS: positive for - numbness/tingling  Dermatological ROS: + for suspicious       EXAM:     Vitals:    02/20/24 0938   Weight: 126.1 kg (278 lb)   Height: 6' 2" (1.88 m)        General:  Alert and Oriented x 3;  No acute distress      Lower extremity exam:    Vascular:   Dorsalis Pedis:  present   Posterior Tibial:  present  Capillary refill time:  2 seconds  Temperature of toes warm to touch  Edema:  none       Neurological:     Sharp touch:  decreased  Light touch: decreased  Tinels Sign:  Present at DPN  Mulders Click:   + at the 2nd interspace         Dermatological:   L great toe has a corn at the lateral aspect at the IPJ.     Musculoskeletal:   Pain and crepitation at the right PIPJ of the 2nd toe.                 "

## 2024-03-07 ENCOUNTER — PATIENT MESSAGE (OUTPATIENT)
Dept: CARDIOLOGY | Facility: CLINIC | Age: 64
End: 2024-03-07
Payer: COMMERCIAL

## 2024-03-12 ENCOUNTER — OFFICE VISIT (OUTPATIENT)
Dept: FAMILY MEDICINE | Facility: CLINIC | Age: 64
End: 2024-03-12
Payer: COMMERCIAL

## 2024-03-12 VITALS
WEIGHT: 282.63 LBS | DIASTOLIC BLOOD PRESSURE: 68 MMHG | BODY MASS INDEX: 36.27 KG/M2 | OXYGEN SATURATION: 95 % | SYSTOLIC BLOOD PRESSURE: 112 MMHG | HEART RATE: 69 BPM | TEMPERATURE: 99 F | HEIGHT: 74 IN

## 2024-03-12 DIAGNOSIS — F31.9 BIPOLAR AFFECTIVE DISORDER, REMISSION STATUS UNSPECIFIED: ICD-10-CM

## 2024-03-12 DIAGNOSIS — E66.01 SEVERE OBESITY (BMI 35.0-35.9 WITH COMORBIDITY): ICD-10-CM

## 2024-03-12 DIAGNOSIS — I10 ESSENTIAL HYPERTENSION: Primary | ICD-10-CM

## 2024-03-12 DIAGNOSIS — E78.2 MIXED HYPERLIPIDEMIA: ICD-10-CM

## 2024-03-12 PROCEDURE — 99214 OFFICE O/P EST MOD 30 MIN: CPT | Mod: S$GLB,,, | Performed by: FAMILY MEDICINE

## 2024-03-12 PROCEDURE — 3078F DIAST BP <80 MM HG: CPT | Mod: CPTII,S$GLB,, | Performed by: FAMILY MEDICINE

## 2024-03-12 PROCEDURE — 99999 PR PBB SHADOW E&M-EST. PATIENT-LVL IV: CPT | Mod: PBBFAC,,, | Performed by: FAMILY MEDICINE

## 2024-03-12 PROCEDURE — 1159F MED LIST DOCD IN RCRD: CPT | Mod: CPTII,S$GLB,, | Performed by: FAMILY MEDICINE

## 2024-03-12 PROCEDURE — 3008F BODY MASS INDEX DOCD: CPT | Mod: CPTII,S$GLB,, | Performed by: FAMILY MEDICINE

## 2024-03-12 PROCEDURE — 1160F RVW MEDS BY RX/DR IN RCRD: CPT | Mod: CPTII,S$GLB,, | Performed by: FAMILY MEDICINE

## 2024-03-12 PROCEDURE — 3074F SYST BP LT 130 MM HG: CPT | Mod: CPTII,S$GLB,, | Performed by: FAMILY MEDICINE

## 2024-03-12 RX ORDER — ZINC GLUCONATE 50 MG
50 TABLET ORAL DAILY
COMMUNITY

## 2024-03-12 RX ORDER — IBUPROFEN 100 MG/5ML
1000 SUSPENSION, ORAL (FINAL DOSE FORM) ORAL DAILY
COMMUNITY

## 2024-03-12 NOTE — PROGRESS NOTES
"Subjective:       Patient ID: Abel Dc III is a 63 y.o. male.    Chief Complaint: Follow-up and Hypertension    HPI:  Pleasant 63-year-old patient here today for follow-up.  He has been doing fine since his pacemaker placement about a year ago.  No ongoing symptoms of CAD or CHF.  Blood pressure is extremely well controlled at this time.  The patient is under treatment for hyperlipidemia on Pravachol and tolerating that fine.  He is got some weight to lose recommend he go from normal sugar drinks 2 diet drinks should help quite a bit and also try to lower his intake portion size.  Get out and walk as much as he can for gets do hot.    Review of Systems   Constitutional: Negative.    HENT: Negative.     Eyes: Negative.    Respiratory: Negative.     Cardiovascular: Negative.    Gastrointestinal: Negative.    Endocrine: Negative.    Genitourinary: Negative.    Musculoskeletal: Negative.    Skin: Negative.    Allergic/Immunologic: Negative.    Neurological: Negative.    Hematological: Negative.    Psychiatric/Behavioral: Negative.         Objective:      Vitals:    03/12/24 0946   BP: 112/68   Pulse: 69   Temp: 98.6 °F (37 °C)   TempSrc: Oral   SpO2: 95%   Weight: 128.2 kg (282 lb 10.1 oz)   Height: 6' 2" (1.88 m)      Physical Exam  Vitals and nursing note reviewed.   Constitutional:       Appearance: Normal appearance. He is obese.   HENT:      Head: Normocephalic and atraumatic.      Nose: Nose normal.      Mouth/Throat:      Mouth: Mucous membranes are moist.      Pharynx: Oropharynx is clear.   Eyes:      Extraocular Movements: Extraocular movements intact.      Conjunctiva/sclera: Conjunctivae normal.      Pupils: Pupils are equal, round, and reactive to light.   Cardiovascular:      Rate and Rhythm: Normal rate and regular rhythm.   Pulmonary:      Effort: Pulmonary effort is normal.      Breath sounds: Normal breath sounds.   Abdominal:      General: Abdomen is flat. Bowel sounds are normal.      " Palpations: Abdomen is soft.   Musculoskeletal:         General: Normal range of motion.      Cervical back: Normal range of motion and neck supple.   Skin:     General: Skin is warm and dry.      Capillary Refill: Capillary refill takes less than 2 seconds.   Neurological:      General: No focal deficit present.      Mental Status: He is alert and oriented to person, place, and time.   Psychiatric:         Mood and Affect: Mood normal.         Behavior: Behavior normal.         Thought Content: Thought content normal.         Judgment: Judgment normal.         Results for orders placed or performed in visit on 12/28/23   Cardiac device check - Remote   Result Value Ref Range    Device Type Pacemaker     AF Victor % < 1     RV Paccing % 11 %     *Note: Due to a large number of results and/or encounters for the requested time period, some results have not been displayed. A complete set of results can be found in Results Review.      Assessment:       1. Essential hypertension    2. Mixed hyperlipidemia    3. Bipolar affective disorder, remission status unspecified    4. Severe obesity (BMI 35.0-35.9 with comorbidity)        Plan:       Essential hypertension  Comments:  Continue on current medications controlled this time.    Mixed hyperlipidemia  Comments:  Being treated.  Laboratories in 6 months.    Bipolar affective disorder, remission status unspecified  Comments:  Followed by Psychiatry and on a good regimen of medications now controlled.    Severe obesity (BMI 35.0-35.9 with comorbidity)  Comments:  Stop regular drinks go to diet and try to get some more exercise and lower portion sizes.          Medication List with Changes/Refills   Current Medications    ALLOPURINOL (ZYLOPRIM) 100 MG TABLET    TAKE 2 TABLETS(200 MG) BY MOUTH EVERY DAY    AMLODIPINE (NORVASC) 5 MG TABLET    Take 1 tablet (5 mg total) by mouth 2 (two) times daily.    APIXABAN (ELIQUIS) 5 MG TAB    Take 1 tablet (5 mg total) by mouth 2 (two)  times daily.    ASCORBIC ACID, VITAMIN C, (VITAMIN C) 1000 MG TABLET    Take 1,000 mg by mouth once daily.    ASPIRIN 81 MG TAB    Take 1 tablet by mouth once daily.    BUSPIRONE (BUSPAR) 15 MG TABLET    Take 15 mg by mouth 3 (three) times daily.     CHOLECALCIFEROL, VITAMIN D3, 2,000 UNIT CAP    Take 1 capsule by mouth once daily.    CYANOCOBALAMIN (VITAMIN B-12) 1000 MCG TABLET    Take 100 mcg by mouth once daily.    FLUOXETINE 40 MG CAPSULE    Take 40 mg by mouth once daily.    FLUTICASONE PROPIONATE (FLONASE) 50 MCG/ACTUATION NASAL SPRAY    SPRAY ONCE IN EACH NOSTRIL EVERY DAY    GABAPENTIN (NEURONTIN) 300 MG CAPSULE    TAKE 1 CAPSULE(300 MG) BY MOUTH THREE TIMES DAILY    METOPROLOL SUCCINATE (TOPROL-XL) 50 MG 24 HR TABLET    Take 1 tablet (50 mg total) by mouth once daily.    OMEPRAZOLE (PRILOSEC) 20 MG CAPSULE    TAKE 1 CAPSULE BY MOUTH EVERY DAY    OXCARBAZEPINE (TRILEPTAL) 300 MG TAB    Take 600 mg by mouth once daily.    PRAVASTATIN (PRAVACHOL) 40 MG TABLET    TAKE 1 TABLET(40 MG) BY MOUTH EVERY EVENING    QUETIAPINE (SEROQUEL) 50 MG TABLET    Take 50 mg by mouth nightly.    ZINC GLUCONATE 50 MG TABLET    Take 50 mg by mouth once daily.

## 2024-03-12 NOTE — PATIENT INSTRUCTIONS
Ask your pharmacist about the RSV vaccine, the flu shot, and completion of the COVID-19 vaccine series.

## 2024-03-13 DIAGNOSIS — N18.32 STAGE 3B CHRONIC KIDNEY DISEASE: ICD-10-CM

## 2024-03-28 ENCOUNTER — CLINICAL SUPPORT (OUTPATIENT)
Dept: CARDIOLOGY | Facility: HOSPITAL | Age: 64
End: 2024-03-28

## 2024-03-28 ENCOUNTER — HOSPITAL ENCOUNTER (OUTPATIENT)
Dept: CARDIOLOGY | Facility: HOSPITAL | Age: 64
Discharge: HOME | End: 2024-03-28
Attending: INTERNAL MEDICINE
Payer: COMMERCIAL

## 2024-03-28 DIAGNOSIS — R00.1 BRADYCARDIA, UNSPECIFIED: ICD-10-CM

## 2024-03-28 PROCEDURE — 93296 REM INTERROG EVL PM/IDS: CPT | Mod: PO | Performed by: INTERNAL MEDICINE

## 2024-03-28 PROCEDURE — 93294 REM INTERROG EVL PM/LDLS PM: CPT | Mod: ,,, | Performed by: INTERNAL MEDICINE

## 2024-04-18 DIAGNOSIS — K21.9 GASTROESOPHAGEAL REFLUX DISEASE: ICD-10-CM

## 2024-04-18 RX ORDER — OMEPRAZOLE 20 MG/1
CAPSULE, DELAYED RELEASE ORAL
Qty: 90 CAPSULE | Refills: 3 | Status: SHIPPED | OUTPATIENT
Start: 2024-04-18

## 2024-04-18 NOTE — TELEPHONE ENCOUNTER
Care Due:                  Date            Visit Type   Department     Provider  --------------------------------------------------------------------------------                                EP -                              [facility]  Last Visit: 03-      CARE ([facility])   MANFRED Dia                              EP -                              PRIMARY      [facility]  Next Visit: 09-      CARE ([facility])   MEDICINE       Jonah Dia                                                            Last  Test          Frequency    Reason                     Performed    Due Date  --------------------------------------------------------------------------------    CBC.........  12 months..  allopurinoL..............  03- 02-    CMP.........  12 months..  allopurinoL, pravastatin.  03- 02-    Uric Acid...  12 months..  allopurinoL..............  Not Found    Overdue    [employer] Embedded Care Due Messages. Reference number: [id number].   4/18/2024 6:09:08 AM CDT

## 2024-04-19 NOTE — TELEPHONE ENCOUNTER
Provider Staff:  Action required for this patient    Requires labs      Please see care gap opportunities below in Care Due Message.    Thanks!  [facility]     Appointments      Date Provider   Last Visit   3/12/2024 Jonah Dia MD   Next Visit   9/12/2024 Jonah Dia MD     Refill Decision Note   Abel Dc  is requesting a refill authorization.  Brief Assessment and Rationale for Refill:  Approve     Medication Therapy Plan:         Comments:     Note composed:9:51 PM 04/18/2024

## 2024-04-22 ENCOUNTER — TELEPHONE (OUTPATIENT)
Dept: PODIATRY | Facility: CLINIC | Age: 64
End: 2024-04-22
Payer: COMMERCIAL

## 2024-05-02 ENCOUNTER — PATIENT MESSAGE (OUTPATIENT)
Dept: CARDIOLOGY | Facility: CLINIC | Age: 64
End: 2024-05-02
Payer: COMMERCIAL

## 2024-05-02 LAB
OHS CV AF BURDEN PERCENT: < 1
OHS CV DC REMOTE DEVICE TYPE: NORMAL
OHS CV RV PACING PERCENT: 7.4 %

## 2024-05-05 ENCOUNTER — PATIENT MESSAGE (OUTPATIENT)
Dept: PODIATRY | Facility: CLINIC | Age: 64
End: 2024-05-05
Payer: COMMERCIAL

## 2024-05-06 ENCOUNTER — TELEPHONE (OUTPATIENT)
Dept: PODIATRY | Facility: CLINIC | Age: 64
End: 2024-05-06
Payer: COMMERCIAL

## 2024-05-09 ENCOUNTER — HOSPITAL ENCOUNTER (OUTPATIENT)
Dept: CARDIOLOGY | Facility: HOSPITAL | Age: 64
Discharge: HOME | End: 2024-05-09
Attending: INTERNAL MEDICINE
Payer: COMMERCIAL

## 2024-05-09 DIAGNOSIS — R00.1 SYMPTOMATIC BRADYCARDIA: ICD-10-CM

## 2024-05-09 DIAGNOSIS — Z95.0 CARDIAC PACEMAKER IN SITU: ICD-10-CM

## 2024-05-09 PROCEDURE — 93280 PM DEVICE PROGR EVAL DUAL: CPT | Mod: 26,,, | Performed by: INTERNAL MEDICINE

## 2024-05-09 PROCEDURE — 93280 PM DEVICE PROGR EVAL DUAL: CPT | Mod: PO

## 2024-05-21 ENCOUNTER — TELEPHONE (OUTPATIENT)
Dept: PODIATRY | Facility: CLINIC | Age: 64
End: 2024-05-21
Payer: COMMERCIAL

## 2024-05-27 LAB
OHS CV AF BURDEN PERCENT: < 1
OHS CV DC REMOTE DEVICE TYPE: NORMAL
OHS CV RV PACING PERCENT: 2.4 %

## 2024-06-05 ENCOUNTER — OFFICE VISIT (OUTPATIENT)
Dept: PODIATRY | Facility: CLINIC | Age: 64
End: 2024-06-05
Payer: COMMERCIAL

## 2024-06-05 VITALS — HEIGHT: 74 IN | BODY MASS INDEX: 36.27 KG/M2 | WEIGHT: 282.63 LBS

## 2024-06-05 DIAGNOSIS — M79.675 GREAT TOE PAIN, LEFT: Primary | ICD-10-CM

## 2024-06-05 PROCEDURE — 99999 PR PBB SHADOW E&M-EST. PATIENT-LVL III: CPT | Mod: PBBFAC,,, | Performed by: STUDENT IN AN ORGANIZED HEALTH CARE EDUCATION/TRAINING PROGRAM

## 2024-06-05 PROCEDURE — 1159F MED LIST DOCD IN RCRD: CPT | Mod: CPTII,S$GLB,, | Performed by: STUDENT IN AN ORGANIZED HEALTH CARE EDUCATION/TRAINING PROGRAM

## 2024-06-05 PROCEDURE — 99213 OFFICE O/P EST LOW 20 MIN: CPT | Mod: S$GLB,,, | Performed by: STUDENT IN AN ORGANIZED HEALTH CARE EDUCATION/TRAINING PROGRAM

## 2024-06-05 PROCEDURE — 3008F BODY MASS INDEX DOCD: CPT | Mod: CPTII,S$GLB,, | Performed by: STUDENT IN AN ORGANIZED HEALTH CARE EDUCATION/TRAINING PROGRAM

## 2024-06-05 PROCEDURE — 1160F RVW MEDS BY RX/DR IN RCRD: CPT | Mod: CPTII,S$GLB,, | Performed by: STUDENT IN AN ORGANIZED HEALTH CARE EDUCATION/TRAINING PROGRAM

## 2024-06-05 NOTE — PROGRESS NOTES
PODIATRY NOTE     PATIENT ID:  Abel Dc III is a 63 y.o. male.     CHIEF CONCERN:   Foot Pain             MEDICAL DECISION MAKING:        ICD-10-CM ICD-9-CM    1. Great toe pain, left  M79.675 729.5                     I counseled the patient on the patient's conditions, their implications and medical management.   Recommend that he try the cream on his left foot as the pain is there with or without socks and with or without spacer.   F/u in 2 months if no improvement. We can consider EMG/NCVs at that time.     Elias Salinas DPM        HISTORY OF PRESENT ILLNESS:  Abel Dc III is a 63 y.o. male with concerns regarding: right 2nd toe numbness and pain  Patient reports symptoms/signs have been present for some time now.   Trauma/injury to the area:  no.     Pain is worse at night.    01/17/2023:   Patient returns for right foot pain.  He was unable to get the cream because it was too expensive.  He continues to have symptoms.    9/11/23: Mr. Dc returns for 2nd toe pain of the right side. He had a DPN injection January 2023 that lasted about a month but the pain is back. He also reports pain when he curls his 2nd toe at the PIPJ.    11/14/23: No improvement after the last injection. Continues to have pain to the 1st and 2nd toes but only at night.     1/9/24  F/u R foot neuropathy.    2/20/24: Mr. Dc presents today with new issue to the L great toe. It is painful to walk and stand.    6/5/24:  Mr. Dc presents today with L 1st and 2nd toe pain. Reports pain that isn't related to any specific activity and comes and goes. He has gotten good relief to the R foot with a cream but hasn't tried it for the left foot yet.     Patient Active Problem List   Diagnosis    Essential hypertension    Mixed hyperlipidemia    Bipolar disorder    Panic disorder    DDD (degenerative disc disease), lumbar    Gout, arthritis    GERD (gastroesophageal reflux disease)    BPH (benign prostatic hypertrophy)    Lumbar  stenosis    History of shoulder surgery:  shoulder surgery, massive rotator cuff repair, biceps tenodesis, synovectomy    Rotator cuff syndrome of right shoulder    Subacromial impingement    Acromioclavicular joint arthritis    Biceps tendonitis    Hip pain, bilateral    Acquired scoliosis    Acquired spondylolisthesis    Lumbago    Degeneration of lumbar or lumbosacral intervertebral disc    Spondylosis without myelopathy    Spinal stenosis, lumbar region, without neurogenic claudication    Severe obesity (BMI 35.0-39.9) with comorbidity    Bilateral knee pain    Difficulty walking    Stage 3b chronic kidney disease    Secondary hyperparathyroidism of renal origin    Tremor    Gallstones without obstruction of gallbladder    Pulmonary nodules -- repeat CT chest in August 2021    Pain of right thigh    Encounter for long-term (current) use of other medications    Nicotine dependence, other tobacco product, uncomplicated    Dizziness    Chest pain    Symptomatic bradycardia    Advance care planning    PAF (paroxysmal atrial fibrillation)           Current Outpatient Medications on File Prior to Visit   Medication Sig Dispense Refill    allopurinoL (ZYLOPRIM) 100 MG tablet TAKE 2 TABLETS(200 MG) BY MOUTH EVERY  tablet 3    amLODIPine (NORVASC) 5 MG tablet Take 1 tablet (5 mg total) by mouth 2 (two) times daily. 180 tablet 2    apixaban (ELIQUIS) 5 mg Tab Take 1 tablet (5 mg total) by mouth 2 (two) times daily. 180 tablet 3    ascorbic acid, vitamin C, (VITAMIN C) 1000 MG tablet Take 1,000 mg by mouth once daily.      aspirin 81 mg Tab Take 1 tablet by mouth once daily.      busPIRone (BUSPAR) 15 MG tablet Take 15 mg by mouth 3 (three) times daily.   4    cholecalciferol, vitamin D3, 2,000 unit Cap Take 1 capsule by mouth once daily.      cyanocobalamin (VITAMIN B-12) 1000 MCG tablet Take 100 mcg by mouth once daily.      FLUoxetine 40 MG capsule Take 40 mg by mouth once daily.  5    fluticasone propionate  "(FLONASE) 50 mcg/actuation nasal spray SPRAY ONCE IN EACH NOSTRIL EVERY DAY 48 g 3    gabapentin (NEURONTIN) 300 MG capsule TAKE 1 CAPSULE(300 MG) BY MOUTH THREE TIMES DAILY 90 capsule 1    metoprolol succinate (TOPROL-XL) 50 MG 24 hr tablet Take 1 tablet (50 mg total) by mouth once daily. 30 tablet 11    omeprazole (PRILOSEC) 20 MG capsule TAKE 1 CAPSULE BY MOUTH EVERY DAY 90 capsule 3    OXcarbazepine (TRILEPTAL) 300 MG Tab Take 600 mg by mouth once daily.  3    pravastatin (PRAVACHOL) 40 MG tablet TAKE 1 TABLET(40 MG) BY MOUTH EVERY EVENING 90 tablet 3    quetiapine (SEROQUEL) 50 MG tablet Take 50 mg by mouth nightly.  0    zinc gluconate 50 mg tablet Take 50 mg by mouth once daily.       No current facility-administered medications on file prior to visit.           Review of patient's allergies indicates:   Allergen Reactions    Amitriptyline Other (See Comments) and Hallucinations     confusion    Hydrocodone      Other reaction(s): Hallucinations    Hydrocodone-acetaminophen      Other reaction(s): hyperactivity    Oxycodone Other (See Comments)     hallucinations    Pseudoephedrine      Other reaction(s): Hallucinations    Pseudoephedrine hcl      Other reaction(s): hyperactivity    Risperidone      Other reaction(s): Hallucinations  Other reaction(s): hyperactivity    Trazodone      Adverse reaction    Naproxen                ROS:   General ROS: negative for - chills, fever or night sweats  Respiratory ROS: no cough, shortness of breath, or wheezing  Cardiovascular ROS: no chest pain or dyspnea on exertion  Musculoskeletal ROS: negative for - joint pain and joint stiffness  Neurological ROS: positive for - numbness/tingling  Dermatological ROS: + for suspicious       EXAM:     Vitals:    06/05/24 1016   Weight: 128.2 kg (282 lb 10.1 oz)   Height: 6' 2" (1.88 m)        General:  Alert and Oriented x 3;  No acute distress      Lower extremity exam:    Vascular:   Dorsalis Pedis:  present   Posterior Tibial:  " present  Capillary refill time:  2 seconds  Temperature of toes warm to touch  Edema:  none       Neurological:     Sharp touch:  decreased  Light touch: decreased  Tinels Sign:  Present at DPN  [facility] Click:   + at the 2nd interspace         Dermatological:   L great toe has a corn at the lateral aspect at the IPJ.     Musculoskeletal:

## 2024-06-10 RX ORDER — METOPROLOL SUCCINATE 50 MG/1
50 TABLET, EXTENDED RELEASE ORAL
Qty: 30 TABLET | Refills: 11 | Status: SHIPPED | OUTPATIENT
Start: 2024-06-10

## 2024-06-10 RX ORDER — APIXABAN 5 MG/1
5 TABLET, FILM COATED ORAL 2 TIMES DAILY
Qty: 180 TABLET | Refills: 3 | Status: SHIPPED | OUTPATIENT
Start: 2024-06-10

## 2024-06-14 ENCOUNTER — PATIENT MESSAGE (OUTPATIENT)
Dept: FAMILY MEDICINE | Facility: CLINIC | Age: 64
End: 2024-06-14
Payer: COMMERCIAL

## 2024-06-27 ENCOUNTER — HOSPITAL ENCOUNTER (OUTPATIENT)
Dept: CARDIOLOGY | Facility: HOSPITAL | Age: 64
Discharge: HOME | End: 2024-06-27
Attending: INTERNAL MEDICINE

## 2024-06-27 ENCOUNTER — CLINICAL SUPPORT (OUTPATIENT)
Dept: CARDIOLOGY | Facility: HOSPITAL | Age: 64
End: 2024-06-27
Payer: COMMERCIAL

## 2024-06-27 DIAGNOSIS — R00.1 BRADYCARDIA, UNSPECIFIED: ICD-10-CM

## 2024-06-27 PROCEDURE — 93294 REM INTERROG EVL PM/LDLS PM: CPT | Mod: ,,, | Performed by: INTERNAL MEDICINE

## 2024-06-27 PROCEDURE — 93296 REM INTERROG EVL PM/IDS: CPT | Mod: PO | Performed by: INTERNAL MEDICINE

## 2024-06-28 ENCOUNTER — LAB VISIT (OUTPATIENT)
Dept: LAB | Facility: HOSPITAL | Age: 64
End: 2024-06-28
Attending: FAMILY MEDICINE
Payer: COMMERCIAL

## 2024-06-28 DIAGNOSIS — N18.32 STAGE 3B CHRONIC KIDNEY DISEASE: ICD-10-CM

## 2024-06-28 LAB
ALBUMIN SERPL BCP-MCNC: 3.8 G/DL (ref 3.5–5.2)
ALP SERPL-CCNC: 106 U/L (ref 55–135)
ALT SERPL W/O P-5'-P-CCNC: 22 U/L (ref 10–44)
ANION GAP SERPL CALC-SCNC: 8 MMOL/L (ref 8–16)
AST SERPL-CCNC: 27 U/L (ref 10–40)
BILIRUB SERPL-MCNC: 0.4 MG/DL (ref 0.1–1)
BUN SERPL-MCNC: 15 MG/DL (ref 8–23)
CALCIUM SERPL-MCNC: 10.1 MG/DL (ref 8.7–10.5)
CHLORIDE SERPL-SCNC: 103 MMOL/L (ref 95–110)
CO2 SERPL-SCNC: 25 MMOL/L (ref 23–29)
CREAT SERPL-MCNC: 1.6 MG/DL (ref 0.5–1.4)
EST. GFR  (NO RACE VARIABLE): 48.1 ML/MIN/1.73 M^2
GLUCOSE SERPL-MCNC: 93 MG/DL (ref 70–110)
POTASSIUM SERPL-SCNC: 4.6 MMOL/L (ref 3.5–5.1)
PROT SERPL-MCNC: 7.7 G/DL (ref 6–8.4)
SODIUM SERPL-SCNC: 136 MMOL/L (ref 136–145)

## 2024-06-28 PROCEDURE — 36415 COLL VENOUS BLD VENIPUNCTURE: CPT | Mod: PO | Performed by: FAMILY MEDICINE

## 2024-06-28 PROCEDURE — 80053 COMPREHEN METABOLIC PANEL: CPT | Performed by: FAMILY MEDICINE

## 2024-06-29 ENCOUNTER — PATIENT MESSAGE (OUTPATIENT)
Dept: CARDIOLOGY | Facility: CLINIC | Age: 64
End: 2024-06-29
Payer: COMMERCIAL

## 2024-06-30 ENCOUNTER — PATIENT MESSAGE (OUTPATIENT)
Dept: FAMILY MEDICINE | Facility: CLINIC | Age: 64
End: 2024-06-30
Payer: COMMERCIAL

## 2024-06-30 DIAGNOSIS — N18.30 STAGE 3 CHRONIC KIDNEY DISEASE, UNSPECIFIED WHETHER STAGE 3A OR 3B CKD: Primary | ICD-10-CM

## 2024-07-02 LAB
OHS CV AF BURDEN PERCENT: < 1
OHS CV DC REMOTE DEVICE TYPE: NORMAL
OHS CV RV PACING PERCENT: 5.7 %

## 2024-07-15 ENCOUNTER — PATIENT MESSAGE (OUTPATIENT)
Dept: ADMINISTRATIVE | Facility: HOSPITAL | Age: 64
End: 2024-07-15
Payer: COMMERCIAL

## 2024-07-17 ENCOUNTER — TELEPHONE (OUTPATIENT)
Dept: PODIATRY | Facility: CLINIC | Age: 64
End: 2024-07-17
Payer: COMMERCIAL

## 2024-07-17 NOTE — TELEPHONE ENCOUNTER
Responded to pt's [employer] message and scheduled appointment for 7/18 per pt's request.

## 2024-07-19 ENCOUNTER — PATIENT OUTREACH (OUTPATIENT)
Dept: ADMINISTRATIVE | Facility: HOSPITAL | Age: 64
End: 2024-07-19
Payer: COMMERCIAL

## 2024-07-19 DIAGNOSIS — Z12.11 ENCOUNTER FOR COLORECTAL CANCER SCREENING: Primary | ICD-10-CM

## 2024-07-19 DIAGNOSIS — Z12.12 ENCOUNTER FOR COLORECTAL CANCER SCREENING: Primary | ICD-10-CM

## 2024-07-19 NOTE — PROGRESS NOTES
Population Health Chart Review & Patient Outreach Details      Additional [facility] Health Notes:               Updates Requested / Reviewed:      Updated Care Coordination Note         Health Maintenance Topics Overdue:      VBHM Score: 0     Patient is not due for any topics at this time.    RSV Vaccine                  Health Maintenance Topic(s) Outreach Outcomes & Actions Taken:    Colorectal Cancer Screening - Outreach Outcomes & Actions Taken  : [employer] was given to patient on 7/19/2024 10:21 AM

## 2024-07-23 ENCOUNTER — LAB VISIT (OUTPATIENT)
Dept: LAB | Facility: HOSPITAL | Age: 64
End: 2024-07-23
Attending: FAMILY MEDICINE
Payer: COMMERCIAL

## 2024-07-23 ENCOUNTER — OFFICE VISIT (OUTPATIENT)
Dept: FAMILY MEDICINE | Facility: CLINIC | Age: 64
End: 2024-07-23
Payer: COMMERCIAL

## 2024-07-23 VITALS
SYSTOLIC BLOOD PRESSURE: 110 MMHG | OXYGEN SATURATION: 95 % | WEIGHT: 277.88 LBS | BODY MASS INDEX: 35.66 KG/M2 | DIASTOLIC BLOOD PRESSURE: 64 MMHG | HEART RATE: 89 BPM | HEIGHT: 74 IN

## 2024-07-23 DIAGNOSIS — N18.30 STAGE 3 CHRONIC KIDNEY DISEASE, UNSPECIFIED WHETHER STAGE 3A OR 3B CKD: ICD-10-CM

## 2024-07-23 DIAGNOSIS — I10 ESSENTIAL HYPERTENSION: Primary | ICD-10-CM

## 2024-07-23 DIAGNOSIS — E78.2 MIXED HYPERLIPIDEMIA: ICD-10-CM

## 2024-07-23 DIAGNOSIS — F31.70 BIPOLAR AFFECTIVE DISORDER IN REMISSION: ICD-10-CM

## 2024-07-23 LAB
ALBUMIN/CREAT UR: 39.2 UG/MG (ref 0–30)
CREAT UR-MCNC: 212 MG/DL (ref 23–375)
MICROALBUMIN UR DL<=1MG/L-MCNC: 83 UG/ML

## 2024-07-23 PROCEDURE — 1159F MED LIST DOCD IN RCRD: CPT | Mod: CPTII,S$GLB,, | Performed by: FAMILY MEDICINE

## 2024-07-23 PROCEDURE — 1160F RVW MEDS BY RX/DR IN RCRD: CPT | Mod: CPTII,S$GLB,, | Performed by: FAMILY MEDICINE

## 2024-07-23 PROCEDURE — 82043 UR ALBUMIN QUANTITATIVE: CPT | Performed by: FAMILY MEDICINE

## 2024-07-23 PROCEDURE — 3078F DIAST BP <80 MM HG: CPT | Mod: CPTII,S$GLB,, | Performed by: FAMILY MEDICINE

## 2024-07-23 PROCEDURE — 99999 PR PBB SHADOW E&M-EST. PATIENT-LVL IV: CPT | Mod: PBBFAC,,, | Performed by: FAMILY MEDICINE

## 2024-07-23 PROCEDURE — 3074F SYST BP LT 130 MM HG: CPT | Mod: CPTII,S$GLB,, | Performed by: FAMILY MEDICINE

## 2024-07-23 PROCEDURE — 99214 OFFICE O/P EST MOD 30 MIN: CPT | Mod: S$GLB,,, | Performed by: FAMILY MEDICINE

## 2024-07-23 PROCEDURE — 82570 ASSAY OF URINE CREATININE: CPT | Performed by: FAMILY MEDICINE

## 2024-07-23 PROCEDURE — 3008F BODY MASS INDEX DOCD: CPT | Mod: CPTII,S$GLB,, | Performed by: FAMILY MEDICINE

## 2024-07-23 NOTE — PROGRESS NOTES
"Subjective:       Patient ID: Abel Dc III is a 63 y.o. male.    Chief Complaint: Follow-up (Patient has concerns about stage 3 kidney disease, wants to  know how much of his kidney is damaged )    HPI:  Pleasant 63-year-old gentleman here today for follow-up.  The patient has essential hypertension very well controlled.  He has stage IIIA CKD and that has been stable for quite some time.  He was going to see Nephrology this year.  Continue to monitor.  Bipolar affective disorder in remission currently.  He is having no problems with his mood at this time.  He has stabilized his life and has nice apartment now.  He is also being treated for mixed hyperlipidemia without any symptoms of CAD or CHF.  He does have a pacemaker in place and is followed by Cardiology.  He has no CAD or PVD.    Review of Systems   Constitutional: Negative.    HENT: Negative.     Eyes: Negative.    Respiratory: Negative.     Cardiovascular: Negative.    Gastrointestinal: Negative.    Endocrine: Negative.    Genitourinary: Negative.    Musculoskeletal: Negative.    Skin: Negative.    Allergic/Immunologic: Negative.    Neurological: Negative.    Hematological: Negative.    Psychiatric/Behavioral: Negative.         Objective:      Vitals:    07/23/24 0818   BP: 110/64   BP Location: Left arm   Patient Position: Sitting   BP Method: Medium (Manual)   Pulse: 89   SpO2: 95%   Weight: 126.1 kg (277 lb 14.2 oz)   Height: 6' 2" (1.88 m)      Physical Exam  Vitals and nursing note reviewed.   Constitutional:       Appearance: Normal appearance. He is obese.   HENT:      Head: Normocephalic and atraumatic.      Nose: Nose normal.      Mouth/Throat:      Mouth: Mucous membranes are moist.      Pharynx: Oropharynx is clear.   Eyes:      Extraocular Movements: Extraocular movements intact.      Conjunctiva/sclera: Conjunctivae normal.      Pupils: Pupils are equal, round, and reactive to light.   Cardiovascular:      Rate and Rhythm: Normal rate and " regular rhythm.   Pulmonary:      Effort: Pulmonary effort is normal.      Breath sounds: Normal breath sounds.   Abdominal:      General: Abdomen is flat. Bowel sounds are normal.      Palpations: Abdomen is soft.   Musculoskeletal:         General: Normal range of motion.      Cervical back: Normal range of motion and neck supple.   Skin:     General: Skin is warm and dry.      Capillary Refill: Capillary refill takes less than 2 seconds.   Neurological:      General: No focal deficit present.      Mental Status: He is alert and oriented to person, place, and time.   Psychiatric:         Mood and Affect: Mood normal.         Behavior: Behavior normal.         Thought Content: Thought content normal.         Judgment: Judgment normal.         Results for orders placed or performed in visit on 06/28/24   Comprehensive Metabolic Panel   Result Value Ref Range    Sodium 136 136 - 145 mmol/L    Potassium 4.6 3.5 - 5.1 mmol/L    Chloride 103 95 - 110 mmol/L    CO2 25 23 - 29 mmol/L    Glucose 93 70 - 110 mg/dL    BUN 15 8 - 23 mg/dL    Creatinine 1.6 (H) 0.5 - 1.4 mg/dL    Calcium 10.1 8.7 - 10.5 mg/dL    Total Protein 7.7 6.0 - 8.4 g/dL    Albumin 3.8 3.5 - 5.2 g/dL    Total Bilirubin 0.4 0.1 - 1.0 mg/dL    Alkaline Phosphatase 106 55 - 135 U/L    AST 27 10 - 40 U/L    ALT 22 10 - 44 U/L    eGFR 48.1 (A) >60 mL/min/1.73 m^2    Anion Gap 8 8 - 16 mmol/L     *Note: Due to a large number of results and/or encounters for the requested time period, some results have not been displayed. A complete set of results can be found in Results Review.      Assessment:       1. Essential hypertension    2. Stage 3 chronic kidney disease, unspecified whether stage 3a or 3b CKD    3. Bipolar affective disorder in remission    4. Mixed hyperlipidemia        Plan:       Essential hypertension    Stage 3 chronic kidney disease, unspecified whether stage 3a or 3b CKD  -     Microalbumin/Creatinine Ratio, Urine; Future    Bipolar affective  disorder in remission    Mixed hyperlipidemia          Medication List with Changes/Refills   Current Medications    ALLOPURINOL (ZYLOPRIM) 100 MG TABLET    TAKE 2 TABLETS(200 MG) BY MOUTH EVERY DAY    AMLODIPINE (NORVASC) 5 MG TABLET    Take 1 tablet (5 mg total) by mouth 2 (two) times daily.    ASCORBIC ACID, VITAMIN C, (VITAMIN C) 1000 MG TABLET    Take 1,000 mg by mouth once daily.    ASPIRIN 81 MG TAB    Take 1 tablet by mouth once daily.    BUSPIRONE (BUSPAR) 15 MG TABLET    Take 15 mg by mouth 3 (three) times daily.     CHOLECALCIFEROL, VITAMIN D3, 2,000 UNIT CAP    Take 1 capsule by mouth once daily.    CYANOCOBALAMIN (VITAMIN B-12) 1000 MCG TABLET    Take 100 mcg by mouth once daily.    ELIQUIS 5 MG TAB    TAKE 1 TABLET(5 MG) BY MOUTH TWICE DAILY    FLUOXETINE 40 MG CAPSULE    Take 40 mg by mouth once daily.    FLUTICASONE PROPIONATE (FLONASE) 50 MCG/ACTUATION NASAL SPRAY    SPRAY ONCE IN EACH NOSTRIL EVERY DAY    GABAPENTIN (NEURONTIN) 300 MG CAPSULE    TAKE 1 CAPSULE(300 MG) BY MOUTH THREE TIMES DAILY    METOPROLOL SUCCINATE (TOPROL-XL) 50 MG 24 HR TABLET    TAKE 1 TABLET(50 MG) BY MOUTH EVERY DAY    OMEPRAZOLE (PRILOSEC) 20 MG CAPSULE    TAKE 1 CAPSULE BY MOUTH EVERY DAY    OXCARBAZEPINE (TRILEPTAL) 300 MG TAB    Take 600 mg by mouth once daily.    PRAVASTATIN (PRAVACHOL) 40 MG TABLET    TAKE 1 TABLET(40 MG) BY MOUTH EVERY EVENING    QUETIAPINE (SEROQUEL) 50 MG TABLET    Take 50 mg by mouth nightly.    ZINC GLUCONATE 50 MG TABLET    Take 50 mg by mouth once daily.

## 2024-07-25 ENCOUNTER — HOSPITAL ENCOUNTER (OUTPATIENT)
Dept: CARDIOLOGY | Facility: HOSPITAL | Age: 64
Discharge: HOME | End: 2024-07-25
Attending: INTERNAL MEDICINE

## 2024-07-26 ENCOUNTER — TELEPHONE (OUTPATIENT)
Dept: CARDIOLOGY | Facility: HOSPITAL | Age: 64
End: 2024-07-26
Payer: COMMERCIAL

## 2024-07-26 NOTE — TELEPHONE ENCOUNTER
[facility]    A recurrence AF currently in progress.   Presenting rhythm shows AF - .  Battery status is OK.  Measured values in normal range.  Since last transmission  There were 6 AMS episodes recorded since last counter reset on 06/27/2024. Available EGMs reveal paroxysmal AF with controlled ventricular rates; one episode remains in progress.   The most recent and longest episode of AMS was on 07/24/2024 for 2 hrs and 43 minutes and ongoing at time of interrogation.  < 1% AT/AF [place].  Anticoagulation noted in chart.          Called pt. Assisted in sending transmission. No longer in AF, event lasted 7 hrs. 5 mins. On 7/24. Pt. Asymptomatic on OAC and rates controlled

## 2024-08-08 ENCOUNTER — PATIENT MESSAGE (OUTPATIENT)
Dept: FAMILY MEDICINE | Facility: CLINIC | Age: 64
End: 2024-08-08
Payer: COMMERCIAL

## 2024-08-08 DIAGNOSIS — E78.2 MIXED HYPERLIPIDEMIA: ICD-10-CM

## 2024-08-08 RX ORDER — PRAVASTATIN SODIUM 40 MG/1
40 TABLET ORAL NIGHTLY
Qty: 90 TABLET | Refills: 0 | Status: SHIPPED | OUTPATIENT
Start: 2024-08-08

## 2024-08-26 ENCOUNTER — TELEPHONE (OUTPATIENT)
Dept: PODIATRY | Facility: CLINIC | Age: 64
End: 2024-08-26
Payer: COMMERCIAL

## 2024-08-26 DIAGNOSIS — G62.9 PERIPHERAL POLYNEUROPATHY: ICD-10-CM

## 2024-08-26 RX ORDER — GABAPENTIN 300 MG/1
300 CAPSULE ORAL 3 TIMES DAILY
Qty: 270 CAPSULE | Refills: 3 | Status: SHIPPED | OUTPATIENT
Start: 2024-08-26

## 2024-08-26 NOTE — TELEPHONE ENCOUNTER
No care due was identified.  Health [facility] Embedded Care Due Messages. Reference number: [id number].   8/26/2024 4:08:44 AM CDT

## 2024-09-19 ENCOUNTER — OFFICE VISIT (OUTPATIENT)
Dept: PODIATRY | Facility: CLINIC | Age: 64
End: 2024-09-19
Payer: COMMERCIAL

## 2024-09-19 VITALS — WEIGHT: 278 LBS | BODY MASS INDEX: 35.68 KG/M2 | HEIGHT: 74 IN

## 2024-09-19 DIAGNOSIS — M79.2 NEURITIS: Primary | ICD-10-CM

## 2024-09-19 PROCEDURE — 99999 PR PBB SHADOW E&M-EST. PATIENT-LVL III: CPT | Mod: PBBFAC,,, | Performed by: STUDENT IN AN ORGANIZED HEALTH CARE EDUCATION/TRAINING PROGRAM

## 2024-09-19 RX ORDER — LIDOCAINE HYDROCHLORIDE 10 MG/ML
3 INJECTION, SOLUTION INFILTRATION; PERINEURAL
Status: COMPLETED | OUTPATIENT
Start: 2024-09-19 | End: 2024-09-19

## 2024-09-19 RX ADMIN — LIDOCAINE HYDROCHLORIDE 3 ML: 10 INJECTION, SOLUTION INFILTRATION; PERINEURAL at 02:09

## 2024-09-19 NOTE — PROGRESS NOTES
PODIATRY NOTE     PATIENT ID:  Abel Dc III is a 63 y.o. male.     CHIEF CONCERN:   Toe Pain             MEDICAL DECISION MAKING:        ICD-10-CM ICD-9-CM    1. Neuritis - Left Foot  M79.2 729.2             I counseled the patient on the patient's conditions, their implications and medical management.   Tried another injection and he felt immediate relief. I recommend f/u in 6 weeks with Dr. Santos for possible nerve decompression. He is to keep a stringent log on his symptoms.     With patient's verbal consent, nerve blocks were performed to the L DP. The block consisted of 3mL of 1% lido plain. Patient tolerated the procedure well without any immediate complications.    Patient to monitor and log symptoms over the next 48 hours.     Elias Salinas DPM        HISTORY OF PRESENT ILLNESS:  Abel Dc III is a 63 y.o. male with concerns regarding: right 2nd toe numbness and pain  Patient reports symptoms/signs have been present for some time now.   Trauma/injury to the area:  no.     Pain is worse at night.    01/17/2023:   Patient returns for right foot pain.  He was unable to get the cream because it was too expensive.  He continues to have symptoms.    9/11/23: Mr. Dc returns for 2nd toe pain of the right side. He had a DPN injection January 2023 that lasted about a month but the pain is back. He also reports pain when he curls his 2nd toe at the PIPJ.    11/14/23: No improvement after the last injection. Continues to have pain to the 1st and 2nd toes but only at night.     1/9/24  F/u R foot neuropathy.    2/20/24: Mr. Dc presents today with new issue to the L great toe. It is painful to walk and stand.    6/5/24:  Mr. Dc presents today with L 1st and 2nd toe pain. Reports pain that isn't related to any specific activity and comes and goes. He has gotten good relief to the R foot with a cream but hasn't tried it for the left foot yet.     9/19/24:  Cream is helping but not much for the L 1st  and 2nd toes. He continues to have severe pain there especially worse at night but also throughout the day.     Patient Active Problem List   Diagnosis    Essential hypertension    Mixed hyperlipidemia    Bipolar disorder    Panic disorder    DDD (degenerative disc disease), lumbar    Gout, arthritis    GERD (gastroesophageal reflux disease)    BPH (benign prostatic hypertrophy)    Lumbar stenosis    History of shoulder surgery:  shoulder surgery, massive rotator cuff repair, biceps tenodesis, synovectomy    Rotator cuff syndrome of right shoulder    Subacromial impingement    Acromioclavicular joint arthritis    Biceps tendonitis    Hip pain, bilateral    Acquired scoliosis    Acquired spondylolisthesis    Lumbago    Degeneration of lumbar or lumbosacral intervertebral disc    Spondylosis without myelopathy    Spinal stenosis, lumbar region, without neurogenic claudication    Severe obesity (BMI 35.0-39.9) with comorbidity    Bilateral knee pain    Difficulty walking    Stage 3b chronic kidney disease    Secondary hyperparathyroidism of renal origin    Tremor    Gallstones without obstruction of gallbladder    Pulmonary nodules -- repeat CT chest in August 2021    Pain of right thigh    Encounter for long-term (current) use of other medications    Nicotine dependence, other tobacco product, uncomplicated    Dizziness    Chest pain    Symptomatic bradycardia    Advance care planning    PAF (paroxysmal atrial fibrillation)           Current Outpatient Medications on File Prior to Visit   Medication Sig Dispense Refill    allopurinoL (ZYLOPRIM) 100 MG tablet TAKE 2 TABLETS(200 MG) BY MOUTH EVERY  tablet 3    amLODIPine (NORVASC) 5 MG tablet Take 1 tablet (5 mg total) by mouth 2 (two) times daily. 180 tablet 2    ascorbic acid, vitamin C, (VITAMIN C) 1000 MG tablet Take 1,000 mg by mouth once daily.      aspirin 81 mg Tab Take 1 tablet by mouth once daily.      busPIRone (BUSPAR) 15 MG tablet Take 15 mg by mouth  3 (three) times daily.   4    cholecalciferol, vitamin D3, 2,000 unit Cap Take 1 capsule by mouth once daily.      cyanocobalamin (VITAMIN B-12) 1000 MCG tablet Take 100 mcg by mouth once daily.      ELIQUIS 5 mg Tab TAKE 1 TABLET(5 MG) BY MOUTH TWICE DAILY 180 tablet 3    FLUoxetine 40 MG capsule Take 40 mg by mouth once daily.  5    fluticasone propionate (FLONASE) 50 mcg/actuation nasal spray SPRAY ONCE IN EACH NOSTRIL EVERY DAY 48 g 3    gabapentin (NEURONTIN) 300 MG capsule Take 1 capsule (300 mg total) by mouth 3 (three) times daily. 270 capsule 3    metoprolol succinate (TOPROL-XL) 50 MG 24 hr tablet TAKE 1 TABLET(50 MG) BY MOUTH EVERY DAY 30 tablet 11    omeprazole (PRILOSEC) 20 MG capsule TAKE 1 CAPSULE BY MOUTH EVERY DAY 90 capsule 3    OXcarbazepine (TRILEPTAL) 300 MG Tab Take 600 mg by mouth once daily.  3    pravastatin (PRAVACHOL) 40 MG tablet Take 1 tablet (40 mg total) by mouth every evening. 90 tablet 0    quetiapine (SEROQUEL) 50 MG tablet Take 50 mg by mouth nightly.  0    zinc gluconate 50 mg tablet Take 50 mg by mouth once daily.       No current facility-administered medications on file prior to visit.           Review of patient's allergies indicates:   Allergen Reactions    Amitriptyline Other (See Comments) and Hallucinations     confusion    Hydrocodone      Other reaction(s): Hallucinations    Hydrocodone-acetaminophen      Other reaction(s): hyperactivity    Oxycodone Other (See Comments)     hallucinations    Pseudoephedrine      Other reaction(s): Hallucinations    Pseudoephedrine hcl      Other reaction(s): hyperactivity    Risperidone      Other reaction(s): Hallucinations  Other reaction(s): hyperactivity    Trazodone      Adverse reaction    Naproxen                ROS:   General ROS: negative for - chills, fever or night sweats  Respiratory ROS: no cough, shortness of breath, or wheezing  Cardiovascular ROS: no chest pain or dyspnea on exertion  Musculoskeletal ROS: negative for -  "joint pain and joint stiffness  Neurological ROS: positive for - numbness/tingling  Dermatological ROS: + for suspicious       EXAM:     Vitals:    09/19/24 1405   Weight: 126.1 kg (278 lb)   Height: 6' 2" (1.88 m)        General:  Alert and Oriented x 3;  No acute distress      Lower extremity exam:    Vascular:   Dorsalis Pedis:  present   Posterior Tibial:  present  Capillary refill time:  2 seconds  Temperature of toes warm to touch  Edema:  none       Neurological:     Sharp touch:  decreased  Light touch: decreased  Tinels Sign:  Present at DPN  Cachorro Click:   + at the 2nd interspace         Dermatological:   L great toe has a corn at the lateral aspect at the IPJ.     Musculoskeletal:           9/19/24:  + Tinel's at the L DPN            "

## 2024-09-21 DIAGNOSIS — G62.9 PERIPHERAL POLYNEUROPATHY: ICD-10-CM

## 2024-09-21 RX ORDER — GABAPENTIN 300 MG/1
300 CAPSULE ORAL 3 TIMES DAILY
Qty: 270 CAPSULE | Refills: 3 | Status: SHIPPED | OUTPATIENT
Start: 2024-09-21

## 2024-09-21 NOTE — TELEPHONE ENCOUNTER
No care due was identified.  Health [facility] Embedded Care Due Messages. Reference number: [id number].   9/21/2024 4:02:43 AM CDT

## 2024-09-26 ENCOUNTER — HOSPITAL ENCOUNTER (OUTPATIENT)
Dept: CARDIOLOGY | Facility: HOSPITAL | Age: 64
Discharge: HOME | End: 2024-09-26
Attending: INTERNAL MEDICINE
Payer: COMMERCIAL

## 2024-09-26 ENCOUNTER — CLINICAL SUPPORT (OUTPATIENT)
Dept: CARDIOLOGY | Facility: HOSPITAL | Age: 64
End: 2024-09-26
Payer: COMMERCIAL

## 2024-09-26 DIAGNOSIS — R00.1 BRADYCARDIA, UNSPECIFIED: ICD-10-CM

## 2024-09-26 PROCEDURE — 93294 REM INTERROG EVL PM/LDLS PM: CPT | Mod: ,,, | Performed by: INTERNAL MEDICINE

## 2024-09-26 PROCEDURE — 93296 REM INTERROG EVL PM/IDS: CPT | Mod: PO | Performed by: INTERNAL MEDICINE

## 2024-09-30 ENCOUNTER — PATIENT MESSAGE (OUTPATIENT)
Dept: CARDIOLOGY | Facility: CLINIC | Age: 64
End: 2024-09-30
Payer: COMMERCIAL

## 2024-09-30 LAB
OHS CV AF BURDEN PERCENT: < 1
OHS CV DC REMOTE DEVICE TYPE: NORMAL
OHS CV ICD SHOCK: NO
OHS CV RV PACING PERCENT: 9.2 %

## 2024-10-15 ENCOUNTER — OFFICE VISIT (OUTPATIENT)
Dept: PODIATRY | Facility: CLINIC | Age: 64
End: 2024-10-15
Payer: COMMERCIAL

## 2024-10-15 DIAGNOSIS — G62.9 PERIPHERAL POLYNEUROPATHY: ICD-10-CM

## 2024-10-15 PROCEDURE — 3060F POS MICROALBUMINURIA REV: CPT | Mod: CPTII,S$GLB,, | Performed by: PODIATRIST

## 2024-10-15 PROCEDURE — 1159F MED LIST DOCD IN RCRD: CPT | Mod: CPTII,S$GLB,, | Performed by: PODIATRIST

## 2024-10-15 PROCEDURE — 1160F RVW MEDS BY RX/DR IN RCRD: CPT | Mod: CPTII,S$GLB,, | Performed by: PODIATRIST

## 2024-10-15 PROCEDURE — 99999 PR PBB SHADOW E&M-EST. PATIENT-LVL III: CPT | Mod: PBBFAC,,, | Performed by: PODIATRIST

## 2024-10-15 PROCEDURE — 3066F NEPHROPATHY DOC TX: CPT | Mod: CPTII,S$GLB,, | Performed by: PODIATRIST

## 2024-10-15 PROCEDURE — 99214 OFFICE O/P EST MOD 30 MIN: CPT | Mod: S$GLB,,, | Performed by: PODIATRIST

## 2024-10-15 RX ORDER — GABAPENTIN 300 MG/1
600 CAPSULE ORAL 2 TIMES DAILY
Qty: 270 CAPSULE | Refills: 3 | Status: SHIPPED | OUTPATIENT
Start: 2024-10-15

## 2024-10-15 NOTE — PROGRESS NOTES
Subjective:      Patient ID: Abel Dc III is a 63 y.o. male.    Chief Complaint: Foot Pain and Toe Pain    Abel is a 63 y.o. male who presents to the podiatry clinic  with complaint of left foot 1-2 toe pain dorsal aspect more at night with sharp and stinging pains at times, right second toe hurts at times as well. Has lower back issues and lumbar radiculopathy as well. Was being seen by Dr zafar who referred patient to me for a possible left deep peroneal nerve release, has had injections but he relates they did not help    Review of Systems   Constitutional: Negative for chills and fever.   Cardiovascular:  Negative for claudication and leg swelling.   Respiratory:  Negative for shortness of breath.    Skin:  Negative for itching, nail changes and rash.   Musculoskeletal:  Positive for back pain. Negative for muscle cramps, muscle weakness and myalgias.   Gastrointestinal:  Negative for nausea and vomiting.   Neurological:  Positive for numbness and paresthesias. Negative for focal weakness and loss of balance.           Objective:      Physical Exam  Constitutional:       General: He is not in acute distress.     Appearance: He is well-developed. He is not diaphoretic.   Cardiovascular:      Pulses:           Dorsalis pedis pulses are 2+ on the right side and 2+ on the left side.        Posterior tibial pulses are 2+ on the right side and 2+ on the left side.      Comments: < 3 sec capillary refill time to toes 1-5 bilateral. Toes and feet are warm to touch proximally with normal distal cooling b/l. There is some hair growth on the feet and toes b/l. There is no edema b/l. No spider veins or varicosities present b/l.     Musculoskeletal:      Comments: Equinus noted b/l ankles with < 10 deg DF noted. MMT 5/5 in DF/PF/Inv/Ev resistance with no reproduction of pain in any direction. Passive range of motion of ankle and pedal joints is painless b/l.    Left dorsal foot paresthesias with palpation overlying the  deep peroneal nerve.    Medial 1st MTPJ exostosis. Lateral deviation of hallux, non trackbound. No pain w/ ROM to 1st or 2nd MTPJs.         Skin:     General: Skin is warm and dry.      Coloration: Skin is not pale.      Findings: No abrasion, bruising, burn, ecchymosis, erythema, laceration, lesion, petechiae or rash.      Nails: There is no clubbing.      Comments: Skin temperature, texture and turgor within normal limits.   Neurological:      Mental Status: He is alert and oriented to person, place, and time.      Sensory: Sensory deficit present.      Motor: No tremor, atrophy or abnormal muscle tone.      Comments: Negative tinel sign bilateral. Diminished vibratory sensation bilateral   Psychiatric:         Behavior: Behavior normal.               Assessment:       Encounter Diagnosis   Name Primary?    Peripheral polyneuropathy          Plan:       Abel was seen today for foot pain and toe pain.    Diagnoses and all orders for this visit:    Peripheral polyneuropathy  -     gabapentin (NEURONTIN) 300 MG capsule; Take 2 capsules (600 mg total) by mouth 2 (two) times daily.      I counseled the patient on his conditions, their implications and medical management.    Increase the gabapentin to 600 mg BID    Discussed that if the pain persists we can do a deep peroneal neuroplasty to the left foot in the OR with 2 weeks post op in a darco shoe and dressing    Will reassess how he is doing on the gabapentin in 1 month    Kristopher Santos DPM

## 2024-10-30 ENCOUNTER — OFFICE VISIT (OUTPATIENT)
Dept: NEPHROLOGY | Facility: CLINIC | Age: 64
End: 2024-10-30
Payer: COMMERCIAL

## 2024-10-30 VITALS — SYSTOLIC BLOOD PRESSURE: 130 MMHG | DIASTOLIC BLOOD PRESSURE: 76 MMHG

## 2024-10-30 DIAGNOSIS — E78.2 MIXED HYPERLIPIDEMIA: ICD-10-CM

## 2024-10-30 DIAGNOSIS — I10 ESSENTIAL HYPERTENSION: ICD-10-CM

## 2024-10-30 DIAGNOSIS — E66.01 SEVERE OBESITY (BMI 35.0-39.9) WITH COMORBIDITY: ICD-10-CM

## 2024-10-30 DIAGNOSIS — N20.0 NEPHROLITHIASIS: ICD-10-CM

## 2024-10-30 DIAGNOSIS — N40.0 BENIGN PROSTATIC HYPERPLASIA WITHOUT LOWER URINARY TRACT SYMPTOMS: ICD-10-CM

## 2024-10-30 DIAGNOSIS — I48.0 PAF (PAROXYSMAL ATRIAL FIBRILLATION): ICD-10-CM

## 2024-10-30 DIAGNOSIS — M10.9 GOUT, ARTHRITIS: ICD-10-CM

## 2024-10-30 DIAGNOSIS — N18.30 STAGE 3 CHRONIC KIDNEY DISEASE, UNSPECIFIED WHETHER STAGE 3A OR 3B CKD: ICD-10-CM

## 2024-10-30 DIAGNOSIS — N18.32 STAGE 3B CHRONIC KIDNEY DISEASE: Primary | ICD-10-CM

## 2024-10-30 PROCEDURE — 3060F POS MICROALBUMINURIA REV: CPT | Mod: CPTII,S$GLB,, | Performed by: STUDENT IN AN ORGANIZED HEALTH CARE EDUCATION/TRAINING PROGRAM

## 2024-10-30 PROCEDURE — 1159F MED LIST DOCD IN RCRD: CPT | Mod: CPTII,S$GLB,, | Performed by: STUDENT IN AN ORGANIZED HEALTH CARE EDUCATION/TRAINING PROGRAM

## 2024-10-30 PROCEDURE — 3066F NEPHROPATHY DOC TX: CPT | Mod: CPTII,S$GLB,, | Performed by: STUDENT IN AN ORGANIZED HEALTH CARE EDUCATION/TRAINING PROGRAM

## 2024-10-30 PROCEDURE — 3078F DIAST BP <80 MM HG: CPT | Mod: CPTII,S$GLB,, | Performed by: STUDENT IN AN ORGANIZED HEALTH CARE EDUCATION/TRAINING PROGRAM

## 2024-10-30 PROCEDURE — 99999 PR PBB SHADOW E&M-EST. PATIENT-LVL III: CPT | Mod: PBBFAC,,, | Performed by: STUDENT IN AN ORGANIZED HEALTH CARE EDUCATION/TRAINING PROGRAM

## 2024-10-30 PROCEDURE — 3075F SYST BP GE 130 - 139MM HG: CPT | Mod: CPTII,S$GLB,, | Performed by: STUDENT IN AN ORGANIZED HEALTH CARE EDUCATION/TRAINING PROGRAM

## 2024-10-30 PROCEDURE — 99204 OFFICE O/P NEW MOD 45 MIN: CPT | Mod: S$GLB,,, | Performed by: STUDENT IN AN ORGANIZED HEALTH CARE EDUCATION/TRAINING PROGRAM

## 2024-10-30 RX ORDER — OLMESARTAN MEDOXOMIL 5 MG/1
5 TABLET ORAL DAILY
Qty: 30 TABLET | Refills: 11 | Status: SHIPPED | OUTPATIENT
Start: 2024-10-30 | End: 2025-10-25

## 2024-11-05 DIAGNOSIS — E78.2 MIXED HYPERLIPIDEMIA: ICD-10-CM

## 2024-11-05 RX ORDER — PRAVASTATIN SODIUM 40 MG/1
40 TABLET ORAL NIGHTLY
Qty: 90 TABLET | Refills: 0 | OUTPATIENT
Start: 2024-11-05

## 2024-11-05 RX ORDER — PRAVASTATIN SODIUM 40 MG/1
40 TABLET ORAL NIGHTLY
Qty: 90 TABLET | Refills: 0 | Status: SHIPPED | OUTPATIENT
Start: 2024-11-05

## 2024-11-05 NOTE — TELEPHONE ENCOUNTER
Care Due:                  Date            Visit Type   Department     Provider  --------------------------------------------------------------------------------                                EP -                              [facility] FAMILY  Last Visit: 07-      CARE ([facility])   MANFRED Dia                              EP -                              PRIMARY      [facility] FAM[facility]  Next Visit: 01-      CARE ([facility])   MANFRED Dia                                                            Last  Test          Frequency    Reason                     Performed    Due Date  --------------------------------------------------------------------------------    CBC.........  12 months..  allopurinoL..............  03- 02-    Lipid Panel.  12 months..  pravastatin..............  09- 09-    Uric Acid...  12 months..  allopurinoL..............  Not Found    Overdue    [employer] Embedded Care Due Messages. Reference number: [id number].   11/05/2024 7:52:56 AM CST

## 2024-11-05 NOTE — TELEPHONE ENCOUNTER
No care due was identified.  Health [facility] Embedded Care Due Messages. Reference number: [id number].   11/05/2024 8:27:00 AM CST

## 2024-11-05 NOTE — TELEPHONE ENCOUNTER
Provider Staff:  Action required for this patient    Requires labs      Please see care gap opportunities below in Care Due Message.    Thanks!  [facility]     Appointments      Date Provider   Last Visit   7/23/2024 Jonah Dia MD   Next Visit   11/5/2024 Jonah Dia MD     Refill Decision Note   Abel Dc  is requesting a refill authorization.    Brief Assessment and Rationale for Refill:   Quick Discontinue       Medication Therapy Plan:   E-Prescribing Status: Receipt confirmed by pharmacy (11/5/2024  9:30 AM CST)      Comments:     Note composed:11:47 AM 11/05/2024

## 2024-11-19 DIAGNOSIS — J30.9 ALLERGIC RHINITIS: ICD-10-CM

## 2024-11-19 RX ORDER — FLUTICASONE PROPIONATE 50 MCG
SPRAY, SUSPENSION (ML) NASAL
Qty: 48 G | Refills: 2 | Status: SHIPPED | OUTPATIENT
Start: 2024-11-19

## 2024-11-19 NOTE — TELEPHONE ENCOUNTER
Refill Decision Note   Osoriogisel Dao  is requesting a refill authorization.  Brief Assessment and Rationale for Refill:  Approve     Medication Therapy Plan:         Comments:     Note composed:9:07 AM 11/19/2024

## 2024-11-19 NOTE — TELEPHONE ENCOUNTER
No care due was identified.  Health [facility] Embedded Care Due Messages. Reference number: [id number].   11/19/2024 7:44:06 AM CST

## 2024-11-20 ENCOUNTER — OFFICE VISIT (OUTPATIENT)
Dept: PODIATRY | Facility: CLINIC | Age: 64
End: 2024-11-20
Payer: COMMERCIAL

## 2024-11-20 DIAGNOSIS — G62.9 PERIPHERAL POLYNEUROPATHY: Primary | ICD-10-CM

## 2024-11-20 DIAGNOSIS — M79.2 NEURITIS: ICD-10-CM

## 2024-11-20 PROCEDURE — 99213 OFFICE O/P EST LOW 20 MIN: CPT | Mod: S$GLB,,, | Performed by: PODIATRIST

## 2024-11-20 PROCEDURE — 1159F MED LIST DOCD IN RCRD: CPT | Mod: CPTII,S$GLB,, | Performed by: PODIATRIST

## 2024-11-20 PROCEDURE — 99999 PR PBB SHADOW E&M-EST. PATIENT-LVL III: CPT | Mod: PBBFAC,,, | Performed by: PODIATRIST

## 2024-11-20 PROCEDURE — 99213 OFFICE O/P EST LOW 20 MIN: CPT | Mod: PBBFAC,PO | Performed by: PODIATRIST

## 2024-11-20 PROCEDURE — 4010F ACE/ARB THERAPY RXD/TAKEN: CPT | Mod: CPTII,S$GLB,, | Performed by: PODIATRIST

## 2024-11-20 PROCEDURE — 3066F NEPHROPATHY DOC TX: CPT | Mod: CPTII,S$GLB,, | Performed by: PODIATRIST

## 2024-11-20 PROCEDURE — 1160F RVW MEDS BY RX/DR IN RCRD: CPT | Mod: CPTII,S$GLB,, | Performed by: PODIATRIST

## 2024-11-20 PROCEDURE — 3060F POS MICROALBUMINURIA REV: CPT | Mod: CPTII,S$GLB,, | Performed by: PODIATRIST

## 2024-11-20 NOTE — PROGRESS NOTES
Subjective:      Patient ID: bAel Dc III is a 64 y.o. male.    Chief Complaint: Foot Pain    Abel is a 64 y.o. male who presents to the podiatry clinic  with complaint of left foot 1-2 toe pain dorsal aspect more at night with sharp and stinging pains at times, right second toe hurts at times as well. Has lower back issues and lumbar radiculopathy as well. Was being seen by Dr zafar who referred patient to me for a possible left deep peroneal nerve release, has had injections but he relates they did not help    11/20/24: Patient returns for follow up left foot pain, relates it is no longer an every day issue and is tolerable at this point although it does get bad on occasion    Review of Systems   Constitutional: Negative for chills and fever.   Cardiovascular:  Negative for claudication and leg swelling.   Respiratory:  Negative for shortness of breath.    Skin:  Negative for itching, nail changes and rash.   Musculoskeletal:  Positive for back pain. Negative for muscle cramps, muscle weakness and myalgias.   Gastrointestinal:  Negative for nausea and vomiting.   Neurological:  Positive for numbness and paresthesias. Negative for focal weakness and loss of balance.           Objective:      Physical Exam  Constitutional:       General: He is not in acute distress.     Appearance: He is well-developed. He is not diaphoretic.   Cardiovascular:      Pulses:           Dorsalis pedis pulses are 2+ on the right side and 2+ on the left side.        Posterior tibial pulses are 2+ on the right side and 2+ on the left side.      Comments: < 3 sec capillary refill time to toes 1-5 bilateral. Toes and feet are warm to touch proximally with normal distal cooling b/l. There is some hair growth on the feet and toes b/l. There is no edema b/l. No spider veins or varicosities present b/l.     Musculoskeletal:      Comments: Equinus noted b/l ankles with < 10 deg DF noted. MMT 5/5 in DF/PF/Inv/Ev resistance with no  reproduction of pain in any direction. Passive range of motion of ankle and pedal joints is painless b/l.    Left dorsal foot paresthesias with palpation overlying the deep peroneal nerve.    Medial 1st MTPJ exostosis. Lateral deviation of hallux, non trackbound. No pain w/ ROM to 1st or 2nd MTPJs.         Skin:     General: Skin is warm and dry.      Coloration: Skin is not pale.      Findings: No abrasion, bruising, burn, ecchymosis, erythema, laceration, lesion, petechiae or rash.      Nails: There is no clubbing.      Comments: Skin temperature, texture and turgor within normal limits.   Neurological:      Mental Status: He is alert and oriented to person, place, and time.      Sensory: Sensory deficit present.      Motor: No tremor, atrophy or abnormal muscle tone.      Comments: Negative tinel sign bilateral. Diminished vibratory sensation bilateral   Psychiatric:         Behavior: Behavior normal.               Assessment:       Encounter Diagnoses   Name Primary?    Peripheral polyneuropathy Yes    Neuritis - Left Foot            Plan:       Abel was seen today for foot pain.    Diagnoses and all orders for this visit:    Peripheral polyneuropathy    Neuritis - Left Foot        I counseled the patient on his conditions, their implications and medical management.    Continue the gabapentin to 600 mg BID    Discussed that if the pain persists or gets worse we can do a deep peroneal neuroplasty to the left foot in the OR with 2 weeks post op in a darco shoe and dressing as the pain is not every day and is tolerable we will hold off on surgery for now    Will return ZENA Santos DPM

## 2024-11-22 ENCOUNTER — PATIENT MESSAGE (OUTPATIENT)
Dept: PODIATRY | Facility: CLINIC | Age: 64
End: 2024-11-22
Payer: MEDICAID

## 2024-12-09 NOTE — TELEPHONE ENCOUNTER
----- Message from Arabella Ruano sent at 2/12/2020 12:28 PM CST -----  Contact: Patient   2nd Request    Requesting a Referral    Requesting to see: NEPHROLOGY    Reason for request: kidney disease    Specific physician requested: [facility]    Please call patient to schedule appt when referral has been placed.    Thank You     PM     PREPARE RBC (CROSSMATCH), 1 Units    Collection Time: 12/08/24  3:45 PM   Result Value Ref Range    History Check Historical check performed    Lactic Acid    Collection Time: 12/08/24  5:08 PM   Result Value Ref Range    Lactic Acid, Plasma 1.4 0.4 - 2.0 MMOL/L   Hemoglobin and Hematocrit    Collection Time: 12/09/24 12:33 AM   Result Value Ref Range    Hemoglobin 7.2 (L) 12.1 - 17.0 g/dL    Hematocrit 22.4 (L) 36.6 - 50.3 %   Urinalysis with Reflex to Culture    Collection Time: 12/09/24  1:29 AM    Specimen: Urine    Urine specimen   Result Value Ref Range    Color, UA DARK YELLOW      Appearance CLEAR CLEAR      Specific Gravity, UA 1.015 1.003 - 1.030      pH, Urine 7.0 5.0 - 8.0      Protein, UA TRACE (A) NEG mg/dL    Glucose, Ur Negative NEG mg/dL    Ketones, Urine TRACE (A) NEG mg/dL    Blood, Urine Negative NEG      Urobilinogen, Urine 2.0 (H) 0.2 - 1.0 EU/dL    Nitrite, Urine Positive (A) NEG      Leukocyte Esterase, Urine TRACE (A) NEG      WBC, UA 0-4 0 - 4 /hpf    RBC, UA 0-5 0 - 5 /hpf    Epithelial Cells, UA FEW FEW /lpf    BACTERIA, URINE Negative NEG /hpf    Urine Culture if Indicated CULTURE NOT INDICATED BY UA RESULT CNI      Calcium Oxalate 3+ (A) NEG   Bilirubin, Confirmatory    Collection Time: 12/09/24  1:29 AM   Result Value Ref Range    Bilirubin Confirmation, UA Negative NEG     Occult Blood, Fecal    Collection Time: 12/09/24  5:28 AM   Result Value Ref Range    POC Occult Blood, Fecal Negative NEG     CBC    Collection Time: 12/09/24  5:33 AM   Result Value Ref Range    WBC 5.4 4.1 - 11.1 K/uL    RBC 2.20 (L) 4.10 - 5.70 M/uL    Hemoglobin 6.9 (L) 12.1 - 17.0 g/dL    Hematocrit 21.3 (L) 36.6 - 50.3 %    MCV 96.8 80.0 - 99.0 FL    MCH 31.4 26.0 - 34.0 PG    MCHC 32.4 30.0 - 36.5 g/dL    RDW 15.4 (H) 11.5 - 14.5 %    Platelets 312 150 - 400 K/uL    MPV 9.5 8.9 - 12.9 FL    Nucleated RBCs 0.0 0  WBC    nRBC 0.00 0.00 - 0.01 K/uL   Lipid Panel    Collection Time: 12/09/24  5:33  AM   Result Value Ref Range    Cholesterol, Total 125 <200 MG/DL    Triglycerides 87 <150 MG/DL    HDL 50 MG/DL    LDL Cholesterol 57.6 0 - 100 MG/DL    VLDL Cholesterol Calculated 17.4 MG/DL    Chol/HDL Ratio 2.5 0.0 - 5.0     Basic Metabolic Panel    Collection Time: 12/09/24  5:33 AM   Result Value Ref Range    Sodium 141 136 - 145 mmol/L    Potassium 3.6 3.5 - 5.1 mmol/L    Chloride 110 (H) 97 - 108 mmol/L    CO2 28 21 - 32 mmol/L    Anion Gap 3 2 - 12 mmol/L    Glucose 90 65 - 100 mg/dL    BUN 18 6 - 20 MG/DL    Creatinine 0.62 (L) 0.70 - 1.30 MG/DL    BUN/Creatinine Ratio 29 (H) 12 - 20      Est, Glom Filt Rate >90 >60 ml/min/1.73m2    Calcium 8.0 (L) 8.5 - 10.1 MG/DL         Impression:     Principal Problem:    Closed left hip fracture, initial encounter ([facility])  Resolved Problems:    * No resolved hospital problems. *      Plan:   Left femur greater trochanter fracture with associated intramuscular hamstring hematoma    Nature of injury discussed in detail with patient.  Fracture is amenable to a conservative course of management.  This holds true for his hematoma as well.  Recommend working with PT/OT for mobilization; WBAT through left lower extremity but avoid abduction maneuvers  Hematoma will be allowed to resorb over time  Monitor hemoglobin and transfuse to maintain above 7  Ice packs to left hip region as needed  DVT prophylaxis up to medical service considering recent strokelike symptoms as well as anemia  Medical management per primary team  Will need outpatient orthopedic follow-up in approximately 3 to 4 weeks for repeat imaging    Patient to be discussed with Dr. Marshal Swanson PA-C  Orthopedic Trauma Service  [facility]

## 2024-12-11 ENCOUNTER — PATIENT OUTREACH (OUTPATIENT)
Dept: ADMINISTRATIVE | Facility: HOSPITAL | Age: 64
End: 2024-12-11
Payer: MEDICAID

## 2024-12-11 ENCOUNTER — PATIENT MESSAGE (OUTPATIENT)
Dept: PODIATRY | Facility: CLINIC | Age: 64
End: 2024-12-11
Payer: MEDICAID

## 2024-12-11 ENCOUNTER — TELEPHONE (OUTPATIENT)
Dept: PODIATRY | Facility: CLINIC | Age: 64
End: 2024-12-11
Payer: MEDICAID

## 2024-12-12 ENCOUNTER — PATIENT MESSAGE (OUTPATIENT)
Dept: PODIATRY | Facility: CLINIC | Age: 64
End: 2024-12-12
Payer: MEDICAID

## 2024-12-12 DIAGNOSIS — G62.9 PERIPHERAL POLYNEUROPATHY: ICD-10-CM

## 2024-12-12 RX ORDER — GABAPENTIN 300 MG/1
600 CAPSULE ORAL 3 TIMES DAILY
Qty: 270 CAPSULE | Refills: 3 | Status: SHIPPED | OUTPATIENT
Start: 2024-12-12

## 2024-12-12 NOTE — TELEPHONE ENCOUNTER
I increased the gabapentin to 600 mg three times a day and sent it in to the [facility] he requested. The cold spray unfortunately would not do much, we did discuss possibly doing a surgical nerve release if the pain is getting worse he can come back in and discuss what that would look like

## 2024-12-12 NOTE — TELEPHONE ENCOUNTER
----- Message from Rosa sent at 12/12/2024  4:06 PM CST -----  Type: Needs Medical Advice  Who Called:  pt    Best Call Back Number: [phone] (home)     Additional Information:pt  requesting call back in regards to his gabapentin (NEURONTIN) 300 MG capsule being filled please advise       [id number] [place] [address] AT [facility] [address] [address]  Phone: [phone] Fax: [phone]

## 2024-12-13 NOTE — PROGRESS NOTES
Population Health Chart Review & Patient Outreach Details      Additional [facility] Health Notes:               Updates Requested / Reviewed:      Updated Care Coordination Note         Health Maintenance Topics Overdue:      [facility] Score: 1     Colon Cancer Screening    Influenza Vaccine  RSV Vaccine                  Health Maintenance Topic(s) Outreach Outcomes & Actions Taken:    Colorectal Cancer Screening - Outreach Outcomes & Actions Taken  : Reminded Patient to Complete Already Received Home Test

## 2024-12-15 RX ORDER — GABAPENTIN 300 MG/1
600 CAPSULE ORAL 3 TIMES DAILY
Qty: 270 CAPSULE | Refills: 3 | Status: SHIPPED | OUTPATIENT
Start: 2024-12-15

## 2024-12-26 ENCOUNTER — CLINICAL SUPPORT (OUTPATIENT)
Dept: CARDIOLOGY | Facility: HOSPITAL | Age: 64
End: 2024-12-26
Payer: MEDICAID

## 2024-12-26 ENCOUNTER — HOSPITAL ENCOUNTER (OUTPATIENT)
Dept: CARDIOLOGY | Facility: HOSPITAL | Age: 64
Discharge: HOME | End: 2024-12-26
Attending: INTERNAL MEDICINE
Payer: MEDICAID

## 2024-12-26 DIAGNOSIS — R00.1 BRADYCARDIA, UNSPECIFIED: ICD-10-CM

## 2024-12-26 DIAGNOSIS — I10 ESSENTIAL HYPERTENSION: ICD-10-CM

## 2024-12-26 PROCEDURE — 93294 REM INTERROG EVL PM/LDLS PM: CPT | Mod: ,,, | Performed by: INTERNAL MEDICINE

## 2024-12-26 PROCEDURE — 93296 REM INTERROG EVL PM/IDS: CPT | Mod: PO | Performed by: INTERNAL MEDICINE

## 2024-12-26 RX ORDER — AMLODIPINE BESYLATE 5 MG/1
5 TABLET ORAL 2 TIMES DAILY
Qty: 180 TABLET | Refills: 3 | Status: SHIPPED | OUTPATIENT
Start: 2024-12-26

## 2024-12-26 RX ORDER — AMLODIPINE BESYLATE 5 MG/1
5 TABLET ORAL 2 TIMES DAILY
Qty: 180 TABLET | Refills: 2 | OUTPATIENT
Start: 2024-12-26

## 2025-01-10 LAB
OHS CV AF BURDEN PERCENT: < 1
OHS CV DC REMOTE DEVICE TYPE: NORMAL
OHS CV RV PACING PERCENT: 7.6 %

## 2025-01-29 ENCOUNTER — OFFICE VISIT (OUTPATIENT)
Dept: FAMILY MEDICINE | Facility: CLINIC | Age: 65
End: 2025-01-29
Payer: COMMERCIAL

## 2025-01-29 VITALS
OXYGEN SATURATION: 94 % | SYSTOLIC BLOOD PRESSURE: 110 MMHG | WEIGHT: 296.5 LBS | DIASTOLIC BLOOD PRESSURE: 58 MMHG | HEART RATE: 76 BPM | HEIGHT: 74 IN | BODY MASS INDEX: 38.05 KG/M2

## 2025-01-29 DIAGNOSIS — Z12.5 ENCOUNTER FOR PROSTATE CANCER SCREENING: ICD-10-CM

## 2025-01-29 DIAGNOSIS — I10 ESSENTIAL HYPERTENSION: Primary | ICD-10-CM

## 2025-01-29 DIAGNOSIS — E78.2 MIXED HYPERLIPIDEMIA: ICD-10-CM

## 2025-01-29 DIAGNOSIS — F31.70 BIPOLAR AFFECTIVE DISORDER IN REMISSION: ICD-10-CM

## 2025-01-29 DIAGNOSIS — N18.30 STAGE 3 CHRONIC KIDNEY DISEASE, UNSPECIFIED WHETHER STAGE 3A OR 3B CKD: ICD-10-CM

## 2025-01-29 DIAGNOSIS — E66.01 SEVERE OBESITY (BMI 35.0-35.9 WITH COMORBIDITY): ICD-10-CM

## 2025-01-29 PROCEDURE — 3078F DIAST BP <80 MM HG: CPT | Mod: CPTII,S$GLB,, | Performed by: FAMILY MEDICINE

## 2025-01-29 PROCEDURE — G2211 COMPLEX E/M VISIT ADD ON: HCPCS | Mod: S$GLB,,, | Performed by: FAMILY MEDICINE

## 2025-01-29 PROCEDURE — 3074F SYST BP LT 130 MM HG: CPT | Mod: CPTII,S$GLB,, | Performed by: FAMILY MEDICINE

## 2025-01-29 PROCEDURE — 99999 PR PBB SHADOW E&M-EST. PATIENT-LVL III: CPT | Mod: PBBFAC,,, | Performed by: FAMILY MEDICINE

## 2025-01-29 PROCEDURE — 99214 OFFICE O/P EST MOD 30 MIN: CPT | Mod: S$GLB,,, | Performed by: FAMILY MEDICINE

## 2025-01-29 PROCEDURE — 3008F BODY MASS INDEX DOCD: CPT | Mod: CPTII,S$GLB,, | Performed by: FAMILY MEDICINE

## 2025-01-29 PROCEDURE — 4010F ACE/ARB THERAPY RXD/TAKEN: CPT | Mod: CPTII,S$GLB,, | Performed by: FAMILY MEDICINE

## 2025-01-29 PROCEDURE — 1159F MED LIST DOCD IN RCRD: CPT | Mod: CPTII,S$GLB,, | Performed by: FAMILY MEDICINE

## 2025-01-29 PROCEDURE — 1160F RVW MEDS BY RX/DR IN RCRD: CPT | Mod: CPTII,S$GLB,, | Performed by: FAMILY MEDICINE

## 2025-01-29 NOTE — PROGRESS NOTES
Patient ID: Abel Dc III is a 64 y.o. male.    Chief Complaint: No chief complaint on file.    History of Present Illness    CHIEF COMPLAINT:  Patient presents today for follow-up.    MEDICAL HISTORY:  He has Stage 3 CKD managed by nephrology. He has a cardiac pacemaker and follows with cardiology regularly. He has bipolar affective disorder, currently well-controlled on medications and managed by psychiatry.      ROS:  General: -fever, -chills, -fatigue, -weight gain, -weight loss  Eyes: -vision changes, -redness, -discharge  ENT: -ear pain, -nasal congestion, -sore throat  Cardiovascular: -chest pain, -palpitations, -lower extremity edema  Respiratory: -cough, -shortness of breath  Gastrointestinal: -abdominal pain, -nausea, -vomiting, -diarrhea, -constipation, -blood in stool  Genitourinary: -dysuria, -hematuria, -frequency  Musculoskeletal: -joint pain, -muscle pain  Skin: -rash, -lesion  Neurological: -headache, -dizziness, -numbness, -tingling  Psychiatric: -anxiety, -depression, -sleep difficulty         Physical Exam    Vitals: Systolic blood pressure: 110. Pulse: 76.  General: No acute distress. Well-developed. Well-nourished.  Eyes: EOMI. Sclerae anicteric.  HENT: Normocephalic. Atraumatic. Nares patent. Moist oral mucosa.  Ears: Bilateral TMs clear. Bilateral EACs clear.  Cardiovascular: Regular rate. Regular rhythm. No murmurs. No rubs. No gallops. Normal S1, S2.  Respiratory: Normal respiratory effort. Clear to auscultation bilaterally. No rales. No rhonchi. No wheezing.  Abdomen: Soft. Non-tender. Non-distended. Normoactive bowel sounds.  Musculoskeletal: No  obvious deformity.  Extremities: No lower extremity edema.  Neurological: Alert & oriented x3. No slurred speech. Normal gait.  Psychiatric: Normal mood. Normal affect. Good insight. Good judgment.  Skin: Warm. Dry. No rash.         Assessment & Plan    IMPRESSION:  - Assessed essential hypertension, currently controlled  - Noted presence of  pacemaker, patient regularly followed by cardiology  - Acknowledged bipolar affective disorder, controlled on current medications  - Monitored stage 3 BCKD, currently followed by nephrology  - Considered ZAP down for weight loss, not currently feasible due to patient's non-diabetic status  - Treating mixed hyperlipidemia    HYPERTENSION:  - Monitored the patient's blood pressure, which is currently controlled with a systolic of 110 and pulse of 76.  - Evaluated the patient's essential hypertension, which is currently controlled.  - Advised to continue all current medications.    CARDIAC PACEMAKER:  - Noted that the patient has a cardiac pacemaker in place.  - Confirmed that the patient is regularly followed by cardiology.    BIPOLAR AFFECTIVE DISORDER:  - Monitored the patient's bipolar affective disorder, which is controlled on current medications.  - Evaluated the bipolar disorder and confirmed it is controlled with current medication regimen.  - Noted that the patient is being followed by psychiatry.  - Advised to continue all current medications.    CHRONIC KIDNEY DISEASE:  - Monitored the patient's stage 3 chronic kidney disease (CKD).  - Ordered labs for CKD monitoring.  - Noted that the patient is being followed by nephrology.  - Confirmed that a nephrology appointment is scheduled in the near future.    WEIGHT MANAGEMENT:  - Evaluated the patient's weight status and determined that a weight loss of approximately 40 lbs is recommended.  - Advised the patient to work on weight loss through reduced food intake.  - Considered ZAP down medication for weight loss but determined the patient is not eligible due to not being diabetic.  - Planned to discuss a once-weekly injectable medication for weight loss at a future appointment.    MIXED HYPERLIPIDEMIA:  - Ordered a lipid panel for assessment of mixed hyperlipidemia.  - Noted that the patient is currently being treated for mixed hyperlipidemia.    LABS:  - CBC,  CMP, and PSA ordered.    FOLLOW UP:  - Follow up in 6 months.            Follow up in about 6 months (around 7/29/2025).    This note was generated with the assistance of ambient listening technology. Verbal consent was obtained by the patient and accompanying visitor(s) for the recording of patient appointment to facilitate this note. I attest to having reviewed and edited the generated note for accuracy, though some syntax or spelling errors may persist. Please contact the author of this note for any clarification.

## 2025-01-30 DIAGNOSIS — M1A.9XX0 CHRONIC GOUT WITHOUT TOPHUS, UNSPECIFIED CAUSE, UNSPECIFIED SITE: ICD-10-CM

## 2025-01-30 RX ORDER — ALLOPURINOL 100 MG/1
200 TABLET ORAL
Qty: 180 TABLET | Refills: 3 | Status: SHIPPED | OUTPATIENT
Start: 2025-01-30

## 2025-01-30 NOTE — TELEPHONE ENCOUNTER
Refill Routing Note   Medication(s) are not appropriate for processing by [facility] for the following reason(s):        Required labs outdated    [facility] action(s):  Defer   Requires labs : Yes               Appointments  past 12m or future 3m with PCP    Date Provider   Last Visit   1/29/2025 Jonah Dia MD   Next Visit   Visit date not found Jonah Dia MD   ED visits in past 90 days: 0        Note composed:1:05 PM 01/30/2025

## 2025-02-06 DIAGNOSIS — E78.2 MIXED HYPERLIPIDEMIA: ICD-10-CM

## 2025-02-06 RX ORDER — PRAVASTATIN SODIUM 40 MG/1
40 TABLET ORAL NIGHTLY
Qty: 90 TABLET | Refills: 3 | Status: SHIPPED | OUTPATIENT
Start: 2025-02-06

## 2025-02-06 NOTE — TELEPHONE ENCOUNTER
Refill Routing Note   Medication(s) are not appropriate for processing by [facility] for the following reason(s):        Required labs outdated    [facility] action(s):  Defer             Appointments  past 12m or future 3m with PCP    Date Provider   Last Visit   1/29/2025 Jonah Dia MD   Next Visit   Visit date not found oJnah Dia MD   ED visits in past 90 days: 0        Note composed:8:00 AM 02/06/2025

## 2025-02-06 NOTE — TELEPHONE ENCOUNTER
No care due was identified.  [facility] Embedded Care Due Messages. Reference number: [id number].   2/06/2025 7:47:42 AM CST

## 2025-02-10 ENCOUNTER — OFFICE VISIT (OUTPATIENT)
Dept: CARDIOLOGY | Facility: CLINIC | Age: 65
End: 2025-02-10
Payer: COMMERCIAL

## 2025-02-10 VITALS
DIASTOLIC BLOOD PRESSURE: 62 MMHG | BODY MASS INDEX: 38.45 KG/M2 | HEIGHT: 74 IN | SYSTOLIC BLOOD PRESSURE: 111 MMHG | HEART RATE: 62 BPM | RESPIRATION RATE: 18 BRPM | WEIGHT: 299.63 LBS

## 2025-02-10 DIAGNOSIS — I10 ESSENTIAL HYPERTENSION: ICD-10-CM

## 2025-02-10 DIAGNOSIS — E78.2 MIXED HYPERLIPIDEMIA: ICD-10-CM

## 2025-02-10 DIAGNOSIS — I48.0 PAF (PAROXYSMAL ATRIAL FIBRILLATION): Primary | ICD-10-CM

## 2025-02-10 PROCEDURE — 3074F SYST BP LT 130 MM HG: CPT | Mod: CPTII,S$GLB,, | Performed by: INTERNAL MEDICINE

## 2025-02-10 PROCEDURE — 99214 OFFICE O/P EST MOD 30 MIN: CPT | Mod: S$GLB,,, | Performed by: INTERNAL MEDICINE

## 2025-02-10 PROCEDURE — 99999 PR PBB SHADOW E&M-EST. PATIENT-LVL IV: CPT | Mod: PBBFAC,,, | Performed by: INTERNAL MEDICINE

## 2025-02-10 PROCEDURE — 3008F BODY MASS INDEX DOCD: CPT | Mod: CPTII,S$GLB,, | Performed by: INTERNAL MEDICINE

## 2025-02-10 PROCEDURE — 1160F RVW MEDS BY RX/DR IN RCRD: CPT | Mod: CPTII,S$GLB,, | Performed by: INTERNAL MEDICINE

## 2025-02-10 PROCEDURE — 1159F MED LIST DOCD IN RCRD: CPT | Mod: CPTII,S$GLB,, | Performed by: INTERNAL MEDICINE

## 2025-02-10 PROCEDURE — 4010F ACE/ARB THERAPY RXD/TAKEN: CPT | Mod: CPTII,S$GLB,, | Performed by: INTERNAL MEDICINE

## 2025-02-10 PROCEDURE — 3078F DIAST BP <80 MM HG: CPT | Mod: CPTII,S$GLB,, | Performed by: INTERNAL MEDICINE

## 2025-02-10 NOTE — PROGRESS NOTES
Subjective:    Patient ID:  Abel Dc III is a 64 y.o. male who presents for follow-up of Annual Exam      HPI  He comes for follow up with no major problems, no chest pain, no shortness of breath.  Having any problem, particularly now that he is   Blood pressure normal at home.  No palpitations.      Review of Systems   Constitutional: Negative for decreased appetite, malaise/fatigue, weight gain and weight loss.   Cardiovascular:  Negative for chest pain, dyspnea on exertion, leg swelling, palpitations and syncope.   Respiratory:  Negative for cough and shortness of breath.    Gastrointestinal: Negative.    Neurological:  Negative for weakness.   All other systems reviewed and are negative.     Objective:      Physical Exam  Vitals and nursing note reviewed.   Constitutional:       Appearance: Normal appearance. He is well-developed.   HENT:      Head: Normocephalic.   Eyes:      Pupils: Pupils are equal, round, and reactive to light.   Neck:      Thyroid: No thyromegaly.      Vascular: No carotid bruit or JVD.   Cardiovascular:      Rate and Rhythm: Normal rate and regular rhythm.      Chest Wall: PMI is not displaced.      Pulses: Normal pulses and intact distal pulses.      Heart sounds: Normal heart sounds. No murmur heard.     No gallop.   Pulmonary:      Effort: Pulmonary effort is normal.      Breath sounds: Normal breath sounds.   Abdominal:      Palpations: Abdomen is soft. There is no mass.      Tenderness: There is no abdominal tenderness.   Musculoskeletal:         General: Normal range of motion.      Cervical back: Normal range of motion and neck supple.   Skin:     General: Skin is warm.   Neurological:      Mental Status: He is alert and oriented to person, place, and time.      Sensory: No sensory deficit.      Deep Tendon Reflexes: Reflexes are normal and symmetric.         Most Recent EKG Results  Results for orders placed or performed during the hospital encounter of 03/04/23   EKG  12-lead    Collection Time: 03/04/23  7:55 PM    Narrative    Test Reason : I10,    Vent. Rate : 060 BPM     Atrial Rate : 060 BPM     P-R Int : 264 ms          QRS Dur : 176 ms      QT Int : 496 ms       P-R-T Axes : 008 -18 129 degrees     QTc Int : 496 ms    Atrial-paced rhythm with prolonged AV conduction  Left bundle branch block  Abnormal ECG  When compared with ECG of 18-FEB-2023 11:05,  Electronic atrial pacemaker has replaced Electronic ventricular pacemaker  Confirmed by Morgan Red MD ([id number]) on 3/5/2023 4:29:21 PM    Referred By: RUSSELL   SELF           Confirmed By:Morgan Red MD       Most Recent Echocardiogram Results  Results for orders placed during the hospital encounter of 02/17/23    Echo    Interpretation Summary  · The left ventricle is normal in size with concentric remodeling and normal systolic function.  · The estimated ejection fraction is 55%.  · There is abnormal septal wall motion consistent with right ventricular pacemaker.  · Normal left ventricular diastolic function.  · Normal right ventricular size with normal right ventricular systolic function.  · IVC not well visualized.  · The estimated PA systolic pressure is at least 17 mmHg.      Most Recent Nuclear Stress Test Results  No results found for this or any previous visit.      Most Recent Cardiac PET Stress Test Results  No results found for this or any previous visit.      Most Recent Cardiovascular Angiogram results  No results found for this or any previous visit.      Other Most Recent Cardiology Results  Results for orders placed in visit on 12/26/24    Cardiac device check - Remote      Labs reviewed    Assessment:       1. PAF (paroxysmal atrial fibrillation)    2. Mixed hyperlipidemia    3. Essential hypertension         Plan:     Continue:  ASA, Beta blocker, DOAC, and Statin  Regular exercise program  Weight loss  Low cholesterol diet    Follow-up in 1 year with Izzy Napier

## 2025-02-14 ENCOUNTER — OFFICE VISIT (OUTPATIENT)
Dept: PODIATRY | Facility: CLINIC | Age: 65
End: 2025-02-14
Payer: COMMERCIAL

## 2025-02-14 ENCOUNTER — HOSPITAL ENCOUNTER (OUTPATIENT)
Dept: RADIOLOGY | Facility: HOSPITAL | Age: 65
Discharge: HOME | End: 2025-02-14
Attending: PODIATRIST
Payer: COMMERCIAL

## 2025-02-14 VITALS — BODY MASS INDEX: 38.45 KG/M2 | WEIGHT: 299.63 LBS | HEIGHT: 74 IN

## 2025-02-14 DIAGNOSIS — M20.42 ACQUIRED HAMMERTOE OF LEFT FOOT: ICD-10-CM

## 2025-02-14 DIAGNOSIS — M20.12 HAV (HALLUX ABDUCTO VALGUS), LEFT: ICD-10-CM

## 2025-02-14 DIAGNOSIS — G89.29 CHRONIC PAIN IN LEFT FOOT: ICD-10-CM

## 2025-02-14 DIAGNOSIS — M79.2 NEURITIS: Primary | ICD-10-CM

## 2025-02-14 DIAGNOSIS — M79.672 CHRONIC PAIN IN LEFT FOOT: ICD-10-CM

## 2025-02-14 DIAGNOSIS — M54.16 LUMBAR RADICULOPATHY, CHRONIC: ICD-10-CM

## 2025-02-14 DIAGNOSIS — M79.2 NEURITIS: ICD-10-CM

## 2025-02-14 PROCEDURE — 73630 X-RAY EXAM OF FOOT: CPT | Mod: TC,PO,LT

## 2025-02-14 PROCEDURE — 99999 PR PBB SHADOW E&M-EST. PATIENT-LVL III: CPT | Mod: PBBFAC,,, | Performed by: PODIATRIST

## 2025-02-14 NOTE — PROGRESS NOTES
Subjective:      Patient ID: Abel Dc III is a 64 y.o. male.    Chief Complaint: Toe Pain    Abel is a 64 y.o. male who presents to the podiatry clinic  with complaint of left foot 1-2 toe pain dorsal aspect more at night with sharp and stinging pains at times, right second toe hurts at times as well. Has lower back issues and lumbar radiculopathy as well. Was being seen by Dr zafar who referred patient to me for a possible left deep peroneal nerve release, has had injections but he relates they did not help    11/20/24: Patient returns for follow up left foot pain, relates it is no longer an every day issue and is tolerable at this point although it does get bad on occasion    2/14/25: Patient returns for follow up left foot pain that has gotten bad again despite the gabapentin increase, here for more options, the pain is worst at night     Review of Systems   Constitutional: Negative for chills and fever.   Cardiovascular:  Negative for claudication and leg swelling.   Respiratory:  Negative for shortness of breath.    Skin:  Negative for itching, nail changes and rash.   Musculoskeletal:  Positive for back pain. Negative for muscle cramps, muscle weakness and myalgias.   Gastrointestinal:  Negative for nausea and vomiting.   Neurological:  Positive for numbness and paresthesias. Negative for focal weakness and loss of balance.           Objective:      Physical Exam  Constitutional:       General: He is not in acute distress.     Appearance: He is well-developed. He is not diaphoretic.   Cardiovascular:      Pulses:           Dorsalis pedis pulses are 2+ on the right side and 2+ on the left side.        Posterior tibial pulses are 2+ on the right side and 2+ on the left side.      Comments: < 3 sec capillary refill time to toes 1-5 bilateral. Toes and feet are warm to touch proximally with normal distal cooling b/l. There is some hair growth on the feet and toes b/l. There is no edema b/l. No spider veins or  varicosities present b/l.     Musculoskeletal:      Comments: Equinus noted b/l ankles with < 10 deg DF noted. MMT 5/5 in DF/PF/Inv/Ev resistance with no reproduction of pain in any direction. Passive range of motion of ankle and pedal joints is painless b/l.    Left dorsal foot paresthesias with palpation overlying the deep peroneal nerve.    Medial 1st MTPJ exostosis. Lateral deviation of hallux, non trackbound. No pain w/ ROM to 1st or 2nd MTPJs.         Skin:     General: Skin is warm and dry.      Coloration: Skin is not pale.      Findings: No abrasion, bruising, burn, ecchymosis, erythema, laceration, lesion, petechiae or rash.      Nails: There is no clubbing.      Comments: Skin temperature, texture and turgor within normal limits.   Neurological:      Mental Status: He is alert and oriented to person, place, and time.      Sensory: Sensory deficit present.      Motor: No tremor, atrophy or abnormal muscle tone.      Comments: Negative tinel sign bilateral. Diminished vibratory sensation bilateral   Psychiatric:         Behavior: Behavior normal.               Assessment:       Encounter Diagnoses   Name Primary?    Neuritis Yes    Hav (hallux abducto valgus), left     Acquired hammertoe of left foot     Lumbar radiculopathy, chronic     Chronic pain in left foot            Plan:       Abel was seen today for toe pain.    Diagnoses and all orders for this visit:    Neuritis  -     X-Ray Foot Complete Left; Future  -     Ambulatory referral/consult to Pain Clinic; Future    Hav (hallux abducto valgus), left  -     X-Ray Foot Complete Left; Future    Acquired hammertoe of left foot  -     X-Ray Foot Complete Left; Future    Lumbar radiculopathy, chronic  -     Ambulatory referral/consult to Pain Clinic; Future    Chronic pain in left foot  -     Ambulatory referral/consult to Pain Clinic; Future        I counseled the patient on his conditions, their implications and medical management.    With his history  of lower back pain I recommend seeing someone in pain clinic as this could be contributing to the chronic foot neuropathy pains    Continue the gabapentin to 600 mg BID    Discussed that if the pain persists or gets worse we can do a deep peroneal neuroplasty to the left foot in the OR with 2 weeks post op in a darco shoe and dressing but I would first like to do an US guided injection of the left deep peroneal nerve for a diagnostic block    Will return for US guided diagnostic block    Kristopher Santos DPM

## 2025-02-27 ENCOUNTER — HOSPITAL ENCOUNTER (OUTPATIENT)
Dept: RADIOLOGY | Facility: HOSPITAL | Age: 65
Discharge: HOME | End: 2025-02-27
Attending: STUDENT IN AN ORGANIZED HEALTH CARE EDUCATION/TRAINING PROGRAM
Payer: COMMERCIAL

## 2025-02-27 DIAGNOSIS — N18.32 STAGE 3B CHRONIC KIDNEY DISEASE: ICD-10-CM

## 2025-02-27 PROCEDURE — 76770 US EXAM ABDO BACK WALL COMP: CPT | Mod: 26,,, | Performed by: STUDENT IN AN ORGANIZED HEALTH CARE EDUCATION/TRAINING PROGRAM

## 2025-02-27 PROCEDURE — 76770 US EXAM ABDO BACK WALL COMP: CPT | Mod: TC,PO

## 2025-03-03 ENCOUNTER — TELEPHONE (OUTPATIENT)
Dept: PODIATRY | Facility: CLINIC | Age: 65
End: 2025-03-03
Payer: COMMERCIAL

## 2025-03-03 NOTE — TELEPHONE ENCOUNTER
----- Message from Chastity sent at 3/3/2025 12:33 PM CST -----  Contact: Self  Type: Needs Medical AdviceWho Called:  PatientSymptoms (please be specific):  Has a blister on Left Big toe that looks infected and is painfulHow long has patient had these symptoms:  few daysPharmacy name and phone #:  [id number] - [place], [place] - [address] [address] AT [facility] [address] [facility] [id number] [address]Payco: [phone] Fax: [phone] Call Back Number: [phone] Information: Pt stated he has this blister on his Big toe that he has been using neosporin on it and it doesn't seem to be healing, wanted to see if he can come in before his appt on the 11th to have it looked at. Can we please call pt back to advise. Thank You

## 2025-03-03 NOTE — TELEPHONE ENCOUNTER
----- Message from Chastity sent at 3/3/2025 12:33 PM CST -----  Contact: Self  Type: Needs Medical AdviceWho Called:  PatientSymptoms (please be specific):  Has a blister on Left Big toe that looks infected and is painfulHow long has patient had these symptoms:  few daysPharmacy name and phone #:  [id number] - [place], [place] - [address] [address] AT [facility] OF [facility] [address] [facility] [id number] [address] [address]Yzbwa: [phone] Fax: [phone] Call Back Number: [phone] Information: Pt stated he has this blister on his Big toe that he has been using neosporin on it and it doesn't seem to be healing, wanted to see if he can come in before his appt on the 11th to have it looked at. Can we please call pt back to advise. Thank You

## 2025-03-06 ENCOUNTER — OFFICE VISIT (OUTPATIENT)
Dept: NEPHROLOGY | Facility: CLINIC | Age: 65
End: 2025-03-06
Payer: COMMERCIAL

## 2025-03-06 VITALS
OXYGEN SATURATION: 95 % | HEIGHT: 74 IN | SYSTOLIC BLOOD PRESSURE: 108 MMHG | WEIGHT: 299.63 LBS | BODY MASS INDEX: 38.45 KG/M2 | HEART RATE: 71 BPM | DIASTOLIC BLOOD PRESSURE: 68 MMHG

## 2025-03-06 DIAGNOSIS — L97.529 TOE ULCER, LEFT, WITH UNSPECIFIED SEVERITY: Primary | ICD-10-CM

## 2025-03-06 DIAGNOSIS — N40.0 BENIGN PROSTATIC HYPERPLASIA WITHOUT LOWER URINARY TRACT SYMPTOMS: ICD-10-CM

## 2025-03-06 DIAGNOSIS — I10 ESSENTIAL HYPERTENSION: ICD-10-CM

## 2025-03-06 DIAGNOSIS — N20.0 NEPHROLITHIASIS: ICD-10-CM

## 2025-03-06 DIAGNOSIS — I48.0 PAF (PAROXYSMAL ATRIAL FIBRILLATION): ICD-10-CM

## 2025-03-06 DIAGNOSIS — E78.2 MIXED HYPERLIPIDEMIA: ICD-10-CM

## 2025-03-06 DIAGNOSIS — N18.31 STAGE 3A CHRONIC KIDNEY DISEASE: Primary | ICD-10-CM

## 2025-03-06 DIAGNOSIS — E66.01 SEVERE OBESITY (BMI 35.0-39.9) WITH COMORBIDITY: ICD-10-CM

## 2025-03-06 PROCEDURE — 99214 OFFICE O/P EST MOD 30 MIN: CPT | Mod: S$GLB,,, | Performed by: STUDENT IN AN ORGANIZED HEALTH CARE EDUCATION/TRAINING PROGRAM

## 2025-03-06 PROCEDURE — 99999 PR PBB SHADOW E&M-EST. PATIENT-LVL III: CPT | Mod: PBBFAC,,, | Performed by: STUDENT IN AN ORGANIZED HEALTH CARE EDUCATION/TRAINING PROGRAM

## 2025-03-06 PROCEDURE — 3008F BODY MASS INDEX DOCD: CPT | Mod: CPTII,S$GLB,, | Performed by: STUDENT IN AN ORGANIZED HEALTH CARE EDUCATION/TRAINING PROGRAM

## 2025-03-06 PROCEDURE — 3078F DIAST BP <80 MM HG: CPT | Mod: CPTII,S$GLB,, | Performed by: STUDENT IN AN ORGANIZED HEALTH CARE EDUCATION/TRAINING PROGRAM

## 2025-03-06 PROCEDURE — 3066F NEPHROPATHY DOC TX: CPT | Mod: CPTII,S$GLB,, | Performed by: STUDENT IN AN ORGANIZED HEALTH CARE EDUCATION/TRAINING PROGRAM

## 2025-03-06 PROCEDURE — 3061F NEG MICROALBUMINURIA REV: CPT | Mod: CPTII,S$GLB,, | Performed by: STUDENT IN AN ORGANIZED HEALTH CARE EDUCATION/TRAINING PROGRAM

## 2025-03-06 PROCEDURE — 3074F SYST BP LT 130 MM HG: CPT | Mod: CPTII,S$GLB,, | Performed by: STUDENT IN AN ORGANIZED HEALTH CARE EDUCATION/TRAINING PROGRAM

## 2025-03-06 PROCEDURE — 4010F ACE/ARB THERAPY RXD/TAKEN: CPT | Mod: CPTII,S$GLB,, | Performed by: STUDENT IN AN ORGANIZED HEALTH CARE EDUCATION/TRAINING PROGRAM

## 2025-03-06 RX ORDER — DOXYCYCLINE HYCLATE 100 MG
100 TABLET ORAL 2 TIMES DAILY
Qty: 14 TABLET | Refills: 0 | Status: SHIPPED | OUTPATIENT
Start: 2025-03-06 | End: 2025-03-13

## 2025-03-06 NOTE — PROGRESS NOTES
[facility]  Nephrology Clinic    Subjective:       HPI ID: Abel Dc III is a 64 y.o. male who returns for ongoing evaluation and management of CKD.   He has ongoing issues with lumber disc disease, gout, HLD, HTN, BPH, Depression.     In terms of his renal history, he denies hospitalizations for prior JANE or JANE requiring RRT. Denies known history of nephrolithiasis or hematuria episodes. No reported history of frequent or recurrent use of NSAIDs/COXI. No pertinent rheumatologic or AI disease history. Denies any pertinent family history of known kidney disease, or family members diagnosed with ESRD requiring dialysis.  Smoking Hx: denies  Other pertinent urologic history: denies (BPH listed in his chart)  Fluid intake per 24hr: 1.5L water. Drinks 60-80oz of red coke per day.     Interval history:  03/06/25 - here today for f/u evaluation. Feels well and denies acute complaints. He has no uremic or urinary symptoms and is in his usual state of health.    Avoiding dehydration. We reviewed recent labs at chairside. Renal function based on serum creatinine trend remains at baseline.      Review of Systems   Constitutional:  Negative for chills, diaphoresis and fever.   Respiratory:  Negative for cough and shortness of breath.    Cardiovascular:  Negative for chest pain and leg swelling.   Gastrointestinal:  Negative for abdominal pain, diarrhea, nausea and vomiting.   Genitourinary:  Negative for difficulty urinating, dysuria and hematuria.   Musculoskeletal:  Negative for myalgias.   Neurological:  Negative for headaches.   Hematological:  Does not bruise/bleed easily.       The past medical, family and social histories were reviewed for this encounter.     Past Medical History:   Diagnosis Date    Bipolar disorder     manic depressive x 1992    BPH (benign prostatic hypertrophy)     Chronic low back pain     Depression     GERD (gastroesophageal reflux disease)     Gout     Gout, arthritis  "    Hyperlipidemia     Hypertension     Lumbar disc disease     Obesity     Panic disorder        Current Outpatient Medications   Medication Sig    allopurinoL (ZYLOPRIM) 100 MG tablet TAKE 2 TABLETS(200 MG) BY MOUTH EVERY DAY    amLODIPine (NORVASC) 5 MG tablet Take 1 tablet (5 mg total) by mouth 2 (two) times daily.    ascorbic acid, vitamin C, (VITAMIN C) 1000 MG tablet Take 1,000 mg by mouth once daily.    aspirin 81 mg Tab Take 1 tablet by mouth once daily.    busPIRone (BUSPAR) 15 MG tablet Take 15 mg by mouth 3 (three) times daily.     cholecalciferol, vitamin D3, 2,000 unit Cap Take 1 capsule by mouth once daily.    cyanocobalamin (VITAMIN B-12) 1000 MCG tablet Take 100 mcg by mouth once daily.    doxycycline (VIBRA-TABS) 100 MG tablet Take 1 tablet (100 mg total) by mouth 2 (two) times daily. for 7 days    ELIQUIS 5 mg Tab TAKE 1 TABLET(5 MG) BY MOUTH TWICE DAILY    FLUoxetine 40 MG capsule Take 40 mg by mouth once daily.    fluticasone propionate (FLONASE) 50 mcg/actuation nasal spray SPRAY ONCE IN EACH NOSTRIL EVERY DAY    gabapentin (NEURONTIN) 300 MG capsule Take 2 capsules (600 mg total) by mouth 3 (three) times daily.    metoprolol succinate (TOPROL-XL) 50 MG 24 hr tablet TAKE 1 TABLET(50 MG) BY MOUTH EVERY DAY    olmesartan (BENICAR) 5 MG Tab Take 1 tablet (5 mg total) by mouth once daily.    omeprazole (PRILOSEC) 20 MG capsule TAKE 1 CAPSULE BY MOUTH EVERY DAY    OXcarbazepine (TRILEPTAL) 300 MG Tab Take 600 mg by mouth once daily.    pravastatin (PRAVACHOL) 40 MG tablet TAKE 1 TABLET(40 MG) BY MOUTH EVERY EVENING    quetiapine (SEROQUEL) 50 MG tablet Take 50 mg by mouth nightly.    zinc gluconate 50 mg tablet Take 50 mg by mouth once daily.     No current facility-administered medications for this visit.         Objective:   /68 (BP Location: Right arm, Patient Position: Sitting)   Pulse 71   Ht 6' 2" (1.88 m)   Wt 135.9 kg (299 lb 9.7 oz)   SpO2 95%   BMI 38.47 kg/m²      Physical " Exam  Vitals reviewed.   Constitutional:       General: He is not in acute distress.     Appearance: He is obese.   Cardiovascular:      Pulses: Normal pulses.      Heart sounds: Normal heart sounds.      No friction rub.   Pulmonary:      Effort: Pulmonary effort is normal. No respiratory distress.      Breath sounds: Normal breath sounds.   Abdominal:      General: Abdomen is protuberant.      Palpations: Abdomen is soft.   Musculoskeletal:         General: No swelling.      Right lower leg: No edema.      Left lower leg: No edema.   Neurological:      General: No focal deficit present.      Mental Status: He is oriented to person, place, and time.      Motor: No weakness.   Psychiatric:         Mood and Affect: Mood normal.         Behavior: Behavior normal.         Assessment:     Lab Results   Component Value Date    CREATININE 1.6 (H) 02/27/2025    BUN 18 02/27/2025     02/27/2025    K 4.7 02/27/2025     02/27/2025    CO2 26 02/27/2025     Lab Results   Component Value Date    PTH 65.4 02/27/2025    CALCIUM 10.1 02/27/2025    CAION 5.5 11/14/2018    PHOS 3.9 02/27/2025     Lab Results   Component Value Date    HCT 41.6 02/27/2025     Prot/Creat Ratio, Urine   Date Value Ref Range Status   02/27/2025 0.10 0.00 - 0.20 Final   01/06/2020 0.13 0.00 - 0.20 Final       Lab Results   Component Value Date    MICALBCREAT 21.7 02/27/2025    MICALBCREAT 39.2 (H) 07/23/2024 02/27/25 RPUS - RK 13.3 cm, two simple cysts, no coritcal thinning. LK 10.8cm. No cortical thinning. 4mm non-obs stone in the inferior pole.         1. Stage 3 chronic kidney disease, unspecified whether stage 3a or 3b CKD    2. Essential hypertension    3. Mixed hyperlipidemia    4. Benign prostatic hyperplasia without lower urinary tract symptoms    5. PAF (paroxysmal atrial fibrillation)    6. Severe obesity (BMI 35.0-39.9) with comorbidity    7. Nephrolithiasis          Plan:   Return to clinic in 6 months  Labs for next visit  include cbc, uacr, rfp  Baseline creatinine is 1.5-1.7mg/dL.    CKD STAGE G3A / A1 PLAN  -risk: metabolic; htn, hld, elevated bmi,   -on RASI for CKD-MACE risk reduction, proteinuria reduction and pascual-protection  -currently already low risk of progression  -drink more water less red cokes  -continue weight loss of 10lbs over next 4 months  -We discussed making lifestyle changes and using a Mediterranean diet for health benefits and weight loss.  -aerobic exercise at least 3x weekly as tolerated  -continue discussion and adjustment of modifiable risk factors. Educated patient on the importance of BP, glycemic and lipid control     -bp trends reviewed; med list reviewed. Continue current medications including ARB.   -no need for IZA  -PTH/phos/calcium acceptable     -non-obs stone in the left inferior pole. Monitor for now. Continue discussion on supersaturation workup.        __________________________  Topher Engle MD  [facility]    Part of this note has been created using [employer] dictation system. Errors in transcription may not be completely avoided.      KFRE 2-Year: 0.3% at 2/27/2025 12:45 PM  Calculated from:  Serum Creatinine: 1.6 mg/dL at 2/27/2025 12:45 PM  Urine Albumin Creatinine Ratio: 21.7 ug/mg at 2/27/2025 12:42 PM  Age: 64 years  Sex: Male at 2/27/2025 12:45 PM  Has CKD-3 to CKD-5: Yes    KFRE 5-Year: 1.1% at 2/27/2025 12:45 PM  Calculated from:  Serum Creatinine: 1.6 mg/dL at 2/27/2025 12:45 PM  Urine Albumin Creatinine Ratio: 21.7 ug/mg at 2/27/2025 12:42 PM  Age: 64 years  Sex: Male at 2/27/2025 12:45 PM  Has CKD-3 to CKD-5: Yes

## 2025-03-11 ENCOUNTER — OFFICE VISIT (OUTPATIENT)
Dept: PODIATRY | Facility: CLINIC | Age: 65
End: 2025-03-11
Payer: COMMERCIAL

## 2025-03-11 VITALS — BODY MASS INDEX: 38.45 KG/M2 | HEIGHT: 74 IN | WEIGHT: 299.63 LBS

## 2025-03-11 DIAGNOSIS — M54.16 LUMBAR RADICULOPATHY, CHRONIC: Primary | ICD-10-CM

## 2025-03-11 DIAGNOSIS — G89.29 CHRONIC PAIN IN LEFT FOOT: ICD-10-CM

## 2025-03-11 DIAGNOSIS — M79.672 CHRONIC PAIN IN LEFT FOOT: ICD-10-CM

## 2025-03-11 DIAGNOSIS — M79.2 NEURITIS: ICD-10-CM

## 2025-03-11 DIAGNOSIS — L97.529 TOE ULCER, LEFT, WITH UNSPECIFIED SEVERITY: ICD-10-CM

## 2025-03-11 PROCEDURE — 99999 PR PBB SHADOW E&M-EST. PATIENT-LVL III: CPT | Mod: PBBFAC,,, | Performed by: PODIATRIST

## 2025-03-11 RX ORDER — DEXAMETHASONE SODIUM PHOSPHATE 4 MG/ML
2 INJECTION, SOLUTION INTRA-ARTICULAR; INTRALESIONAL; INTRAMUSCULAR; INTRAVENOUS; SOFT TISSUE ONCE
Status: COMPLETED | OUTPATIENT
Start: 2025-03-11 | End: 2025-03-11

## 2025-03-11 RX ORDER — CEPHALEXIN 500 MG/1
500 CAPSULE ORAL 2 TIMES DAILY
COMMUNITY
Start: 2025-03-04

## 2025-03-11 RX ORDER — MUPIROCIN 20 MG/G
OINTMENT TOPICAL 2 TIMES DAILY
COMMUNITY
Start: 2025-03-04

## 2025-03-11 RX ADMIN — DEXAMETHASONE SODIUM PHOSPHATE 2 MG: 4 INJECTION, SOLUTION INTRA-ARTICULAR; INTRALESIONAL; INTRAMUSCULAR; INTRAVENOUS; SOFT TISSUE at 10:03

## 2025-03-11 NOTE — PROGRESS NOTES
Subjective:      Patient ID: Abel Dc III is a 64 y.o. male.    Chief Complaint: Foot Pain    Abel is a 64 y.o. male who presents to the podiatry clinic  with complaint of left foot 1-2 toe pain dorsal aspect more at night with sharp and stinging pains at times, right second toe hurts at times as well. Has lower back issues and lumbar radiculopathy as well. Was being seen by Dr zafar who referred patient to me for a possible left deep peroneal nerve release, has had injections but he relates they did not help    11/20/24: Patient returns for follow up left foot pain, relates it is no longer an every day issue and is tolerable at this point although it does get bad on occasion    2/14/25: Patient returns for follow up left foot pain that has gotten bad again despite the gabapentin increase, here for more options, the pain is worst at night     3/11/25: patient returns for left foot pain here for deep peroneal nerve block under US guidance, has a new sore left great toe, on doxycycline and using mupirocin on the area daily    Review of Systems   Constitutional: Negative for chills and fever.   Cardiovascular:  Negative for claudication and leg swelling.   Respiratory:  Negative for shortness of breath.    Skin:  Negative for itching, nail changes and rash.   Musculoskeletal:  Positive for back pain. Negative for muscle cramps, muscle weakness and myalgias.   Gastrointestinal:  Negative for nausea and vomiting.   Neurological:  Positive for numbness and paresthesias. Negative for focal weakness and loss of balance.           Objective:      Physical Exam  Constitutional:       General: He is not in acute distress.     Appearance: He is well-developed. He is not diaphoretic.   Cardiovascular:      Pulses:           Dorsalis pedis pulses are 2+ on the right side and 2+ on the left side.        Posterior tibial pulses are 2+ on the right side and 2+ on the left side.      Comments: < 3 sec capillary refill time to  toes 1-5 bilateral. Toes and feet are warm to touch proximally with normal distal cooling b/l. There is some hair growth on the feet and toes b/l. There is no edema b/l. No spider veins or varicosities present b/l.     Musculoskeletal:      Comments: Equinus noted b/l ankles with < 10 deg DF noted. MMT 5/5 in DF/PF/Inv/Ev resistance with no reproduction of pain in any direction. Passive range of motion of ankle and pedal joints is painless b/l.    Left dorsal foot paresthesias with palpation overlying the deep peroneal nerve.    Medial 1st MTPJ exostosis. Lateral deviation of hallux, non trackbound. No pain w/ ROM to 1st or 2nd MTPJs.         Skin:     General: Skin is warm and dry.      Coloration: Skin is not pale.      Findings: No abrasion, bruising, burn, ecchymosis, erythema, laceration, lesion, petechiae or rash.      Nails: There is no clubbing.      Comments: Skin temperature, texture and turgor within normal limits.    Left dorsal hallux sore healing well without erythema or edema       Neurological:      Mental Status: He is alert and oriented to person, place, and time.      Sensory: Sensory deficit present.      Motor: No tremor, atrophy or abnormal muscle tone.      Comments: Negative tinel sign bilateral. Diminished vibratory sensation bilateral   Psychiatric:         Behavior: Behavior normal.               Assessment:       Encounter Diagnoses   Name Primary?    Lumbar radiculopathy, chronic Yes    Chronic pain in left foot     Toe ulcer, left, with unspecified severity     Neuritis              Plan:       Abel was seen today for foot pain.    Diagnoses and all orders for this visit:    Lumbar radiculopathy, chronic    Chronic pain in left foot    Toe ulcer, left, with unspecified severity    Neuritis          I counseled the patient on his conditions, their implications and medical management.    With his history of lower back pain I recommend seeing someone in pain clinic as this could be  contributing to the chronic foot neuropathy pains    Continue the gabapentin to 600 mg BID    Toe wound almost healed cover with band aid daily    Diagnostic deep peroneal nerve block with ultrasound guidance left:  -Attention was directed to the left dorsal foot where the ultrasound was used to identify the deep peroneal nerve. The needle was placed at the deep peroneal nerve while watching on the ultrasound to ensure proper placement of the needle, 0.5 cc 1% lidocaine plain with 2 mg dexamethasone was then injected around the deep peroneal nerve. Patient related to some relief of pain with the injection and will follow up with me in 3 weeks for review and possible scheduling of a deep peroneal nerve neuroplasty    Kristopher Santos DPM

## 2025-03-24 ENCOUNTER — OFFICE VISIT (OUTPATIENT)
Dept: PODIATRY | Facility: CLINIC | Age: 65
End: 2025-03-24
Payer: COMMERCIAL

## 2025-03-24 VITALS — BODY MASS INDEX: 38.45 KG/M2 | WEIGHT: 299.63 LBS | HEIGHT: 74 IN

## 2025-03-24 DIAGNOSIS — Z87.2 HEALED ULCER OF LEFT FOOT ON EXAMINATION: ICD-10-CM

## 2025-03-24 DIAGNOSIS — M79.672 CHRONIC PAIN IN LEFT FOOT: Primary | ICD-10-CM

## 2025-03-24 DIAGNOSIS — G89.29 CHRONIC PAIN IN LEFT FOOT: Primary | ICD-10-CM

## 2025-03-24 DIAGNOSIS — M79.2 NEURITIS: ICD-10-CM

## 2025-03-24 PROCEDURE — 1160F RVW MEDS BY RX/DR IN RCRD: CPT | Mod: CPTII,S$GLB,, | Performed by: PODIATRIST

## 2025-03-24 PROCEDURE — 3061F NEG MICROALBUMINURIA REV: CPT | Mod: CPTII,S$GLB,, | Performed by: PODIATRIST

## 2025-03-24 PROCEDURE — 3066F NEPHROPATHY DOC TX: CPT | Mod: CPTII,S$GLB,, | Performed by: PODIATRIST

## 2025-03-24 PROCEDURE — 99213 OFFICE O/P EST LOW 20 MIN: CPT | Mod: S$GLB,,, | Performed by: PODIATRIST

## 2025-03-24 PROCEDURE — 3008F BODY MASS INDEX DOCD: CPT | Mod: CPTII,S$GLB,, | Performed by: PODIATRIST

## 2025-03-24 PROCEDURE — 4010F ACE/ARB THERAPY RXD/TAKEN: CPT | Mod: CPTII,S$GLB,, | Performed by: PODIATRIST

## 2025-03-24 PROCEDURE — 1159F MED LIST DOCD IN RCRD: CPT | Mod: CPTII,S$GLB,, | Performed by: PODIATRIST

## 2025-03-24 PROCEDURE — 99999 PR PBB SHADOW E&M-EST. PATIENT-LVL III: CPT | Mod: PBBFAC,,, | Performed by: PODIATRIST

## 2025-03-24 NOTE — PROGRESS NOTES
Subjective:      Patient ID: Abel Dc III is a 64 y.o. male.    Chief Complaint: Toe Pain (Left toe )    Abel is a 64 y.o. male who presents to the podiatry clinic  with complaint of left foot 1-2 toe pain dorsal aspect more at night with sharp and stinging pains at times, right second toe hurts at times as well. Has lower back issues and lumbar radiculopathy as well. Was being seen by Dr zafar who referred patient to me for a possible left deep peroneal nerve release, has had injections but he relates they did not help    11/20/24: Patient returns for follow up left foot pain, relates it is no longer an every day issue and is tolerable at this point although it does get bad on occasion    2/14/25: Patient returns for follow up left foot pain that has gotten bad again despite the gabapentin increase, here for more options, the pain is worst at night     3/11/25: patient returns for left foot pain here for deep peroneal nerve block under US guidance, has a new sore left great toe, on doxycycline and using mupirocin on the area daily    3/24/25: Patient returns for left foot pain today in the great toe where he had a previous blister that has healed but the area still looks a little red and is tender to touch    Review of Systems   Constitutional: Negative for chills and fever.   Cardiovascular:  Negative for claudication and leg swelling.   Respiratory:  Negative for shortness of breath.    Skin:  Negative for itching, nail changes and rash.   Musculoskeletal:  Positive for back pain. Negative for muscle cramps, muscle weakness and myalgias.   Gastrointestinal:  Negative for nausea and vomiting.   Neurological:  Positive for numbness and paresthesias. Negative for focal weakness and loss of balance.           Objective:      Physical Exam  Constitutional:       General: He is not in acute distress.     Appearance: He is well-developed. He is not diaphoretic.   Cardiovascular:      Pulses:           Dorsalis  pedis pulses are 2+ on the right side and 2+ on the left side.        Posterior tibial pulses are 2+ on the right side and 2+ on the left side.      Comments: < 3 sec capillary refill time to toes 1-5 bilateral. Toes and feet are warm to touch proximally with normal distal cooling b/l. There is some hair growth on the feet and toes b/l. There is no edema b/l. No spider veins or varicosities present b/l.     Musculoskeletal:      Comments: Equinus noted b/l ankles with < 10 deg DF noted. MMT 5/5 in DF/PF/Inv/Ev resistance with no reproduction of pain in any direction. Passive range of motion of ankle and pedal joints is painless b/l.    Left dorsal foot paresthesias with palpation overlying the deep peroneal nerve.    Left dorsal hallux there is an area of superficial irritation hyperpigmentation and tenderness to palpation    Medial 1st MTPJ exostosis. Lateral deviation of hallux, non trackbound. No pain w/ ROM to 1st or 2nd MTPJs.         Skin:     General: Skin is warm and dry.      Coloration: Skin is not pale.      Findings: No abrasion, bruising, burn, ecchymosis, erythema, laceration, lesion, petechiae or rash.      Nails: There is no clubbing.      Comments: Skin temperature, texture and turgor within normal limits.    Left dorsal hallux sore healing well without erythema or edema       Neurological:      Mental Status: He is alert and oriented to person, place, and time.      Sensory: Sensory deficit present.      Motor: No tremor, atrophy or abnormal muscle tone.      Comments: Negative tinel sign bilateral. Diminished vibratory sensation bilateral   Psychiatric:         Behavior: Behavior normal.               Assessment:       Encounter Diagnoses   Name Primary?    Chronic pain in left foot Yes    Neuritis     Healed ulcer of left foot on examination                Plan:       Abel was seen today for toe pain.    Diagnoses and all orders for this visit:    Chronic pain in left foot    Neuritis    Healed  ulcer of left foot on examination            I counseled the patient on his conditions, their implications and medical management.    With his history of lower back pain I recommend seeing someone in pain clinic as this could be contributing to the chronic foot neuropathy pains    Continue the gabapentin to 600 mg BID    Toe wound healed but the area still appears irritated and is tender, cover the area with dry band aid and allow more time to heal    Return in 1-2 weeks to discuss nerve release surgery  as this also may be secondary to nerve pain      Kristopher Santos DPM

## 2025-03-27 ENCOUNTER — CLINICAL SUPPORT (OUTPATIENT)
Dept: CARDIOLOGY | Facility: HOSPITAL | Age: 65
End: 2025-03-27
Payer: COMMERCIAL

## 2025-03-27 ENCOUNTER — HOSPITAL ENCOUNTER (OUTPATIENT)
Dept: CARDIOLOGY | Facility: HOSPITAL | Age: 65
Discharge: HOME | End: 2025-03-27
Attending: INTERNAL MEDICINE
Payer: COMMERCIAL

## 2025-03-27 DIAGNOSIS — R00.1 BRADYCARDIA, UNSPECIFIED: ICD-10-CM

## 2025-03-27 PROCEDURE — 93296 REM INTERROG EVL PM/IDS: CPT | Mod: PO | Performed by: INTERNAL MEDICINE

## 2025-03-27 PROCEDURE — 93294 REM INTERROG EVL PM/LDLS PM: CPT | Mod: ,,, | Performed by: INTERNAL MEDICINE

## 2025-04-08 LAB
OHS CV AF BURDEN PERCENT: < 1
OHS CV DC REMOTE DEVICE TYPE: NORMAL
OHS CV RV PACING PERCENT: 7 %

## 2025-04-13 ENCOUNTER — PATIENT MESSAGE (OUTPATIENT)
Dept: PODIATRY | Facility: CLINIC | Age: 65
End: 2025-04-13
Payer: COMMERCIAL

## 2025-04-22 ENCOUNTER — OFFICE VISIT (OUTPATIENT)
Dept: PODIATRY | Facility: CLINIC | Age: 65
End: 2025-04-22
Payer: COMMERCIAL

## 2025-04-22 VITALS — WEIGHT: 299.63 LBS | BODY MASS INDEX: 38.45 KG/M2 | HEIGHT: 74 IN

## 2025-04-22 DIAGNOSIS — M79.2 NEURITIS: ICD-10-CM

## 2025-04-22 DIAGNOSIS — G89.29 CHRONIC PAIN IN LEFT FOOT: Primary | ICD-10-CM

## 2025-04-22 DIAGNOSIS — G57.32 ENTRAPMENT OF LEFT DEEP PERONEAL NERVE: ICD-10-CM

## 2025-04-22 DIAGNOSIS — M79.672 CHRONIC PAIN IN LEFT FOOT: Primary | ICD-10-CM

## 2025-04-22 PROCEDURE — 99999 PR PBB SHADOW E&M-EST. PATIENT-LVL IV: CPT | Mod: PBBFAC,,, | Performed by: PODIATRIST

## 2025-04-22 NOTE — PROGRESS NOTES
Subjective:      Patient ID: Abel Dc III is a 64 y.o. male.    Chief Complaint: Toe Pain    Abel is a 64 y.o. male who presents to the podiatry clinic  with complaint of left foot 1-2 toe pain dorsal aspect more at night with sharp and stinging pains at times, right second toe hurts at times as well. Has lower back issues and lumbar radiculopathy as well. Was being seen by Dr zafar who referred patient to me for a possible left deep peroneal nerve release, has had injections but he relates they did not help    11/20/24: Patient returns for follow up left foot pain, relates it is no longer an every day issue and is tolerable at this point although it does get bad on occasion    2/14/25: Patient returns for follow up left foot pain that has gotten bad again despite the gabapentin increase, here for more options, the pain is worst at night     3/11/25: patient returns for left foot pain here for deep peroneal nerve block under US guidance, has a new sore left great toe, on doxycycline and using mupirocin on the area daily    3/24/25: Patient returns for left foot pain today in the great toe where he had a previous blister that has healed but the area still looks a little red and is tender to touch    4/22/25: Patient returns for continued burning to the 1-2 toes and top of the foot, the diagnostic block helped for a time but wore off. Here to discuss surgical release of the deep peroneal nerve    Review of Systems   Constitutional: Negative for chills and fever.   Cardiovascular:  Negative for claudication and leg swelling.   Respiratory:  Negative for shortness of breath.    Skin:  Negative for itching, nail changes and rash.   Musculoskeletal:  Positive for back pain. Negative for muscle cramps, muscle weakness and myalgias.   Gastrointestinal:  Negative for nausea and vomiting.   Neurological:  Positive for numbness and paresthesias. Negative for focal weakness and loss of balance.           Objective:       Physical Exam  Constitutional:       General: He is not in acute distress.     Appearance: He is well-developed. He is not diaphoretic.   Cardiovascular:      Pulses:           Dorsalis pedis pulses are 2+ on the right side and 2+ on the left side.        Posterior tibial pulses are 2+ on the right side and 2+ on the left side.      Comments: < 3 sec capillary refill time to toes 1-5 bilateral. Toes and feet are warm to touch proximally with normal distal cooling b/l. There is some hair growth on the feet and toes b/l. There is no edema b/l. No spider veins or varicosities present b/l.     Musculoskeletal:      Comments: Equinus noted b/l ankles with < 10 deg DF noted. MMT 5/5 in DF/PF/Inv/Ev resistance with no reproduction of pain in any direction. Passive range of motion of ankle and pedal joints is painless b/l.    Left dorsal foot paresthesias with palpation overlying the deep peroneal nerve.    Left dorsal hallux there is an area of superficial irritation hyperpigmentation and tenderness to palpation    Medial 1st MTPJ exostosis. Lateral deviation of hallux, non trackbound. No pain w/ ROM to 1st or 2nd MTPJs.         Skin:     General: Skin is warm and dry.      Coloration: Skin is not pale.      Findings: No abrasion, bruising, burn, ecchymosis, erythema, laceration, lesion, petechiae or rash.      Nails: There is no clubbing.      Comments: Skin temperature, texture and turgor within normal limits.    Left dorsal hallux sore healing well without erythema or edema       Neurological:      Mental Status: He is alert and oriented to person, place, and time.      Sensory: Sensory deficit present.      Motor: No tremor, atrophy or abnormal muscle tone.      Comments: Negative tinel sign bilateral. Diminished vibratory sensation bilateral   Psychiatric:         Behavior: Behavior normal.               Assessment:       Encounter Diagnoses   Name Primary?    Chronic pain in left foot Yes    Neuritis     Entrapment  of left deep peroneal nerve                Plan:       Abel was seen today for toe pain.    Diagnoses and all orders for this visit:    Chronic pain in left foot  -     Case Request Operating Room: DECOMPRESSION, NERVE    Neuritis  -     Case Request Operating Room: DECOMPRESSION, NERVE    Entrapment of left deep peroneal nerve  -     Case Request Operating Room: DECOMPRESSION, NERVE            I counseled the patient on his conditions, their implications and medical management.    With his history of lower back pain I recommend seeing someone in pain clinic as this could be contributing to the chronic foot neuropathy pains    Continue the gabapentin to 600 mg BID    Discussed neuroplasty procedure to the deep peroneal nerve with 2 weeks post op with foot dressings on and in a darco shoe, he is amendable to this plan    Upon reviewing the risks/benefits,  the planned procedure, the surgical goal, and the postoperative course for the Lt. Foot deep peroneal nerve release/decompression, the written consent was signed, timed, and dated by the patient with a witness present.      Referred to PCP for medical optimization and risk stratification will also need cardiac clearance, e consult will be placed as he has a pace maker and is on blood thinners    Return 1 week post op    Kristopher Santos DPM

## 2025-04-29 ENCOUNTER — OFFICE VISIT (OUTPATIENT)
Dept: FAMILY MEDICINE | Facility: CLINIC | Age: 65
End: 2025-04-29
Payer: COMMERCIAL

## 2025-04-29 VITALS
HEART RATE: 65 BPM | BODY MASS INDEX: 38.16 KG/M2 | WEIGHT: 297.38 LBS | DIASTOLIC BLOOD PRESSURE: 60 MMHG | RESPIRATION RATE: 94 BRPM | HEIGHT: 74 IN | SYSTOLIC BLOOD PRESSURE: 118 MMHG

## 2025-04-29 DIAGNOSIS — I10 ESSENTIAL HYPERTENSION: ICD-10-CM

## 2025-04-29 DIAGNOSIS — I48.0 PAF (PAROXYSMAL ATRIAL FIBRILLATION): ICD-10-CM

## 2025-04-29 DIAGNOSIS — F31.9 BIPOLAR AFFECTIVE DISORDER, REMISSION STATUS UNSPECIFIED: ICD-10-CM

## 2025-04-29 DIAGNOSIS — G57.82 NERVE ENTRAPMENT OF LOWER LIMB, LEFT: Primary | ICD-10-CM

## 2025-04-29 DIAGNOSIS — N18.30 STAGE 3 CHRONIC KIDNEY DISEASE, UNSPECIFIED WHETHER STAGE 3A OR 3B CKD: ICD-10-CM

## 2025-04-29 DIAGNOSIS — E66.01 SEVERE OBESITY (BMI 35.0-39.9) WITH COMORBIDITY: ICD-10-CM

## 2025-04-29 PROCEDURE — 4010F ACE/ARB THERAPY RXD/TAKEN: CPT | Mod: CPTII,S$GLB,, | Performed by: FAMILY MEDICINE

## 2025-04-29 PROCEDURE — 99214 OFFICE O/P EST MOD 30 MIN: CPT | Mod: S$GLB,,, | Performed by: FAMILY MEDICINE

## 2025-04-29 PROCEDURE — 3061F NEG MICROALBUMINURIA REV: CPT | Mod: CPTII,S$GLB,, | Performed by: FAMILY MEDICINE

## 2025-04-29 PROCEDURE — 1159F MED LIST DOCD IN RCRD: CPT | Mod: CPTII,S$GLB,, | Performed by: FAMILY MEDICINE

## 2025-04-29 PROCEDURE — 3078F DIAST BP <80 MM HG: CPT | Mod: CPTII,S$GLB,, | Performed by: FAMILY MEDICINE

## 2025-04-29 PROCEDURE — 99999 PR PBB SHADOW E&M-EST. PATIENT-LVL IV: CPT | Mod: PBBFAC,,, | Performed by: FAMILY MEDICINE

## 2025-04-29 PROCEDURE — 3008F BODY MASS INDEX DOCD: CPT | Mod: CPTII,S$GLB,, | Performed by: FAMILY MEDICINE

## 2025-04-29 PROCEDURE — 3074F SYST BP LT 130 MM HG: CPT | Mod: CPTII,S$GLB,, | Performed by: FAMILY MEDICINE

## 2025-04-29 PROCEDURE — 3066F NEPHROPATHY DOC TX: CPT | Mod: CPTII,S$GLB,, | Performed by: FAMILY MEDICINE

## 2025-04-29 PROCEDURE — 1160F RVW MEDS BY RX/DR IN RCRD: CPT | Mod: CPTII,S$GLB,, | Performed by: FAMILY MEDICINE

## 2025-04-29 NOTE — PROGRESS NOTES
Patient ID: Abel Dc III is a 64 y.o. male.    Chief Complaint: Follow-up (Sx clearance for podiatry )    History of Present Illness    CHIEF COMPLAINT:  Patient presents today for pre-operative evaluation for deep perineal nerve release.    NEUROLOGICAL:  He has polyneuropathy with significant issues affecting the left foot.    CARDIOVASCULAR:  He has a pacemaker. He denies symptoms consistent with coronary disease or congestive heart failure.    RENAL:  He has stable Stage 3A CKD.    PSYCHIATRIC:  He has bipolar affective disorder and is followed by psychiatry in [place]. He reports being stable on current medication regimen.    SOCIAL HISTORY:  He is single and lives independently in his own apartment.    CURRENT MEDICATIONS:  He is currently taking Eliquis which requires discontinuation 5 days prior to procedure, and aspirin which requires discontinuation 1 week before procedure.      ROS:  General: -fever, -chills, -fatigue, -weight gain, -weight loss  Eyes: -vision changes, -redness, -discharge  ENT: -ear pain, -nasal congestion, -sore throat  Cardiovascular: -chest pain, -palpitations, -lower extremity edema  Respiratory: -cough, -shortness of breath  Gastrointestinal: -abdominal pain, -nausea, -vomiting, -diarrhea, -constipation, -blood in stool  Genitourinary: -dysuria, -hematuria, -frequency  Musculoskeletal: -joint pain, -muscle pain  Skin: -rash, -lesion  Neurological: -headache, -dizziness, -numbness, +tingling, +nerve pain  Psychiatric: -anxiety, -depression, -sleep difficulty         Physical Exam    Vitals: BMI: 38. Morbidly obese.  General: No acute distress. Well-developed. Well-nourished.  Eyes: EOMI. Sclerae anicteric.  HENT: Normocephalic. Atraumatic. Nares patent. Moist oral mucosa.  Ears: Bilateral TMs clear. Bilateral EACs clear.  Cardiovascular: Regular rate. Regular rhythm. No murmurs. No rubs. No gallops. Normal S1, S2.  Respiratory: Normal respiratory effort. Clear to  auscultation bilaterally. No rales. No rhonchi. No wheezing.  Abdomen: Soft. Non-tender. Non-distended. Normoactive bowel sounds.  Musculoskeletal: No  obvious deformity.  Extremities: No lower extremity edema.  Neurological: Alert & oriented x3. No slurred speech. Normal gait.  Psychiatric: Normal mood. Normal affect. Good insight. Good judgment.  Skin: Warm. Dry. No rash.         Assessment & Plan    G62.9 Polyneuropathy, unspecified  N18.31 Chronic kidney disease, stage 3a  F31.9 Bipolar disorder, unspecified  E66.01 Morbid (severe) obesity due to excess calories  Z95.0 Presence of cardiac pacemaker  Z68.38 Body mass index [BMI] 38.0-38.9, adult  Z79.01 Long term (current) use of anticoagulants    IMPRESSION:  - Cleared for low-risk deep perineal nerve release procedure under conscious sedation with regional nerve block.  - Pacemaker requires cardiology clearance in addition to current clearance.  - BP well-controlled on current medication regimen.  - Stage 3A CKD, likely due to past hypertensive disease, stable.  - BMI 38, acknowledging difficulty with weight loss.  - Bipolar affective disorder on appropriate medication mix and followed by psychiatry in [place].    POLYNEUROPATHY:  - Patient has been experiencing polyneuropathy with significant issues on the left side of the foot.  - Scheduled for a deep peroneal nerve release procedure under conscious sedation with regional nerve block.    CHRONIC KIDNEY DISEASE:  - Patient has stage 3A chronic kidney disease, which is currently stable.  - The condition is likely due to hypertensive disease in the past.    BIPOLAR DISORDER:  - Patient has bipolar affective disorder and is under psychiatric care in [place].  - Patient appears to be doing well with current medication regimen.    MORBID OBESITY:  - Patient has a BMI of 38, indicating morbid obesity.  - Advised to continue efforts to lose weight.  - Will continue discussing weight management strategies with  the patient at future visits.    CARDIAC PACEMAKER:  - Patient has a pacemaker.  - Referred to cardiology for clearance for upcoming procedure due to this.    ANTICOAGULANT USE:  - Patient is on Eliquis.  - Instructed to discontinue Eliquis 5 days prior to the procedure and aspirin 1 week prior to the procedure.    FOLLOW-UP:  - Follow up in 6 months for annual visit.              Follow up in about 6 months (around 10/29/2025).    This note was generated with the assistance of ambient listening technology. Verbal consent was obtained by the patient and accompanying visitor(s) for the recording of patient appointment to facilitate this note. I attest to having reviewed and edited the generated note for accuracy, though some syntax or spelling errors may persist. Please contact the author of this note for any clarification.

## 2025-04-29 NOTE — H&P (VIEW-ONLY)
Patient ID: Abel Dc III is a 64 y.o. male.    Chief Complaint: Follow-up (Sx clearance for podiatry )    History of Present Illness    CHIEF COMPLAINT:  Patient presents today for pre-operative evaluation for deep perineal nerve release.    NEUROLOGICAL:  He has polyneuropathy with significant issues affecting the left foot.    CARDIOVASCULAR:  He has a pacemaker. He denies symptoms consistent with coronary disease or congestive heart failure.    RENAL:  He has stable Stage 3A CKD.    PSYCHIATRIC:  He has bipolar affective disorder and is followed by psychiatry in [place]. He reports being stable on current medication regimen.    SOCIAL HISTORY:  He is single and lives independently in his own apartment.    CURRENT MEDICATIONS:  He is currently taking Eliquis which requires discontinuation 5 days prior to procedure, and aspirin which requires discontinuation 1 week before procedure.      ROS:  General: -fever, -chills, -fatigue, -weight gain, -weight loss  Eyes: -vision changes, -redness, -discharge  ENT: -ear pain, -nasal congestion, -sore throat  Cardiovascular: -chest pain, -palpitations, -lower extremity edema  Respiratory: -cough, -shortness of breath  Gastrointestinal: -abdominal pain, -nausea, -vomiting, -diarrhea, -constipation, -blood in stool  Genitourinary: -dysuria, -hematuria, -frequency  Musculoskeletal: -joint pain, -muscle pain  Skin: -rash, -lesion  Neurological: -headache, -dizziness, -numbness, +tingling, +nerve pain  Psychiatric: -anxiety, -depression, -sleep difficulty         Physical Exam    Vitals: BMI: 38. Morbidly obese.  General: No acute distress. Well-developed. Well-nourished.  Eyes: EOMI. Sclerae anicteric.  HENT: Normocephalic. Atraumatic. Nares patent. Moist oral mucosa.  Ears: Bilateral TMs clear. Bilateral EACs clear.  Cardiovascular: Regular rate. Regular rhythm. No murmurs. No rubs. No gallops. Normal S1, S2.  Respiratory: Normal respiratory effort. Clear to  auscultation bilaterally. No rales. No rhonchi. No wheezing.  Abdomen: Soft. Non-tender. Non-distended. Normoactive bowel sounds.  Musculoskeletal: No  obvious deformity.  Extremities: No lower extremity edema.  Neurological: Alert & oriented x3. No slurred speech. Normal gait.  Psychiatric: Normal mood. Normal affect. Good insight. Good judgment.  Skin: Warm. Dry. No rash.         Assessment & Plan    G62.9 Polyneuropathy, unspecified  N18.31 Chronic kidney disease, stage 3a  F31.9 Bipolar disorder, unspecified  E66.01 Morbid (severe) obesity due to excess calories  Z95.0 Presence of cardiac pacemaker  Z68.38 Body mass index [BMI] 38.0-38.9, adult  Z79.01 Long term (current) use of anticoagulants    IMPRESSION:  - Cleared for low-risk deep perineal nerve release procedure under conscious sedation with regional nerve block.  - Pacemaker requires cardiology clearance in addition to current clearance.  - BP well-controlled on current medication regimen.  - Stage 3A CKD, likely due to past hypertensive disease, stable.  - BMI 38, acknowledging difficulty with weight loss.  - Bipolar affective disorder on appropriate medication mix and followed by psychiatry in [place].    POLYNEUROPATHY:  - Patient has been experiencing polyneuropathy with significant issues on the left side of the foot.  - Scheduled for a deep peroneal nerve release procedure under conscious sedation with regional nerve block.    CHRONIC KIDNEY DISEASE:  - Patient has stage 3A chronic kidney disease, which is currently stable.  - The condition is likely due to hypertensive disease in the past.    BIPOLAR DISORDER:  - Patient has bipolar affective disorder and is under psychiatric care in [place].  - Patient appears to be doing well with current medication regimen.    MORBID OBESITY:  - Patient has a BMI of 38, indicating morbid obesity.  - Advised to continue efforts to lose weight.  - Will continue discussing weight management strategies with  the patient at future visits.    CARDIAC PACEMAKER:  - Patient has a pacemaker.  - Referred to cardiology for clearance for upcoming procedure due to this.    ANTICOAGULANT USE:  - Patient is on Eliquis.  - Instructed to discontinue Eliquis 5 days prior to the procedure and aspirin 1 week prior to the procedure.    FOLLOW-UP:  - Follow up in 6 months for annual visit.              Follow up in about 6 months (around 10/29/2025).    This note was generated with the assistance of ambient listening technology. Verbal consent was obtained by the patient and accompanying visitor(s) for the recording of patient appointment to facilitate this note. I attest to having reviewed and edited the generated note for accuracy, though some syntax or spelling errors may persist. Please contact the author of this note for any clarification.

## 2025-05-07 ENCOUNTER — E-CONSULT (OUTPATIENT)
Dept: CARDIOLOGY | Facility: CLINIC | Age: 65
End: 2025-05-07
Payer: COMMERCIAL

## 2025-05-07 DIAGNOSIS — I48.0 PAROXYSMAL ATRIAL FIBRILLATION: ICD-10-CM

## 2025-05-07 DIAGNOSIS — Z01.818 PREOPERATIVE CLEARANCE: Primary | ICD-10-CM

## 2025-05-07 DIAGNOSIS — G57.32 ENTRAPMENT OF LEFT DEEP PERONEAL NERVE: ICD-10-CM

## 2025-05-07 DIAGNOSIS — Z01.818 ENCOUNTER FOR PREOPERATIVE ASSESSMENT: Primary | ICD-10-CM

## 2025-05-07 DIAGNOSIS — I10 PRIMARY HYPERTENSION: ICD-10-CM

## 2025-05-08 ENCOUNTER — TELEPHONE (OUTPATIENT)
Dept: SURGERY | Facility: HOSPITAL | Age: 65
End: 2025-05-08
Payer: COMMERCIAL

## 2025-05-08 NOTE — TELEPHONE ENCOUNTER
Pt is scheduled for DECOMPRESSION, NERVE - Left on 5/15. Does pt need to hold Aspirin? Please reach out to pt. Thank you.

## 2025-05-14 ENCOUNTER — ANESTHESIA EVENT (OUTPATIENT)
Dept: SURGERY | Facility: HOSPITAL | Age: 65
End: 2025-05-14
Payer: COMMERCIAL

## 2025-05-15 ENCOUNTER — PATIENT MESSAGE (OUTPATIENT)
Dept: CARDIOLOGY | Facility: CLINIC | Age: 65
End: 2025-05-15
Payer: COMMERCIAL

## 2025-05-15 ENCOUNTER — ANESTHESIA (OUTPATIENT)
Dept: SURGERY | Facility: HOSPITAL | Age: 65
End: 2025-05-15
Payer: COMMERCIAL

## 2025-05-15 ENCOUNTER — HOSPITAL ENCOUNTER (OUTPATIENT)
Facility: HOSPITAL | Age: 65
Discharge: HOME | End: 2025-05-15
Attending: PODIATRIST | Admitting: PODIATRIST
Payer: COMMERCIAL

## 2025-05-15 VITALS
RESPIRATION RATE: 10 BRPM | OXYGEN SATURATION: 95 % | HEART RATE: 59 BPM | BODY MASS INDEX: 38.12 KG/M2 | HEIGHT: 74 IN | WEIGHT: 297 LBS | TEMPERATURE: 98 F | DIASTOLIC BLOOD PRESSURE: 54 MMHG | SYSTOLIC BLOOD PRESSURE: 100 MMHG

## 2025-05-15 DIAGNOSIS — G57.32 ENTRAPMENT OF LEFT DEEP PERONEAL NERVE: Primary | ICD-10-CM

## 2025-05-15 PROCEDURE — 36000707: Mod: PO | Performed by: PODIATRIST

## 2025-05-15 PROCEDURE — 64704 REVISE HAND/FOOT NERVE: CPT | Mod: LT,,, | Performed by: PODIATRIST

## 2025-05-15 PROCEDURE — 63600175 PHARM REV CODE 636 W HCPCS: Mod: PO | Performed by: PODIATRIST

## 2025-05-15 PROCEDURE — 37000009 HC ANESTHESIA EA ADD 15 MINS: Mod: PO | Performed by: PODIATRIST

## 2025-05-15 PROCEDURE — 37000008 HC ANESTHESIA 1ST 15 MINUTES: Mod: PO | Performed by: PODIATRIST

## 2025-05-15 PROCEDURE — 63600175 PHARM REV CODE 636 W HCPCS: Mod: PO | Performed by: NURSE ANESTHETIST, CERTIFIED REGISTERED

## 2025-05-15 PROCEDURE — 71000033 HC RECOVERY, INTIAL HOUR: Mod: PO | Performed by: PODIATRIST

## 2025-05-15 PROCEDURE — 63600175 PHARM REV CODE 636 W HCPCS: Mod: PO | Performed by: ANESTHESIOLOGY

## 2025-05-15 PROCEDURE — 71000015 HC POSTOP RECOV 1ST HR: Mod: PO | Performed by: PODIATRIST

## 2025-05-15 PROCEDURE — 36000706: Mod: PO | Performed by: PODIATRIST

## 2025-05-15 PROCEDURE — 25000003 PHARM REV CODE 250: Mod: PO | Performed by: NURSE ANESTHETIST, CERTIFIED REGISTERED

## 2025-05-15 RX ORDER — ACETAMINOPHEN 10 MG/ML
INJECTION, SOLUTION INTRAVENOUS
Status: DISCONTINUED | OUTPATIENT
Start: 2025-05-15 | End: 2025-05-15

## 2025-05-15 RX ORDER — LIDOCAINE HYDROCHLORIDE 20 MG/ML
INJECTION INTRAVENOUS
Status: DISCONTINUED | OUTPATIENT
Start: 2025-05-15 | End: 2025-05-15

## 2025-05-15 RX ORDER — CEFAZOLIN SODIUM 1 G/3ML
2 INJECTION, POWDER, FOR SOLUTION INTRAMUSCULAR; INTRAVENOUS ONCE
Status: COMPLETED | OUTPATIENT
Start: 2025-05-15 | End: 2025-05-15

## 2025-05-15 RX ORDER — LIDOCAINE HYDROCHLORIDE 10 MG/ML
1 INJECTION, SOLUTION EPIDURAL; INFILTRATION; INTRACAUDAL; PERINEURAL ONCE
Status: DISCONTINUED | OUTPATIENT
Start: 2025-05-15 | End: 2025-05-15 | Stop reason: HOSPADM

## 2025-05-15 RX ORDER — FENTANYL CITRATE 50 UG/ML
INJECTION, SOLUTION INTRAMUSCULAR; INTRAVENOUS
Status: DISCONTINUED | OUTPATIENT
Start: 2025-05-15 | End: 2025-05-15

## 2025-05-15 RX ORDER — BUPIVACAINE HYDROCHLORIDE 5 MG/ML
INJECTION, SOLUTION EPIDURAL; INTRACAUDAL; PERINEURAL
Status: DISCONTINUED | OUTPATIENT
Start: 2025-05-15 | End: 2025-05-15 | Stop reason: HOSPADM

## 2025-05-15 RX ORDER — PROPOFOL 10 MG/ML
VIAL (ML) INTRAVENOUS CONTINUOUS PRN
Status: DISCONTINUED | OUTPATIENT
Start: 2025-05-15 | End: 2025-05-15

## 2025-05-15 RX ORDER — PROPOFOL 10 MG/ML
VIAL (ML) INTRAVENOUS
Status: DISCONTINUED | OUTPATIENT
Start: 2025-05-15 | End: 2025-05-15

## 2025-05-15 RX ORDER — EPHEDRINE SULFATE 50 MG/ML
INJECTION, SOLUTION INTRAVENOUS
Status: DISCONTINUED | OUTPATIENT
Start: 2025-05-15 | End: 2025-05-15

## 2025-05-15 RX ORDER — MIDAZOLAM HYDROCHLORIDE 1 MG/ML
INJECTION INTRAMUSCULAR; INTRAVENOUS
Status: DISCONTINUED | OUTPATIENT
Start: 2025-05-15 | End: 2025-05-15

## 2025-05-15 RX ORDER — FENTANYL CITRATE 50 UG/ML
25 INJECTION, SOLUTION INTRAMUSCULAR; INTRAVENOUS EVERY 5 MIN PRN
Status: DISCONTINUED | OUTPATIENT
Start: 2025-05-15 | End: 2025-05-15 | Stop reason: HOSPADM

## 2025-05-15 RX ORDER — SODIUM CHLORIDE, SODIUM LACTATE, POTASSIUM CHLORIDE, CALCIUM CHLORIDE 600; 310; 30; 20 MG/100ML; MG/100ML; MG/100ML; MG/100ML
INJECTION, SOLUTION INTRAVENOUS CONTINUOUS
Status: DISCONTINUED | OUTPATIENT
Start: 2025-05-15 | End: 2025-05-15 | Stop reason: HOSPADM

## 2025-05-15 RX ORDER — ONDANSETRON HYDROCHLORIDE 2 MG/ML
INJECTION, SOLUTION INTRAVENOUS
Status: DISCONTINUED | OUTPATIENT
Start: 2025-05-15 | End: 2025-05-15

## 2025-05-15 RX ORDER — PHENYLEPHRINE HYDROCHLORIDE 10 MG/ML
INJECTION INTRAVENOUS
Status: DISCONTINUED | OUTPATIENT
Start: 2025-05-15 | End: 2025-05-15

## 2025-05-15 RX ORDER — LIDOCAINE HYDROCHLORIDE 10 MG/ML
INJECTION, SOLUTION EPIDURAL; INFILTRATION; INTRACAUDAL; PERINEURAL
Status: DISCONTINUED | OUTPATIENT
Start: 2025-05-15 | End: 2025-05-15 | Stop reason: HOSPADM

## 2025-05-15 RX ORDER — GLUCAGON 1 MG
1 KIT INJECTION
Status: DISCONTINUED | OUTPATIENT
Start: 2025-05-15 | End: 2025-05-15 | Stop reason: HOSPADM

## 2025-05-15 RX ADMIN — PROPOFOL 100 MCG/KG/MIN: 10 INJECTION, EMULSION INTRAVENOUS at 09:05

## 2025-05-15 RX ADMIN — EPHEDRINE SULFATE 10 MG: 50 INJECTION INTRAVENOUS at 09:05

## 2025-05-15 RX ADMIN — SODIUM CHLORIDE, POTASSIUM CHLORIDE, SODIUM LACTATE AND CALCIUM CHLORIDE: 600; 310; 30; 20 INJECTION, SOLUTION INTRAVENOUS at 08:05

## 2025-05-15 RX ADMIN — PROPOFOL 70 MG: 10 INJECTION, EMULSION INTRAVENOUS at 09:05

## 2025-05-15 RX ADMIN — PHENYLEPHRINE HYDROCHLORIDE 100 MCG: 10 INJECTION INTRAVENOUS at 09:05

## 2025-05-15 RX ADMIN — MIDAZOLAM HYDROCHLORIDE 2 MG: 2 INJECTION, SOLUTION INTRAMUSCULAR; INTRAVENOUS at 09:05

## 2025-05-15 RX ADMIN — LIDOCAINE HYDROCHLORIDE 50 MG: 20 INJECTION INTRAVENOUS at 09:05

## 2025-05-15 RX ADMIN — PHENYLEPHRINE HYDROCHLORIDE 200 MCG: 10 INJECTION INTRAVENOUS at 09:05

## 2025-05-15 RX ADMIN — ONDANSETRON 4 MG: 2 INJECTION, SOLUTION INTRAMUSCULAR; INTRAVENOUS at 09:05

## 2025-05-15 RX ADMIN — FENTANYL CITRATE 50 MCG: 0.05 INJECTION, SOLUTION INTRAMUSCULAR; INTRAVENOUS at 09:05

## 2025-05-15 RX ADMIN — CEFAZOLIN 2 G: 330 INJECTION, POWDER, FOR SOLUTION INTRAMUSCULAR; INTRAVENOUS at 09:05

## 2025-05-15 RX ADMIN — ACETAMINOPHEN 1000 MG: 10 INJECTION INTRAVENOUS at 10:05

## 2025-05-15 NOTE — ANESTHESIA POSTPROCEDURE EVALUATION
Anesthesia Post Evaluation    Patient: Abel Dc III    Procedure(s) Performed: Procedure(s) (LRB):  DECOMPRESSION, NERVE (Left)    Final Anesthesia Type: MAC      Patient location during evaluation: PACU  Patient participation: Yes- Able to Participate  Level of consciousness: awake  Post-procedure vital signs: reviewed and stable  Pain management: adequate  Airway patency: patent    PONV status at discharge: No PONV  Anesthetic complications: no      Cardiovascular status: blood pressure returned to baseline and hemodynamically stable  Respiratory status: spontaneous ventilation  Hydration status: euvolemic  Follow-up not needed.              Vitals Value Taken Time   BP 91/55 05/15/25 10:22   Temp  05/15/25 10:26   Pulse 59 05/15/25 10:22   Resp 10 05/15/25 10:22   SpO2 95 % 05/15/25 10:22         No case tracking events are documented in the log.      Pain/Clarita Score: Clarita Score: 5 (5/15/2025 10:22 AM)

## 2025-05-15 NOTE — INTERVAL H&P NOTE
The patient has been examined and the previous podiatry progress note has been reviewed:    I concur with the findings and no changes have occurred since my previous progress note was written.    Surgery risks, benefits and alternative options discussed and understood by patient/family.    Proceed as discussed consent reviewed and proper site confirmed and marked    Kristopher Santos DPM         There are no hospital problems to display for this patient.

## 2025-05-15 NOTE — INTERVAL H&P NOTE
The patient has been examined and the H&P has been reviewed:    I concur with the findings and no changes have occurred since H&P was written.    Anesthesia risks, benefits and alternative options discussed and understood by patient/family.    Agree with H&P.  Zacarias Lanier MD      There are no hospital problems to display for this patient.

## 2025-05-15 NOTE — DISCHARGE INSTRUCTIONS
FOOT SURGERY  After surgery:    DO:  Keep leg elevated until first post operative visit.  Keep ice pack on foot for 20 minutes each hour while awake for next 24-48 hours.  Keep dressing clean and dry. Do not change the bandages.  Advance diet as tolerated.   Check circulation frequently in toes by pressing down on toenail. Nail should turn white and then pink WITHIN 3 SECONDS when released.  Wear surgical shoe/boot. May remove to shower.  Do not walk without shoe/boot.  Resume home medications.    DO NOT:  Do not remove your dressing.  Do not get dressing wet.  Do not drive until released by your doctor.  Do not take additional tylenol/acetaminophen while taking narcotic pain medication that contains tylenol/acetaminophen.     CALL PHYSICIAN FOR:  Burning, or numbness of the toes not relieved by elevation of the leg.  Pale or cold toes.  Blue colored nail beds.  Redness, swelling, or bleeding.  Fever greater than 101,  Drainage (pus) from the operative site.  Pain unrelieved by pain medication.    FOR EMERGENCIES CONTACT YOUR PHYSICIAN'S OFFICE  [phone]

## 2025-05-15 NOTE — BRIEF OP NOTE
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    DISPOSITION: Home or Self Care    FINAL DIAGNOSIS:  Entrapment of left deep peroneal nerve    FOLLOWUP: In clinic    DISCHARGE INSTRUCTIONS:    Discharge Procedure Orders   Diet general     Keep surgical extremity elevated     Ice to affected area     Call MD for:  temperature >100.4     Call MD for:  persistent nausea and vomiting     Call MD for:  severe uncontrolled pain     Call MD for:  difficulty breathing, headache or visual disturbances     Call MD for:  redness, tenderness, or signs of infection (pain, swelling, redness, odor or green/yellow discharge around incision site)     Call MD for:  hives     Call MD for:  persistent dizziness or light-headedness     Call MD for:  extreme fatigue     Leave dressing on - Keep it clean, dry, and intact until clinic visit     Weight bearing restrictions (specify)   Order Comments: In post op shoe     Kristopher Santos DPM

## 2025-05-15 NOTE — ANESTHESIA PREPROCEDURE EVALUATION
05/15/2025  Abel Dc III is a 64 y.o., male.    Anesthesia Evaluation    I have reviewed the Patient Summary Reports.     I have reviewed the Nursing Notes. I have reviewed the NPO Status.   I have reviewed the Medications.     Review of Systems  Anesthesia Hx:             Denies Family Hx of Anesthesia complications.    Denies Personal Hx of Anesthesia complications.                    Social:  Smoker       Cardiovascular:    Pacemaker Hypertension    Dysrhythmias atrial fibrillation      hyperlipidemia   ECG has been reviewed. PAF; off Eliquis 3 days                           Pulmonary:  Pulmonary Normal                       Renal/:  Chronic Renal Disease, CKD                Hepatic/GI:     GERD                Musculoskeletal:         Spine Disorders: lumbar Degenerative disease and Disc disease           Neurological:  Neurology Normal          Left deep peroneal n                             Psych:   anxiety depression                Physical Exam  General:  Well nourished and Obesity       Airway/Jaw/Neck:  Airway Findings: Mouth Opening: Normal     Tongue: Normal      General Airway Assessment: Adult, Good      Mallampati: II   TM Distance: Normal, at least 6 cm               Dental:  Dental Findings: upper front caps      Chest/Lungs:  Chest/Lungs Clear      Heart/Vascular:  Heart Findings: Rate: Normal  Rhythm: Regular Rhythm                        Mental Status:  Mental Status Findings:  Alert and Oriented, Cooperative         Anesthesia Plan  Type of Anesthesia, risks & benefits discussed:  Anesthesia Type:  MAC    Patient's Preference:   Plan Factors:          Intra-op Monitoring Plan: standard ASA monitors  Intra-op Monitoring Plan Comments:   Post Op Pain Control Plan: IV/PO Opioids PRN and multimodal analgesia  Post Op Pain Control Plan Comments:     Induction:   IV and Inhalation  Beta  Blocker:         Informed Consent: Informed consent signed with the Patient and all parties understand the risks and agree with anesthesia plan.  All questions answered.  Anesthesia consent signed with patient.  ASA Score: 3     Day of Surgery Review of History & Physical:              Ready For Surgery From Anesthesia Perspective.         Patient Active Problem List   Diagnosis    Primary hypertension    Mixed hyperlipidemia    Bipolar disorder    Panic disorder    DDD (degenerative disc disease), lumbar    Gout, arthritis    GERD (gastroesophageal reflux disease)    BPH (benign prostatic hypertrophy)    Lumbar stenosis    History of shoulder surgery:  shoulder surgery, massive rotator cuff repair, biceps tenodesis, synovectomy    Rotator cuff syndrome of right shoulder    Subacromial impingement    Acromioclavicular joint arthritis    Biceps tendonitis    Hip pain, bilateral    Acquired scoliosis    Acquired spondylolisthesis    Lumbago    Degeneration of lumbar or lumbosacral intervertebral disc    Spondylosis without myelopathy    Spinal stenosis, lumbar region, without neurogenic claudication    Severe obesity (BMI 35.0-39.9) with comorbidity    Bilateral knee pain    Difficulty walking    Stage 3b chronic kidney disease    Secondary hyperparathyroidism of renal origin    Tremor    Gallstones without obstruction of gallbladder    Pulmonary nodules -- repeat CT chest in August 2021    Pain of right thigh    Encounter for long-term (current) use of other medications    Nicotine dependence, other tobacco product, uncomplicated    Dizziness    Chest pain    Symptomatic bradycardia    Advance care planning    Paroxysmal atrial fibrillation    Encounter for preoperative assessment     Past Medical History:   Diagnosis Date    Bipolar disorder     manic depressive x 1992    BPH (benign prostatic hypertrophy)     Chronic low back pain     Depression     GERD (gastroesophageal reflux disease)     Gout     Gout,  "arthritis     Hyperlipidemia     Hypertension     Lumbar disc disease     Obesity     Panic disorder      Past Surgical History:   Procedure Laterality Date    A-V CARDIAC PACEMAKER INSERTION Left 2/18/2023    Procedure: INSERTION, CARDIAC PACEMAKER, DUAL CHAMBER;  Surgeon: Cralos Smith MD;  Location: [facility] CATH;  Service: Cardiology;  Laterality: Left;    HERNIA REPAIR      HUMERUS FRACTURE SURGERY  1971    Bone grafts required    INSERTION, PACEMAKER, TEMPORARY TRANSVENOUS  2/18/2023    Procedure: Insertion, Pacemaker, Temporary Transvenous;  Surgeon: Carlos Smith MD;  Location: [facility] CATH;  Service: Cardiology;;    MANDIBLE FRACTURE SURGERY      MUSCLE TRANSFER UPPER ARM      SHOULDER ARTHROSCOPY      SHOULDER SURGERY      TONSILLECTOMY       Social History     Socioeconomic History    Marital status: Other   Occupational History    Occupation: disability for manic-depressive state   Tobacco Use    Smoking status: Never    Smokeless tobacco: Current     Types: Snuff    Tobacco comments:     Patient uses "dip" tobacco   Substance and Sexual Activity    Alcohol use: No     Comment: Heavy alcohol use in the past    Drug use: No    Sexual activity: Yes     Partners: Female   Social History Narrative    No longer working, is on disability     Social Drivers of Health     Financial Resource Strain: Low Risk  (5/8/2024)    Overall Financial Resource Strain (CARDIA)     Difficulty of Paying Living Expenses: Not hard at all   Food Insecurity: No Food Insecurity (5/8/2024)    Hunger Vital Sign     Worried About Running Out of Food in the Last Year: Never true     Ran Out of Food in the Last Year: Never true   Transportation Needs: No Transportation Needs (5/8/2024)    PRAPARE - Transportation     Lack of Transportation (Medical): No     Lack of Transportation (Non-Medical): No   Physical Activity: Inactive (5/8/2024)    Exercise Vital Sign     Days of Exercise per Week: 0 days     Minutes of Exercise per " "Session: 0 min   Stress: No Stress Concern Present (5/8/2024)    [employer] - Occupational Stress Questionnaire     Feeling of Stress : Not at all   Housing Stability: Low Risk  (4/5/2023)    Housing Stability Vital Sign     Unable to Pay for Housing in the Last Year: No     Number of Places Lived in the Last Year: 2     Unstable Housing in the Last Year: No     CMP  No results for input(s): "GLU", "CALCIUM", "ALBUMIN", "PROT", "NA", "K", "CO2", "CL", "BUN", "CREATININE", "ALKPHOS", "ALT", "AST", "BILITOT" in the last 24 hours.  No results found for this or any previous visit (from the past 2 weeks).  BMP  No results for input(s): "GLU", "NA", "K", "CL", "CO2", "BUN", "CREATININE", "CALCIUM", "MG" in the last 24 hours.  Lab Results   Component Value Date    TSH 0.669 02/18/2023             Physical Exam  General: Well nourished and Obesity    Airway:  Mallampati: II   Mouth Opening: Normal  TM Distance: Normal, at least 6 cm  Tongue: Normal    Dental:  upper front caps    Heart:  Rate: Normal  Rhythm: Regular Rhythm        Anesthesia Plan  Type of Anesthesia, risks & benefits discussed:    Anesthesia Type: MAC  Intra-op Monitoring Plan: standard ASA monitors  Post Op Pain Control Plan: IV/PO Opioids PRN and multimodal analgesia  Induction:  IV and Inhalation  Informed Consent: Informed consent signed with the Patient and all parties understand the risks and agree with anesthesia plan.  All questions answered.   ASA Score: 3    Ready For Surgery From Anesthesia Perspective.     .  "

## 2025-05-15 NOTE — TRANSFER OF CARE
"Anesthesia Transfer of Care Note    Patient: Abel Dc III    Procedure(s) Performed: Procedure(s) (LRB):  DECOMPRESSION, NERVE (Left)    Patient location: PACU    Anesthesia Type: MAC    Transport from OR: Transported from OR on room air with adequate spontaneous ventilation    Post pain: adequate analgesia    Post assessment: no apparent anesthetic complications and tolerated procedure well    Post vital signs: stable    Level of consciousness: sedated    Nausea/Vomiting: no nausea/vomiting    Complications: none    Transfer of care protocol was followed      Last vitals: Visit Vitals  BP (!) 96/55   Pulse 63   Temp 36.5 °C (97.7 °F) (Skin)   Resp 16   Ht 6' 2" (1.88 m)   Wt 134.7 kg (297 lb)   SpO2 97%   BMI 38.13 kg/m²     "

## 2025-05-15 NOTE — OP NOTE
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 Following IV sedation, local anesthesia was obtained utilizing 20 cc's of a 1:1 mixture of 1% Lidocaine plain and 0.5% Marcaine plain in an ankle block. The foot and leg was then scrubbed, prepped, and draped in the usual aseptic manner.     PROCEDURE:   Attention was then directed to the dorsal foot left where an incision was made from the base of the 1-2 metatarsals running obliquely and proximal in direction over the course of the deep peroneal nerve over the extensor hallucis brevis tendon. The incision was made through dermis, then deepened via sharp and blunt dissection through subcutaneous tissue ensuring retract any major vessels and nerves. Any bleeding vessels encountered were cauterized. The extensor hallucis brevis tendon was released over the deep peroneal nerve and removed from the area overlying the nerve. Superficial peroneal nerves were identified and carefully retracted out of the area. The seep peroneal nerve was identified and released from all surrounding soft tissue adhesions. The area was flushed with sterile saline and the skin  overlying the area was closed with 4-0 prolene. (CPT 91006)     The incision was dressed with xeroform and covered with a sterile compressive dressing consisting of 4 X 4s and kerlix. A Coban wrap and post-op shoe was then applied. The patient tolerated the procedure and anesthesia well. The patient was transported to recovery with vital signs stable and vascular status intact.      Kristopher Santos DPM

## 2025-05-16 ENCOUNTER — TELEPHONE (OUTPATIENT)
Dept: PODIATRY | Facility: CLINIC | Age: 65
End: 2025-05-16
Payer: COMMERCIAL

## 2025-05-16 ENCOUNTER — PATIENT MESSAGE (OUTPATIENT)
Dept: PODIATRY | Facility: CLINIC | Age: 65
End: 2025-05-16
Payer: COMMERCIAL

## 2025-05-16 NOTE — TELEPHONE ENCOUNTER
----- Message from Marlyn sent at 5/16/2025  1:51 PM CDT -----  Contact: self  Type:  Patient Returning CallWho Called:  pt Who Left Message for Patient:  Toshia Does the patient know what this is regarding?:  post op call pt is having just a little pain but is having a little diarrhea and so just wanted to speak to someone about it and has a few questions before the weekend Best Call Back Number:  [phone] Information:  thanks

## 2025-05-17 ENCOUNTER — PATIENT MESSAGE (OUTPATIENT)
Dept: PODIATRY | Facility: CLINIC | Age: 65
End: 2025-05-17
Payer: COMMERCIAL

## 2025-05-19 ENCOUNTER — CLINICAL SUPPORT (OUTPATIENT)
Dept: PODIATRY | Facility: CLINIC | Age: 65
End: 2025-05-19
Payer: COMMERCIAL

## 2025-05-19 ENCOUNTER — PATIENT MESSAGE (OUTPATIENT)
Dept: PODIATRY | Facility: CLINIC | Age: 65
End: 2025-05-19
Payer: COMMERCIAL

## 2025-05-19 VITALS — WEIGHT: 296.94 LBS | HEIGHT: 74 IN | BODY MASS INDEX: 38.11 KG/M2

## 2025-05-19 DIAGNOSIS — Z48.00 DRESSING CHANGE: Primary | ICD-10-CM

## 2025-05-19 PROCEDURE — 99999 PR PBB SHADOW E&M-EST. PATIENT-LVL III: CPT | Mod: PBBFAC,,,

## 2025-05-19 NOTE — PROGRESS NOTES
Pt seen today re pt request d/t cast padding and Ace wrap coming undone since 5/15 sx. Football dressing replaced with Xeroform, cast padding, and Coban and ambulating in PO shoe. RTC later this week to see provider for PO visit

## 2025-05-21 ENCOUNTER — OFFICE VISIT (OUTPATIENT)
Dept: PODIATRY | Facility: CLINIC | Age: 65
End: 2025-05-21
Payer: COMMERCIAL

## 2025-05-21 DIAGNOSIS — Z98.890 POST-OPERATIVE STATE: Primary | ICD-10-CM

## 2025-05-21 PROCEDURE — 99999 PR PBB SHADOW E&M-EST. PATIENT-LVL III: CPT | Mod: PBBFAC,,, | Performed by: PODIATRIST

## 2025-05-21 NOTE — PROGRESS NOTES
Subjective:      Patient ID: Abel Dc III is a 64 y.o. male.    Chief Complaint: Follow-up (Lt foot)    Abel is a 64 y.o. male who presents to the podiatry clinic  with complaint of left foot 1-2 toe pain dorsal aspect more at night with sharp and stinging pains at times, right second toe hurts at times as well. Has lower back issues and lumbar radiculopathy as well. Was being seen by Dr zafar who referred patient to me for a possible left deep peroneal nerve release, has had injections but he relates they did not help    11/20/24: Patient returns for follow up left foot pain, relates it is no longer an every day issue and is tolerable at this point although it does get bad on occasion    2/14/25: Patient returns for follow up left foot pain that has gotten bad again despite the gabapentin increase, here for more options, the pain is worst at night     3/11/25: patient returns for left foot pain here for deep peroneal nerve block under US guidance, has a new sore left great toe, on doxycycline and using mupirocin on the area daily    3/24/25: Patient returns for left foot pain today in the great toe where he had a previous blister that has healed but the area still looks a little red and is tender to touch    4/22/25: Patient returns for continued burning to the 1-2 toes and top of the foot, the diagnostic block helped for a time but wore off. Here to discuss surgical release of the deep peroneal nerve    5/21/25: Patient returns for post op 1 week left foot deep peroneal nerve neuroplasty, no pain noted dressings intact ambulating in the darco shoe    Review of Systems   Constitutional: Negative for chills and fever.   Cardiovascular:  Negative for claudication and leg swelling.   Respiratory:  Negative for shortness of breath.    Skin:  Negative for itching, nail changes and rash.   Musculoskeletal:  Positive for back pain. Negative for muscle cramps, muscle weakness and myalgias.   Gastrointestinal:   Negative for nausea and vomiting.   Neurological:  Positive for numbness and paresthesias. Negative for focal weakness and loss of balance.           Objective:      Physical Exam  Constitutional:       General: He is not in acute distress.     Appearance: He is well-developed. He is not diaphoretic.   Cardiovascular:      Pulses:           Dorsalis pedis pulses are 2+ on the right side and 2+ on the left side.        Posterior tibial pulses are 2+ on the right side and 2+ on the left side.      Comments: < 3 sec capillary refill time to toes 1-5 bilateral. Toes and feet are warm to touch proximally with normal distal cooling b/l. There is some hair growth on the feet and toes b/l. There is no edema b/l. No spider veins or varicosities present b/l.     Musculoskeletal:      Comments: Equinus noted b/l ankles with < 10 deg DF noted. MMT 5/5 in DF/PF/Inv/Ev resistance with no reproduction of pain in any direction. Passive range of motion of ankle and pedal joints is painless b/l.    Left dorsal foot paresthesias with palpation overlying the deep peroneal nerve.    Left dorsal hallux there is an area of superficial irritation hyperpigmentation and tenderness to palpation    Medial 1st MTPJ exostosis. Lateral deviation of hallux, non trackbound. No pain w/ ROM to 1st or 2nd MTPJs.         Skin:     General: Skin is warm and dry.      Coloration: Skin is not pale.      Findings: No abrasion, bruising, burn, ecchymosis, erythema, laceration, lesion, petechiae or rash.      Nails: There is no clubbing.      Comments: Skin temperature, texture and turgor within normal limits.    Left dorsal foot incision well coapted sutures intact no erythema dehiscence or drainage       Neurological:      Mental Status: He is alert and oriented to person, place, and time.      Sensory: Sensory deficit present.      Motor: No tremor, atrophy or abnormal muscle tone.      Comments: Negative tinel sign bilateral. Diminished vibratory  sensation bilateral   Psychiatric:         Behavior: Behavior normal.               Assessment:       Encounter Diagnosis   Name Primary?    Post-operative state Yes                 Plan:       Abel was seen today for follow-up.    Diagnoses and all orders for this visit:    Post-operative state              I counseled the patient on his conditions, their implications and medical management.    Incision covered with xeroform and gauze and football wraps, ambulate in the darco shoe    Return in 1 week for suture removal    Kristopher Santos DPM

## 2025-05-23 ENCOUNTER — PATIENT MESSAGE (OUTPATIENT)
Dept: PODIATRY | Facility: CLINIC | Age: 65
End: 2025-05-23
Payer: COMMERCIAL

## 2025-05-26 DIAGNOSIS — I10 PRIMARY HYPERTENSION: Primary | ICD-10-CM

## 2025-05-27 DIAGNOSIS — I10 PRIMARY HYPERTENSION: Primary | ICD-10-CM

## 2025-05-28 ENCOUNTER — OFFICE VISIT (OUTPATIENT)
Dept: PODIATRY | Facility: CLINIC | Age: 65
End: 2025-05-28
Payer: COMMERCIAL

## 2025-05-28 VITALS — BODY MASS INDEX: 38.11 KG/M2 | WEIGHT: 296.94 LBS | HEIGHT: 74 IN

## 2025-05-28 DIAGNOSIS — Z98.890 POST-OPERATIVE STATE: Primary | ICD-10-CM

## 2025-05-28 PROCEDURE — 99999 PR PBB SHADOW E&M-EST. PATIENT-LVL III: CPT | Mod: PBBFAC,,, | Performed by: PODIATRIST

## 2025-05-28 NOTE — PROGRESS NOTES
Subjective:      Patient ID: Abel Dc III is a 64 y.o. male.    Chief Complaint: Post-op Evaluation    Abel is a 64 y.o. male who presents to the podiatry clinic  with complaint of left foot 1-2 toe pain dorsal aspect more at night with sharp and stinging pains at times, right second toe hurts at times as well. Has lower back issues and lumbar radiculopathy as well. Was being seen by Dr zafar who referred patient to me for a possible left deep peroneal nerve release, has had injections but he relates they did not help    11/20/24: Patient returns for follow up left foot pain, relates it is no longer an every day issue and is tolerable at this point although it does get bad on occasion    2/14/25: Patient returns for follow up left foot pain that has gotten bad again despite the gabapentin increase, here for more options, the pain is worst at night     3/11/25: patient returns for left foot pain here for deep peroneal nerve block under US guidance, has a new sore left great toe, on doxycycline and using mupirocin on the area daily    3/24/25: Patient returns for left foot pain today in the great toe where he had a previous blister that has healed but the area still looks a little red and is tender to touch    4/22/25: Patient returns for continued burning to the 1-2 toes and top of the foot, the diagnostic block helped for a time but wore off. Here to discuss surgical release of the deep peroneal nerve    5/21/25: Patient returns for post op 1 week left foot deep peroneal nerve neuroplasty, no pain noted dressings intact ambulating in the darco shoe    5/28/25: patient returns for post op 2 weeks left deep peroneal neuroplasty no new complaints.     Review of Systems   Constitutional: Negative for chills and fever.   Cardiovascular:  Negative for claudication and leg swelling.   Respiratory:  Negative for shortness of breath.    Skin:  Negative for itching, nail changes and rash.   Musculoskeletal:  Positive for  back pain. Negative for muscle cramps, muscle weakness and myalgias.   Gastrointestinal:  Negative for nausea and vomiting.   Neurological:  Positive for numbness and paresthesias. Negative for focal weakness and loss of balance.           Objective:      Physical Exam  Constitutional:       General: He is not in acute distress.     Appearance: He is well-developed. He is not diaphoretic.   Cardiovascular:      Pulses:           Dorsalis pedis pulses are 2+ on the right side and 2+ on the left side.        Posterior tibial pulses are 2+ on the right side and 2+ on the left side.      Comments: < 3 sec capillary refill time to toes 1-5 bilateral. Toes and feet are warm to touch proximally with normal distal cooling b/l. There is some hair growth on the feet and toes b/l. There is no edema b/l. No spider veins or varicosities present b/l.     Musculoskeletal:      Comments: Equinus noted b/l ankles with < 10 deg DF noted. MMT 5/5 in DF/PF/Inv/Ev resistance with no reproduction of pain in any direction. Passive range of motion of ankle and pedal joints is painless b/l.    Left dorsal foot paresthesias with palpation overlying the deep peroneal nerve.    Left dorsal hallux there is an area of superficial irritation hyperpigmentation and tenderness to palpation    Medial 1st MTPJ exostosis. Lateral deviation of hallux, non trackbound. No pain w/ ROM to 1st or 2nd MTPJs.         Skin:     General: Skin is warm and dry.      Coloration: Skin is not pale.      Findings: No abrasion, bruising, burn, ecchymosis, erythema, laceration, lesion, petechiae or rash.      Nails: There is no clubbing.      Comments: Skin temperature, texture and turgor within normal limits.    Left dorsal foot incision well coapted sutures intact no erythema dehiscence or drainage       Neurological:      Mental Status: He is alert and oriented to person, place, and time.      Sensory: Sensory deficit present.      Motor: No tremor, atrophy or  abnormal muscle tone.      Comments: Negative tinel sign bilateral. Diminished vibratory sensation bilateral   Psychiatric:         Behavior: Behavior normal.               Assessment:       Encounter Diagnosis   Name Primary?    Post-operative state Yes                   Plan:       Abel was seen today for post-op evaluation.    Diagnoses and all orders for this visit:    Post-operative state                I counseled the patient on his conditions, their implications and medical management.    Incision healing well, sutures removed    Cover with band aid to prevent rubbing in shoes to incision    Return in 2 weeks to see how his transition to a shoe is going    Kristopher Santos DPM

## 2025-05-29 RX ORDER — METOPROLOL SUCCINATE 50 MG/1
50 TABLET, EXTENDED RELEASE ORAL
Qty: 30 TABLET | Refills: 11 | Status: SHIPPED | OUTPATIENT
Start: 2025-05-29

## 2025-05-29 RX ORDER — APIXABAN 5 MG/1
5 TABLET, FILM COATED ORAL 2 TIMES DAILY
Qty: 180 TABLET | Refills: 3 | Status: SHIPPED | OUTPATIENT
Start: 2025-05-29

## 2025-05-31 DIAGNOSIS — G62.9 PERIPHERAL POLYNEUROPATHY: ICD-10-CM

## 2025-06-02 RX ORDER — GABAPENTIN 300 MG/1
600 CAPSULE ORAL 3 TIMES DAILY
Qty: 270 CAPSULE | Refills: 3 | Status: SHIPPED | OUTPATIENT
Start: 2025-06-02

## 2025-06-11 ENCOUNTER — OFFICE VISIT (OUTPATIENT)
Dept: PODIATRY | Facility: CLINIC | Age: 65
End: 2025-06-11
Payer: COMMERCIAL

## 2025-06-11 VITALS — WEIGHT: 296.94 LBS | BODY MASS INDEX: 38.11 KG/M2 | HEIGHT: 74 IN

## 2025-06-11 DIAGNOSIS — Z98.890 POST-OPERATIVE STATE: Primary | ICD-10-CM

## 2025-06-11 PROCEDURE — 99999 PR PBB SHADOW E&M-EST. PATIENT-LVL III: CPT | Mod: PBBFAC,,, | Performed by: PODIATRIST

## 2025-06-11 NOTE — PROGRESS NOTES
Subjective:      Patient ID: Abel Dc III is a 64 y.o. male.    Chief Complaint: Post-op Evaluation    Abel is a 64 y.o. male who presents to the podiatry clinic  with complaint of left foot 1-2 toe pain dorsal aspect more at night with sharp and stinging pains at times, right second toe hurts at times as well. Has lower back issues and lumbar radiculopathy as well. Was being seen by Dr zafar who referred patient to me for a possible left deep peroneal nerve release, has had injections but he relates they did not help    11/20/24: Patient returns for follow up left foot pain, relates it is no longer an every day issue and is tolerable at this point although it does get bad on occasion    2/14/25: Patient returns for follow up left foot pain that has gotten bad again despite the gabapentin increase, here for more options, the pain is worst at night     3/11/25: patient returns for left foot pain here for deep peroneal nerve block under US guidance, has a new sore left great toe, on doxycycline and using mupirocin on the area daily    3/24/25: Patient returns for left foot pain today in the great toe where he had a previous blister that has healed but the area still looks a little red and is tender to touch    4/22/25: Patient returns for continued burning to the 1-2 toes and top of the foot, the diagnostic block helped for a time but wore off. Here to discuss surgical release of the deep peroneal nerve    5/21/25: Patient returns for post op 1 week left foot deep peroneal nerve neuroplasty, no pain noted dressings intact ambulating in the darco shoe    5/28/25: patient returns for post op 2 weeks left deep peroneal neuroplasty no new complaints.     6/11/25: patient returns for post op 4 weeks left deep peroneal nerve release, doing well no drainage from the incision and no pain overall    Review of Systems   Constitutional: Negative for chills and fever.   Cardiovascular:  Negative for claudication and leg  swelling.   Respiratory:  Negative for shortness of breath.    Skin:  Negative for itching, nail changes and rash.   Musculoskeletal:  Positive for back pain. Negative for muscle cramps, muscle weakness and myalgias.   Gastrointestinal:  Negative for nausea and vomiting.   Neurological:  Positive for numbness and paresthesias. Negative for focal weakness and loss of balance.           Objective:      Physical Exam  Constitutional:       General: He is not in acute distress.     Appearance: He is well-developed. He is not diaphoretic.   Cardiovascular:      Pulses:           Dorsalis pedis pulses are 2+ on the right side and 2+ on the left side.        Posterior tibial pulses are 2+ on the right side and 2+ on the left side.      Comments: < 3 sec capillary refill time to toes 1-5 bilateral. Toes and feet are warm to touch proximally with normal distal cooling b/l. There is some hair growth on the feet and toes b/l. There is no edema b/l. No spider veins or varicosities present b/l.     Musculoskeletal:      Comments: Equinus noted b/l ankles with < 10 deg DF noted. MMT 5/5 in DF/PF/Inv/Ev resistance with no reproduction of pain in any direction. Passive range of motion of ankle and pedal joints is painless b/l.    Left dorsal foot paresthesias with palpation overlying the deep peroneal nerve.    Left dorsal hallux there is an area of superficial irritation hyperpigmentation and tenderness to palpation    Medial 1st MTPJ exostosis. Lateral deviation of hallux, non trackbound. No pain w/ ROM to 1st or 2nd MTPJs.         Skin:     General: Skin is warm and dry.      Coloration: Skin is not pale.      Findings: No abrasion, bruising, burn, ecchymosis, erythema, laceration, lesion, petechiae or rash.      Nails: There is no clubbing.      Comments: Skin temperature, texture and turgor within normal limits.    Left dorsal foot incision well healed       Neurological:      Mental Status: He is alert and oriented to person,  place, and time.      Sensory: Sensory deficit present.      Motor: No tremor, atrophy or abnormal muscle tone.      Comments: Negative tinel sign bilateral. Diminished vibratory sensation bilateral   Psychiatric:         Behavior: Behavior normal.               Assessment:       Encounter Diagnosis   Name Primary?    Post-operative state Yes                     Plan:       Abel was seen today for post-op evaluation.    Diagnoses and all orders for this visit:    Post-operative state                  I counseled the patient on his conditions, their implications and medical management.    Doing well return to normal activity to toleration    Return ZENA Santos DPM

## 2025-06-12 ENCOUNTER — PATIENT MESSAGE (OUTPATIENT)
Dept: PODIATRY | Facility: CLINIC | Age: 65
End: 2025-06-12
Payer: COMMERCIAL

## 2025-06-26 ENCOUNTER — CLINICAL SUPPORT (OUTPATIENT)
Dept: CARDIOLOGY | Facility: HOSPITAL | Age: 65
End: 2025-06-26
Payer: COMMERCIAL

## 2025-06-26 ENCOUNTER — HOSPITAL ENCOUNTER (OUTPATIENT)
Dept: CARDIOLOGY | Facility: HOSPITAL | Age: 65
Discharge: HOME | End: 2025-06-26
Attending: INTERNAL MEDICINE
Payer: COMMERCIAL

## 2025-06-26 DIAGNOSIS — R00.1 BRADYCARDIA, UNSPECIFIED: ICD-10-CM

## 2025-06-26 PROCEDURE — 93294 REM INTERROG EVL PM/LDLS PM: CPT | Mod: ,,, | Performed by: INTERNAL MEDICINE

## 2025-06-26 PROCEDURE — 93296 REM INTERROG EVL PM/IDS: CPT | Mod: PO | Performed by: INTERNAL MEDICINE

## 2025-07-09 LAB
OHS CV AF BURDEN PERCENT: < 1
OHS CV DC REMOTE DEVICE TYPE: NORMAL
OHS CV ICD SHOCK: NO
OHS CV RV PACING PERCENT: 1 %

## 2025-07-14 ENCOUNTER — PATIENT MESSAGE (OUTPATIENT)
Dept: ADMINISTRATIVE | Facility: HOSPITAL | Age: 65
End: 2025-07-14
Payer: COMMERCIAL

## 2025-07-16 ENCOUNTER — PATIENT OUTREACH (OUTPATIENT)
Dept: ADMINISTRATIVE | Facility: HOSPITAL | Age: 65
End: 2025-07-16
Payer: COMMERCIAL

## 2025-07-16 DIAGNOSIS — Z12.12 ENCOUNTER FOR COLORECTAL CANCER SCREENING: Primary | ICD-10-CM

## 2025-07-16 DIAGNOSIS — Z12.11 ENCOUNTER FOR COLORECTAL CANCER SCREENING: Primary | ICD-10-CM

## 2025-07-16 NOTE — PROGRESS NOTES
Population Health Chart Review & Patient Outreach Details      Additional [facility] Notes:               Updates Requested / Reviewed:      Updated Care Coordination Note         Health Maintenance Topics Overdue:      VBHM Score: 1     Colon Cancer Screening    RSV Vaccine                  Health Maintenance Topic(s) Outreach Outcomes & Actions Taken:    Colorectal Cancer Screening - Outreach Outcomes & Actions Taken  : Colonoscopy Case Request / Referral / Home Test Order Placed and [employer] was mailed to patient on 7/16/2025 7:20 AM

## 2025-07-28 ENCOUNTER — OFFICE VISIT (OUTPATIENT)
Dept: PAIN MEDICINE | Facility: CLINIC | Age: 65
End: 2025-07-28
Payer: COMMERCIAL

## 2025-07-28 VITALS
BODY MASS INDEX: 37.98 KG/M2 | WEIGHT: 295.94 LBS | HEART RATE: 64 BPM | SYSTOLIC BLOOD PRESSURE: 118 MMHG | HEIGHT: 74 IN | DIASTOLIC BLOOD PRESSURE: 74 MMHG

## 2025-07-28 DIAGNOSIS — M54.6 CHRONIC RIGHT-SIDED THORACIC BACK PAIN: ICD-10-CM

## 2025-07-28 DIAGNOSIS — G89.29 CHRONIC RIGHT-SIDED THORACIC BACK PAIN: ICD-10-CM

## 2025-07-28 DIAGNOSIS — M54.50 LUMBAR BACK PAIN: Primary | ICD-10-CM

## 2025-07-28 DIAGNOSIS — M48.061 SPINAL STENOSIS OF LUMBAR REGION WITHOUT NEUROGENIC CLAUDICATION: ICD-10-CM

## 2025-07-28 DIAGNOSIS — M47.816 LUMBAR SPONDYLOSIS: ICD-10-CM

## 2025-07-28 PROCEDURE — 4010F ACE/ARB THERAPY RXD/TAKEN: CPT | Mod: CPTII,S$GLB,, | Performed by: PHYSICIAN ASSISTANT

## 2025-07-28 PROCEDURE — 3074F SYST BP LT 130 MM HG: CPT | Mod: CPTII,S$GLB,, | Performed by: PHYSICIAN ASSISTANT

## 2025-07-28 PROCEDURE — 3061F NEG MICROALBUMINURIA REV: CPT | Mod: CPTII,S$GLB,, | Performed by: PHYSICIAN ASSISTANT

## 2025-07-28 PROCEDURE — 99999 PR PBB SHADOW E&M-EST. PATIENT-LVL IV: CPT | Mod: PBBFAC,,, | Performed by: PHYSICIAN ASSISTANT

## 2025-07-28 PROCEDURE — 1159F MED LIST DOCD IN RCRD: CPT | Mod: CPTII,S$GLB,, | Performed by: PHYSICIAN ASSISTANT

## 2025-07-28 PROCEDURE — 3008F BODY MASS INDEX DOCD: CPT | Mod: CPTII,S$GLB,, | Performed by: PHYSICIAN ASSISTANT

## 2025-07-28 PROCEDURE — 3078F DIAST BP <80 MM HG: CPT | Mod: CPTII,S$GLB,, | Performed by: PHYSICIAN ASSISTANT

## 2025-07-28 PROCEDURE — 3066F NEPHROPATHY DOC TX: CPT | Mod: CPTII,S$GLB,, | Performed by: PHYSICIAN ASSISTANT

## 2025-07-28 PROCEDURE — 1160F RVW MEDS BY RX/DR IN RCRD: CPT | Mod: CPTII,S$GLB,, | Performed by: PHYSICIAN ASSISTANT

## 2025-07-28 PROCEDURE — 99204 OFFICE O/P NEW MOD 45 MIN: CPT | Mod: S$GLB,,, | Performed by: PHYSICIAN ASSISTANT

## 2025-07-28 NOTE — PROGRESS NOTES
[facility] New Patient Evaluation      Referred by: Aaareferral Self    PCP:  Jonah Dia MD    CC:   Chief Complaint   Patient presents with    Low-back Pain     Right side           HPI:   Abel Dc III is a 64 y.o. year old male patient who has a past medical history of Bipolar disorder, BPH (benign prostatic hypertrophy), Chronic low back pain, Depression, GERD (gastroesophageal reflux disease), Gout, Gout, arthritis, Hyperlipidemia, Hypertension, Lumbar disc disease, Obesity, and Panic disorder. He presents in self referral for back pain.  He reports an injury to the lower back in his 20s working as a  for [employer].   He is known from a CT scan in February 2023 to have significant lumbar stenosis at L4-5 with more moderate stenosis at L2-3 and L3-4.  He describes chronic and daily pain affecting the right side of the back from the subscapular area on the right down to the right baseline pain it also radiates across the entire lumbar region.  Denies any radicular leg pain, numbness, tingling.  Pain is felt daily and affect his daily activities.  He uses Tylenol for pain control when needed.  In the past used Advil, however now with stage 3 kidney disease he avoids NSAIDs.  He recalls chiropractic care in the past temporary relief.  Last visit was February 2025.  He has never had physical therapy    Denies bowel/ bladder incontinence.    Past and current medications:  Antineuropathics:  NSAIDs: ibuprofen in the past  Antidepressants:  Muscle relaxers:  Opioids:  Antiplatelets/Anticoagulants:  Others:  tylenol    Physical Therapy/ Chiropractic care:  Chiropratcic care - in the past with last visti in February 2025  PT - none    Pain Intervention History:  none    Past Spine Surgical History:  none        History:  Current Medications[1]    Past Medical History:   Diagnosis Date    Bipolar disorder     manic depressive x 1992    BPH (benign prostatic hypertrophy)     Chronic low back pain   "   Depression     GERD (gastroesophageal reflux disease)     Gout     Gout, arthritis     Hyperlipidemia     Hypertension     Lumbar disc disease     Obesity     Panic disorder        Past Surgical History:   Procedure Laterality Date    A-V CARDIAC PACEMAKER INSERTION Left 2/18/2023    Procedure: INSERTION, CARDIAC PACEMAKER, DUAL CHAMBER;  Surgeon: Carlos Smith MD;  Location: [facility];  Service: Cardiology;  Laterality: Left;    DECOMPRESSION OF NERVE Left 5/15/2025    Procedure: DECOMPRESSION, NERVE;  Surgeon: Kristopher Santos DPM;  Location: [facility] OR;  Service: Podiatry;  Laterality: Left;  deep peroneal nerve on the dorsal foot    HERNIA REPAIR      HUMERUS FRACTURE SURGERY  1971    Bone grafts required    INSERTION, PACEMAKER, TEMPORARY TRANSVENOUS  2/18/2023    Procedure: Insertion, Pacemaker, Temporary Transvenous;  Surgeon: Carlos Smith MD;  Location: [facility];  Service: Cardiology;;    MANDIBLE FRACTURE SURGERY      MUSCLE TRANSFER UPPER ARM      SHOULDER ARTHROSCOPY      SHOULDER SURGERY      TONSILLECTOMY         Family History   Problem Relation Name Age of Onset    Diabetes Mother      Breast cancer Mother      Dementia Father      Marfan syndrome Daughter         Social History     Socioeconomic History    Marital status: Other   Occupational History    Occupation: disability for manic-depressive state   Tobacco Use    Smoking status: Never    Smokeless tobacco: Current     Types: Snuff    Tobacco comments:     Patient uses "dip" tobacco   Substance and Sexual Activity    Alcohol use: No     Comment: Heavy alcohol use in the past    Drug use: No    Sexual activity: Yes     Partners: Female   Social History Narrative    No longer working, is on disability     Social Drivers of Health     Financial Resource Strain: Low Risk  (5/8/2024)    Overall Financial Resource Strain (CARDIA)     Difficulty of Paying Living Expenses: Not hard at all   Food Insecurity: No Food Insecurity " "(5/8/2024)    Hunger Vital Sign     Worried About Running Out of Food in the Last Year: Never true     Ran Out of Food in the Last Year: Never true   Transportation Needs: No Transportation Needs (5/8/2024)    PRAPARE - Transportation     Lack of Transportation (Medical): No     Lack of Transportation (Non-Medical): No   Physical Activity: Inactive (5/8/2024)    Exercise Vital Sign     Days of Exercise per Week: 0 days     Minutes of Exercise per Session: 0 min   Stress: No Stress Concern Present (5/8/2024)    [employer] [place] [employer] - Occupational Stress Questionnaire     Feeling of Stress : Not at all   Housing Stability: Low Risk  (4/5/2023)    Housing Stability Vital Sign     Unable to Pay for Housing in the Last Year: No     Number of Places Lived in the Last Year: 2     Unstable Housing in the Last Year: No       Review of patient's allergies indicates:   Allergen Reactions    Amitriptyline Other (See Comments) and Hallucinations     confusion    Hydrocodone      Other reaction(s): Hallucinations    Hydrocodone-acetaminophen      Other reaction(s): hyperactivity    Oxycodone Other (See Comments)     hallucinations    Pseudoephedrine      Other reaction(s): Hallucinations    Pseudoephedrine hcl      Other reaction(s): hyperactivity    Risperidone      Other reaction(s): Hallucinations  Other reaction(s): hyperactivity    Trazodone      Adverse reaction    Naproxen        Labs:  Lab Results   Component Value Date    HGBA1C 5.1 09/07/2023       Lab Results   Component Value Date    WBC 10.03 02/27/2025    HGB 13.3 (L) 02/27/2025    HCT 41.6 02/27/2025    MCV 96 02/27/2025     02/27/2025           Review of Systems:  Thoracic back pain.  Lower back pain .  Balance of review of systems is negative.    Physical Exam:  Vitals:    07/28/25 1331   BP: 118/74   Pulse: 64   Weight: 134.2 kg (295 lb 15.5 oz)   Height: 6' 2" (1.88 m)   PainSc: 1    PainLoc: Back     Body mass index is 38 " kg/m².    Pain disability index:      7/28/2025     1:29 PM   Last 3 PDI Scores   Pain Disability Index (PDI) 18       Gen: NAD  Psych: mood appropriate for given condition  HEENT: eyes anicteric   CV: RRR, 2+ radial pulse  Respiratory: non-labored, no signs of respiratory distress  Abd: non-distended  Skin: warm, dry and intact.  Gait: Antalgic gait.     Cervical spine: ROM is full in flexion, extension and lateral rotation without increased pain.  Spurling's maneuver causes no neck pain to either side.  Myofascial exam: No Tenderness to palpation across cervical paraspinous region bilaterally.    Lumbar spine:  Lumbar spine: ROM is full with flexion extension and oblique extension with no increased pain.    Supine straight leg raise is negative bilaterally.    Myofascial exam: No tenderness to palpation across lumbar paraspinous muscles. No tenderness to palpation over the bilateral greater trochanters and bilateral SI joint    Sensory:  Intact and symmetrical to light touch in C4-T1 dermatomes bilaterally. Intact and symmetrical to light touch in L1-S1 dermatomes bilaterally.    Motor:    Right Left   C4 Shoulder Abduction  5  5   C5 Elbow Flexion    5  5   C6 Wrist Extension  5  5   C7 Elbow Extension   5  5   C8/T1 Hand Intrinsics   5  5        Right Left   L2/3 Iliacus Hip flexion  5  5   L3/4 Qudratus Femoris Knee Extension  5  5   L4/5 Tib Anterior Ankle Dorsiflexion   5  5   L5/S1 Extensor Hallicus Longus Great toe extension  5  5   S1/S2 Gastroc/Soleus Plantar Flexion  5  5      Right Left   Triceps DTR 0+ 0+   Biceps DTR 0+ 0+   Brachioradialis DTR 0+ 0+   Patellar DTR 0+ 0+   Achilles DTR 0+ 0+   Dugan Absent  Absent   Clonus Absent Absent   Babinski Absent Absent       Imaging:    CT lumbar spine 2-7-23:  FINDINGS:  There is a dextroscoliosis of the lumbar spine.  Five lumbar type vertebral bodies.  There is a grade 1 anterolisthesis of L5 on S1 associated with bilateral pars interarticularis defects.   Grade 1 anterolisthesis of L4 on L5.  There is also mild retrolisthesis of L3 on L4 and L2 on L3 and L1 on L2.  Severe degenerative disc disease throughout the lumbar spine.  Vertebral body heights are maintained.  No definite signs for acute fractures.  L5-S1: Approximately 5 mm anterolisthesis of L5 on S1 associated with bilateral pars interarticularis defects.  Broad-based posterior osteophyte and disc complex.  Severe degenerative hypertrophic facet arthropathy bilaterally.  There is also bilateral multi factorial foraminal stenosis worse on the right compared to the left.  In the right neural foramen compression of the exiting right L5 root not excluded (image 97 series 9).  L4-5: Severe spondylotic changes associated with a approximately 5 mm anterolisthesis of L4 on L5.  There is vacuum phenomena.  Endplate sclerotic changes.  Degenerative hypertrophic facet arthropathy.  The enlarged facet joints and osteophytes encroach the spinal canal causing a severe central canal spinal stenosis behind the L5 vertebral body and at the L4-5 disc space.  There is a severe right foraminal stenosis and a severe left foraminal stenosis.  Schmorl's nodes along the opposing endplates at the L5-S1 disc space.  L3-4: Severe spondylotic changes with disc space narrowing and marginal anterior and posterior osteophytes.  There is a posterior osteophyte and disc complex at least moderate central canal spinal stenosis.  Facet arthropathy bilaterally.  Moderate to severe left foraminal stenosis and mild right foraminal stenosis.  L2-3: Spondylosis with disc space narrowing and anterior bridging osteophytes.  Schmorl's node along the inferior endplate of L2.  Broad-based posterior osteophyte.  There is approximately 3-4 mm retrolisthesis of L2 on L3.  Mild central canal spinal stenosis.  Moderate to severe left foraminal stenosis and mild right foraminal stenosis.  There is at least mild central canal spinal stenosis.  L1-2: Severe  disc degeneration with disc space narrowing and marginal anterior and posterior osteophytes.  There is bilateral facet arthropathy.  T12-L1: Severe spondylotic changes with severe disc space narrowing.  No significant central canal spinal stenosis.  There is a prominent thin walled cyst associated with the right kidney.     Impression:  1. No acute lumbar spine fractures.  2. Multilevel severe degenerative disc disease throughout the lumbar spine.  There is severe central canal spinal stenosis L4-5 disc space and at least moderate central canal spinal stenosis at the L3-4 and L2-3 disc spaces.  There is multilevel foraminal stenotic disease.  See further details at each disc space level.      Assessment:   Abel Dc III is a 64 y.o. year old male patient who has a past medical history of Bipolar disorder, BPH (benign prostatic hypertrophy), Chronic low back pain, Depression, GERD (gastroesophageal reflux disease), Gout, Gout, arthritis, Hyperlipidemia, Hypertension, Lumbar disc disease, Obesity, and Panic disorder. He presents in self referral for thoracic and lumbar back pain.    Right thoracolumbar back pain.  Bilateral lower lumbar back pain.  No radiculopathy.  No neurological deficits.    CT lumbar spine from 02/07/2023 reviewed.  Multilevel degenerative changes degenerative disc desiccation.  There is severe central canal stenosis at L4-5.  More moderate central canal stenosis noted at L2-3 and L3-4.    He has no neurogenic claudication or radicular symptoms from degenerative changes and stenosis.  Focused thoracolumbar [place] in the right side likely strong myofascial component.  Lower lumbar back pain bilaterally can be myofascial and/or related to degenerative changes in lumbar spine.  He has no pain with facet loading.    Plan:  -to help with back pain, we recommend home exercise, weight loss, physical therapy  - he is going to pursue PT.  Order placed.  -if no improvement with PT, we will consider  further imaging with MRI  -  follow up at about 6 weeks to monitor progress with PT or dooner if needed    Problem List Items Addressed This Visit       Lumbar stenosis     Other Visit Diagnoses         Lumbar back pain    -  Primary    Relevant Orders    Ambulatory Referral/Consult to Physical Therapy      Chronic right-sided thoracic back pain        Relevant Orders    Ambulatory Referral/Consult to Physical Therapy      Lumbar spondylosis        Relevant Orders    Ambulatory Referral/Consult to Physical Therapy            Follow Up: RTC in 6 weeks    : Reviewed and consistent with medication use as prescribed.    Thank you for referring this interesting patient, and I look forward to continuing to collaborate in his care.        Mariposa Luis PA-C  [facility]         [1]   Current Outpatient Medications:     allopurinoL (ZYLOPRIM) 100 MG tablet, TAKE 2 TABLETS(200 MG) BY MOUTH EVERY DAY, Disp: 180 tablet, Rfl: 3    amLODIPine (NORVASC) 5 MG tablet, Take 1 tablet (5 mg total) by mouth 2 (two) times daily., Disp: 180 tablet, Rfl: 3    apixaban (ELIQUIS) 5 mg Tab, Take 1 tablet (5 mg total) by mouth 2 (two) times daily., Disp: 180 tablet, Rfl: 3    ascorbic acid, vitamin C, (VITAMIN C) 1000 MG tablet, Take 1,000 mg by mouth once daily., Disp: , Rfl:     aspirin 81 mg Tab, Take 1 tablet by mouth once daily., Disp: , Rfl:     busPIRone (BUSPAR) 15 MG tablet, Take 15 mg by mouth 3 (three) times daily. , Disp: , Rfl: 4    cholecalciferol, vitamin D3, 2,000 unit Cap, Take 1 capsule by mouth once daily., Disp: , Rfl:     cyanocobalamin (VITAMIN B-12) 1000 MCG tablet, Take 100 mcg by mouth once daily., Disp: , Rfl:     ELIQUIS 5 mg Tab, TAKE 1 TABLET(5 MG) BY MOUTH TWICE DAILY, Disp: 180 tablet, Rfl: 3    FLUoxetine 40 MG capsule, Take 40 mg by mouth once daily., Disp: , Rfl: 5    fluticasone propionate (FLONASE) 50 mcg/actuation nasal spray, SPRAY ONCE IN EACH NOSTRIL EVERY DAY, Disp: 48 g, Rfl:  2    gabapentin (NEURONTIN) 300 MG capsule, Take 2 capsules (600 mg total) by mouth 3 (three) times daily., Disp: 270 capsule, Rfl: 3    metoprolol succinate (TOPROL-XL) 50 MG 24 hr tablet, TAKE 1 TABLET(50 MG) BY MOUTH EVERY DAY, Disp: 30 tablet, Rfl: 11    olmesartan (BENICAR) 5 MG Tab, Take 1 tablet (5 mg total) by mouth once daily., Disp: 30 tablet, Rfl: 11    omeprazole (PRILOSEC) 20 MG capsule, TAKE 1 CAPSULE BY MOUTH EVERY DAY, Disp: 90 capsule, Rfl: 3    OXcarbazepine (TRILEPTAL) 300 MG Tab, Take 600 mg by mouth once daily., Disp: , Rfl: 3    pravastatin (PRAVACHOL) 40 MG tablet, TAKE 1 TABLET(40 MG) BY MOUTH EVERY EVENING, Disp: 90 tablet, Rfl: 3    quetiapine (SEROQUEL) 50 MG tablet, Take 50 mg by mouth nightly., Disp: , Rfl: 0    zinc gluconate 50 mg tablet, Take 50 mg by mouth once daily., Disp: , Rfl:

## 2025-07-30 ENCOUNTER — TELEPHONE (OUTPATIENT)
Dept: FAMILY MEDICINE | Facility: CLINIC | Age: 65
End: 2025-07-30
Payer: COMMERCIAL

## 2025-08-01 ENCOUNTER — PATIENT MESSAGE (OUTPATIENT)
Dept: PODIATRY | Facility: CLINIC | Age: 65
End: 2025-08-01
Payer: COMMERCIAL

## 2025-08-03 ENCOUNTER — PATIENT MESSAGE (OUTPATIENT)
Dept: PODIATRY | Facility: CLINIC | Age: 65
End: 2025-08-03
Payer: COMMERCIAL

## 2025-08-05 ENCOUNTER — PATIENT MESSAGE (OUTPATIENT)
Dept: PODIATRY | Facility: CLINIC | Age: 65
End: 2025-08-05
Payer: COMMERCIAL

## 2025-08-05 ENCOUNTER — OFFICE VISIT (OUTPATIENT)
Dept: OTOLARYNGOLOGY | Facility: CLINIC | Age: 65
End: 2025-08-05
Payer: COMMERCIAL

## 2025-08-05 VITALS — WEIGHT: 295 LBS | BODY MASS INDEX: 37.86 KG/M2 | HEIGHT: 74 IN

## 2025-08-05 DIAGNOSIS — H61.23 BILATERAL IMPACTED CERUMEN: Primary | ICD-10-CM

## 2025-08-05 PROCEDURE — 99999 PR PBB SHADOW E&M-EST. PATIENT-LVL III: CPT | Mod: PBBFAC,,, | Performed by: PHYSICIAN ASSISTANT

## 2025-08-05 RX ORDER — AMOXICILLIN AND CLAVULANATE POTASSIUM 875; 125 MG/1; MG/1
1 TABLET, FILM COATED ORAL 2 TIMES DAILY
COMMUNITY
Start: 2025-08-02

## 2025-08-05 RX ORDER — OFLOXACIN 3 MG/ML
SOLUTION AURICULAR (OTIC)
COMMUNITY
Start: 2025-08-02

## 2025-08-05 RX ORDER — CIPROFLOXACIN AND DEXAMETHASONE 3; 1 MG/ML; MG/ML
4 SUSPENSION/ DROPS AURICULAR (OTIC) 2 TIMES DAILY
COMMUNITY
Start: 2025-07-20

## 2025-08-05 NOTE — PROCEDURES
Ear Cerumen Removal    Date/Time: 8/5/2025 9:45 AM    Performed by: Mendoza De Santiago PA-C  Authorized by: Mendoza De Santiago PA-C      Local anesthetic:  None  Location details:  Both ears  Procedure type: curette    Procedure type comment:  Suction  Cerumen  Removal Results:  Cerumen completely removed  Patient tolerance:  Patient tolerated the procedure well with no immediate complications

## 2025-08-05 NOTE — PROGRESS NOTES
[facility]    Subjective:      Patient: Abel Dc III Patient PCP: Jonah Dia MD         :  1960     Sex:  male      MRN:  [id number]          Date of Visit: 2025      Chief Complaint: Possible TM perforation, left    Patient ID: Abel Dc III is a 64 y.o. male who presents to clinic for evaluation of possible left TM perforation. He reports two recent urgent care visits on Thursday and Saturday related to a possible left TM perforation after using debrox kit to try to clean his ears. He was prescribed 7 days of Amoxil and floxin drops that he has been taking/using for 4 days. He reports muffled hearing in the left ear and persistent ringing in the left ear, which he finds irritating and bothersome. He denies current ear pain, ear discharge, and confirms the ringing is not synchronized with his heartbeat.    MEDICAL HISTORY:  He has no history of ear surgery, ear tubes, or frequent ear infections. This appears to be a first-time occurrence.         Past Medical History  He has a past medical history of Bipolar disorder, BPH (benign prostatic hypertrophy), Chronic low back pain, Depression, GERD (gastroesophageal reflux disease), Gout, Gout, arthritis, Hyperlipidemia, Hypertension, Lumbar disc disease, Obesity, and Panic disorder.    Family History  His family history includes Breast cancer in his mother; Dementia in his father; Diabetes in his mother; Marfan syndrome in his daughter.    Past Surgical History:   Procedure Laterality Date    A-V CARDIAC PACEMAKER INSERTION Left 2023    Procedure: INSERTION, CARDIAC PACEMAKER, DUAL CHAMBER;  Surgeon: Carlos Smith MD;  Location: [facility] CATH;  Service: Cardiology;  Laterality: Left;    DECOMPRESSION OF NERVE Left 5/15/2025    Procedure: DECOMPRESSION, NERVE;  Surgeon: Kristopher Santos DPM;  Location[facility] OR;  Service: Podiatry;  Laterality: Left;  deep peroneal nerve on the dorsal foot    HERNIA REPAIR      HUMERUS FRACTURE  "SURGERY  1971    Bone grafts required    INSERTION, PACEMAKER, TEMPORARY TRANSVENOUS  2/18/2023    Procedure: Insertion, Pacemaker, Temporary Transvenous;  Surgeon: Carlos Smith MD;  Location: [facility];  Service: Cardiology;;    MANDIBLE FRACTURE SURGERY      MUSCLE TRANSFER UPPER ARM      SHOULDER ARTHROSCOPY      SHOULDER SURGERY      TONSILLECTOMY       Social History     Tobacco Use    Smoking status: Never    Smokeless tobacco: Current     Types: Snuff    Tobacco comments:     Patient uses "dip" tobacco   Substance and Sexual Activity    Alcohol use: No     Comment: Heavy alcohol use in the past    Drug use: No    Sexual activity: Yes     Partners: Female     Medications  He has a current medication list which includes the following prescription(s): allopurinol, amlodipine, amoxicillin-clavulanate 875-125mg, apixaban, ascorbic acid (vitamin c), aspirin, buspirone, cholecalciferol (vitamin d3), ciprofloxacin-dexamethasone 0.3-0.1%, cyanocobalamin, eliquis, fluoxetine, fluticasone propionate, gabapentin, metoprolol succinate, ofloxacin, olmesartan, omeprazole, oxcarbazepine, pravastatin, quetiapine, and zinc gluconate.    Review of patient's allergies indicates:   Allergen Reactions    Amitriptyline Other (See Comments) and Hallucinations     confusion    Hydrocodone      Other reaction(s): Hallucinations    Hydrocodone-acetaminophen      Other reaction(s): hyperactivity    Oxycodone Other (See Comments)     hallucinations    Pseudoephedrine      Other reaction(s): Hallucinations    Pseudoephedrine hcl      Other reaction(s): hyperactivity    Risperidone      Other reaction(s): Hallucinations  Other reaction(s): hyperactivity    Trazodone      Adverse reaction    Naproxen      All medications, allergies, and past history have been reviewed.    Objective:      Vitals:      6/11/2025     1:35 PM 7/28/2025     1:31 PM 8/5/2025     9:11 AM   Vitals - 1 value per visit   SYSTOLIC  118    DIASTOLIC  74  " "  Pulse  64    Weight (lb) 296.96 295.97 294.98   Weight (kg) 134.7 134.25 133.8   Height 6' 2" (1.88 m) 6' 2" (1.88 m) 6' 2" (1.88 m)   BMI (Calculated) 38.1 38 37.9   Pain Score Zero One        Body surface area is 2.64 meters squared.    Physical Exam  Constitutional:       General: He is not in acute distress.     Appearance: Normal appearance. He is not ill-appearing.   HENT:      Head: Normocephalic and atraumatic.      Right Ear: Tympanic membrane, ear canal and external ear normal. There is impacted cerumen.      Left Ear: Tympanic membrane, ear canal and external ear normal. There is impacted cerumen.      Nose: Nose normal.      Mouth/Throat:      Lips: Pink. No lesions.      Mouth: Mucous membranes are moist. No oral lesions.      Tongue: No lesions.      Palate: No lesions.      Pharynx: Oropharynx is clear. Uvula midline. No pharyngeal swelling, oropharyngeal exudate, posterior oropharyngeal erythema or uvula swelling.      Tonsils: 0 on the right. 0 on the left.      Comments: S/p tonsillectomy.  Eyes:      General:         Right eye: No discharge.         Left eye: No discharge.      Extraocular Movements: Extraocular movements intact.      Conjunctiva/sclera: Conjunctivae normal.   Pulmonary:      Effort: Pulmonary effort is normal.   Neurological:      General: No focal deficit present.      Mental Status: He is alert and oriented to person, place, and time. Mental status is at baseline.   Psychiatric:         Mood and Affect: Mood normal.         Behavior: Behavior normal.         Thought Content: Thought content normal.         Judgment: Judgment normal.     Ear Cerumen Removal     Date/Time: 8/5/2025 9:45 AM     Performed by: Mendoza De Santiago PA-C  Authorized by: Mendoza De Santiago PA-C       Local anesthetic:  None  Location details:  Both ears  Procedure type: curette    Procedure type comment:  Suction  Cerumen  Removal Results:  Cerumen completely removed  Patient tolerance:  " Patient tolerated the procedure well with no immediate complications    Labs:  WBC   Date Value Ref Range Status   02/27/2025 10.03 3.90 - 12.70 K/uL Final     Platelets   Date Value Ref Range Status   02/27/2025 261 150 - 450 K/uL Final     Creatinine   Date Value Ref Range Status   02/27/2025 1.6 (H) 0.5 - 1.4 mg/dL Final     TSH   Date Value Ref Range Status   02/18/2023 0.669 0.400 - 4.000 uIU/mL Final     Comment:     Warning:  Heterophilic antibodies in serum or plasma of   certain individuals are known to cause interference with   immunoassays. These antibodies may be present in blood samples   from individuals regularly exposed to animal or who have been   treated with animal products.     Patients taking very high Biotin doses of >300 mcg/day may   cause a negative bias in this assay.       Glucose   Date Value Ref Range Status   02/27/2025 89 70 - 110 mg/dL Final     Hemoglobin A1C   Date Value Ref Range Status   09/07/2023 5.1 4.0 - 5.6 % Final     Comment:     ADA Screening Guidelines:  5.7-6.4%  Consistent with prediabetes  >or=6.5%  Consistent with diabetes    High levels of fetal hemoglobin interfere with the HbA1C  assay. Heterozygous hemoglobin variants (HbS, HgC, etc)do  not significantly interfere with this assay.   However, presence of multiple variants may affect accuracy.       All lab results and imaging results have been reviewed.    Assessment:        ICD-10-CM ICD-9-CM   1. Bilateral impacted cerumen  H61.23 380.4            Plan:      IMPACTED CERUMEN AU:  - Bilateral ear cleaning performed today in office.   - Discovered significant earwax buildup, initially mistaken for perforation.  - Removed earwax and re-examined, confirming intact eardrum.  - Determined no perforation present, attributing symptoms to wax buildup against eardrum.  - Discontinued previously prescribed antibiotics and ear drops.    Follow up for annual ear cleaning.     This note was generated with the assistance of  ambient listening technology. Verbal consent was obtained by the patient and accompanying visitor(s) for the recording of patient appointment to facilitate this note. I attest to having reviewed and edited the generated note for accuracy, though some syntax or spelling errors may persist. Please contact the author of this note for any clarification.

## 2025-08-06 ENCOUNTER — CLINICAL SUPPORT (OUTPATIENT)
Dept: REHABILITATION | Facility: HOSPITAL | Age: 65
End: 2025-08-06
Payer: COMMERCIAL

## 2025-08-06 DIAGNOSIS — M54.6 CHRONIC RIGHT-SIDED THORACIC BACK PAIN: Primary | ICD-10-CM

## 2025-08-06 DIAGNOSIS — R29.898 DECREASED STRENGTH OF TRUNK AND BACK: ICD-10-CM

## 2025-08-06 DIAGNOSIS — M25.69 DECREASED ROM OF TRUNK AND BACK: ICD-10-CM

## 2025-08-06 DIAGNOSIS — G89.29 CHRONIC RIGHT-SIDED THORACIC BACK PAIN: Primary | ICD-10-CM

## 2025-08-06 DIAGNOSIS — M54.50 CHRONIC BILATERAL LOW BACK PAIN WITHOUT SCIATICA: ICD-10-CM

## 2025-08-06 DIAGNOSIS — G89.29 CHRONIC BILATERAL LOW BACK PAIN WITHOUT SCIATICA: ICD-10-CM

## 2025-08-06 PROCEDURE — 97530 THERAPEUTIC ACTIVITIES: CPT | Mod: PO | Performed by: PHYSICAL THERAPIST

## 2025-08-06 PROCEDURE — 97110 THERAPEUTIC EXERCISES: CPT | Mod: PO | Performed by: PHYSICAL THERAPIST

## 2025-08-06 PROCEDURE — 97162 PT EVAL MOD COMPLEX 30 MIN: CPT | Mod: PO | Performed by: PHYSICAL THERAPIST
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
## 2025-08-06 NOTE — PATIENT INSTRUCTIONS
BRACE SUPINE MARCHING    While lying on your back with your knees bent,  slowly raise up one foot a few inches and then set it back down.  Next, perform on your other leg.  Use your stomach muscles to keep your spine from moving. 20x.

## 2025-08-07 ENCOUNTER — PATIENT MESSAGE (OUTPATIENT)
Dept: PODIATRY | Facility: CLINIC | Age: 65
End: 2025-08-07
Payer: COMMERCIAL

## 2025-08-07 PROBLEM — G89.29 CHRONIC BILATERAL LOW BACK PAIN WITHOUT SCIATICA: Status: ACTIVE | Noted: 2025-08-07

## 2025-08-07 PROBLEM — M54.50 CHRONIC BILATERAL LOW BACK PAIN WITHOUT SCIATICA: Status: ACTIVE | Noted: 2025-08-07

## 2025-08-07 PROBLEM — R29.898 DECREASED STRENGTH OF TRUNK AND BACK: Status: ACTIVE | Noted: 2025-08-07

## 2025-08-07 PROBLEM — M25.69 DECREASED ROM OF TRUNK AND BACK: Status: ACTIVE | Noted: 2025-08-07

## 2025-08-07 PROBLEM — G89.29 CHRONIC RIGHT-SIDED THORACIC BACK PAIN: Status: ACTIVE | Noted: 2025-08-07

## 2025-08-07 PROBLEM — M54.6 CHRONIC RIGHT-SIDED THORACIC BACK PAIN: Status: ACTIVE | Noted: 2025-08-07

## 2025-08-11 ENCOUNTER — OFFICE VISIT (OUTPATIENT)
Dept: PODIATRY | Facility: CLINIC | Age: 65
End: 2025-08-11
Payer: COMMERCIAL

## 2025-08-11 VITALS — HEIGHT: 74 IN | WEIGHT: 295 LBS | BODY MASS INDEX: 37.86 KG/M2

## 2025-08-11 DIAGNOSIS — G62.9 PERIPHERAL POLYNEUROPATHY: Primary | ICD-10-CM

## 2025-08-11 PROCEDURE — 1160F RVW MEDS BY RX/DR IN RCRD: CPT | Mod: CPTII,S$GLB,, | Performed by: PODIATRIST

## 2025-08-11 PROCEDURE — 4010F ACE/ARB THERAPY RXD/TAKEN: CPT | Mod: CPTII,S$GLB,, | Performed by: PODIATRIST

## 2025-08-11 PROCEDURE — 3066F NEPHROPATHY DOC TX: CPT | Mod: CPTII,S$GLB,, | Performed by: PODIATRIST

## 2025-08-11 PROCEDURE — 99024 POSTOP FOLLOW-UP VISIT: CPT | Mod: S$GLB,,, | Performed by: PODIATRIST

## 2025-08-11 PROCEDURE — 3061F NEG MICROALBUMINURIA REV: CPT | Mod: CPTII,S$GLB,, | Performed by: PODIATRIST

## 2025-08-11 PROCEDURE — 99999 PR PBB SHADOW E&M-EST. PATIENT-LVL III: CPT | Mod: PBBFAC,,, | Performed by: PODIATRIST

## 2025-08-11 PROCEDURE — 1159F MED LIST DOCD IN RCRD: CPT | Mod: CPTII,S$GLB,, | Performed by: PODIATRIST

## 2025-08-11 RX ORDER — PREGABALIN 150 MG/1
150 CAPSULE ORAL 2 TIMES DAILY
Qty: 60 CAPSULE | Refills: 11 | Status: SHIPPED | OUTPATIENT
Start: 2025-08-11 | End: 2025-08-15 | Stop reason: ALTCHOICE

## 2025-08-12 ENCOUNTER — PATIENT MESSAGE (OUTPATIENT)
Dept: ADMINISTRATIVE | Facility: OTHER | Age: 65
End: 2025-08-12
Payer: COMMERCIAL

## 2025-08-14 ENCOUNTER — PATIENT MESSAGE (OUTPATIENT)
Dept: PODIATRY | Facility: CLINIC | Age: 65
End: 2025-08-14
Payer: COMMERCIAL

## 2025-08-15 ENCOUNTER — PATIENT MESSAGE (OUTPATIENT)
Dept: PODIATRY | Facility: CLINIC | Age: 65
End: 2025-08-15
Payer: COMMERCIAL

## 2025-08-15 RX ORDER — GABAPENTIN 600 MG/1
600 TABLET ORAL 3 TIMES DAILY
Qty: 90 TABLET | Refills: 11 | Status: SHIPPED | OUTPATIENT
Start: 2025-08-15 | End: 2026-08-15

## 2025-08-19 ENCOUNTER — PATIENT MESSAGE (OUTPATIENT)
Dept: ADMINISTRATIVE | Facility: HOSPITAL | Age: 65
End: 2025-08-19
Payer: COMMERCIAL

## 2025-08-21 ENCOUNTER — PATIENT MESSAGE (OUTPATIENT)
Dept: FAMILY MEDICINE | Facility: CLINIC | Age: 65
End: 2025-08-21
Payer: COMMERCIAL

## 2025-08-21 ENCOUNTER — PATIENT MESSAGE (OUTPATIENT)
Dept: PODIATRY | Facility: CLINIC | Age: 65
End: 2025-08-21
Payer: COMMERCIAL

## 2025-08-29 ENCOUNTER — APPOINTMENT (OUTPATIENT)
Dept: LAB | Facility: HOSPITAL | Age: 65
End: 2025-08-29
Attending: STUDENT IN AN ORGANIZED HEALTH CARE EDUCATION/TRAINING PROGRAM
Payer: COMMERCIAL

## (undated) DEVICE — DRAPE T EXTRM SURG 121X128X90

## (undated) DEVICE — APPLICATOR CHLORAPREP ORN 26ML

## (undated) DEVICE — STOCKINET 4INX48

## (undated) DEVICE — DRESSING XEROFORM NONADH 1X8IN

## (undated) DEVICE — DRAPE THREE-QTR REINF 53X77IN

## (undated) DEVICE — DRAPE STERI-DRAPE 1000 17X11IN

## (undated) DEVICE — KIT SAHARA DRAPE DRAW/LIFT

## (undated) DEVICE — STAPLER SKIN PROXIMATE REG

## (undated) DEVICE — PENCIL ROCKER SWITCH 10FT CORD

## (undated) DEVICE — BLADE SURG CARBON STEEL SZ11

## (undated) DEVICE — BANDAGE ESMARK 6X12

## (undated) DEVICE — FORCEP STRAIGHT DISP

## (undated) DEVICE — HANDLE SURG LIGHT NONRIGID

## (undated) DEVICE — BANDAGE MATRIX HK LOOP 4IN 5YD

## (undated) DEVICE — BANDAGE ESMARK LATEX FREE 4INX

## (undated) DEVICE — Device

## (undated) DEVICE — TOURNIQUET SB QC DP 30X4IN

## (undated) DEVICE — ELECTRODE REM PLYHSV RETURN 9

## (undated) DEVICE — CORD BIPOLAR 12 FOOT

## (undated) DEVICE — STRAP OR TABLE 5IN X 72IN

## (undated) DEVICE — SYR 10CC LUER LOCK

## (undated) DEVICE — BLADE SURG #15 CARBON STEEL

## (undated) DEVICE — SOL IRR 0.9% NACL 500ML PB

## (undated) DEVICE — SPONGE COTTON TRAY 4X4IN